# Patient Record
Sex: FEMALE | Race: ASIAN | NOT HISPANIC OR LATINO
[De-identification: names, ages, dates, MRNs, and addresses within clinical notes are randomized per-mention and may not be internally consistent; named-entity substitution may affect disease eponyms.]

---

## 2017-01-03 ENCOUNTER — APPOINTMENT (OUTPATIENT)
Dept: OBGYN | Facility: CLINIC | Age: 37
End: 2017-01-03

## 2017-01-03 VITALS
WEIGHT: 150 LBS | SYSTOLIC BLOOD PRESSURE: 120 MMHG | HEIGHT: 61 IN | DIASTOLIC BLOOD PRESSURE: 70 MMHG | BODY MASS INDEX: 28.32 KG/M2

## 2017-01-03 LAB
BILIRUB UR QL STRIP: NORMAL
GLUCOSE UR-MCNC: NORMAL
HCG UR QL: 0.2 EU/DL
HGB UR QL STRIP.AUTO: NORMAL
KETONES UR-MCNC: NORMAL
LEUKOCYTE ESTERASE UR QL STRIP: NORMAL
NITRITE UR QL STRIP: NORMAL
PH UR STRIP: 6.5
PROT UR STRIP-MCNC: NORMAL
SP GR UR STRIP: 1.01

## 2017-01-04 LAB
ALBUMIN SERPL ELPH-MCNC: 3.8 G/DL
ALP BLD-CCNC: 55 U/L
ALT SERPL-CCNC: 10 U/L
ANION GAP SERPL CALC-SCNC: 15 MMOL/L
AST SERPL-CCNC: 13 U/L
BASOPHILS # BLD AUTO: 0.01 K/UL
BASOPHILS NFR BLD AUTO: 0.1 %
BILIRUB SERPL-MCNC: <0.2 MG/DL
BUN SERPL-MCNC: 6 MG/DL
CALCIUM SERPL-MCNC: 9.2 MG/DL
CHLORIDE SERPL-SCNC: 101 MMOL/L
CO2 SERPL-SCNC: 21 MMOL/L
CREAT SERPL-MCNC: 0.52 MG/DL
EOSINOPHIL # BLD AUTO: 0.08 K/UL
EOSINOPHIL NFR BLD AUTO: 0.8 %
GLUCOSE 1H P 50 G GLC PO SERPL-MCNC: 140 MG/DL
GLUCOSE SERPL-MCNC: 143 MG/DL
HCT VFR BLD CALC: 39.4 %
HGB BLD-MCNC: 12.7 G/DL
IMM GRANULOCYTES NFR BLD AUTO: 0.3 %
LYMPHOCYTES # BLD AUTO: 2.45 K/UL
LYMPHOCYTES NFR BLD AUTO: 24.5 %
MAN DIFF?: NORMAL
MCHC RBC-ENTMCNC: 29.6 PG
MCHC RBC-ENTMCNC: 32.2 GM/DL
MCV RBC AUTO: 91.8 FL
MONOCYTES # BLD AUTO: 0.58 K/UL
MONOCYTES NFR BLD AUTO: 5.8 %
NEUTROPHILS # BLD AUTO: 6.83 K/UL
NEUTROPHILS NFR BLD AUTO: 68.5 %
PLATELET # BLD AUTO: 297 K/UL
POTASSIUM SERPL-SCNC: 4.5 MMOL/L
PROT SERPL-MCNC: 6.9 G/DL
RBC # BLD: 4.29 M/UL
RBC # FLD: 14.1 %
SODIUM SERPL-SCNC: 137 MMOL/L
WBC # FLD AUTO: 9.98 K/UL

## 2017-01-06 LAB
CREAT 24H UR-MCNC: 1.1 G/24 H
CREAT ?TM UR-MCNC: 42 MG/DL
PROT 24H UR-MRATE: <4 MG/DL
PROT ?TM UR-MCNC: 24 HR
PROT UR-MCNC: <105 MG/24 H
SPECIMEN VOL 24H UR: 2625 ML

## 2017-01-19 ENCOUNTER — APPOINTMENT (OUTPATIENT)
Dept: CARDIOLOGY | Facility: CLINIC | Age: 37
End: 2017-01-19

## 2017-01-27 ENCOUNTER — APPOINTMENT (OUTPATIENT)
Dept: ANTEPARTUM | Facility: CLINIC | Age: 37
End: 2017-01-27

## 2017-01-27 ENCOUNTER — ASOB RESULT (OUTPATIENT)
Age: 37
End: 2017-01-27

## 2017-01-30 ENCOUNTER — OUTPATIENT (OUTPATIENT)
Dept: OUTPATIENT SERVICES | Facility: HOSPITAL | Age: 37
LOS: 1 days | End: 2017-01-30

## 2017-01-30 DIAGNOSIS — O26.899 OTHER SPECIFIED PREGNANCY RELATED CONDITIONS, UNSPECIFIED TRIMESTER: ICD-10-CM

## 2017-01-30 DIAGNOSIS — Z3A.00 WEEKS OF GESTATION OF PREGNANCY NOT SPECIFIED: ICD-10-CM

## 2017-01-30 DIAGNOSIS — O03.9 COMPLETE OR UNSPECIFIED SPONTANEOUS ABORTION WITHOUT COMPLICATION: Chronic | ICD-10-CM

## 2017-02-03 LAB
GLUCOSE 1H P 100 G GLC PO SERPL-MCNC: 177 MG/DL
GLUCOSE 2H P CHAL SERPL-MCNC: 137 MG/DL
GLUCOSE 3H P CHAL SERPL-MCNC: 153 MG/DL
GLUCOSE BS SERPL-MCNC: 84 MG/DL

## 2017-02-04 ENCOUNTER — APPOINTMENT (OUTPATIENT)
Dept: OBGYN | Facility: CLINIC | Age: 37
End: 2017-02-04

## 2017-02-04 VITALS
BODY MASS INDEX: 30.21 KG/M2 | SYSTOLIC BLOOD PRESSURE: 102 MMHG | DIASTOLIC BLOOD PRESSURE: 60 MMHG | WEIGHT: 160 LBS | HEIGHT: 61 IN

## 2017-02-04 LAB
BILIRUB UR QL STRIP: NORMAL
GLUCOSE UR-MCNC: NORMAL
HCG UR QL: 0.2 EU/DL
HGB UR QL STRIP.AUTO: NORMAL
KETONES UR-MCNC: NORMAL
LEUKOCYTE ESTERASE UR QL STRIP: NORMAL
NITRITE UR QL STRIP: NORMAL
PH UR STRIP: 7
PROT UR STRIP-MCNC: NORMAL
SP GR UR STRIP: 1.01

## 2017-02-09 ENCOUNTER — APPOINTMENT (OUTPATIENT)
Dept: CARDIOLOGY | Facility: CLINIC | Age: 37
End: 2017-02-09

## 2017-02-16 ENCOUNTER — NON-APPOINTMENT (OUTPATIENT)
Age: 37
End: 2017-02-16

## 2017-02-16 ENCOUNTER — APPOINTMENT (OUTPATIENT)
Dept: CARDIOLOGY | Facility: CLINIC | Age: 37
End: 2017-02-16

## 2017-02-16 VITALS
HEART RATE: 99 BPM | RESPIRATION RATE: 15 BRPM | OXYGEN SATURATION: 97 % | SYSTOLIC BLOOD PRESSURE: 123 MMHG | DIASTOLIC BLOOD PRESSURE: 79 MMHG | HEIGHT: 61 IN | WEIGHT: 160 LBS | BODY MASS INDEX: 30.21 KG/M2

## 2017-02-17 ENCOUNTER — APPOINTMENT (OUTPATIENT)
Dept: OBGYN | Facility: CLINIC | Age: 37
End: 2017-02-17

## 2017-02-17 ENCOUNTER — MESSAGE (OUTPATIENT)
Age: 37
End: 2017-02-17

## 2017-02-17 VITALS
HEIGHT: 61 IN | SYSTOLIC BLOOD PRESSURE: 110 MMHG | WEIGHT: 162 LBS | BODY MASS INDEX: 30.58 KG/M2 | DIASTOLIC BLOOD PRESSURE: 62 MMHG

## 2017-02-17 DIAGNOSIS — O44.40 LOW LYING PLACENTA NOS OR WITHOUT HEMORRHAGE, UNSPECIFIED TRIMESTER: ICD-10-CM

## 2017-02-17 DIAGNOSIS — Z87.898 PERSONAL HISTORY OF OTHER SPECIFIED CONDITIONS: ICD-10-CM

## 2017-02-17 DIAGNOSIS — O09.92 SUPERVISION OF HIGH RISK PREGNANCY, UNSPECIFIED, SECOND TRIMESTER: ICD-10-CM

## 2017-02-17 DIAGNOSIS — Z23 ENCOUNTER FOR IMMUNIZATION: ICD-10-CM

## 2017-02-17 LAB
BILIRUB UR QL STRIP: NORMAL
CLARITY UR: CLEAR
COLLECTION METHOD: NORMAL
GLUCOSE UR-MCNC: NORMAL
HCG UR QL: 0.2 EU/DL
HGB UR QL STRIP.AUTO: NORMAL
KETONES UR-MCNC: NORMAL
LEUKOCYTE ESTERASE UR QL STRIP: NORMAL
NITRITE UR QL STRIP: NORMAL
PH UR STRIP: 7
PROT UR STRIP-MCNC: NORMAL
SP GR UR STRIP: 1.01

## 2017-02-19 PROBLEM — Z23 FLU VACCINE NEED: Status: RESOLVED | Noted: 2017-01-03 | Resolved: 2017-02-19

## 2017-02-19 PROBLEM — O09.92 HIGH-RISK PREGNANCY SUPERVISION, SECOND TRIMESTER: Status: RESOLVED | Noted: 2017-02-06 | Resolved: 2017-02-19

## 2017-02-19 PROBLEM — O44.40 LOW-LYING PLACENTA: Status: RESOLVED | Noted: 2017-01-12 | Resolved: 2017-02-19

## 2017-02-22 ENCOUNTER — APPOINTMENT (OUTPATIENT)
Dept: CV DIAGNOSITCS | Facility: HOSPITAL | Age: 37
End: 2017-02-22

## 2017-02-22 ENCOUNTER — APPOINTMENT (OUTPATIENT)
Dept: CARDIOLOGY | Facility: CLINIC | Age: 37
End: 2017-02-22

## 2017-02-22 ENCOUNTER — OUTPATIENT (OUTPATIENT)
Dept: OUTPATIENT SERVICES | Facility: HOSPITAL | Age: 37
LOS: 1 days | End: 2017-02-22

## 2017-02-22 VITALS
WEIGHT: 159 LBS | BODY MASS INDEX: 30.02 KG/M2 | OXYGEN SATURATION: 97 % | HEART RATE: 93 BPM | HEIGHT: 61 IN | DIASTOLIC BLOOD PRESSURE: 72 MMHG | SYSTOLIC BLOOD PRESSURE: 109 MMHG

## 2017-02-22 DIAGNOSIS — J45.909 UNSPECIFIED ASTHMA, UNCOMPLICATED: ICD-10-CM

## 2017-02-22 DIAGNOSIS — O24.419 GESTATIONAL DIABETES MELLITUS IN PREGNANCY, UNSPECIFIED CONTROL: ICD-10-CM

## 2017-02-22 DIAGNOSIS — R07.9 CHEST PAIN, UNSPECIFIED: ICD-10-CM

## 2017-02-22 DIAGNOSIS — O03.9 COMPLETE OR UNSPECIFIED SPONTANEOUS ABORTION WITHOUT COMPLICATION: Chronic | ICD-10-CM

## 2017-02-22 DIAGNOSIS — I50.9 HEART FAILURE, UNSPECIFIED: ICD-10-CM

## 2017-02-28 RX ORDER — LANCETS
EACH MISCELLANEOUS
Qty: 1 | Refills: 2 | Status: DISCONTINUED | COMMUNITY
Start: 2017-02-03 | End: 2017-02-28

## 2017-03-07 ENCOUNTER — APPOINTMENT (OUTPATIENT)
Dept: OBGYN | Facility: CLINIC | Age: 37
End: 2017-03-07

## 2017-03-07 VITALS
HEIGHT: 61 IN | WEIGHT: 163 LBS | DIASTOLIC BLOOD PRESSURE: 52 MMHG | SYSTOLIC BLOOD PRESSURE: 112 MMHG | BODY MASS INDEX: 30.78 KG/M2

## 2017-03-21 ENCOUNTER — APPOINTMENT (OUTPATIENT)
Dept: CV DIAGNOSITCS | Facility: HOSPITAL | Age: 37
End: 2017-03-21

## 2017-03-24 ENCOUNTER — APPOINTMENT (OUTPATIENT)
Dept: OBGYN | Facility: CLINIC | Age: 37
End: 2017-03-24

## 2017-03-24 VITALS
DIASTOLIC BLOOD PRESSURE: 70 MMHG | BODY MASS INDEX: 30.96 KG/M2 | WEIGHT: 164 LBS | HEIGHT: 61 IN | SYSTOLIC BLOOD PRESSURE: 100 MMHG

## 2017-03-24 LAB
BILIRUB UR QL STRIP: NORMAL
GLUCOSE UR-MCNC: NORMAL
HCG UR QL: 0.2 EU/DL
HGB UR QL STRIP.AUTO: NORMAL
KETONES UR-MCNC: NORMAL
LEUKOCYTE ESTERASE UR QL STRIP: NORMAL
NITRITE UR QL STRIP: NORMAL
PH UR STRIP: 6.5
PROT UR STRIP-MCNC: NORMAL
SP GR UR STRIP: 1.02

## 2017-04-07 ENCOUNTER — APPOINTMENT (OUTPATIENT)
Dept: ANTEPARTUM | Facility: CLINIC | Age: 37
End: 2017-04-07

## 2017-04-07 ENCOUNTER — ASOB RESULT (OUTPATIENT)
Age: 37
End: 2017-04-07

## 2017-04-10 RX ORDER — TERCONAZOLE 80 MG/1
80 SUPPOSITORY VAGINAL
Qty: 3 | Refills: 0 | Status: DISCONTINUED | COMMUNITY
Start: 2017-04-10 | End: 2017-04-10

## 2017-04-18 ENCOUNTER — APPOINTMENT (OUTPATIENT)
Dept: OBGYN | Facility: CLINIC | Age: 37
End: 2017-04-18

## 2017-04-18 VITALS
WEIGHT: 170 LBS | DIASTOLIC BLOOD PRESSURE: 72 MMHG | BODY MASS INDEX: 32.1 KG/M2 | HEIGHT: 61 IN | SYSTOLIC BLOOD PRESSURE: 106 MMHG

## 2017-04-18 DIAGNOSIS — O24.419 GESTATIONAL DIABETES MELLITUS IN PREGNANCY, UNSPECIFIED CONTROL: ICD-10-CM

## 2017-04-19 PROBLEM — O24.419 GESTATIONAL DIABETES MELLITUS (GDM) IN SECOND TRIMESTER, GESTATIONAL DIABETES METHOD OF CONTROL UNSPECIFIED: Status: RESOLVED | Noted: 2017-02-03 | Resolved: 2017-04-19

## 2017-04-20 LAB
BASOPHILS # BLD AUTO: 0.01 K/UL
BASOPHILS NFR BLD AUTO: 0.1 %
EOSINOPHIL # BLD AUTO: 0.05 K/UL
EOSINOPHIL NFR BLD AUTO: 0.6 %
HCT VFR BLD CALC: 41.3 %
HGB BLD-MCNC: 13.8 G/DL
HIV1+2 AB SPEC QL IA.RAPID: NONREACTIVE
IMM GRANULOCYTES NFR BLD AUTO: 0.6 %
LYMPHOCYTES # BLD AUTO: 2.12 K/UL
LYMPHOCYTES NFR BLD AUTO: 26 %
MAN DIFF?: NORMAL
MCHC RBC-ENTMCNC: 30.5 PG
MCHC RBC-ENTMCNC: 33.4 GM/DL
MCV RBC AUTO: 91.2 FL
MONOCYTES # BLD AUTO: 0.81 K/UL
MONOCYTES NFR BLD AUTO: 9.9 %
NEUTROPHILS # BLD AUTO: 5.12 K/UL
NEUTROPHILS NFR BLD AUTO: 62.8 %
PLATELET # BLD AUTO: 217 K/UL
RBC # BLD: 4.53 M/UL
RBC # FLD: 13.8 %
WBC # FLD AUTO: 8.16 K/UL

## 2017-04-21 ENCOUNTER — APPOINTMENT (OUTPATIENT)
Dept: ANTEPARTUM | Facility: CLINIC | Age: 37
End: 2017-04-21

## 2017-04-21 LAB
GP B STREP DNA SPEC QL NAA+PROBE: DETECTED
GP B STREP DNA SPEC QL NAA+PROBE: NORMAL
SOURCE GBS: NORMAL

## 2017-04-24 ENCOUNTER — APPOINTMENT (OUTPATIENT)
Dept: CARDIOLOGY | Facility: CLINIC | Age: 37
End: 2017-04-24

## 2017-04-24 ENCOUNTER — NON-APPOINTMENT (OUTPATIENT)
Age: 37
End: 2017-04-24

## 2017-04-24 VITALS
BODY MASS INDEX: 32.47 KG/M2 | HEART RATE: 101 BPM | HEIGHT: 61 IN | OXYGEN SATURATION: 97 % | WEIGHT: 172 LBS | SYSTOLIC BLOOD PRESSURE: 108 MMHG | DIASTOLIC BLOOD PRESSURE: 73 MMHG

## 2017-04-25 ENCOUNTER — APPOINTMENT (OUTPATIENT)
Dept: OBGYN | Facility: CLINIC | Age: 37
End: 2017-04-25

## 2017-04-30 ENCOUNTER — OUTPATIENT (OUTPATIENT)
Dept: OUTPATIENT SERVICES | Facility: HOSPITAL | Age: 37
LOS: 1 days | End: 2017-04-30

## 2017-04-30 DIAGNOSIS — O26.899 OTHER SPECIFIED PREGNANCY RELATED CONDITIONS, UNSPECIFIED TRIMESTER: ICD-10-CM

## 2017-04-30 DIAGNOSIS — Z3A.00 WEEKS OF GESTATION OF PREGNANCY NOT SPECIFIED: ICD-10-CM

## 2017-04-30 DIAGNOSIS — O03.9 COMPLETE OR UNSPECIFIED SPONTANEOUS ABORTION WITHOUT COMPLICATION: Chronic | ICD-10-CM

## 2017-04-30 RX ORDER — SODIUM CHLORIDE 9 MG/ML
1000 INJECTION, SOLUTION INTRAVENOUS ONCE
Qty: 0 | Refills: 0 | Status: COMPLETED | OUTPATIENT
Start: 2017-04-30 | End: 2017-04-30

## 2017-04-30 RX ADMIN — SODIUM CHLORIDE 2000 MILLILITER(S): 9 INJECTION, SOLUTION INTRAVENOUS at 19:30

## 2017-05-01 ENCOUNTER — INPATIENT (INPATIENT)
Facility: HOSPITAL | Age: 37
LOS: 4 days | Discharge: HOME | End: 2017-05-06
Attending: OBSTETRICS & GYNECOLOGY | Admitting: OBSTETRICS & GYNECOLOGY
Payer: MEDICAID

## 2017-05-01 ENCOUNTER — TRANSCRIPTION ENCOUNTER (OUTPATIENT)
Age: 37
End: 2017-05-01

## 2017-05-01 ENCOUNTER — RESULT REVIEW (OUTPATIENT)
Age: 37
End: 2017-05-01

## 2017-05-01 VITALS — WEIGHT: 171.96 LBS | HEIGHT: 61 IN

## 2017-05-01 DIAGNOSIS — Z3A.00 WEEKS OF GESTATION OF PREGNANCY NOT SPECIFIED: ICD-10-CM

## 2017-05-01 DIAGNOSIS — O26.899 OTHER SPECIFIED PREGNANCY RELATED CONDITIONS, UNSPECIFIED TRIMESTER: ICD-10-CM

## 2017-05-01 DIAGNOSIS — O03.9 COMPLETE OR UNSPECIFIED SPONTANEOUS ABORTION WITHOUT COMPLICATION: Chronic | ICD-10-CM

## 2017-05-01 LAB
HCT VFR BLD CALC: 40.4 % — SIGNIFICANT CHANGE UP (ref 34.5–45)
HGB BLD-MCNC: 13.4 G/DL — SIGNIFICANT CHANGE UP (ref 11.5–15.5)
MCHC RBC-ENTMCNC: 30.4 PG — SIGNIFICANT CHANGE UP (ref 27–34)
MCHC RBC-ENTMCNC: 33.2 % — SIGNIFICANT CHANGE UP (ref 32–36)
MCV RBC AUTO: 91.6 FL — SIGNIFICANT CHANGE UP (ref 80–100)
PLATELET # BLD AUTO: 211 K/UL — SIGNIFICANT CHANGE UP (ref 150–400)
PMV BLD: 11.1 FL — SIGNIFICANT CHANGE UP (ref 7–13)
RBC # BLD: 4.41 M/UL — SIGNIFICANT CHANGE UP (ref 3.8–5.2)
RBC # FLD: 13.4 % — SIGNIFICANT CHANGE UP (ref 10.3–14.5)
WBC # BLD: 9.69 K/UL — SIGNIFICANT CHANGE UP (ref 3.8–10.5)
WBC # FLD AUTO: 9.69 K/UL — SIGNIFICANT CHANGE UP (ref 3.8–10.5)

## 2017-05-01 RX ORDER — OXYCODONE HYDROCHLORIDE 5 MG/1
5 TABLET ORAL EVERY 4 HOURS
Qty: 0 | Refills: 0 | Status: DISCONTINUED | OUTPATIENT
Start: 2017-05-02 | End: 2017-05-06

## 2017-05-01 RX ORDER — ONDANSETRON 8 MG/1
4 TABLET, FILM COATED ORAL EVERY 6 HOURS
Qty: 0 | Refills: 0 | Status: DISCONTINUED | OUTPATIENT
Start: 2017-05-02 | End: 2017-05-03

## 2017-05-01 RX ORDER — HYDROMORPHONE HYDROCHLORIDE 2 MG/ML
0.5 INJECTION INTRAMUSCULAR; INTRAVENOUS; SUBCUTANEOUS
Qty: 0 | Refills: 0 | Status: DISCONTINUED | OUTPATIENT
Start: 2017-05-02 | End: 2017-05-03

## 2017-05-01 RX ORDER — OXYCODONE HYDROCHLORIDE 5 MG/1
10 TABLET ORAL
Qty: 0 | Refills: 0 | Status: DISCONTINUED | OUTPATIENT
Start: 2017-05-02 | End: 2017-05-03

## 2017-05-01 RX ORDER — OXYCODONE HYDROCHLORIDE 5 MG/1
5 TABLET ORAL
Qty: 0 | Refills: 0 | Status: DISCONTINUED | OUTPATIENT
Start: 2017-05-02 | End: 2017-05-03

## 2017-05-01 RX ORDER — DIPHENHYDRAMINE HCL 50 MG
25 CAPSULE ORAL EVERY 6 HOURS
Qty: 0 | Refills: 0 | Status: DISCONTINUED | OUTPATIENT
Start: 2017-05-02 | End: 2017-05-06

## 2017-05-01 RX ORDER — KETOROLAC TROMETHAMINE 30 MG/ML
30 SYRINGE (ML) INJECTION EVERY 6 HOURS
Qty: 0 | Refills: 0 | Status: DISCONTINUED | OUTPATIENT
Start: 2017-05-02 | End: 2017-05-03

## 2017-05-01 RX ORDER — HEPARIN SODIUM 5000 [USP'U]/ML
5000 INJECTION INTRAVENOUS; SUBCUTANEOUS EVERY 12 HOURS
Qty: 0 | Refills: 0 | Status: DISCONTINUED | OUTPATIENT
Start: 2017-05-02 | End: 2017-05-06

## 2017-05-01 RX ORDER — LANOLIN
1 OINTMENT (GRAM) TOPICAL
Qty: 0 | Refills: 0 | Status: DISCONTINUED | OUTPATIENT
Start: 2017-05-02 | End: 2017-05-06

## 2017-05-01 RX ORDER — FERROUS SULFATE 325(65) MG
325 TABLET ORAL DAILY
Qty: 0 | Refills: 0 | Status: DISCONTINUED | OUTPATIENT
Start: 2017-05-02 | End: 2017-05-06

## 2017-05-01 RX ORDER — OXYTOCIN 10 UNIT/ML
333.33 VIAL (ML) INJECTION
Qty: 20 | Refills: 0 | Status: COMPLETED | OUTPATIENT
Start: 2017-05-01 | End: 2017-05-02

## 2017-05-01 RX ORDER — DIPHENHYDRAMINE HCL 50 MG
25 CAPSULE ORAL EVERY 4 HOURS
Qty: 0 | Refills: 0 | Status: DISCONTINUED | OUTPATIENT
Start: 2017-05-02 | End: 2017-05-03

## 2017-05-01 RX ORDER — TETANUS TOXOID, REDUCED DIPHTHERIA TOXOID AND ACELLULAR PERTUSSIS VACCINE, ADSORBED 5; 2.5; 8; 8; 2.5 [IU]/.5ML; [IU]/.5ML; UG/.5ML; UG/.5ML; UG/.5ML
0.5 SUSPENSION INTRAMUSCULAR ONCE
Qty: 0 | Refills: 0 | Status: DISCONTINUED | OUTPATIENT
Start: 2017-05-02 | End: 2017-05-06

## 2017-05-01 RX ORDER — OXYTOCIN 10 UNIT/ML
41.67 VIAL (ML) INJECTION
Qty: 20 | Refills: 0 | Status: DISCONTINUED | OUTPATIENT
Start: 2017-05-01 | End: 2017-05-02

## 2017-05-01 RX ORDER — DOCUSATE SODIUM 100 MG
100 CAPSULE ORAL
Qty: 0 | Refills: 0 | Status: DISCONTINUED | OUTPATIENT
Start: 2017-05-02 | End: 2017-05-06

## 2017-05-01 RX ORDER — OXYCODONE HYDROCHLORIDE 5 MG/1
5 TABLET ORAL
Qty: 0 | Refills: 0 | Status: DISCONTINUED | OUTPATIENT
Start: 2017-05-02 | End: 2017-05-06

## 2017-05-01 RX ORDER — HYDROMORPHONE HYDROCHLORIDE 2 MG/ML
1 INJECTION INTRAMUSCULAR; INTRAVENOUS; SUBCUTANEOUS
Qty: 0 | Refills: 0 | Status: DISCONTINUED | OUTPATIENT
Start: 2017-05-02 | End: 2017-05-02

## 2017-05-01 RX ORDER — ACETAMINOPHEN 500 MG
975 TABLET ORAL EVERY 6 HOURS
Qty: 0 | Refills: 0 | Status: DISCONTINUED | OUTPATIENT
Start: 2017-05-02 | End: 2017-05-06

## 2017-05-01 RX ORDER — NALOXONE HYDROCHLORIDE 4 MG/.1ML
0.1 SPRAY NASAL
Qty: 0 | Refills: 0 | Status: DISCONTINUED | OUTPATIENT
Start: 2017-05-02 | End: 2017-05-03

## 2017-05-01 RX ORDER — GLYCERIN ADULT
1 SUPPOSITORY, RECTAL RECTAL AT BEDTIME
Qty: 0 | Refills: 0 | Status: DISCONTINUED | OUTPATIENT
Start: 2017-05-02 | End: 2017-05-06

## 2017-05-01 RX ORDER — SIMETHICONE 80 MG/1
80 TABLET, CHEWABLE ORAL EVERY 4 HOURS
Qty: 0 | Refills: 0 | Status: DISCONTINUED | OUTPATIENT
Start: 2017-05-02 | End: 2017-05-06

## 2017-05-01 RX ORDER — SODIUM CHLORIDE 9 MG/ML
1000 INJECTION, SOLUTION INTRAVENOUS
Qty: 0 | Refills: 0 | Status: DISCONTINUED | OUTPATIENT
Start: 2017-05-01 | End: 2017-05-02

## 2017-05-01 RX ORDER — MORPHINE SULFATE 50 MG/1
0.2 CAPSULE, EXTENDED RELEASE ORAL ONCE
Qty: 0 | Refills: 0 | Status: DISCONTINUED | OUTPATIENT
Start: 2017-05-02 | End: 2017-05-03

## 2017-05-01 RX ORDER — IBUPROFEN 200 MG
600 TABLET ORAL EVERY 6 HOURS
Qty: 0 | Refills: 0 | Status: DISCONTINUED | OUTPATIENT
Start: 2017-05-02 | End: 2017-05-06

## 2017-05-01 RX ADMIN — FAMOTIDINE 20 MILLIGRAM(S): 10 INJECTION INTRAVENOUS at 23:35

## 2017-05-01 RX ADMIN — Medication 10 MILLIGRAM(S): at 23:35

## 2017-05-01 RX ADMIN — Medication 30 MILLILITER(S): at 23:50

## 2017-05-02 LAB
ALBUMIN SERPL ELPH-MCNC: 3 G/DL — LOW (ref 3.3–5)
ALP SERPL-CCNC: 119 U/L — SIGNIFICANT CHANGE UP (ref 40–120)
ALT FLD-CCNC: 14 U/L — SIGNIFICANT CHANGE UP (ref 4–33)
APTT BLD: 29.3 SEC — SIGNIFICANT CHANGE UP (ref 27.5–37.4)
AST SERPL-CCNC: 15 U/L — SIGNIFICANT CHANGE UP (ref 4–32)
BASOPHILS # BLD AUTO: 0.01 K/UL — SIGNIFICANT CHANGE UP (ref 0–0.2)
BASOPHILS NFR BLD AUTO: 0.1 % — SIGNIFICANT CHANGE UP (ref 0–2)
BILIRUB SERPL-MCNC: < 0.2 MG/DL — LOW (ref 0.2–1.2)
BLD GP AB SCN SERPL QL: NEGATIVE — SIGNIFICANT CHANGE UP
BUN SERPL-MCNC: 8 MG/DL — SIGNIFICANT CHANGE UP (ref 7–23)
CALCIUM SERPL-MCNC: 8.9 MG/DL — SIGNIFICANT CHANGE UP (ref 8.4–10.5)
CHLORIDE SERPL-SCNC: 106 MMOL/L — SIGNIFICANT CHANGE UP (ref 98–107)
CO2 SERPL-SCNC: 21 MMOL/L — LOW (ref 22–31)
CREAT SERPL-MCNC: 0.52 MG/DL — SIGNIFICANT CHANGE UP (ref 0.5–1.3)
EOSINOPHIL # BLD AUTO: 0.1 K/UL — SIGNIFICANT CHANGE UP (ref 0–0.5)
EOSINOPHIL NFR BLD AUTO: 0.9 % — SIGNIFICANT CHANGE UP (ref 0–6)
FIBRINOGEN PPP-MCNC: 609 MG/DL — HIGH (ref 310–510)
GLUCOSE SERPL-MCNC: 100 MG/DL — HIGH (ref 70–99)
HCT VFR BLD CALC: 34.3 % — LOW (ref 34.5–45)
HCT VFR BLD CALC: 39.7 % — SIGNIFICANT CHANGE UP (ref 34.5–45)
HGB BLD-MCNC: 11.1 G/DL — LOW (ref 11.5–15.5)
HGB BLD-MCNC: 13.2 G/DL — SIGNIFICANT CHANGE UP (ref 11.5–15.5)
IMM GRANULOCYTES NFR BLD AUTO: 0.2 % — SIGNIFICANT CHANGE UP (ref 0–1.5)
INR BLD: 0.92 — SIGNIFICANT CHANGE UP (ref 0.88–1.17)
LDH SERPL L TO P-CCNC: 186 U/L — SIGNIFICANT CHANGE UP (ref 135–225)
LYMPHOCYTES # BLD AUTO: 26.7 % — SIGNIFICANT CHANGE UP (ref 13–44)
LYMPHOCYTES # BLD AUTO: 3 K/UL — SIGNIFICANT CHANGE UP (ref 1–3.3)
MCHC RBC-ENTMCNC: 29.8 PG — SIGNIFICANT CHANGE UP (ref 27–34)
MCHC RBC-ENTMCNC: 30.6 PG — SIGNIFICANT CHANGE UP (ref 27–34)
MCHC RBC-ENTMCNC: 32.4 % — SIGNIFICANT CHANGE UP (ref 32–36)
MCHC RBC-ENTMCNC: 33.2 % — SIGNIFICANT CHANGE UP (ref 32–36)
MCV RBC AUTO: 91.9 FL — SIGNIFICANT CHANGE UP (ref 80–100)
MCV RBC AUTO: 92 FL — SIGNIFICANT CHANGE UP (ref 80–100)
MONOCYTES # BLD AUTO: 0.74 K/UL — SIGNIFICANT CHANGE UP (ref 0–0.9)
MONOCYTES NFR BLD AUTO: 6.6 % — SIGNIFICANT CHANGE UP (ref 2–14)
NEUTROPHILS # BLD AUTO: 7.38 K/UL — SIGNIFICANT CHANGE UP (ref 1.8–7.4)
NEUTROPHILS NFR BLD AUTO: 65.5 % — SIGNIFICANT CHANGE UP (ref 43–77)
NT-PROBNP SERPL-SCNC: 9.24 PG/ML — SIGNIFICANT CHANGE UP
PLATELET # BLD AUTO: 190 K/UL — SIGNIFICANT CHANGE UP (ref 150–400)
PLATELET # BLD AUTO: 199 K/UL — SIGNIFICANT CHANGE UP (ref 150–400)
PMV BLD: 10.7 FL — SIGNIFICANT CHANGE UP (ref 7–13)
PMV BLD: 10.9 FL — SIGNIFICANT CHANGE UP (ref 7–13)
POTASSIUM SERPL-MCNC: 4.4 MMOL/L — SIGNIFICANT CHANGE UP (ref 3.5–5.3)
POTASSIUM SERPL-SCNC: 4.4 MMOL/L — SIGNIFICANT CHANGE UP (ref 3.5–5.3)
PROT SERPL-MCNC: 5.6 G/DL — LOW (ref 6–8.3)
PROTHROM AB SERPL-ACNC: 10.3 SEC — SIGNIFICANT CHANGE UP (ref 9.8–13.1)
RBC # BLD: 3.73 M/UL — LOW (ref 3.8–5.2)
RBC # BLD: 4.32 M/UL — SIGNIFICANT CHANGE UP (ref 3.8–5.2)
RBC # FLD: 13.4 % — SIGNIFICANT CHANGE UP (ref 10.3–14.5)
RBC # FLD: 13.6 % — SIGNIFICANT CHANGE UP (ref 10.3–14.5)
RH IG SCN BLD-IMP: POSITIVE — SIGNIFICANT CHANGE UP
SODIUM SERPL-SCNC: 141 MMOL/L — SIGNIFICANT CHANGE UP (ref 135–145)
T PALLIDUM AB TITR SER: NEGATIVE — SIGNIFICANT CHANGE UP
URATE SERPL-MCNC: 3.3 MG/DL — SIGNIFICANT CHANGE UP (ref 2.5–7)
WBC # BLD: 10.53 K/UL — HIGH (ref 3.8–10.5)
WBC # BLD: 11.25 K/UL — HIGH (ref 3.8–10.5)
WBC # FLD AUTO: 10.53 K/UL — HIGH (ref 3.8–10.5)
WBC # FLD AUTO: 11.25 K/UL — HIGH (ref 3.8–10.5)

## 2017-05-02 PROCEDURE — 93010 ELECTROCARDIOGRAM REPORT: CPT

## 2017-05-02 PROCEDURE — 71010: CPT | Mod: 26

## 2017-05-02 PROCEDURE — 88307 TISSUE EXAM BY PATHOLOGIST: CPT | Mod: 26

## 2017-05-02 PROCEDURE — 59510 CESAREAN DELIVERY: CPT | Mod: U8,GC

## 2017-05-02 RX ORDER — ALBUTEROL 90 UG/1
2 AEROSOL, METERED ORAL EVERY 6 HOURS
Qty: 0 | Refills: 0 | Status: DISCONTINUED | OUTPATIENT
Start: 2017-05-02 | End: 2017-05-06

## 2017-05-02 RX ORDER — SODIUM CHLORIDE 9 MG/ML
250 INJECTION, SOLUTION INTRAVENOUS ONCE
Qty: 0 | Refills: 0 | Status: COMPLETED | OUTPATIENT
Start: 2017-05-02 | End: 2017-05-02

## 2017-05-02 RX ORDER — METOCLOPRAMIDE HCL 10 MG
10 TABLET ORAL ONCE
Qty: 0 | Refills: 0 | Status: COMPLETED | OUTPATIENT
Start: 2017-05-02 | End: 2017-05-01

## 2017-05-02 RX ORDER — FAMOTIDINE 10 MG/ML
20 INJECTION INTRAVENOUS ONCE
Qty: 0 | Refills: 0 | Status: COMPLETED | OUTPATIENT
Start: 2017-05-02 | End: 2017-05-01

## 2017-05-02 RX ORDER — SODIUM CHLORIDE 9 MG/ML
1000 INJECTION, SOLUTION INTRAVENOUS
Qty: 0 | Refills: 0 | Status: DISCONTINUED | OUTPATIENT
Start: 2017-05-02 | End: 2017-05-03

## 2017-05-02 RX ORDER — OXYTOCIN 10 UNIT/ML
41.67 VIAL (ML) INJECTION
Qty: 20 | Refills: 0 | Status: DISCONTINUED | OUTPATIENT
Start: 2017-05-02 | End: 2017-05-02

## 2017-05-02 RX ORDER — CITRIC ACID/SODIUM CITRATE 300-500 MG
30 SOLUTION, ORAL ORAL ONCE
Qty: 0 | Refills: 0 | Status: COMPLETED | OUTPATIENT
Start: 2017-05-02 | End: 2017-05-01

## 2017-05-02 RX ADMIN — SODIUM CHLORIDE 500 MILLILITER(S): 9 INJECTION, SOLUTION INTRAVENOUS at 04:50

## 2017-05-02 RX ADMIN — HEPARIN SODIUM 5000 UNIT(S): 5000 INJECTION INTRAVENOUS; SUBCUTANEOUS at 17:53

## 2017-05-02 RX ADMIN — ONDANSETRON 4 MILLIGRAM(S): 8 TABLET, FILM COATED ORAL at 08:07

## 2017-05-02 RX ADMIN — Medication 30 MILLIGRAM(S): at 22:00

## 2017-05-02 RX ADMIN — SODIUM CHLORIDE 125 MILLILITER(S): 9 INJECTION, SOLUTION INTRAVENOUS at 11:03

## 2017-05-02 RX ADMIN — Medication 975 MILLIGRAM(S): at 14:30

## 2017-05-02 RX ADMIN — Medication 30 MILLIGRAM(S): at 14:45

## 2017-05-02 RX ADMIN — Medication 30 MILLIGRAM(S): at 08:07

## 2017-05-02 RX ADMIN — Medication 1000 MILLIUNIT(S)/MIN: at 00:46

## 2017-05-02 RX ADMIN — Medication 125 MILLIUNIT(S)/MIN: at 01:43

## 2017-05-02 RX ADMIN — Medication 30 MILLIGRAM(S): at 14:30

## 2017-05-02 RX ADMIN — HEPARIN SODIUM 5000 UNIT(S): 5000 INJECTION INTRAVENOUS; SUBCUTANEOUS at 06:31

## 2017-05-02 RX ADMIN — Medication 975 MILLIGRAM(S): at 15:00

## 2017-05-02 RX ADMIN — SODIUM CHLORIDE 125 MILLILITER(S): 9 INJECTION, SOLUTION INTRAVENOUS at 04:52

## 2017-05-02 RX ADMIN — SODIUM CHLORIDE 125 MILLILITER(S): 9 INJECTION, SOLUTION INTRAVENOUS at 00:46

## 2017-05-02 RX ADMIN — Medication 30 MILLIGRAM(S): at 21:15

## 2017-05-02 RX ADMIN — Medication 30 MILLIGRAM(S): at 08:30

## 2017-05-02 NOTE — DISCHARGE NOTE OB - PLAN OF CARE
fully recovered dr ambrose 2 weeks wound check dr ambrose 2 weeks for a wound check.   please call the office for an appointment

## 2017-05-02 NOTE — DISCHARGE NOTE OB - HOSPITAL COURSE
decreased fetal movement at 37 weeks category 2 tracing remote from delivery male 35 y/o female presented with decreased fetal movement at 37 weeks.  Pt had a primary LTCS for category 2 tracing remote from delivery male.    Cardiology was consulted and recommended no tele or CCU care.  However strict I/Os and daily weight were monitored as per Cardiology.  Patient remained asymptomatic.

## 2017-05-02 NOTE — DISCHARGE NOTE OB - MEDICATION SUMMARY - MEDICATIONS TO TAKE
I will START or STAY ON the medications listed below when I get home from the hospital:    ibuprofen 600 mg oral tablet  -- 1 tab(s) by mouth every 6 hours  -- Indication: For Mild to moderate pain as needed    Percocet 5/325 325 mg-5 mg oral tablet  -- 1 tab(s) by mouth every 6 hours, As Needed MDD:6  -- Caution federal law prohibits the transfer of this drug to any person other  than the person for whom it was prescribed.  May cause drowsiness.  Alcohol may intensify this effect.  Use care when operating dangerous machinery.  This prescription cannot be refilled.  This product contains acetaminophen.  Do not use  with any other product containing acetaminophen to prevent possible liver damage.  Using more of this medication than prescribed may cause serious breathing problems.    -- Indication: For Severe pain as needed    Prenatal Multivitamins with Folic Acid 1 mg oral tablet  -- 1 tab(s) by mouth once a day  -- Indication: For Postpartum

## 2017-05-02 NOTE — DISCHARGE NOTE OB - CARE PROVIDERS DIRECT ADDRESSES
[email or web address]

## 2017-05-02 NOTE — PROVIDER CONTACT NOTE (OTHER) - BACKGROUND
S/p c/s at 0027, , IVF in OR 1600, urine output in . Urine output for first hour was 75ml, second hour 30ml and third hour was 17ml. CBC sent at 0200.

## 2017-05-02 NOTE — DISCHARGE NOTE OB - CARE PROVIDER_API CALL
Pedro Brizuela), Obstetrics and Gynecology  [address]  [place] [address]  Phone: [phone]  Fax: [phone]

## 2017-05-02 NOTE — DISCHARGE NOTE OB - PATIENT PORTAL LINK FT
“You can access the [facility] Patient Portal, offered by [facility], by registering with the following website: [email or web address]”

## 2017-05-02 NOTE — DISCHARGE NOTE OB - CARE PLAN
Principal Discharge DX:	 delivery delivered  Goal:	fully recovered  Instructions for follow-up, activity and diet:	dr ambrose 2 weeks wound check Principal Discharge DX:	 delivery delivered  Goal:	fully recovered  Instructions for follow-up, activity and diet:	dr ambrose 2 weeks for a wound check.   please call the office for an appointment

## 2017-05-02 NOTE — PROVIDER CONTACT NOTE (OTHER) - SITUATION
S/P Primary c/s for Cat 2 tracing, Oligo, decreased fetal movement. patient urine output should be 38ml.

## 2017-05-02 NOTE — DISCHARGE NOTE OB - MEDICATION SUMMARY - MEDICATIONS TO STOP TAKING
I will STOP taking the medications listed below when I get home from the hospital:    doxycycline monohydrate 100 mg oral capsule  -- 1 cap(s) by mouth every 12 hours    methylergonovine 0.2 mg oral tablet  -- 1 tab(s) by mouth every 4 hours

## 2017-05-03 ENCOUNTER — TRANSCRIPTION ENCOUNTER (OUTPATIENT)
Age: 37
End: 2017-05-03

## 2017-05-03 PROCEDURE — 93306 TTE W/DOPPLER COMPLETE: CPT | Mod: 26

## 2017-05-03 PROCEDURE — 99233 SBSQ HOSP IP/OBS HIGH 50: CPT

## 2017-05-03 RX ORDER — FUROSEMIDE 40 MG
20 TABLET ORAL ONCE
Qty: 0 | Refills: 0 | Status: COMPLETED | OUTPATIENT
Start: 2017-05-03 | End: 2017-05-03

## 2017-05-03 RX ADMIN — HEPARIN SODIUM 5000 UNIT(S): 5000 INJECTION INTRAVENOUS; SUBCUTANEOUS at 06:25

## 2017-05-03 RX ADMIN — Medication 600 MILLIGRAM(S): at 21:30

## 2017-05-03 RX ADMIN — Medication 600 MILLIGRAM(S): at 06:25

## 2017-05-03 RX ADMIN — OXYCODONE HYDROCHLORIDE 5 MILLIGRAM(S): 5 TABLET ORAL at 18:15

## 2017-05-03 RX ADMIN — Medication 1 TABLET(S): at 10:26

## 2017-05-03 RX ADMIN — Medication 20 MILLIGRAM(S): at 15:51

## 2017-05-03 RX ADMIN — Medication 975 MILLIGRAM(S): at 18:45

## 2017-05-03 RX ADMIN — Medication 600 MILLIGRAM(S): at 20:58

## 2017-05-03 RX ADMIN — OXYCODONE HYDROCHLORIDE 5 MILLIGRAM(S): 5 TABLET ORAL at 10:25

## 2017-05-03 RX ADMIN — HEPARIN SODIUM 5000 UNIT(S): 5000 INJECTION INTRAVENOUS; SUBCUTANEOUS at 18:15

## 2017-05-03 RX ADMIN — Medication 975 MILLIGRAM(S): at 10:25

## 2017-05-03 RX ADMIN — Medication 975 MILLIGRAM(S): at 10:55

## 2017-05-03 RX ADMIN — Medication 975 MILLIGRAM(S): at 04:30

## 2017-05-03 RX ADMIN — Medication 325 MILLIGRAM(S): at 13:27

## 2017-05-03 RX ADMIN — OXYCODONE HYDROCHLORIDE 5 MILLIGRAM(S): 5 TABLET ORAL at 18:45

## 2017-05-03 RX ADMIN — OXYCODONE HYDROCHLORIDE 5 MILLIGRAM(S): 5 TABLET ORAL at 03:48

## 2017-05-03 RX ADMIN — OXYCODONE HYDROCHLORIDE 5 MILLIGRAM(S): 5 TABLET ORAL at 10:55

## 2017-05-03 RX ADMIN — Medication 975 MILLIGRAM(S): at 18:15

## 2017-05-03 RX ADMIN — Medication 600 MILLIGRAM(S): at 04:30

## 2017-05-03 RX ADMIN — Medication 975 MILLIGRAM(S): at 03:48

## 2017-05-03 RX ADMIN — Medication 600 MILLIGRAM(S): at 13:28

## 2017-05-03 RX ADMIN — Medication 600 MILLIGRAM(S): at 13:58

## 2017-05-04 LAB
ALBUMIN SERPL ELPH-MCNC: 3 G/DL — LOW (ref 3.3–5)
ALP SERPL-CCNC: 97 U/L — SIGNIFICANT CHANGE UP (ref 40–120)
ALT FLD-CCNC: 15 U/L — SIGNIFICANT CHANGE UP (ref 4–33)
AST SERPL-CCNC: 16 U/L — SIGNIFICANT CHANGE UP (ref 4–32)
BILIRUB SERPL-MCNC: 0.2 MG/DL — SIGNIFICANT CHANGE UP (ref 0.2–1.2)
BUN SERPL-MCNC: 5 MG/DL — LOW (ref 7–23)
CALCIUM SERPL-MCNC: 8.5 MG/DL — SIGNIFICANT CHANGE UP (ref 8.4–10.5)
CHLORIDE SERPL-SCNC: 104 MMOL/L — SIGNIFICANT CHANGE UP (ref 98–107)
CO2 SERPL-SCNC: 25 MMOL/L — SIGNIFICANT CHANGE UP (ref 22–31)
CREAT SERPL-MCNC: 0.52 MG/DL — SIGNIFICANT CHANGE UP (ref 0.5–1.3)
GLUCOSE SERPL-MCNC: 158 MG/DL — HIGH (ref 70–99)
POTASSIUM SERPL-MCNC: 3.9 MMOL/L — SIGNIFICANT CHANGE UP (ref 3.5–5.3)
POTASSIUM SERPL-SCNC: 3.9 MMOL/L — SIGNIFICANT CHANGE UP (ref 3.5–5.3)
PROT SERPL-MCNC: 5.7 G/DL — LOW (ref 6–8.3)
SODIUM SERPL-SCNC: 141 MMOL/L — SIGNIFICANT CHANGE UP (ref 135–145)

## 2017-05-04 RX ADMIN — Medication 600 MILLIGRAM(S): at 07:09

## 2017-05-04 RX ADMIN — Medication 975 MILLIGRAM(S): at 13:00

## 2017-05-04 RX ADMIN — Medication 325 MILLIGRAM(S): at 12:07

## 2017-05-04 RX ADMIN — OXYCODONE HYDROCHLORIDE 5 MILLIGRAM(S): 5 TABLET ORAL at 13:00

## 2017-05-04 RX ADMIN — Medication 1 TABLET(S): at 12:07

## 2017-05-04 RX ADMIN — Medication 975 MILLIGRAM(S): at 01:25

## 2017-05-04 RX ADMIN — HEPARIN SODIUM 5000 UNIT(S): 5000 INJECTION INTRAVENOUS; SUBCUTANEOUS at 06:35

## 2017-05-04 RX ADMIN — Medication 600 MILLIGRAM(S): at 18:47

## 2017-05-04 RX ADMIN — HEPARIN SODIUM 5000 UNIT(S): 5000 INJECTION INTRAVENOUS; SUBCUTANEOUS at 18:47

## 2017-05-04 RX ADMIN — OXYCODONE HYDROCHLORIDE 5 MILLIGRAM(S): 5 TABLET ORAL at 19:33

## 2017-05-04 RX ADMIN — OXYCODONE HYDROCHLORIDE 5 MILLIGRAM(S): 5 TABLET ORAL at 07:09

## 2017-05-04 RX ADMIN — Medication 975 MILLIGRAM(S): at 19:30

## 2017-05-04 RX ADMIN — Medication 975 MILLIGRAM(S): at 02:15

## 2017-05-04 RX ADMIN — OXYCODONE HYDROCHLORIDE 5 MILLIGRAM(S): 5 TABLET ORAL at 12:07

## 2017-05-04 RX ADMIN — OXYCODONE HYDROCHLORIDE 5 MILLIGRAM(S): 5 TABLET ORAL at 02:15

## 2017-05-04 RX ADMIN — Medication 975 MILLIGRAM(S): at 12:05

## 2017-05-04 RX ADMIN — Medication 600 MILLIGRAM(S): at 06:34

## 2017-05-04 RX ADMIN — Medication 600 MILLIGRAM(S): at 19:30

## 2017-05-04 RX ADMIN — OXYCODONE HYDROCHLORIDE 5 MILLIGRAM(S): 5 TABLET ORAL at 01:24

## 2017-05-04 RX ADMIN — OXYCODONE HYDROCHLORIDE 5 MILLIGRAM(S): 5 TABLET ORAL at 06:34

## 2017-05-04 RX ADMIN — Medication 100 MILLIGRAM(S): at 01:24

## 2017-05-04 RX ADMIN — Medication 975 MILLIGRAM(S): at 18:47

## 2017-05-04 RX ADMIN — OXYCODONE HYDROCHLORIDE 5 MILLIGRAM(S): 5 TABLET ORAL at 20:28

## 2017-05-05 ENCOUNTER — APPOINTMENT (OUTPATIENT)
Dept: ANTEPARTUM | Facility: CLINIC | Age: 37
End: 2017-05-05

## 2017-05-05 PROCEDURE — 93970 EXTREMITY STUDY: CPT | Mod: 26

## 2017-05-05 RX ADMIN — OXYCODONE HYDROCHLORIDE 5 MILLIGRAM(S): 5 TABLET ORAL at 16:30

## 2017-05-05 RX ADMIN — HEPARIN SODIUM 5000 UNIT(S): 5000 INJECTION INTRAVENOUS; SUBCUTANEOUS at 06:07

## 2017-05-05 RX ADMIN — OXYCODONE HYDROCHLORIDE 5 MILLIGRAM(S): 5 TABLET ORAL at 20:59

## 2017-05-05 RX ADMIN — Medication 975 MILLIGRAM(S): at 09:56

## 2017-05-05 RX ADMIN — OXYCODONE HYDROCHLORIDE 5 MILLIGRAM(S): 5 TABLET ORAL at 16:05

## 2017-05-05 RX ADMIN — Medication 975 MILLIGRAM(S): at 08:58

## 2017-05-05 RX ADMIN — Medication 975 MILLIGRAM(S): at 16:30

## 2017-05-05 RX ADMIN — OXYCODONE HYDROCHLORIDE 5 MILLIGRAM(S): 5 TABLET ORAL at 21:30

## 2017-05-05 RX ADMIN — HEPARIN SODIUM 5000 UNIT(S): 5000 INJECTION INTRAVENOUS; SUBCUTANEOUS at 17:32

## 2017-05-05 RX ADMIN — Medication 975 MILLIGRAM(S): at 16:05

## 2017-05-05 RX ADMIN — Medication 975 MILLIGRAM(S): at 21:00

## 2017-05-05 RX ADMIN — OXYCODONE HYDROCHLORIDE 5 MILLIGRAM(S): 5 TABLET ORAL at 07:43

## 2017-05-05 RX ADMIN — Medication 100 MILLIGRAM(S): at 08:57

## 2017-05-05 RX ADMIN — Medication 975 MILLIGRAM(S): at 02:43

## 2017-05-05 RX ADMIN — Medication 100 MILLIGRAM(S): at 02:43

## 2017-05-05 RX ADMIN — OXYCODONE HYDROCHLORIDE 5 MILLIGRAM(S): 5 TABLET ORAL at 08:15

## 2017-05-05 RX ADMIN — Medication 600 MILLIGRAM(S): at 07:00

## 2017-05-05 RX ADMIN — Medication 975 MILLIGRAM(S): at 21:30

## 2017-05-05 RX ADMIN — Medication 600 MILLIGRAM(S): at 08:15

## 2017-05-05 RX ADMIN — Medication 600 MILLIGRAM(S): at 21:30

## 2017-05-05 RX ADMIN — Medication 1 TABLET(S): at 12:07

## 2017-05-05 RX ADMIN — Medication 600 MILLIGRAM(S): at 07:45

## 2017-05-05 RX ADMIN — Medication 600 MILLIGRAM(S): at 21:00

## 2017-05-05 RX ADMIN — OXYCODONE HYDROCHLORIDE 5 MILLIGRAM(S): 5 TABLET ORAL at 02:40

## 2017-05-05 RX ADMIN — Medication 325 MILLIGRAM(S): at 12:07

## 2017-05-05 RX ADMIN — OXYCODONE HYDROCHLORIDE 5 MILLIGRAM(S): 5 TABLET ORAL at 03:45

## 2017-05-05 RX ADMIN — Medication 975 MILLIGRAM(S): at 03:45

## 2017-05-06 ENCOUNTER — APPOINTMENT (OUTPATIENT)
Dept: OBGYN | Facility: CLINIC | Age: 37
End: 2017-05-06

## 2017-05-06 VITALS
SYSTOLIC BLOOD PRESSURE: 134 MMHG | HEART RATE: 71 BPM | RESPIRATION RATE: 18 BRPM | DIASTOLIC BLOOD PRESSURE: 87 MMHG | OXYGEN SATURATION: 100 % | TEMPERATURE: 98 F

## 2017-05-06 RX ORDER — IBUPROFEN 200 MG
1 TABLET ORAL
Qty: 0 | Refills: 0 | COMMUNITY
Start: 2017-05-06

## 2017-05-06 RX ADMIN — OXYCODONE HYDROCHLORIDE 5 MILLIGRAM(S): 5 TABLET ORAL at 17:26

## 2017-05-06 RX ADMIN — Medication 975 MILLIGRAM(S): at 05:42

## 2017-05-06 RX ADMIN — Medication 975 MILLIGRAM(S): at 06:37

## 2017-05-06 RX ADMIN — Medication 100 MILLIGRAM(S): at 05:42

## 2017-05-06 RX ADMIN — HEPARIN SODIUM 5000 UNIT(S): 5000 INJECTION INTRAVENOUS; SUBCUTANEOUS at 05:42

## 2017-05-06 RX ADMIN — Medication 600 MILLIGRAM(S): at 05:43

## 2017-05-06 RX ADMIN — Medication 600 MILLIGRAM(S): at 06:38

## 2017-05-08 ENCOUNTER — MEDICATION RENEWAL (OUTPATIENT)
Age: 37
End: 2017-05-08

## 2017-05-09 ENCOUNTER — APPOINTMENT (OUTPATIENT)
Dept: OBGYN | Facility: CLINIC | Age: 37
End: 2017-05-09

## 2017-05-10 ENCOUNTER — APPOINTMENT (OUTPATIENT)
Dept: CARDIOLOGY | Facility: CLINIC | Age: 37
End: 2017-05-10

## 2017-05-10 ENCOUNTER — NON-APPOINTMENT (OUTPATIENT)
Age: 37
End: 2017-05-10

## 2017-05-10 VITALS
SYSTOLIC BLOOD PRESSURE: 148 MMHG | HEART RATE: 61 BPM | WEIGHT: 154 LBS | HEIGHT: 61 IN | DIASTOLIC BLOOD PRESSURE: 97 MMHG | OXYGEN SATURATION: 100 % | BODY MASS INDEX: 29.07 KG/M2 | RESPIRATION RATE: 16 BRPM

## 2017-05-10 LAB — SURGICAL PATHOLOGY STUDY: SIGNIFICANT CHANGE UP

## 2017-05-11 ENCOUNTER — RESULT REVIEW (OUTPATIENT)
Age: 37
End: 2017-05-11

## 2017-05-11 LAB
ANION GAP SERPL CALC-SCNC: 22 MMOL/L
BUN SERPL-MCNC: 14 MG/DL
CALCIUM SERPL-MCNC: 10.3 MG/DL
CHLORIDE SERPL-SCNC: 99 MMOL/L
CO2 SERPL-SCNC: 21 MMOL/L
CREAT SERPL-MCNC: 0.7 MG/DL
GLUCOSE SERPL-MCNC: 48 MG/DL
NT-PROBNP SERPL-MCNC: 153 PG/ML
POTASSIUM SERPL-SCNC: 3.8 MMOL/L
SODIUM SERPL-SCNC: 142 MMOL/L

## 2017-06-06 ENCOUNTER — APPOINTMENT (OUTPATIENT)
Dept: OBGYN | Facility: CLINIC | Age: 37
End: 2017-06-06

## 2017-06-06 VITALS
DIASTOLIC BLOOD PRESSURE: 80 MMHG | WEIGHT: 150 LBS | BODY MASS INDEX: 28.32 KG/M2 | SYSTOLIC BLOOD PRESSURE: 120 MMHG | HEIGHT: 61 IN

## 2017-06-06 DIAGNOSIS — M54.31 SCIATICA, RIGHT SIDE: ICD-10-CM

## 2017-06-06 DIAGNOSIS — L29.8 OTHER PRURITUS: ICD-10-CM

## 2017-06-06 DIAGNOSIS — M54.32 SCIATICA, RIGHT SIDE: ICD-10-CM

## 2017-06-06 DIAGNOSIS — O09.90 SUPERVISION OF HIGH RISK PREGNANCY, UNSPECIFIED, UNSPECIFIED TRIMESTER: ICD-10-CM

## 2017-06-06 DIAGNOSIS — Z23 ENCOUNTER FOR IMMUNIZATION: ICD-10-CM

## 2017-06-06 DIAGNOSIS — O24.419 GESTATIONAL DIABETES MELLITUS IN PREGNANCY, UNSPECIFIED CONTROL: ICD-10-CM

## 2017-06-06 DIAGNOSIS — Z87.898 PERSONAL HISTORY OF OTHER SPECIFIED CONDITIONS: ICD-10-CM

## 2017-06-08 PROBLEM — M54.31 BILATERAL SCIATICA: Status: RESOLVED | Noted: 2017-03-10 | Resolved: 2017-06-08

## 2017-06-08 PROBLEM — Z23 NEED FOR TDAP VACCINATION: Status: RESOLVED | Noted: 2017-04-18 | Resolved: 2017-06-08

## 2017-06-08 PROBLEM — O24.419 GESTATIONAL DIABETES MELLITUS (GDM) IN THIRD TRIMESTER: Status: RESOLVED | Noted: 2017-04-19 | Resolved: 2017-06-08

## 2017-06-08 PROBLEM — L29.8 VAGINAL ITCHING: Status: RESOLVED | Noted: 2017-04-10 | Resolved: 2017-06-08

## 2017-06-08 RX ORDER — BLOOD-GLUCOSE METER
W/DEVICE EACH MISCELLANEOUS
Qty: 1 | Refills: 0 | Status: DISCONTINUED | COMMUNITY
Start: 2017-02-03 | End: 2017-06-08

## 2017-06-08 RX ORDER — GLYBURIDE 2.5 MG/1
2.5 TABLET ORAL DAILY
Qty: 30 | Refills: 0 | Status: COMPLETED | COMMUNITY
Start: 2017-03-24 | End: 2017-06-08

## 2017-06-08 RX ORDER — TERCONAZOLE 8 MG/G
0.8 CREAM VAGINAL
Qty: 1 | Refills: 0 | Status: COMPLETED | COMMUNITY
Start: 2017-04-10 | End: 2017-06-08

## 2017-06-08 RX ORDER — LANCETS
EACH MISCELLANEOUS
Qty: 1 | Refills: 3 | Status: DISCONTINUED | COMMUNITY
Start: 2017-02-04 | End: 2017-06-08

## 2017-06-08 RX ORDER — BLOOD SUGAR DIAGNOSTIC
STRIP MISCELLANEOUS 4 TIMES DAILY
Qty: 1 | Refills: 2 | Status: DISCONTINUED | COMMUNITY
Start: 2017-02-03 | End: 2017-06-08

## 2017-06-08 RX ORDER — HYDRALAZINE HYDROCHLORIDE 10 MG/1
10 TABLET ORAL EVERY 8 HOURS
Qty: 90 | Refills: 3 | Status: COMPLETED | COMMUNITY
Start: 2017-05-10 | End: 2017-06-08

## 2017-06-08 RX ORDER — LANCETS 33 GAUGE
EACH MISCELLANEOUS
Qty: 1 | Refills: 4 | Status: DISCONTINUED | COMMUNITY
Start: 2017-02-28 | End: 2017-06-08

## 2017-06-23 ENCOUNTER — APPOINTMENT (OUTPATIENT)
Dept: OBGYN | Facility: CLINIC | Age: 37
End: 2017-06-23

## 2017-08-18 ENCOUNTER — APPOINTMENT (OUTPATIENT)
Dept: OBGYN | Facility: CLINIC | Age: 37
End: 2017-08-18

## 2017-09-26 ENCOUNTER — APPOINTMENT (OUTPATIENT)
Dept: FAMILY MEDICINE | Facility: CLINIC | Age: 37
End: 2017-09-26
Payer: MEDICAID

## 2017-09-26 VITALS
DIASTOLIC BLOOD PRESSURE: 86 MMHG | WEIGHT: 158 LBS | HEART RATE: 70 BPM | BODY MASS INDEX: 29.83 KG/M2 | SYSTOLIC BLOOD PRESSURE: 123 MMHG | HEIGHT: 61 IN | TEMPERATURE: 98.4 F

## 2017-09-26 PROCEDURE — 93000 ELECTROCARDIOGRAM COMPLETE: CPT | Mod: 59

## 2017-09-26 PROCEDURE — 99214 OFFICE O/P EST MOD 30 MIN: CPT | Mod: 25

## 2017-09-26 RX ORDER — FUROSEMIDE 20 MG/1
20 TABLET ORAL DAILY
Qty: 30 | Refills: 6 | Status: COMPLETED | COMMUNITY
Start: 2017-05-08 | End: 2017-09-26

## 2017-09-26 RX ORDER — TERCONAZOLE 8 MG/G
0.8 CREAM VAGINAL
Qty: 1 | Refills: 0 | Status: COMPLETED | COMMUNITY
Start: 2017-07-18 | End: 2017-09-26

## 2017-09-28 ENCOUNTER — APPOINTMENT (OUTPATIENT)
Dept: FAMILY MEDICINE | Facility: CLINIC | Age: 37
End: 2017-09-28

## 2017-10-09 ENCOUNTER — FORM ENCOUNTER (OUTPATIENT)
Age: 37
End: 2017-10-09

## 2017-10-10 ENCOUNTER — APPOINTMENT (OUTPATIENT)
Dept: RADIOLOGY | Facility: HOSPITAL | Age: 37
End: 2017-10-10
Payer: MEDICAID

## 2017-10-10 ENCOUNTER — OUTPATIENT (OUTPATIENT)
Dept: OUTPATIENT SERVICES | Facility: HOSPITAL | Age: 37
LOS: 1 days | End: 2017-10-10
Payer: MEDICAID

## 2017-10-10 DIAGNOSIS — O03.9 COMPLETE OR UNSPECIFIED SPONTANEOUS ABORTION WITHOUT COMPLICATION: Chronic | ICD-10-CM

## 2017-10-10 PROCEDURE — 71046 X-RAY EXAM CHEST 2 VIEWS: CPT

## 2017-10-10 PROCEDURE — 71020: CPT | Mod: 26

## 2017-10-22 ENCOUNTER — FORM ENCOUNTER (OUTPATIENT)
Age: 37
End: 2017-10-22

## 2017-10-23 ENCOUNTER — OUTPATIENT (OUTPATIENT)
Dept: OUTPATIENT SERVICES | Facility: HOSPITAL | Age: 37
LOS: 1 days | End: 2017-10-23
Payer: MEDICAID

## 2017-10-23 ENCOUNTER — APPOINTMENT (OUTPATIENT)
Dept: CT IMAGING | Facility: HOSPITAL | Age: 37
End: 2017-10-23
Payer: MEDICAID

## 2017-10-23 DIAGNOSIS — O03.9 COMPLETE OR UNSPECIFIED SPONTANEOUS ABORTION WITHOUT COMPLICATION: Chronic | ICD-10-CM

## 2017-10-23 PROCEDURE — 71250 CT THORAX DX C-: CPT

## 2017-10-23 PROCEDURE — 71250 CT THORAX DX C-: CPT | Mod: 26

## 2017-11-09 ENCOUNTER — LABORATORY RESULT (OUTPATIENT)
Age: 37
End: 2017-11-09

## 2017-11-10 ENCOUNTER — APPOINTMENT (OUTPATIENT)
Dept: OBGYN | Facility: CLINIC | Age: 37
End: 2017-11-10
Payer: MEDICAID

## 2017-11-10 VITALS
DIASTOLIC BLOOD PRESSURE: 70 MMHG | HEIGHT: 61 IN | WEIGHT: 158 LBS | BODY MASS INDEX: 29.83 KG/M2 | SYSTOLIC BLOOD PRESSURE: 122 MMHG

## 2017-11-10 DIAGNOSIS — R07.89 OTHER CHEST PAIN: ICD-10-CM

## 2017-11-10 DIAGNOSIS — Z86.79 PERSONAL HISTORY OF OTHER DISEASES OF THE CIRCULATORY SYSTEM: ICD-10-CM

## 2017-11-10 DIAGNOSIS — B37.9 CANDIDIASIS, UNSPECIFIED: ICD-10-CM

## 2017-11-10 LAB
BILIRUB UR QL STRIP: NORMAL
GLUCOSE UR-MCNC: 100
HCG UR QL: 0.2 EU/DL
HCG UR QL: POSITIVE
HGB UR QL STRIP.AUTO: NORMAL
KETONES UR-MCNC: NORMAL
LEUKOCYTE ESTERASE UR QL STRIP: NORMAL
NITRITE UR QL STRIP: NORMAL
PH UR STRIP: 7
PROT UR STRIP-MCNC: NORMAL
QUALITY CONTROL: YES
SP GR UR STRIP: 1.01

## 2017-11-10 PROCEDURE — 81025 URINE PREGNANCY TEST: CPT

## 2017-11-10 PROCEDURE — 99213 OFFICE O/P EST LOW 20 MIN: CPT | Mod: 25

## 2017-11-10 PROCEDURE — 76817 TRANSVAGINAL US OBSTETRIC: CPT

## 2017-11-10 PROCEDURE — 81003 URINALYSIS AUTO W/O SCOPE: CPT | Mod: QW

## 2017-11-10 PROCEDURE — 36415 COLL VENOUS BLD VENIPUNCTURE: CPT

## 2017-11-11 LAB
HCG SERPL-MCNC: NORMAL MIU/ML
PROGEST SERPL-MCNC: 14.1 NG/ML

## 2017-11-12 PROBLEM — R07.89 CHEST PAIN, ATYPICAL: Status: RESOLVED | Noted: 2017-09-26 | Resolved: 2017-11-12

## 2017-11-12 PROBLEM — Z86.79 HISTORY OF HYPERTENSION: Status: RESOLVED | Noted: 2017-05-10 | Resolved: 2017-11-12

## 2017-11-12 PROBLEM — B37.9 YEAST INFECTION: Status: RESOLVED | Noted: 2017-07-18 | Resolved: 2017-11-12

## 2017-11-12 LAB
C TRACH RRNA SPEC QL NAA+PROBE: NOT DETECTED
N GONORRHOEA RRNA SPEC QL NAA+PROBE: NOT DETECTED
SOURCE AMPLIFICATION: NORMAL

## 2017-11-13 LAB
ABO + RH PNL BLD: NORMAL
BLD GP AB SCN SERPL QL: NORMAL

## 2017-11-14 LAB
SOURCE AMPLIFICATION: NORMAL
T VAGINALIS RRNA SPEC QL NAA+PROBE: NOT DETECTED

## 2017-11-15 LAB
BASOPHILS # BLD AUTO: 0.02 K/UL
BASOPHILS NFR BLD AUTO: 0.2 %
EOSINOPHIL # BLD AUTO: 0.08 K/UL
EOSINOPHIL NFR BLD AUTO: 0.9 %
HCT VFR BLD CALC: 39.3 %
HGB BLD-MCNC: 13.2 G/DL
IMM GRANULOCYTES NFR BLD AUTO: 0.2 %
LYMPHOCYTES # BLD AUTO: 2.31 K/UL
LYMPHOCYTES NFR BLD AUTO: 25.5 %
MAN DIFF?: NORMAL
MCHC RBC-ENTMCNC: 30.1 PG
MCHC RBC-ENTMCNC: 33.6 GM/DL
MCV RBC AUTO: 89.7 FL
MONOCYTES # BLD AUTO: 0.64 K/UL
MONOCYTES NFR BLD AUTO: 7.1 %
NEUTROPHILS # BLD AUTO: 6 K/UL
NEUTROPHILS NFR BLD AUTO: 66.1 %
PLATELET # BLD AUTO: 338 K/UL
RBC # BLD: 4.38 M/UL
RBC # FLD: 13 %
WBC # FLD AUTO: 9.07 K/UL

## 2017-11-16 ENCOUNTER — APPOINTMENT (OUTPATIENT)
Dept: CARDIOLOGY | Facility: CLINIC | Age: 37
End: 2017-11-16

## 2017-11-22 ENCOUNTER — APPOINTMENT (OUTPATIENT)
Dept: PULMONOLOGY | Facility: CLINIC | Age: 37
End: 2017-11-22
Payer: MEDICAID

## 2017-11-22 VITALS
SYSTOLIC BLOOD PRESSURE: 120 MMHG | OXYGEN SATURATION: 98 % | HEIGHT: 61 IN | DIASTOLIC BLOOD PRESSURE: 70 MMHG | BODY MASS INDEX: 29.64 KG/M2 | HEART RATE: 80 BPM | WEIGHT: 157 LBS

## 2017-11-22 DIAGNOSIS — R93.8 ABNORMAL FINDINGS ON DIAGNOSTIC IMAGING OF OTHER SPECIFIED BODY STRUCTURES: ICD-10-CM

## 2017-11-22 PROCEDURE — 99205 OFFICE O/P NEW HI 60 MIN: CPT

## 2017-11-27 ENCOUNTER — OUTPATIENT (OUTPATIENT)
Dept: OUTPATIENT SERVICES | Facility: HOSPITAL | Age: 37
LOS: 1 days | End: 2017-11-27
Payer: MEDICAID

## 2017-11-27 ENCOUNTER — LABORATORY RESULT (OUTPATIENT)
Age: 37
End: 2017-11-27

## 2017-11-27 ENCOUNTER — APPOINTMENT (OUTPATIENT)
Dept: OBGYN | Facility: CLINIC | Age: 37
End: 2017-11-27
Payer: MEDICAID

## 2017-11-27 VITALS
DIASTOLIC BLOOD PRESSURE: 75 MMHG | HEART RATE: 97 BPM | SYSTOLIC BLOOD PRESSURE: 123 MMHG | TEMPERATURE: 99 F | OXYGEN SATURATION: 98 % | WEIGHT: 156.09 LBS | HEIGHT: 61 IN | RESPIRATION RATE: 16 BRPM

## 2017-11-27 VITALS — SYSTOLIC BLOOD PRESSURE: 130 MMHG | WEIGHT: 158 LBS | BODY MASS INDEX: 29.85 KG/M2 | DIASTOLIC BLOOD PRESSURE: 80 MMHG

## 2017-11-27 DIAGNOSIS — Z01.818 ENCOUNTER FOR OTHER PREPROCEDURAL EXAMINATION: ICD-10-CM

## 2017-11-27 DIAGNOSIS — Z90.89 ACQUIRED ABSENCE OF OTHER ORGANS: Chronic | ICD-10-CM

## 2017-11-27 DIAGNOSIS — N76.0 ACUTE VAGINITIS: ICD-10-CM

## 2017-11-27 DIAGNOSIS — O03.9 COMPLETE OR UNSPECIFIED SPONTANEOUS ABORTION WITHOUT COMPLICATION: Chronic | ICD-10-CM

## 2017-11-27 DIAGNOSIS — Z33.2 ENCOUNTER FOR ELECTIVE TERMINATION OF PREGNANCY: ICD-10-CM

## 2017-11-27 DIAGNOSIS — Z98.891 HISTORY OF UTERINE SCAR FROM PREVIOUS SURGERY: Chronic | ICD-10-CM

## 2017-11-27 PROCEDURE — 99215 OFFICE O/P EST HI 40 MIN: CPT | Mod: GC

## 2017-11-27 PROCEDURE — 86850 RBC ANTIBODY SCREEN: CPT

## 2017-11-27 PROCEDURE — G0463: CPT

## 2017-11-27 PROCEDURE — 86900 BLOOD TYPING SEROLOGIC ABO: CPT

## 2017-11-27 PROCEDURE — 86901 BLOOD TYPING SEROLOGIC RH(D): CPT

## 2017-11-27 PROCEDURE — 85027 COMPLETE CBC AUTOMATED: CPT

## 2017-11-27 NOTE — H&P PST ADULT - PMH
Asthma    Encounter for elective termination of pregnancy    Non-rheumatic mitral regurgitation Acute congestive heart failure, unspecified congestive heart failure type  during pregnancy   Asthma    Encounter for elective termination of pregnancy    Gestational diabetes mellitus (GDM), antepartum, gestational diabetes method of control unspecified    Missed   2015  Non-rheumatic mitral regurgitation    Preeclampsia in postpartum period

## 2017-11-28 ENCOUNTER — APPOINTMENT (OUTPATIENT)
Dept: OBGYN | Facility: CLINIC | Age: 37
End: 2017-11-28

## 2017-12-04 ENCOUNTER — NON-APPOINTMENT (OUTPATIENT)
Age: 37
End: 2017-12-04

## 2017-12-04 ENCOUNTER — APPOINTMENT (OUTPATIENT)
Dept: CARDIOLOGY | Facility: CLINIC | Age: 37
End: 2017-12-04
Payer: MEDICAID

## 2017-12-04 VITALS
WEIGHT: 158 LBS | OXYGEN SATURATION: 99 % | DIASTOLIC BLOOD PRESSURE: 68 MMHG | BODY MASS INDEX: 29.83 KG/M2 | HEIGHT: 61 IN | SYSTOLIC BLOOD PRESSURE: 106 MMHG | RESPIRATION RATE: 16 BRPM | HEART RATE: 87 BPM

## 2017-12-04 PROCEDURE — 93000 ELECTROCARDIOGRAM COMPLETE: CPT

## 2017-12-04 PROCEDURE — 99214 OFFICE O/P EST MOD 30 MIN: CPT

## 2017-12-05 ENCOUNTER — APPOINTMENT (OUTPATIENT)
Dept: OBGYN | Facility: CLINIC | Age: 37
End: 2017-12-05

## 2017-12-05 ENCOUNTER — OUTPATIENT (OUTPATIENT)
Dept: OUTPATIENT SERVICES | Facility: HOSPITAL | Age: 37
LOS: 1 days | End: 2017-12-05
Payer: MEDICAID

## 2017-12-05 VITALS
TEMPERATURE: 97 F | HEIGHT: 61 IN | WEIGHT: 156.09 LBS | SYSTOLIC BLOOD PRESSURE: 106 MMHG | OXYGEN SATURATION: 100 % | RESPIRATION RATE: 16 BRPM | DIASTOLIC BLOOD PRESSURE: 60 MMHG | HEART RATE: 82 BPM

## 2017-12-05 VITALS
HEART RATE: 70 BPM | DIASTOLIC BLOOD PRESSURE: 63 MMHG | TEMPERATURE: 98 F | OXYGEN SATURATION: 100 % | RESPIRATION RATE: 16 BRPM | SYSTOLIC BLOOD PRESSURE: 112 MMHG

## 2017-12-05 DIAGNOSIS — O03.9 COMPLETE OR UNSPECIFIED SPONTANEOUS ABORTION WITHOUT COMPLICATION: Chronic | ICD-10-CM

## 2017-12-05 DIAGNOSIS — Z98.891 HISTORY OF UTERINE SCAR FROM PREVIOUS SURGERY: Chronic | ICD-10-CM

## 2017-12-05 DIAGNOSIS — Z90.89 ACQUIRED ABSENCE OF OTHER ORGANS: Chronic | ICD-10-CM

## 2017-12-05 DIAGNOSIS — Z33.2 ENCOUNTER FOR ELECTIVE TERMINATION OF PREGNANCY: ICD-10-CM

## 2017-12-05 PROCEDURE — 59840 INDUCED ABORTION D&C: CPT

## 2017-12-05 PROCEDURE — 58300 INSERT INTRAUTERINE DEVICE: CPT

## 2017-12-05 RX ORDER — SODIUM CHLORIDE 9 MG/ML
3 INJECTION INTRAMUSCULAR; INTRAVENOUS; SUBCUTANEOUS EVERY 8 HOURS
Qty: 0 | Refills: 0 | Status: DISCONTINUED | OUTPATIENT
Start: 2017-12-05 | End: 2017-12-20

## 2017-12-05 RX ORDER — SODIUM CHLORIDE 9 MG/ML
1000 INJECTION, SOLUTION INTRAVENOUS
Qty: 0 | Refills: 0 | Status: DISCONTINUED | OUTPATIENT
Start: 2017-12-05 | End: 2017-12-20

## 2017-12-05 RX ORDER — CELECOXIB 200 MG/1
200 CAPSULE ORAL ONCE
Qty: 0 | Refills: 0 | Status: COMPLETED | OUTPATIENT
Start: 2017-12-05 | End: 2017-12-05

## 2017-12-05 RX ORDER — FAMOTIDINE 10 MG/ML
1 INJECTION INTRAVENOUS
Qty: 0 | Refills: 0 | COMMUNITY

## 2017-12-05 RX ORDER — OXYCODONE HYDROCHLORIDE 5 MG/1
5 TABLET ORAL ONCE
Qty: 0 | Refills: 0 | Status: DISCONTINUED | OUTPATIENT
Start: 2017-12-05 | End: 2017-12-05

## 2017-12-05 RX ORDER — ACETAMINOPHEN 500 MG
975 TABLET ORAL ONCE
Qty: 0 | Refills: 0 | Status: COMPLETED | OUTPATIENT
Start: 2017-12-05 | End: 2017-12-05

## 2017-12-05 RX ORDER — LIDOCAINE HCL 20 MG/ML
0.2 VIAL (ML) INJECTION ONCE
Qty: 0 | Refills: 0 | Status: DISCONTINUED | OUTPATIENT
Start: 2017-12-05 | End: 2017-12-20

## 2017-12-05 RX ORDER — ONDANSETRON 8 MG/1
4 TABLET, FILM COATED ORAL ONCE
Qty: 0 | Refills: 0 | Status: DISCONTINUED | OUTPATIENT
Start: 2017-12-05 | End: 2017-12-20

## 2017-12-05 RX ADMIN — Medication 975 MILLIGRAM(S): at 11:48

## 2017-12-05 RX ADMIN — CELECOXIB 200 MILLIGRAM(S): 200 CAPSULE ORAL at 13:31

## 2017-12-05 RX ADMIN — CELECOXIB 200 MILLIGRAM(S): 200 CAPSULE ORAL at 11:48

## 2017-12-05 NOTE — BRIEF OPERATIVE NOTE - PROCEDURE
<<-----Click on this checkbox to enter Procedure IUD insertion  12/05/2017  LNG-IUS: lot number: [id number], exp: 06/2020  Active  ESCARACELI  Dilation and curettage for pregnancy termination  12/05/2017    Active  JOHN

## 2017-12-05 NOTE — ASU DISCHARGE PLAN (ADULT/PEDIATRIC). - NOTIFY
Persistent Nausea and Vomiting/Unable to Urinate/Excessive Diarrhea/Inability to Tolerate Liquids or Foods/Bleeding that does not stop/Pain not relieved by Medications/GYN Fever>100.4 GYN Fever>100.4/Inability to Tolerate Liquids or Foods/Bleeding that does not stop/Excessive Diarrhea/Unable to Urinate/Pain not relieved by Medications/Swelling that continues/Persistent Nausea and Vomiting

## 2017-12-05 NOTE — ASU PATIENT PROFILE, ADULT - PMH
Acute congestive heart failure, unspecified congestive heart failure type  during pregnancy   Asthma    Encounter for elective termination of pregnancy    Gestational diabetes mellitus (GDM), antepartum, gestational diabetes method of control unspecified    Missed   2015  Non-rheumatic mitral regurgitation    Preeclampsia in postpartum period

## 2017-12-05 NOTE — ASU DISCHARGE PLAN (ADULT/PEDIATRIC). - MEDICATION SUMMARY - MEDICATIONS TO TAKE
I will START or STAY ON the medications listed below when I get home from the hospital:    Prenatal Multivitamins with Folic Acid 1 mg oral tablet  -- 1 tab(s) by mouth once a day  -- Indication: For vitamin

## 2017-12-05 NOTE — CHART NOTE - NSCHARTNOTEFT_GEN_A_CORE
called pt's torrie, reviewed uncomplicated D+C and IUD insertion.  pt to f/u in 2 weeks.  all questions/concerns addressed.

## 2017-12-06 ENCOUNTER — RESULT REVIEW (OUTPATIENT)
Age: 37
End: 2017-12-06

## 2017-12-06 ENCOUNTER — TRANSCRIPTION ENCOUNTER (OUTPATIENT)
Age: 37
End: 2017-12-06

## 2017-12-06 PROCEDURE — 88305 TISSUE EXAM BY PATHOLOGIST: CPT | Mod: 26

## 2017-12-06 PROCEDURE — 58300 INSERT INTRAUTERINE DEVICE: CPT

## 2017-12-06 PROCEDURE — 88305 TISSUE EXAM BY PATHOLOGIST: CPT

## 2017-12-06 PROCEDURE — 59840 INDUCED ABORTION D&C: CPT

## 2017-12-07 DIAGNOSIS — Z64.0 PROBLEMS RELATED TO UNWANTED PREGNANCY: ICD-10-CM

## 2018-01-10 ENCOUNTER — APPOINTMENT (OUTPATIENT)
Dept: PULMONOLOGY | Facility: CLINIC | Age: 38
End: 2018-01-10

## 2018-01-31 ENCOUNTER — APPOINTMENT (OUTPATIENT)
Dept: FAMILY MEDICINE | Facility: CLINIC | Age: 38
End: 2018-01-31

## 2018-02-02 ENCOUNTER — APPOINTMENT (OUTPATIENT)
Dept: FAMILY MEDICINE | Facility: CLINIC | Age: 38
End: 2018-02-02

## 2018-02-11 ENCOUNTER — TRANSCRIPTION ENCOUNTER (OUTPATIENT)
Age: 38
End: 2018-02-11

## 2018-04-13 ENCOUNTER — APPOINTMENT (OUTPATIENT)
Dept: OBGYN | Facility: CLINIC | Age: 38
End: 2018-04-13
Payer: MEDICAID

## 2018-04-13 VITALS
HEIGHT: 61 IN | BODY MASS INDEX: 27.94 KG/M2 | WEIGHT: 148 LBS | DIASTOLIC BLOOD PRESSURE: 80 MMHG | SYSTOLIC BLOOD PRESSURE: 120 MMHG

## 2018-04-13 PROCEDURE — 99395 PREV VISIT EST AGE 18-39: CPT

## 2018-04-15 LAB
CANDIDA VAG CYTO: NOT DETECTED
G VAGINALIS+PREV SP MTYP VAG QL MICRO: NOT DETECTED
HPV HIGH+LOW RISK DNA PNL CVX: NOT DETECTED
T VAGINALIS VAG QL WET PREP: NOT DETECTED

## 2018-04-18 ENCOUNTER — APPOINTMENT (OUTPATIENT)
Dept: FAMILY MEDICINE | Facility: CLINIC | Age: 38
End: 2018-04-18
Payer: MEDICAID

## 2018-04-18 VITALS
BODY MASS INDEX: 28.7 KG/M2 | HEIGHT: 61 IN | SYSTOLIC BLOOD PRESSURE: 110 MMHG | DIASTOLIC BLOOD PRESSURE: 70 MMHG | OXYGEN SATURATION: 98 % | WEIGHT: 152 LBS | TEMPERATURE: 98.6 F | HEART RATE: 73 BPM

## 2018-04-18 DIAGNOSIS — Z32.01 ENCOUNTER FOR PREGNANCY TEST, RESULT POSITIVE: ICD-10-CM

## 2018-04-18 DIAGNOSIS — Z78.9 OTHER SPECIFIED HEALTH STATUS: ICD-10-CM

## 2018-04-18 DIAGNOSIS — Z64.0 PROBLEMS RELATED TO UNWANTED PREGNANCY: ICD-10-CM

## 2018-04-18 PROCEDURE — 93000 ELECTROCARDIOGRAM COMPLETE: CPT | Mod: 59

## 2018-04-18 PROCEDURE — 99214 OFFICE O/P EST MOD 30 MIN: CPT | Mod: 25

## 2018-04-19 LAB — CYTOLOGY CVX/VAG DOC THIN PREP: NORMAL

## 2018-05-24 ENCOUNTER — RX RENEWAL (OUTPATIENT)
Age: 38
End: 2018-05-24

## 2018-07-16 PROBLEM — I34.0 NONRHEUMATIC MITRAL (VALVE) INSUFFICIENCY: Chronic | Status: ACTIVE | Noted: 2017-11-27

## 2018-07-16 PROBLEM — Z33.2 ENCOUNTER FOR ELECTIVE TERMINATION OF PREGNANCY: Chronic | Status: ACTIVE | Noted: 2017-11-27

## 2018-07-16 PROBLEM — I50.9 HEART FAILURE, UNSPECIFIED: Chronic | Status: ACTIVE | Noted: 2017-11-27

## 2018-07-16 PROBLEM — O02.1 MISSED ABORTION: Chronic | Status: ACTIVE | Noted: 2017-11-27

## 2018-07-16 PROBLEM — O24.419 GESTATIONAL DIABETES MELLITUS IN PREGNANCY, UNSPECIFIED CONTROL: Chronic | Status: ACTIVE | Noted: 2017-11-27

## 2018-07-17 ENCOUNTER — FORM ENCOUNTER (OUTPATIENT)
Age: 38
End: 2018-07-17

## 2018-07-17 ENCOUNTER — APPOINTMENT (OUTPATIENT)
Dept: FAMILY MEDICINE | Facility: CLINIC | Age: 38
End: 2018-07-17
Payer: MEDICAID

## 2018-07-17 VITALS
BODY MASS INDEX: 27.56 KG/M2 | WEIGHT: 146 LBS | DIASTOLIC BLOOD PRESSURE: 68 MMHG | RESPIRATION RATE: 15 BRPM | OXYGEN SATURATION: 97 % | HEIGHT: 61 IN | SYSTOLIC BLOOD PRESSURE: 112 MMHG | HEART RATE: 82 BPM

## 2018-07-17 DIAGNOSIS — E04.9 NONTOXIC GOITER, UNSPECIFIED: ICD-10-CM

## 2018-07-17 DIAGNOSIS — Z87.09 PERSONAL HISTORY OF OTHER DISEASES OF THE RESPIRATORY SYSTEM: ICD-10-CM

## 2018-07-17 DIAGNOSIS — R42 DIZZINESS AND GIDDINESS: ICD-10-CM

## 2018-07-17 PROCEDURE — 99214 OFFICE O/P EST MOD 30 MIN: CPT | Mod: 25

## 2018-07-17 PROCEDURE — 93000 ELECTROCARDIOGRAM COMPLETE: CPT | Mod: 59

## 2018-07-17 RX ORDER — AMOXICILLIN 500 MG/1
500 CAPSULE ORAL TWICE DAILY
Qty: 40 | Refills: 0 | Status: COMPLETED | COMMUNITY
Start: 2018-04-18 | End: 2018-07-17

## 2018-07-17 RX ORDER — MONTELUKAST 10 MG/1
10 TABLET, FILM COATED ORAL
Qty: 30 | Refills: 0 | Status: COMPLETED | COMMUNITY
Start: 2017-11-22 | End: 2018-07-17

## 2018-07-18 ENCOUNTER — APPOINTMENT (OUTPATIENT)
Dept: RADIOLOGY | Facility: HOSPITAL | Age: 38
End: 2018-07-18
Payer: MEDICAID

## 2018-07-18 ENCOUNTER — OUTPATIENT (OUTPATIENT)
Dept: OUTPATIENT SERVICES | Facility: HOSPITAL | Age: 38
LOS: 1 days | End: 2018-07-18
Payer: MEDICAID

## 2018-07-18 DIAGNOSIS — E04.9 NONTOXIC GOITER, UNSPECIFIED: ICD-10-CM

## 2018-07-18 DIAGNOSIS — O03.9 COMPLETE OR UNSPECIFIED SPONTANEOUS ABORTION WITHOUT COMPLICATION: Chronic | ICD-10-CM

## 2018-07-18 DIAGNOSIS — Z98.891 HISTORY OF UTERINE SCAR FROM PREVIOUS SURGERY: Chronic | ICD-10-CM

## 2018-07-18 DIAGNOSIS — Z90.89 ACQUIRED ABSENCE OF OTHER ORGANS: Chronic | ICD-10-CM

## 2018-07-18 DIAGNOSIS — R51 HEADACHE: ICD-10-CM

## 2018-07-18 PROCEDURE — 72100 X-RAY EXAM L-S SPINE 2/3 VWS: CPT | Mod: 26

## 2018-07-18 PROCEDURE — 71046 X-RAY EXAM CHEST 2 VIEWS: CPT | Mod: 26

## 2018-07-18 PROCEDURE — 72100 X-RAY EXAM L-S SPINE 2/3 VWS: CPT

## 2018-07-18 PROCEDURE — 73521 X-RAY EXAM HIPS BI 2 VIEWS: CPT

## 2018-07-18 PROCEDURE — 73521 X-RAY EXAM HIPS BI 2 VIEWS: CPT | Mod: 26

## 2018-07-18 PROCEDURE — 71046 X-RAY EXAM CHEST 2 VIEWS: CPT

## 2018-07-19 ENCOUNTER — FORM ENCOUNTER (OUTPATIENT)
Age: 38
End: 2018-07-19

## 2018-07-19 LAB
25(OH)D3 SERPL-MCNC: 29.2 NG/ML
ALBUMIN SERPL ELPH-MCNC: 4.7 G/DL
ALP BLD-CCNC: 52 U/L
ALT SERPL-CCNC: 8 U/L
ANION GAP SERPL CALC-SCNC: 13 MMOL/L
APPEARANCE: CLEAR
AST SERPL-CCNC: 17 U/L
BASOPHILS # BLD AUTO: 0.01 K/UL
BASOPHILS NFR BLD AUTO: 0.2 %
BILIRUB DIRECT SERPL-MCNC: 0.1 MG/DL
BILIRUB INDIRECT SERPL-MCNC: 0.5 MG/DL
BILIRUB SERPL-MCNC: 0.6 MG/DL
BILIRUBIN URINE: NEGATIVE
BLOOD URINE: ABNORMAL
BUN SERPL-MCNC: 10 MG/DL
CALCIUM SERPL-MCNC: 9.6 MG/DL
CHLORIDE SERPL-SCNC: 103 MMOL/L
CHOLEST SERPL-MCNC: 176 MG/DL
CHOLEST/HDLC SERPL: 3.8 RATIO
CO2 SERPL-SCNC: 25 MMOL/L
COLOR: YELLOW
CREAT SERPL-MCNC: 0.72 MG/DL
CRP SERPL-MCNC: 0.26 MG/DL
DEPRECATED D DIMER PPP IA-ACNC: <150 NG/ML DDU
EOSINOPHIL # BLD AUTO: 0.12 K/UL
EOSINOPHIL NFR BLD AUTO: 2.1 %
FOLATE SERPL-MCNC: >20 NG/ML
GLUCOSE QUALITATIVE U: NEGATIVE MG/DL
GLUCOSE SERPL-MCNC: 98 MG/DL
HCG SERPL-MCNC: <1 MIU/ML
HCT VFR BLD CALC: 41.3 %
HDLC SERPL-MCNC: 46 MG/DL
HGB BLD-MCNC: 14.1 G/DL
IMM GRANULOCYTES NFR BLD AUTO: 0 %
KETONES URINE: NEGATIVE
LDLC SERPL CALC-MCNC: 119 MG/DL
LEUKOCYTE ESTERASE URINE: ABNORMAL
LYMPHOCYTES # BLD AUTO: 2.48 K/UL
LYMPHOCYTES NFR BLD AUTO: 42.5 %
MAN DIFF?: NORMAL
MCHC RBC-ENTMCNC: 29.5 PG
MCHC RBC-ENTMCNC: 34.1 GM/DL
MCV RBC AUTO: 86.4 FL
MONOCYTES # BLD AUTO: 0.32 K/UL
MONOCYTES NFR BLD AUTO: 5.5 %
NEUTROPHILS # BLD AUTO: 2.91 K/UL
NEUTROPHILS NFR BLD AUTO: 49.7 %
NITRITE URINE: NEGATIVE
PH URINE: 7
PLATELET # BLD AUTO: 318 K/UL
POTASSIUM SERPL-SCNC: 4.7 MMOL/L
PROT SERPL-MCNC: 7.6 G/DL
PROTEIN URINE: NEGATIVE MG/DL
RBC # BLD: 4.78 M/UL
RBC # FLD: 12.4 %
SODIUM SERPL-SCNC: 141 MMOL/L
SPECIFIC GRAVITY URINE: 1.01
TRIGL SERPL-MCNC: 56 MG/DL
TSH SERPL-ACNC: 1.48 UIU/ML
URATE SERPL-MCNC: 3.9 MG/DL
UROBILINOGEN URINE: NEGATIVE MG/DL
VIT B12 SERPL-MCNC: 682 PG/ML
WBC # FLD AUTO: 5.84 K/UL

## 2018-07-20 ENCOUNTER — OUTPATIENT (OUTPATIENT)
Dept: OUTPATIENT SERVICES | Facility: HOSPITAL | Age: 38
LOS: 1 days | End: 2018-07-20
Payer: MEDICAID

## 2018-07-20 ENCOUNTER — APPOINTMENT (OUTPATIENT)
Dept: MRI IMAGING | Facility: HOSPITAL | Age: 38
End: 2018-07-20
Payer: MEDICAID

## 2018-07-20 DIAGNOSIS — Z98.891 HISTORY OF UTERINE SCAR FROM PREVIOUS SURGERY: Chronic | ICD-10-CM

## 2018-07-20 DIAGNOSIS — R51 HEADACHE: ICD-10-CM

## 2018-07-20 DIAGNOSIS — Z90.89 ACQUIRED ABSENCE OF OTHER ORGANS: Chronic | ICD-10-CM

## 2018-07-20 DIAGNOSIS — O03.9 COMPLETE OR UNSPECIFIED SPONTANEOUS ABORTION WITHOUT COMPLICATION: Chronic | ICD-10-CM

## 2018-07-20 PROCEDURE — 70551 MRI BRAIN STEM W/O DYE: CPT | Mod: 26

## 2018-07-20 PROCEDURE — 70551 MRI BRAIN STEM W/O DYE: CPT

## 2018-07-23 ENCOUNTER — APPOINTMENT (OUTPATIENT)
Dept: ULTRASOUND IMAGING | Facility: HOSPITAL | Age: 38
End: 2018-07-23

## 2018-07-23 PROBLEM — R42 DIZZINESS: Status: ACTIVE | Noted: 2018-04-18

## 2018-07-23 PROBLEM — E04.9 THYROID ENLARGED: Status: ACTIVE | Noted: 2018-07-17

## 2018-07-23 NOTE — HISTORY OF PRESENT ILLNESS
[FreeTextEntry1] : cc: headache , back pain , sleep apnea  [de-identified] : Patient  came to f/u on her HA , Dizziness, no N,V<C,D. Back pain on and off, 10/10, not improving - pt did not go for blood work, CXR . Pain is worse with moment , no injury. Patient c/o HA ,on and off, generalized, recently worse. Denies CP,SOB,Abd pain .

## 2018-07-23 NOTE — COUNSELING
[Activity counseling provided] : activity [Keep Food Diary] : Keep food diary [Low Fat Diet] : Low fat diet [Decrease Portions] : Decrease food portions [Low Salt Diet] : Low salt diet [Walking] : Walking

## 2018-07-23 NOTE — PHYSICAL EXAM
[No Acute Distress] : no acute distress [Normal Outer Ear/Nose] : the outer ears and nose were normal in appearance [Normal Oropharynx] : the oropharynx was normal [Normal TMs] : both tympanic membranes were normal [No JVD] : no jugular venous distention [Supple] : supple [Thyroid Normal, No Nodules] : the thyroid was normal and there were no nodules present [No Respiratory Distress] : no respiratory distress  [Clear to Auscultation] : lungs were clear to auscultation bilaterally [No Accessory Muscle Use] : no accessory muscle use [Normal Rate] : normal rate  [Regular Rhythm] : with a regular rhythm [Normal S1, S2] : normal S1 and S2 [No Murmur] : no murmur heard [No Varicosities] : no varicosities [No Edema] : there was no peripheral edema [Soft] : abdomen soft [Non Tender] : non-tender [Non-distended] : non-distended [No Masses] : no abdominal mass palpated [No HSM] : no HSM [Normal Bowel Sounds] : normal bowel sounds [Normal Supraclavicular Nodes] : no supraclavicular lymphadenopathy [Normal Posterior Cervical Nodes] : no posterior cervical lymphadenopathy [Normal Anterior Cervical Nodes] : no anterior cervical lymphadenopathy [No CVA Tenderness] : no CVA  tenderness [No Joint Swelling] : no joint swelling [Grossly Normal Strength/Tone] : grossly normal strength/tone [No Rash] : no rash [Normal Gait] : normal gait [Coordination Grossly Intact] : coordination grossly intact [No Focal Deficits] : no focal deficits [Normal Affect] : the affect was normal [Alert and Oriented x3] : oriented to person, place, and time [Normal Insight/Judgement] : insight and judgment were intact [de-identified] : +

## 2018-10-10 ENCOUNTER — TRANSCRIPTION ENCOUNTER (OUTPATIENT)
Age: 38
End: 2018-10-10

## 2018-11-10 ENCOUNTER — TRANSCRIPTION ENCOUNTER (OUTPATIENT)
Age: 38
End: 2018-11-10

## 2018-11-12 ENCOUNTER — FORM ENCOUNTER (OUTPATIENT)
Age: 38
End: 2018-11-12

## 2018-11-13 ENCOUNTER — APPOINTMENT (OUTPATIENT)
Dept: RADIOLOGY | Facility: HOSPITAL | Age: 38
End: 2018-11-13
Payer: MEDICAID

## 2018-11-13 ENCOUNTER — OUTPATIENT (OUTPATIENT)
Dept: OUTPATIENT SERVICES | Facility: HOSPITAL | Age: 38
LOS: 1 days | End: 2018-11-13
Payer: MEDICAID

## 2018-11-13 ENCOUNTER — APPOINTMENT (OUTPATIENT)
Dept: FAMILY MEDICINE | Facility: CLINIC | Age: 38
End: 2018-11-13
Payer: MEDICAID

## 2018-11-13 VITALS
HEART RATE: 92 BPM | TEMPERATURE: 99.5 F | WEIGHT: 143 LBS | DIASTOLIC BLOOD PRESSURE: 76 MMHG | SYSTOLIC BLOOD PRESSURE: 120 MMHG | BODY MASS INDEX: 27 KG/M2 | HEIGHT: 61 IN | OXYGEN SATURATION: 99 %

## 2018-11-13 DIAGNOSIS — J45.909 UNSPECIFIED ASTHMA, UNCOMPLICATED: ICD-10-CM

## 2018-11-13 DIAGNOSIS — O03.9 COMPLETE OR UNSPECIFIED SPONTANEOUS ABORTION WITHOUT COMPLICATION: Chronic | ICD-10-CM

## 2018-11-13 DIAGNOSIS — Z98.891 HISTORY OF UTERINE SCAR FROM PREVIOUS SURGERY: Chronic | ICD-10-CM

## 2018-11-13 DIAGNOSIS — Z90.89 ACQUIRED ABSENCE OF OTHER ORGANS: Chronic | ICD-10-CM

## 2018-11-13 PROCEDURE — 96372 THER/PROPH/DIAG INJ SC/IM: CPT

## 2018-11-13 PROCEDURE — 71046 X-RAY EXAM CHEST 2 VIEWS: CPT | Mod: 26

## 2018-11-13 PROCEDURE — 71046 X-RAY EXAM CHEST 2 VIEWS: CPT

## 2018-11-13 PROCEDURE — 93000 ELECTROCARDIOGRAM COMPLETE: CPT | Mod: 59

## 2018-11-13 PROCEDURE — 99214 OFFICE O/P EST MOD 30 MIN: CPT | Mod: 25

## 2018-11-13 RX ORDER — METHYLPRED ACET/NACL,ISO-OS/PF 40 MG/ML
40 VIAL (ML) INJECTION
Qty: 1 | Refills: 0 | Status: COMPLETED | OUTPATIENT
Start: 2018-11-13

## 2018-11-13 RX ORDER — PROMETHAZINE HYDROCHLORIDE AND DEXTROMETHORPHAN HYDROBROMIDE ORAL SOLUTION 15; 6.25 MG/5ML; MG/5ML
6.25-15 SOLUTION ORAL
Qty: 100 | Refills: 0 | Status: COMPLETED | COMMUNITY
Start: 2018-10-10

## 2018-11-13 RX ORDER — METHYLPREDNISOLONE 4 MG/1
4 TABLET ORAL
Qty: 21 | Refills: 0 | Status: COMPLETED | COMMUNITY
Start: 2018-10-10

## 2018-11-13 RX ADMIN — METHYLPREDNISOLONE ACETATE 0 MG/ML: 40 INJECTION, SUSPENSION INTRA-ARTICULAR; INTRALESIONAL; INTRAMUSCULAR; SOFT TISSUE at 00:00

## 2018-11-13 NOTE — REVIEW OF SYSTEMS
[Palpitations] : no palpitations [Leg Claudication] : no leg claudication [Lower Ext Edema] : no lower extremity edema [Orthopnea] : no orthopnea [Paroysmal Nocturnal Dyspnea] : no paroysmal nocturnal dyspnea [Negative] : Integumentary [FreeTextEntry5] : chest wall pain

## 2018-11-13 NOTE — PHYSICAL EXAM
[No Acute Distress] : no acute distress [No JVD] : no jugular venous distention [Supple] : supple [Thyroid Normal, No Nodules] : the thyroid was normal and there were no nodules present [No Respiratory Distress] : no respiratory distress  [Clear to Auscultation] : lungs were clear to auscultation bilaterally [No Accessory Muscle Use] : no accessory muscle use [Normal Rate] : normal rate  [Regular Rhythm] : with a regular rhythm [Normal S1, S2] : normal S1 and S2 [No Murmur] : no murmur heard [No Varicosities] : no varicosities [No Edema] : there was no peripheral edema [Soft] : abdomen soft [Non Tender] : non-tender [Non-distended] : non-distended [No Masses] : no abdominal mass palpated [No HSM] : no HSM [Normal Bowel Sounds] : normal bowel sounds [Normal Anterior Cervical Nodes] : no anterior cervical lymphadenopathy [Normal Gait] : normal gait [Alert and Oriented x3] : oriented to person, place, and time [de-identified] : extended vesicular , erythematosus rash on chest wall front and back

## 2018-11-13 NOTE — HISTORY OF PRESENT ILLNESS
[FreeTextEntry8] : cc: f/u EMMETT pichardoal - 11/10/18\par Patient came to f/u after  evaluation on 11/10/18 - Dgn, Acute bronchitis, Shingles - treated with Medrol dose pack, Ibuprofen, Valtrex. patient came c/o cough, worse at  night , pt. was not using her nebul recently, she c/o chest discomfort - + shingle on her right chest wall, no SOB, no heart palpitation, Pt takes Ibuprofen for pain, no improvement " I need something stronger", pain worse  with movement.

## 2018-11-14 DIAGNOSIS — M54.9 DORSALGIA, UNSPECIFIED: ICD-10-CM

## 2018-11-28 ENCOUNTER — APPOINTMENT (OUTPATIENT)
Dept: FAMILY MEDICINE | Facility: CLINIC | Age: 38
End: 2018-11-28
Payer: MEDICAID

## 2018-11-28 VITALS
TEMPERATURE: 98.3 F | WEIGHT: 142 LBS | BODY MASS INDEX: 26.81 KG/M2 | SYSTOLIC BLOOD PRESSURE: 110 MMHG | OXYGEN SATURATION: 98 % | HEIGHT: 61 IN | DIASTOLIC BLOOD PRESSURE: 60 MMHG | HEART RATE: 62 BPM

## 2018-11-28 PROBLEM — M54.9 ACUTE BACK PAIN: Status: ACTIVE | Noted: 2017-09-26

## 2018-11-28 PROCEDURE — 99214 OFFICE O/P EST MOD 30 MIN: CPT

## 2018-11-28 NOTE — PHYSICAL EXAM
[No Acute Distress] : no acute distress [No Respiratory Distress] : no respiratory distress  [Clear to Auscultation] : lungs were clear to auscultation bilaterally [No Accessory Muscle Use] : no accessory muscle use [Normal Rate] : normal rate  [Regular Rhythm] : with a regular rhythm [Normal S1, S2] : normal S1 and S2 [No Murmur] : no murmur heard [No Varicosities] : no varicosities [No Edema] : there was no peripheral edema [Soft] : abdomen soft [Non Tender] : non-tender [Non-distended] : non-distended [No Masses] : no abdominal mass palpated [No HSM] : no HSM [Normal Bowel Sounds] : normal bowel sounds [Normal Anterior Cervical Nodes] : no anterior cervical lymphadenopathy [Normal Gait] : normal gait [Alert and Oriented x3] : oriented to person, place, and time [de-identified] : no vesicular rash, + crusted  rash on chest wall front and back , + erythema

## 2018-11-28 NOTE — HISTORY OF PRESENT ILLNESS
[FreeTextEntry1] : cc: f/u on her Shingles, pain management  [de-identified] : Patient  came to f/u on her  rash - which is improving, crusting. Her pain is getting worse 10/10.  Tramadol is not helping - sharp, tingling, itchy . Patient denies CP,SOB,Abd pain,

## 2018-11-28 NOTE — REVIEW OF SYSTEMS
[Skin Rash] : skin rash [Negative] : Neurological [FreeTextEntry9] : back and chest wall pain  [de-identified] : improving - crusting

## 2019-02-24 ENCOUNTER — RX RENEWAL (OUTPATIENT)
Age: 39
End: 2019-02-24

## 2019-03-13 ENCOUNTER — APPOINTMENT (OUTPATIENT)
Dept: FAMILY MEDICINE | Facility: CLINIC | Age: 39
End: 2019-03-13
Payer: MEDICAID

## 2019-03-13 VITALS
HEIGHT: 61 IN | OXYGEN SATURATION: 98 % | RESPIRATION RATE: 14 BRPM | TEMPERATURE: 99.1 F | BODY MASS INDEX: 27.56 KG/M2 | WEIGHT: 146 LBS | HEART RATE: 61 BPM

## 2019-03-13 VITALS — DIASTOLIC BLOOD PRESSURE: 70 MMHG | SYSTOLIC BLOOD PRESSURE: 108 MMHG

## 2019-03-13 DIAGNOSIS — H10.10 ACUTE ATOPIC CONJUNCTIVITIS, UNSPECIFIED EYE: ICD-10-CM

## 2019-03-13 PROCEDURE — 99212 OFFICE O/P EST SF 10 MIN: CPT

## 2019-03-13 RX ORDER — TRAMADOL HYDROCHLORIDE 50 MG/1
50 TABLET, COATED ORAL 3 TIMES DAILY
Qty: 21 | Refills: 0 | Status: COMPLETED | COMMUNITY
Start: 2018-11-13 | End: 2019-03-13

## 2019-03-13 RX ORDER — GABAPENTIN 300 MG/1
300 CAPSULE ORAL AT BEDTIME
Qty: 90 | Refills: 0 | Status: COMPLETED | COMMUNITY
Start: 2018-11-28 | End: 2019-03-13

## 2019-03-13 RX ORDER — IBUPROFEN 800 MG/1
800 TABLET, FILM COATED ORAL
Qty: 21 | Refills: 0 | Status: COMPLETED | COMMUNITY
Start: 2018-11-10 | End: 2019-03-13

## 2019-03-13 RX ORDER — OXYCODONE 5 MG/1
5 TABLET ORAL EVERY 8 HOURS
Qty: 21 | Refills: 0 | Status: COMPLETED | COMMUNITY
Start: 2018-11-30 | End: 2019-03-13

## 2019-03-13 RX ORDER — MELOXICAM 15 MG/1
15 TABLET ORAL DAILY
Qty: 30 | Refills: 0 | Status: COMPLETED | COMMUNITY
Start: 2018-07-19 | End: 2019-03-13

## 2019-03-13 RX ORDER — BENZONATATE 200 MG/1
200 CAPSULE ORAL 3 TIMES DAILY
Qty: 21 | Refills: 1 | Status: COMPLETED | COMMUNITY
Start: 2018-11-13 | End: 2019-03-13

## 2019-03-13 RX ORDER — LIDOCAINE 5% 700 MG/1
5 PATCH TOPICAL
Qty: 60 | Refills: 3 | Status: COMPLETED | COMMUNITY
Start: 2018-11-28 | End: 2019-03-13

## 2019-03-13 RX ORDER — AZITHROMYCIN 250 MG/1
250 TABLET, FILM COATED ORAL
Qty: 6 | Refills: 0 | Status: COMPLETED | COMMUNITY
Start: 2018-11-10 | End: 2019-03-13

## 2019-03-13 RX ORDER — VALACYCLOVIR 1 G/1
1 TABLET, FILM COATED ORAL
Qty: 21 | Refills: 0 | Status: COMPLETED | COMMUNITY
Start: 2018-11-10 | End: 2019-03-13

## 2019-03-13 NOTE — HISTORY OF PRESENT ILLNESS
[FreeTextEntry8] : Noted bilateral reddened eyes since Friday evening. Eyes are very itchy and are tearing. Notes to be sneezing more than usual. Minimal discharge noted. Eyes are not crusty. \par \par \par Did see opthalmology on Friday during the day. Patient has been having chronic vision blurriness, but patient reports that her vision was normal at check up. Denies acute change in vision or eye pain. \par \par Eye redness and and itchiness improved when she took a Zyrtec.

## 2019-03-13 NOTE — PLAN
[FreeTextEntry1] : Conjunctivitis most likely allergic. Encouraged to continue taking Zyrtec. \par Patient aware to follow up with opthalmology if symptoms persist more then 2 days or if she notices vision changes or eye pain. \par \par Patient encouraged not to rub eyes and wash her hands frequently

## 2019-03-13 NOTE — PHYSICAL EXAM
[No Acute Distress] : no acute distress [Well Nourished] : well nourished [Well Developed] : well developed [Normal Outer Ear/Nose] : the outer ears and nose were normal in appearance [Normal Oropharynx] : the oropharynx was normal [Normal TMs] : both tympanic membranes were normal [No JVD] : no jugular venous distention [Supple] : supple [No Lymphadenopathy] : no lymphadenopathy [No Respiratory Distress] : no respiratory distress  [Clear to Auscultation] : lungs were clear to auscultation bilaterally [No Accessory Muscle Use] : no accessory muscle use [Normal Rate] : normal rate  [Regular Rhythm] : with a regular rhythm [Normal S1, S2] : normal S1 and S2 [No Murmur] : no murmur heard [de-identified] : Injected conjunctivae. No discharge or crusting noted  [de-identified] : Boggy nasal turbinates.

## 2019-03-20 ENCOUNTER — APPOINTMENT (OUTPATIENT)
Dept: FAMILY MEDICINE | Facility: CLINIC | Age: 39
End: 2019-03-20
Payer: MEDICAID

## 2019-03-20 VITALS
SYSTOLIC BLOOD PRESSURE: 108 MMHG | OXYGEN SATURATION: 99 % | HEIGHT: 60 IN | DIASTOLIC BLOOD PRESSURE: 66 MMHG | WEIGHT: 148 LBS | BODY MASS INDEX: 29.06 KG/M2 | RESPIRATION RATE: 17 BRPM | HEART RATE: 68 BPM | TEMPERATURE: 98.3 F

## 2019-03-20 PROCEDURE — 99214 OFFICE O/P EST MOD 30 MIN: CPT

## 2019-03-20 NOTE — HEALTH RISK ASSESSMENT
[No falls in past year] : Patient reported no falls in the past year [0] : 2) Feeling down, depressed, or hopeless: Not at all (0) [[id number]] : 0

## 2019-03-20 NOTE — PHYSICAL EXAM
[No Acute Distress] : no acute distress [No JVD] : no jugular venous distention [Normal Outer Ear/Nose] : the outer ears and nose were normal in appearance [Clear to Auscultation] : lungs were clear to auscultation bilaterally [No Respiratory Distress] : no respiratory distress  [Normal Rate] : normal rate  [No Accessory Muscle Use] : no accessory muscle use [Regular Rhythm] : with a regular rhythm [Normal S1, S2] : normal S1 and S2 [No Varicosities] : no varicosities [No Edema] : there was no peripheral edema [Soft] : abdomen soft [Non Tender] : non-tender [Non-distended] : non-distended [No Masses] : no abdominal mass palpated [No HSM] : no HSM [Normal Bowel Sounds] : normal bowel sounds [Normal Supraclavicular Nodes] : no supraclavicular lymphadenopathy [Normal Axillary Nodes] : no axillary lymphadenopathy [Normal Anterior Cervical Nodes] : no anterior cervical lymphadenopathy [No Rash] : no rash [No Joint Swelling] : no joint swelling [Acne] : acne [de-identified] : + b/l conjunctival erythema  [Normal Gait] : normal gait [de-identified] : + enlarde lymp nodes behind left ear  [de-identified] : face  [de-identified] : + PHARYNX ERYTHEMA, + enlarged lymph nodes - behind the left ear  , left ear canal + erythema

## 2019-03-20 NOTE — HISTORY OF PRESENT ILLNESS
[FreeTextEntry8] : Patient came to f/u on her eye problem - seen by NP her meds recently were changed to Abx and today feels better, her acne is getting worse , she c/o left ear discomfort, lump behind her left ear, no fever, no chills, no cough - no CP,no And pain, SOB. patient has Ct chest done in the past + nodule , needs f/u CT

## 2019-03-20 NOTE — REVIEW OF SYSTEMS
[Discharge] : discharge [Vision Problems] : no vision problems [Itching] : itching [Negative] : Psychiatric [FreeTextEntry4] : left ear pain, lump behind the ear  [de-identified] : + acne

## 2019-05-01 ENCOUNTER — OUTPATIENT (OUTPATIENT)
Dept: OUTPATIENT SERVICES | Facility: HOSPITAL | Age: 39
LOS: 1 days | End: 2019-05-01
Payer: MEDICAID

## 2019-05-01 DIAGNOSIS — O03.9 COMPLETE OR UNSPECIFIED SPONTANEOUS ABORTION WITHOUT COMPLICATION: Chronic | ICD-10-CM

## 2019-05-01 DIAGNOSIS — Z98.891 HISTORY OF UTERINE SCAR FROM PREVIOUS SURGERY: Chronic | ICD-10-CM

## 2019-05-01 DIAGNOSIS — Z90.89 ACQUIRED ABSENCE OF OTHER ORGANS: Chronic | ICD-10-CM

## 2019-05-01 PROCEDURE — G9001: CPT

## 2019-05-07 ENCOUNTER — EMERGENCY (EMERGENCY)
Facility: HOSPITAL | Age: 39
LOS: 1 days | Discharge: HOME | End: 2019-05-07
Attending: EMERGENCY MEDICINE | Admitting: EMERGENCY MEDICINE
Payer: MEDICAID

## 2019-05-07 VITALS
DIASTOLIC BLOOD PRESSURE: 67 MMHG | RESPIRATION RATE: 18 BRPM | HEART RATE: 89 BPM | HEIGHT: 66 IN | SYSTOLIC BLOOD PRESSURE: 132 MMHG | OXYGEN SATURATION: 98 % | WEIGHT: 145.06 LBS | TEMPERATURE: 98 F

## 2019-05-07 VITALS
HEART RATE: 70 BPM | SYSTOLIC BLOOD PRESSURE: 112 MMHG | OXYGEN SATURATION: 99 % | RESPIRATION RATE: 18 BRPM | DIASTOLIC BLOOD PRESSURE: 70 MMHG

## 2019-05-07 DIAGNOSIS — O03.9 COMPLETE OR UNSPECIFIED SPONTANEOUS ABORTION WITHOUT COMPLICATION: Chronic | ICD-10-CM

## 2019-05-07 DIAGNOSIS — Z90.89 ACQUIRED ABSENCE OF OTHER ORGANS: Chronic | ICD-10-CM

## 2019-05-07 DIAGNOSIS — R07.9 CHEST PAIN, UNSPECIFIED: ICD-10-CM

## 2019-05-07 DIAGNOSIS — Z98.891 HISTORY OF UTERINE SCAR FROM PREVIOUS SURGERY: Chronic | ICD-10-CM

## 2019-05-07 LAB
ALBUMIN SERPL ELPH-MCNC: 4 G/DL — SIGNIFICANT CHANGE UP (ref 3.3–5)
ALP SERPL-CCNC: 54 U/L — SIGNIFICANT CHANGE UP (ref 40–120)
ALT FLD-CCNC: 20 U/L DA — SIGNIFICANT CHANGE UP (ref 10–45)
ANION GAP SERPL CALC-SCNC: 10 MMOL/L — SIGNIFICANT CHANGE UP (ref 5–17)
AST SERPL-CCNC: 15 U/L — SIGNIFICANT CHANGE UP (ref 10–40)
BILIRUB SERPL-MCNC: 0.4 MG/DL — SIGNIFICANT CHANGE UP (ref 0.2–1.2)
BUN SERPL-MCNC: 9 MG/DL — SIGNIFICANT CHANGE UP (ref 7–23)
CALCIUM SERPL-MCNC: 9.8 MG/DL — SIGNIFICANT CHANGE UP (ref 8.4–10.5)
CHLORIDE SERPL-SCNC: 106 MMOL/L — SIGNIFICANT CHANGE UP (ref 96–108)
CO2 SERPL-SCNC: 26 MMOL/L — SIGNIFICANT CHANGE UP (ref 22–31)
CREAT SERPL-MCNC: 0.66 MG/DL — SIGNIFICANT CHANGE UP (ref 0.5–1.3)
GLUCOSE SERPL-MCNC: 91 MG/DL — SIGNIFICANT CHANGE UP (ref 70–99)
HCT VFR BLD CALC: 41.8 % — SIGNIFICANT CHANGE UP (ref 34.5–45)
HGB BLD-MCNC: 14 G/DL — SIGNIFICANT CHANGE UP (ref 11.5–15.5)
MCHC RBC-ENTMCNC: 30 PG — SIGNIFICANT CHANGE UP (ref 27–34)
MCHC RBC-ENTMCNC: 33.5 GM/DL — SIGNIFICANT CHANGE UP (ref 32–36)
MCV RBC AUTO: 89.5 FL — SIGNIFICANT CHANGE UP (ref 80–100)
PLATELET # BLD AUTO: 304 K/UL — SIGNIFICANT CHANGE UP (ref 150–400)
POTASSIUM SERPL-MCNC: 4.8 MMOL/L — SIGNIFICANT CHANGE UP (ref 3.5–5.3)
POTASSIUM SERPL-SCNC: 4.8 MMOL/L — SIGNIFICANT CHANGE UP (ref 3.5–5.3)
PROT SERPL-MCNC: 7.8 G/DL — SIGNIFICANT CHANGE UP (ref 6–8.3)
RBC # BLD: 4.67 M/UL — SIGNIFICANT CHANGE UP (ref 3.8–5.2)
RBC # FLD: 11.3 % — SIGNIFICANT CHANGE UP (ref 10.3–14.5)
SODIUM SERPL-SCNC: 142 MMOL/L — SIGNIFICANT CHANGE UP (ref 135–145)
TROPONIN I SERPL-MCNC: <.017 NG/ML — LOW (ref 0.02–0.06)
TROPONIN I SERPL-MCNC: <.017 NG/ML — LOW (ref 0.02–0.06)
WBC # BLD: 8.1 K/UL — SIGNIFICANT CHANGE UP (ref 3.8–10.5)
WBC # FLD AUTO: 8.1 K/UL — SIGNIFICANT CHANGE UP (ref 3.8–10.5)

## 2019-05-07 PROCEDURE — 84484 ASSAY OF TROPONIN QUANT: CPT

## 2019-05-07 PROCEDURE — 99284 EMERGENCY DEPT VISIT MOD MDM: CPT | Mod: 25

## 2019-05-07 PROCEDURE — 99285 EMERGENCY DEPT VISIT HI MDM: CPT

## 2019-05-07 PROCEDURE — 93010 ELECTROCARDIOGRAM REPORT: CPT

## 2019-05-07 PROCEDURE — 93005 ELECTROCARDIOGRAM TRACING: CPT

## 2019-05-07 PROCEDURE — 70450 CT HEAD/BRAIN W/O DYE: CPT

## 2019-05-07 PROCEDURE — 70450 CT HEAD/BRAIN W/O DYE: CPT | Mod: 26

## 2019-05-07 PROCEDURE — 81025 URINE PREGNANCY TEST: CPT

## 2019-05-07 PROCEDURE — 71046 X-RAY EXAM CHEST 2 VIEWS: CPT

## 2019-05-07 PROCEDURE — 85027 COMPLETE CBC AUTOMATED: CPT

## 2019-05-07 PROCEDURE — 96374 THER/PROPH/DIAG INJ IV PUSH: CPT

## 2019-05-07 PROCEDURE — 71046 X-RAY EXAM CHEST 2 VIEWS: CPT | Mod: 26

## 2019-05-07 PROCEDURE — 36415 COLL VENOUS BLD VENIPUNCTURE: CPT

## 2019-05-07 PROCEDURE — 80053 COMPREHEN METABOLIC PANEL: CPT

## 2019-05-07 RX ORDER — METOCLOPRAMIDE HCL 10 MG
10 TABLET ORAL ONCE
Qty: 0 | Refills: 0 | Status: COMPLETED | OUTPATIENT
Start: 2019-05-07 | End: 2019-05-07

## 2019-05-07 RX ORDER — SODIUM CHLORIDE 9 MG/ML
1000 INJECTION INTRAMUSCULAR; INTRAVENOUS; SUBCUTANEOUS ONCE
Qty: 0 | Refills: 0 | Status: COMPLETED | OUTPATIENT
Start: 2019-05-07 | End: 2019-05-07

## 2019-05-07 RX ORDER — ACETAMINOPHEN 500 MG
650 TABLET ORAL ONCE
Qty: 0 | Refills: 0 | Status: COMPLETED | OUTPATIENT
Start: 2019-05-07 | End: 2019-05-07

## 2019-05-07 RX ADMIN — Medication 650 MILLIGRAM(S): at 16:17

## 2019-05-07 RX ADMIN — Medication 10 MILLIGRAM(S): at 16:17

## 2019-05-07 RX ADMIN — SODIUM CHLORIDE 1000 MILLILITER(S): 9 INJECTION INTRAMUSCULAR; INTRAVENOUS; SUBCUTANEOUS at 15:24
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
## 2019-05-07 NOTE — ED PROVIDER NOTE - CARE PLAN
Principal Discharge DX:	Syncope Principal Discharge DX:	Pre-syncope  Secondary Diagnosis:	Headache syndrome Principal Discharge DX:	Pre-syncope  Assessment and plan of treatment:	Follow up with your primary physician for a post hospital visit adrian 48 hours, taking all results from the ER to be reviewed.   You can follow up with Dr Ovalle for your migraines, call to make an appointment [phone].  If any recurrent, concerning or new signs or symptoms return to the ER.  Secondary Diagnosis:	Headache syndrome

## 2019-05-07 NOTE — ED PROVIDER NOTE - PROGRESS NOTE DETAILS
Labs, ct head, cxr, ekg ok.   Still co headache. hx migraines in past, Will give reglan, and rpt trop at this time, re-eval nino thomas, headache resolved with meds.  Feeling ok. Will dc home with pcp fu as john Griffin. Pt requesting neuro name for hx of migraines, will put in dc.
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
## 2019-05-07 NOTE — ED PROVIDER NOTE - PLAN OF CARE
Follow up with your primary physician for a post hospital visit adrian 48 hours, taking all results from the ER to be reviewed.   You can follow up with Dr Ovalle for your migraines, call to make an appointment [phone].  If any recurrent, concerning or new signs or symptoms return to the ER.
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
## 2019-05-08 ENCOUNTER — APPOINTMENT (OUTPATIENT)
Dept: FAMILY MEDICINE | Facility: CLINIC | Age: 39
End: 2019-05-08
Payer: MEDICAID

## 2019-05-08 VITALS
OXYGEN SATURATION: 99 % | BODY MASS INDEX: 27.94 KG/M2 | SYSTOLIC BLOOD PRESSURE: 112 MMHG | TEMPERATURE: 98 F | DIASTOLIC BLOOD PRESSURE: 81 MMHG | WEIGHT: 148 LBS | RESPIRATION RATE: 15 BRPM | HEIGHT: 61 IN | HEART RATE: 71 BPM

## 2019-05-08 DIAGNOSIS — L70.0 ACNE VULGARIS: ICD-10-CM

## 2019-05-08 PROCEDURE — 99214 OFFICE O/P EST MOD 30 MIN: CPT

## 2019-05-08 RX ORDER — FLUTICASONE PROPIONATE 50 UG/1
50 SPRAY, METERED NASAL
Qty: 1 | Refills: 3 | Status: DISCONTINUED | COMMUNITY
Start: 2018-04-18 | End: 2019-05-08

## 2019-05-08 RX ORDER — OFLOXACIN 3 MG/ML
0.3 SOLUTION/ DROPS OPHTHALMIC
Qty: 2 | Refills: 0 | Status: DISCONTINUED | COMMUNITY
Start: 2019-03-18 | End: 2019-05-08

## 2019-05-08 RX ORDER — AMOXICILLIN AND CLAVULANATE POTASSIUM 875; 125 MG/1; MG/1
875-125 TABLET, COATED ORAL
Qty: 14 | Refills: 0 | Status: DISCONTINUED | COMMUNITY
Start: 2019-03-20 | End: 2019-05-08

## 2019-05-08 RX ORDER — KETOTIFEN FUMARATE 0.25 MG/ML
0.03 SOLUTION OPHTHALMIC
Qty: 1 | Refills: 0 | Status: DISCONTINUED | COMMUNITY
Start: 2019-03-13 | End: 2019-05-08

## 2019-05-08 NOTE — HEALTH RISK ASSESSMENT
[Intercurrent hospitalizations] : was admitted to the hospital  [No falls in past year] : Patient reported no falls in the past year [0] : 2) Feeling down, depressed, or hopeless: Not at all (0) [None] : None [With Family] : lives with family [# of Members in Household ___] :  household currently consist of [unfilled] member(s) [Employed] : employed [] : No [de-identified] : [employer] [de-identified] : social wine [[id number]] : 0 [Language] : denies difficulty with language [Change in mental status noted] : No change in mental status noted [Behavior] : denies difficulty with behavior [Handling Complex Tasks] : denies difficulty handling complex tasks [Learning/Retaining New Information] : denies difficulty learning/retaining new information [Spatial Ability and Orientation] : denies difficulty with spatial ability and orientation [Reasoning] : denies difficulty with reasoning [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

## 2019-05-08 NOTE — REVIEW OF SYSTEMS
[Cough] : cough [Nausea] : nausea [Dizziness] : dizziness [Headache] : headache [Memory Loss] : memory loss [Confusion] : confusion [Fever] : no fever [Vision Problems] : no vision problems [Chills] : no chills [Nasal Discharge] : no nasal discharge [Paroysmal Nocturnal Dyspnea] : no paroysmal nocturnal dyspnea [Palpitations] : no palpitations [Chest Pain] : no chest pain [Shortness Of Breath] : no shortness of breath [Wheezing] : no wheezing [Diarrhea] : diarrhea [Abdominal Pain] : no abdominal pain [Constipation] : no constipation [Incontinence] : no incontinence [Dysuria] : no dysuria [Vomiting] : no vomiting [Fainting] : no fainting [Skin Rash] : no skin rash [Frequency] : no frequency
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
## 2019-05-09 ENCOUNTER — INPATIENT (INPATIENT)
Facility: HOSPITAL | Age: 39
LOS: 0 days | Discharge: HOME | DRG: 312 | End: 2019-05-10
Attending: STUDENT IN AN ORGANIZED HEALTH CARE EDUCATION/TRAINING PROGRAM | Admitting: INTERNAL MEDICINE
Payer: MEDICAID

## 2019-05-09 VITALS
HEIGHT: 65 IN | TEMPERATURE: 98 F | DIASTOLIC BLOOD PRESSURE: 94 MMHG | WEIGHT: 160.06 LBS | RESPIRATION RATE: 16 BRPM | OXYGEN SATURATION: 99 % | SYSTOLIC BLOOD PRESSURE: 146 MMHG | HEART RATE: 102 BPM

## 2019-05-09 DIAGNOSIS — Z90.89 ACQUIRED ABSENCE OF OTHER ORGANS: Chronic | ICD-10-CM

## 2019-05-09 DIAGNOSIS — Z98.891 HISTORY OF UTERINE SCAR FROM PREVIOUS SURGERY: Chronic | ICD-10-CM

## 2019-05-09 DIAGNOSIS — O03.9 COMPLETE OR UNSPECIFIED SPONTANEOUS ABORTION WITHOUT COMPLICATION: Chronic | ICD-10-CM

## 2019-05-09 PROCEDURE — 99285 EMERGENCY DEPT VISIT HI MDM: CPT | Mod: 25

## 2019-05-09 NOTE — ED ADULT TRIAGE NOTE - CHIEF COMPLAINT QUOTE
Pt s/p seizure like activity presents with expressive aphasia, unable to move bilat le, headache.  Per EMS family reports pt "staring off into into space".  Pt seen in ED 2 days prior for new onset seizure.

## 2019-05-10 ENCOUNTER — APPOINTMENT (OUTPATIENT)
Dept: MRI IMAGING | Facility: HOSPITAL | Age: 39
End: 2019-05-10

## 2019-05-10 ENCOUNTER — TRANSCRIPTION ENCOUNTER (OUTPATIENT)
Age: 39
End: 2019-05-10

## 2019-05-10 VITALS
SYSTOLIC BLOOD PRESSURE: 127 MMHG | OXYGEN SATURATION: 100 % | RESPIRATION RATE: 16 BRPM | HEART RATE: 69 BPM | TEMPERATURE: 99 F | DIASTOLIC BLOOD PRESSURE: 78 MMHG

## 2019-05-10 DIAGNOSIS — R55 SYNCOPE AND COLLAPSE: ICD-10-CM

## 2019-05-10 LAB
ALBUMIN SERPL ELPH-MCNC: 3.7 G/DL — SIGNIFICANT CHANGE UP (ref 3.3–5)
ALP SERPL-CCNC: 53 U/L — SIGNIFICANT CHANGE UP (ref 40–120)
ALT FLD-CCNC: 19 U/L DA — SIGNIFICANT CHANGE UP (ref 10–45)
ANION GAP SERPL CALC-SCNC: 8 MMOL/L — SIGNIFICANT CHANGE UP (ref 5–17)
AST SERPL-CCNC: 13 U/L — SIGNIFICANT CHANGE UP (ref 10–40)
BASOPHILS # BLD AUTO: 0.1 K/UL — SIGNIFICANT CHANGE UP (ref 0–0.2)
BASOPHILS NFR BLD AUTO: 1.1 % — SIGNIFICANT CHANGE UP (ref 0–2)
BILIRUB SERPL-MCNC: 0.1 MG/DL — LOW (ref 0.2–1.2)
BUN SERPL-MCNC: 14 MG/DL — SIGNIFICANT CHANGE UP (ref 7–23)
CALCIUM SERPL-MCNC: 9 MG/DL — SIGNIFICANT CHANGE UP (ref 8.4–10.5)
CHLORIDE SERPL-SCNC: 106 MMOL/L — SIGNIFICANT CHANGE UP (ref 96–108)
CO2 SERPL-SCNC: 26 MMOL/L — SIGNIFICANT CHANGE UP (ref 22–31)
CREAT SERPL-MCNC: 0.91 MG/DL — SIGNIFICANT CHANGE UP (ref 0.5–1.3)
EOSINOPHIL # BLD AUTO: 0.2 K/UL — SIGNIFICANT CHANGE UP (ref 0–0.5)
EOSINOPHIL NFR BLD AUTO: 1.8 % — SIGNIFICANT CHANGE UP (ref 0–6)
GLUCOSE BLDC GLUCOMTR-MCNC: 150 MG/DL — HIGH (ref 70–99)
GLUCOSE SERPL-MCNC: 136 MG/DL — HIGH (ref 70–99)
HCT VFR BLD CALC: 38.8 % — SIGNIFICANT CHANGE UP (ref 34.5–45)
HGB BLD-MCNC: 13.4 G/DL — SIGNIFICANT CHANGE UP (ref 11.5–15.5)
LYMPHOCYTES # BLD AUTO: 3.2 K/UL — SIGNIFICANT CHANGE UP (ref 1–3.3)
LYMPHOCYTES # BLD AUTO: 33.9 % — SIGNIFICANT CHANGE UP (ref 13–44)
MCHC RBC-ENTMCNC: 30.5 PG — SIGNIFICANT CHANGE UP (ref 27–34)
MCHC RBC-ENTMCNC: 34.6 GM/DL — SIGNIFICANT CHANGE UP (ref 32–36)
MCV RBC AUTO: 88.3 FL — SIGNIFICANT CHANGE UP (ref 80–100)
MONOCYTES # BLD AUTO: 0.7 K/UL — SIGNIFICANT CHANGE UP (ref 0–0.9)
MONOCYTES NFR BLD AUTO: 7.3 % — SIGNIFICANT CHANGE UP (ref 2–14)
NEUTROPHILS # BLD AUTO: 5.3 K/UL — SIGNIFICANT CHANGE UP (ref 1.8–7.4)
NEUTROPHILS NFR BLD AUTO: 55.9 % — SIGNIFICANT CHANGE UP (ref 43–77)
PLATELET # BLD AUTO: 305 K/UL — SIGNIFICANT CHANGE UP (ref 150–400)
POTASSIUM SERPL-MCNC: 3.5 MMOL/L — SIGNIFICANT CHANGE UP (ref 3.5–5.3)
POTASSIUM SERPL-SCNC: 3.5 MMOL/L — SIGNIFICANT CHANGE UP (ref 3.5–5.3)
PROT SERPL-MCNC: 7.2 G/DL — SIGNIFICANT CHANGE UP (ref 6–8.3)
RBC # BLD: 4.39 M/UL — SIGNIFICANT CHANGE UP (ref 3.8–5.2)
RBC # FLD: 11.3 % — SIGNIFICANT CHANGE UP (ref 10.3–14.5)
SODIUM SERPL-SCNC: 140 MMOL/L — SIGNIFICANT CHANGE UP (ref 135–145)
WBC # BLD: 9.4 K/UL — SIGNIFICANT CHANGE UP (ref 3.8–10.5)
WBC # FLD AUTO: 9.4 K/UL — SIGNIFICANT CHANGE UP (ref 3.8–10.5)

## 2019-05-10 PROCEDURE — 70498 CT ANGIOGRAPHY NECK: CPT | Mod: 26

## 2019-05-10 PROCEDURE — 93010 ELECTROCARDIOGRAM REPORT: CPT

## 2019-05-10 PROCEDURE — 93306 TTE W/DOPPLER COMPLETE: CPT | Mod: 26

## 2019-05-10 PROCEDURE — 70496 CT ANGIOGRAPHY HEAD: CPT | Mod: 26

## 2019-05-10 PROCEDURE — 70551 MRI BRAIN STEM W/O DYE: CPT | Mod: 26

## 2019-05-10 PROCEDURE — 99223 1ST HOSP IP/OBS HIGH 75: CPT

## 2019-05-10 PROCEDURE — 99239 HOSP IP/OBS DSCHRG MGMT >30: CPT

## 2019-05-10 PROCEDURE — 70450 CT HEAD/BRAIN W/O DYE: CPT | Mod: 26,59

## 2019-05-10 RX ORDER — DIAZEPAM 5 MG
2 TABLET ORAL ONCE
Refills: 0 | Status: DISCONTINUED | OUTPATIENT
Start: 2019-05-10 | End: 2019-05-10

## 2019-05-10 RX ORDER — ASPIRIN/CALCIUM CARB/MAGNESIUM 324 MG
81 TABLET ORAL DAILY
Refills: 0 | Status: DISCONTINUED | OUTPATIENT
Start: 2019-05-10 | End: 2019-05-10

## 2019-05-10 RX ORDER — SODIUM CHLORIDE 9 MG/ML
1000 INJECTION INTRAMUSCULAR; INTRAVENOUS; SUBCUTANEOUS ONCE
Refills: 0 | Status: COMPLETED | OUTPATIENT
Start: 2019-05-10 | End: 2019-05-10

## 2019-05-10 RX ORDER — IBUPROFEN 200 MG
400 TABLET ORAL THREE TIMES A DAY
Refills: 0 | Status: DISCONTINUED | OUTPATIENT
Start: 2019-05-10 | End: 2019-05-10

## 2019-05-10 RX ORDER — ACETAMINOPHEN 500 MG
650 TABLET ORAL EVERY 6 HOURS
Refills: 0 | Status: DISCONTINUED | OUTPATIENT
Start: 2019-05-10 | End: 2019-05-10

## 2019-05-10 RX ORDER — IBUPROFEN 200 MG
1 TABLET ORAL
Qty: 0 | Refills: 0 | DISCHARGE
Start: 2019-05-10

## 2019-05-10 RX ADMIN — Medication 400 MILLIGRAM(S): at 17:31

## 2019-05-10 RX ADMIN — SODIUM CHLORIDE 1000 MILLILITER(S): 9 INJECTION INTRAMUSCULAR; INTRAVENOUS; SUBCUTANEOUS at 02:29

## 2019-05-10 RX ADMIN — SODIUM CHLORIDE 2000 MILLILITER(S): 9 INJECTION INTRAMUSCULAR; INTRAVENOUS; SUBCUTANEOUS at 01:29

## 2019-05-10 RX ADMIN — Medication 2 MILLIGRAM(S): at 13:03

## 2019-05-10 RX ADMIN — Medication 81 MILLIGRAM(S): at 11:41

## 2019-05-10 RX ADMIN — Medication 400 MILLIGRAM(S): at 11:41

## 2019-05-10 RX ADMIN — Medication 400 MILLIGRAM(S): at 12:00

## 2019-05-10 NOTE — CONSULT NOTE ADULT - SUBJECTIVE AND OBJECTIVE BOX
Neurology consult    NEDRA [id number]     Patient is a 38y old  Female who presents with a chief complaint of syncope (10 May 2019 06:49)      HPI:  39 y/o F with h/o asthma and migraine, presents to the ED with sudden onset dizziness unsteadiness and fell to the floor, was out for few seconds. When she was woken up by fiance, and EMS, pt was unable to speak, until she got to the ED.  Pt apparently in the ED also a day ago for almost same complaints, but pt was seated in the car.  Head CT at that time neg. at that time.  Pt still with mild headache and states she feels fatigued, lightheaded.  There was no report of witnessed seizure activity.  denies chest pain, vomiting, blurred vision.  CT Angio brain/neck neg, +10 mm right upper lobe peripheral nodule. (10 May 2019 06:49)    MEDICATIONS    acetaminophen   Tablet .. 650 milliGRAM(s) Oral every 6 hours PRN  aspirin enteric coated 81 milliGRAM(s) Oral daily  ibuprofen  Tablet. 400 milliGRAM(s) Oral three times a day PRN      PMH: Gestational diabetes mellitus (GDM), antepartum, gestational diabetes method of control unspecified  Missed   Acute congestive heart failure, unspecified congestive heart failure type  Preeclampsia in postpartum period  Non-rheumatic mitral regurgitation  Encounter for elective termination of pregnancy  Asthma  No pertinent past medical history       PSH: S/P  section  S/P tonsillectomy    No significant past surgical history      Family history:   FAMILY HISTORY:  No pertinent family history in first degree relatives      SOCIAL HISTORY:  No history of tobacco or alcohol use     Allergies    calamari and fish eggs (Unknown)  No Known Drug Allergies  shellfish (Swelling)    Intolerances        Height (cm): 165.1 ( @ 23:56)  Weight (kg): 72.8 (05-10 @ 05:31)  BMI (kg/m2): 26.7 (05-10 @ 05:31)    Vital Signs Last 24 Hrs  T(C): 36.8 (10 May 2019 05:31), Max: 36.8 (09 May 2019 23:56)  T(F): 98.3 (10 May 2019 05:31), Max: 98.3 (09 May 2019 23:56)  HR: 83 (10 May 2019 05:31) (83 - 102)  BP: 111/67 (10 May 2019 05:31) (98/59 - 146/94)  BP(mean): --  RR: 16 (10 May 2019 05:31) (15 - 16)  SpO2: 98% (10 May 2019 05:31) (98% - 100%)      On Neurological Examination:    Head: normocephalic Neck: supple no carotid bruits    Mental Status - Patient is alert oriented speech is normal    Cranial Nerves - PERRL, EOMI, VFF, normal V through XII no nystagmus    Motor Exam :  No drift normal strength tone and coordination mildly tremulous bilaterally no focality    Sensory    Intact to light touch and pinprick bilaterally normal DSS to touch    Reflexes:  symmetric @ 2+ plantars downgoing    Gait -  normal                                                                        RADIOLOGY  CT:       < from: CT Angio Head w/ IV Cont (05.10.19 @ 02:18) >    EXAM:  CT ANGIO NECK (W)AW IC    EXAM:  CT ANGIO BRAIN (W)AW IC      PROCEDURE DATE:  05/10/2019        INTERPRETATION:  INDICATION: Headache. Syncope    TECHNIQUE:  A thin section CT study of the head and  neck was conducted   during rapid infusion of intravenous contrast (power injected).  In   addition to the axial data, reformatted images were generated using a 3D   workstation. 100 cc Omnipaque 350 was administered.    FINDINGS:      AORTIC ARCH  unremarkable. Major margins are patent  COMMON CAROTID ARTERIES:     Bilaterally patent  RIGHT BIFURCATION:  Widely patent  LEFT BIFURCATION:   Widely patent  INTERNAL CAROTID ARTERIES:  Bilaterally patent with tortuosity  VERTEBRALSpatent with right-sided dominance  MISCELLANEOUS:  A pleural-based lesion is noted in the posterolateral   right upper chest, measuring 8.9 mm. Further assessment and follow-up   recommended. This is incompletely imaged.      BRAIN       The anterior middle, and posterior cerebral arteries are bilaterally   widely patent. The cavernous and supraclinoid carotid arteries are   unremarkable. The vertebrobasilar system is patent.    Venous sinuses are patent.      IMPRESSION    Widely patent head and neck vasculature. Incidental pleural-based lesion   right posterolateral upper chest. This is incompletely imaged, and   appears larger when compared with 10/23/2017. Dedicated chest imaging   advised.                    LAURIE GONZALES M.D., ATTENDING RADIOLOGIST  This document has been electronically signed. May 10 2019  9:00AM              < end of copied text >                < from: CT Head No Cont (05.10.19 @ 00:39) >    EXAM:  CT BRAIN      PROCEDURE DATE:  05/10/2019        INTERPRETATION:  NONCONTRAST CT OF THE BRAIN.    CLINICAL HISTORY:  Syncope.    TECHNIQUE: Axial CT images are obtained from the cranial vertex to the   skull base without the administration of IV contrast.    Comparison is made to the prior CT head 2019.    FINDINGS: Beam hardening artifact slightly obscures evaluation of the   posterior fossa and brainstem.    There is no acute intra-axial or extra-axial hemorrhage. There is no mass   effect or shift of the midline. The basal cisterns are not effaced.   Ventricles and sulci are appropriate for patient's stated age. Gray-white   matter differentiation is grossly preserved. There is no CT evidence of a   large vascular territorial infarct.    The regional soft tissues and bony calvarium are unremarkable. The   visualized paranasal sinuses and mastoid air cells are well-aerated.    IMPRESSION:    No acute intracranial hemorrhage, mass effect, or CT evidence of a large   vascularterritory infarct. If symptoms persist, consider short-term   follow-up or MRI may be obtained for further evaluation provided no MR   contraindications.            < end of copied text >

## 2019-05-10 NOTE — DISCHARGE NOTE NURSING/CASE MANAGEMENT/SOCIAL WORK - NSDCDPATPORTLINK_GEN_ALL_CORE
You can access the "[employer] Patient Portal, offered by [facility], by registering with the following website: [email or web address]

## 2019-05-10 NOTE — H&P ADULT - NSHPPHYSICALEXAM_GEN_ALL_CORE
Vital Signs (24 Hrs):  T(C): 36.8 (05-10-19 @ 05:31), Max: 36.8 (05-09-19 @ 23:56)  HR: 83 (05-10-19 @ 05:31) (83 - 102)  BP: 111/67 (05-10-19 @ 05:31) (98/59 - 146/94)  RR: 16 (05-10-19 @ 05:31) (15 - 16)  SpO2: 98% (05-10-19 @ 05:31) (98% - 100%)  Daily Height in cm: 165.1 (09 May 2019 23:56)

## 2019-05-10 NOTE — DISCHARGE NOTE PROVIDER - NSDCFUADDINST_GEN_ALL_CORE_FT
Pt. to make own appts for f/u with Dr. Walters, PMD and Dr. Ramos, Neurology.  REcommend f/u in 3-5 days.

## 2019-05-10 NOTE — H&P ADULT - NSICDXPASTMEDICALHX_GEN_ALL_CORE_FT
Acute worsening of chronic renal failure  Indication for emergent dialysis is hyperkalemia with cardiac membrane instability  Plan for 2 hours of HD tonight for hyperkalemia, request MICU evaluation for first cycle of dialysis
PAST MEDICAL HISTORY:  Acute congestive heart failure, unspecified congestive heart failure type during pregnancy     Asthma     Encounter for elective termination of pregnancy     Gestational diabetes mellitus (GDM), antepartum, gestational diabetes method of control unspecified     Missed  2015    Non-rheumatic mitral regurgitation     Preeclampsia in postpartum period

## 2019-05-10 NOTE — ED ADULT NURSE NOTE - OBJECTIVE STATEMENT
Pt to hospital via EMS s/p seizure like activity.  Pt presents with expressive aphasia, unable to move bilat le.  Pt does understand and shakes/nods head appropriately.  VSS, pt indicate headache.

## 2019-05-10 NOTE — ED PROVIDER NOTE - OBJECTIVE STATEMENT
Pertinent PMH/PSH/FHx/SHx and Review of Systems contained within:  39 y/o F with h/o asthma and migraine [facility] EMS c/o sudden onset dizziness unsteadiness and fell backwards, after which she was not able to speak for a while, but she had no tongue bite or incontinence .Pt had almost similar symptoms 2 days ago and was seen in er and discharged home with advise to follow up with neuro.

## 2019-05-10 NOTE — H&P ADULT - ASSESSMENT
37 y/o female with hx of asthma, migraine presents with syncope, second episode apparently this week. CT head neg.  ?arrhythmia, ?vasovagal ?migraine aura, ?anxiety  Lung Nodule    Admit-telemetry  will initiate ASA 81 mg/day  Echo to assess LV fn, r/o valvular dse  Neuro Eval  Out pt ffup of Lung Nodule- ffup CT in 6-12 months  Low risk for DVT - does not require prophylaxis

## 2019-05-10 NOTE — CONSULT NOTE ADULT - ASSESSMENT
39 yo female with h/o migraine presents with 2 episodes of altered consciousness and headache as well as difficulty with speech and jaw tightness.  No incontinence or tongue biting or other definite seizure stigmata.  Neuro exam is unremarkable and CT head and CTA head and neck all negative.  DDx includes syncope, seizure and complicated migraine.    REC:  EEG, MRI brain, routine studies.

## 2019-05-10 NOTE — ED PROVIDER NOTE - CLINICAL SUMMARY MEDICAL DECISION MAKING FREE TEXT BOX
pt has atypical syncope of unknown cause, this being second episode in few days, will admit for further work up

## 2019-05-10 NOTE — H&P ADULT - HISTORY OF PRESENT ILLNESS
39 y/o F with h/o asthma and migraine, presents to the ED with sudden onset dizziness unsteadiness and fell to the floor, was out for few seconds. When she was woken up by fiance, and EMS, pt was unable to speak, until she got to the ED.  Pt apparently in the ED also a day ago for almost same complaints, but pt was seated in the car.  Head CT at that time neg. at that time.  Pt still with mild headache and states she feels fatigued, lightheaded.  There was no report of witnessed seizure activity.  denies chest pain, vomiting, blurred vision.  CT Angio brain/neck neg, +10 mm right upper lobe peripheral nodule.

## 2019-05-10 NOTE — ED ADULT NURSE NOTE - CHPI ED NUR SYMPTOMS NEG
no tingling/no dizziness/no chills/no decreased eating/drinking/no nausea/no vomiting/no pain/no fever

## 2019-05-10 NOTE — H&P ADULT - NEUROLOGICAL DETAILS
alert and oriented x 3/responds to pain/responds to verbal commands/cranial nerves intact/sensation intact/deep reflexes intact

## 2019-05-10 NOTE — DISCHARGE NOTE PROVIDER - HOSPITAL COURSE
39 y/o female with hx of asthma, migraine presents with syncope, second episode apparently this week. CT head neg.    She was admitted to telemetry started on ASA 81 mg/day.  Pt. seen by Neurology, ordered MRI of brain and EEG.      Pt. cleared for D/C by Neurology.        < from: TTE Echo Complete w/Doppler (05.10.19 @ 08:05) >                Summary:     1. Left ventricular ejection fraction, by visual estimation, is 70%.     2. Normalglobal left ventricular systolic function.     3. Spectral Doppler shows pseudonormal pattern of left ventricular     myocardial filling (Grade II diastolic dysfunction).     4. LA volume Index is 23.8 ml/m² ml/m2.        < end of copied text >        < from: MR Head No Cont (05.10.19 @ 14:14) >            IMPRESSION:    Unremarkable noncontrast brain MRI. No acute infarct or hemorrhage.        < end of copied text >

## 2019-05-10 NOTE — ED ADULT NURSE NOTE - NSIMPLEMENTINTERV_GEN_ALL_ED
Implemented All Fall Risk Interventions:  [place] to call system. Call bell, personal items and telephone within reach. Instruct patient to call for assistance. Room bathroom lighting operational. Non-slip footwear when patient is off stretcher. Physically safe environment: no spills, clutter or unnecessary equipment. Stretcher in lowest position, wheels locked, appropriate side rails in place. Provide visual cue, wrist band, yellow gown, etc. Monitor gait and stability. Monitor for mental status changes and reorient to person, place, and time. Review medications for side effects contributing to fall risk. Reinforce activity limits and safety measures with patient and family.

## 2019-05-10 NOTE — DISCHARGE NOTE PROVIDER - NSDCCPCAREPLAN_GEN_ALL_CORE_FT
PRINCIPAL DISCHARGE DIAGNOSIS  Diagnosis: Atypical syncope  Assessment and Plan of Treatment:       SECONDARY DISCHARGE DIAGNOSES  Diagnosis: Migraine  Assessment and Plan of Treatment:

## 2019-05-11 ENCOUNTER — INPATIENT (INPATIENT)
Facility: HOSPITAL | Age: 39
LOS: 2 days | Discharge: HOME | End: 2019-05-14
Attending: HOSPITALIST | Admitting: HOSPITALIST
Payer: MEDICAID

## 2019-05-11 VITALS
RESPIRATION RATE: 18 BRPM | SYSTOLIC BLOOD PRESSURE: 142 MMHG | HEART RATE: 91 BPM | OXYGEN SATURATION: 99 % | DIASTOLIC BLOOD PRESSURE: 85 MMHG | TEMPERATURE: 99 F

## 2019-05-11 DIAGNOSIS — Z98.891 HISTORY OF UTERINE SCAR FROM PREVIOUS SURGERY: Chronic | ICD-10-CM

## 2019-05-11 DIAGNOSIS — R56.9 UNSPECIFIED CONVULSIONS: ICD-10-CM

## 2019-05-11 DIAGNOSIS — Z29.9 ENCOUNTER FOR PROPHYLACTIC MEASURES, UNSPECIFIED: ICD-10-CM

## 2019-05-11 DIAGNOSIS — G43.909 MIGRAINE, UNSPECIFIED, NOT INTRACTABLE, WITHOUT STATUS MIGRAINOSUS: ICD-10-CM

## 2019-05-11 DIAGNOSIS — Z90.89 ACQUIRED ABSENCE OF OTHER ORGANS: Chronic | ICD-10-CM

## 2019-05-11 DIAGNOSIS — J45.20 MILD INTERMITTENT ASTHMA, UNCOMPLICATED: ICD-10-CM

## 2019-05-11 DIAGNOSIS — O03.9 COMPLETE OR UNSPECIFIED SPONTANEOUS ABORTION WITHOUT COMPLICATION: Chronic | ICD-10-CM

## 2019-05-11 LAB
ALBUMIN SERPL ELPH-MCNC: 4.4 G/DL — SIGNIFICANT CHANGE UP (ref 3.3–5)
ALP SERPL-CCNC: 48 U/L — SIGNIFICANT CHANGE UP (ref 40–120)
ALT FLD-CCNC: 14 U/L — SIGNIFICANT CHANGE UP (ref 4–33)
ANION GAP SERPL CALC-SCNC: 14 MMO/L — SIGNIFICANT CHANGE UP (ref 7–14)
APAP SERPL-MCNC: < 15 UG/ML — LOW (ref 15–25)
AST SERPL-CCNC: 28 U/L — SIGNIFICANT CHANGE UP (ref 4–32)
BASE EXCESS BLDV CALC-SCNC: 2.8 MMOL/L — SIGNIFICANT CHANGE UP
BASOPHILS # BLD AUTO: 0.05 K/UL — SIGNIFICANT CHANGE UP (ref 0–0.2)
BASOPHILS NFR BLD AUTO: 0.7 % — SIGNIFICANT CHANGE UP (ref 0–2)
BILIRUB SERPL-MCNC: 0.4 MG/DL — SIGNIFICANT CHANGE UP (ref 0.2–1.2)
BLOOD GAS VENOUS - CREATININE: 0.65 MG/DL — SIGNIFICANT CHANGE UP (ref 0.5–1.3)
BLOOD GAS VENOUS - FIO2: 21 — SIGNIFICANT CHANGE UP
BUN SERPL-MCNC: 10 MG/DL — SIGNIFICANT CHANGE UP (ref 7–23)
CALCIUM SERPL-MCNC: 9.6 MG/DL — SIGNIFICANT CHANGE UP (ref 8.4–10.5)
CHLORIDE BLDV-SCNC: 107 MMOL/L — SIGNIFICANT CHANGE UP (ref 96–108)
CHLORIDE SERPL-SCNC: 111 MMOL/L — HIGH (ref 98–107)
CO2 SERPL-SCNC: 20 MMOL/L — LOW (ref 22–31)
CREAT SERPL-MCNC: 0.61 MG/DL — SIGNIFICANT CHANGE UP (ref 0.5–1.3)
EOSINOPHIL # BLD AUTO: 0.17 K/UL — SIGNIFICANT CHANGE UP (ref 0–0.5)
EOSINOPHIL NFR BLD AUTO: 2.4 % — SIGNIFICANT CHANGE UP (ref 0–6)
ETHANOL BLD-MCNC: < 10 MG/DL — SIGNIFICANT CHANGE UP
GAS PNL BLDV: 140 MMOL/L — SIGNIFICANT CHANGE UP (ref 136–146)
GLUCOSE BLDV-MCNC: 85 MG/DL — SIGNIFICANT CHANGE UP (ref 70–99)
GLUCOSE SERPL-MCNC: 85 MG/DL — SIGNIFICANT CHANGE UP (ref 70–99)
HCG SERPL-ACNC: SIGNIFICANT CHANGE UP MIU/ML
HCO3 BLDV-SCNC: 25 MMOL/L — SIGNIFICANT CHANGE UP (ref 20–27)
HCT VFR BLD CALC: 42.7 % — SIGNIFICANT CHANGE UP (ref 34.5–45)
HCT VFR BLDV CALC: 43.3 % — SIGNIFICANT CHANGE UP (ref 34.5–45)
HGB BLD-MCNC: 13.9 G/DL — SIGNIFICANT CHANGE UP (ref 11.5–15.5)
HGB BLDV-MCNC: 14.1 G/DL — SIGNIFICANT CHANGE UP (ref 11.5–15.5)
IMM GRANULOCYTES NFR BLD AUTO: 0.3 % — SIGNIFICANT CHANGE UP (ref 0–1.5)
LACTATE BLDV-MCNC: 2.1 MMOL/L — HIGH (ref 0.5–2)
LYMPHOCYTES # BLD AUTO: 2.68 K/UL — SIGNIFICANT CHANGE UP (ref 1–3.3)
LYMPHOCYTES # BLD AUTO: 38.3 % — SIGNIFICANT CHANGE UP (ref 13–44)
MCHC RBC-ENTMCNC: 28.8 PG — SIGNIFICANT CHANGE UP (ref 27–34)
MCHC RBC-ENTMCNC: 32.6 % — SIGNIFICANT CHANGE UP (ref 32–36)
MCV RBC AUTO: 88.6 FL — SIGNIFICANT CHANGE UP (ref 80–100)
MONOCYTES # BLD AUTO: 0.44 K/UL — SIGNIFICANT CHANGE UP (ref 0–0.9)
MONOCYTES NFR BLD AUTO: 6.3 % — SIGNIFICANT CHANGE UP (ref 2–14)
NEUTROPHILS # BLD AUTO: 3.63 K/UL — SIGNIFICANT CHANGE UP (ref 1.8–7.4)
NEUTROPHILS NFR BLD AUTO: 52 % — SIGNIFICANT CHANGE UP (ref 43–77)
NRBC # FLD: 0 K/UL — SIGNIFICANT CHANGE UP (ref 0–0)
PCO2 BLDV: 51 MMHG — SIGNIFICANT CHANGE UP (ref 41–51)
PH BLDV: 7.36 PH — SIGNIFICANT CHANGE UP (ref 7.32–7.43)
PLATELET # BLD AUTO: 339 K/UL — SIGNIFICANT CHANGE UP (ref 150–400)
PMV BLD: 10.6 FL — SIGNIFICANT CHANGE UP (ref 7–13)
PO2 BLDV: 25 MMHG — LOW (ref 35–40)
POTASSIUM BLDV-SCNC: 4.6 MMOL/L — HIGH (ref 3.4–4.5)
POTASSIUM SERPL-MCNC: 5.9 MMOL/L — HIGH (ref 3.5–5.3)
POTASSIUM SERPL-SCNC: 5.9 MMOL/L — HIGH (ref 3.5–5.3)
PROT SERPL-MCNC: 7.5 G/DL — SIGNIFICANT CHANGE UP (ref 6–8.3)
RBC # BLD: 4.82 M/UL — SIGNIFICANT CHANGE UP (ref 3.8–5.2)
RBC # FLD: 12.1 % — SIGNIFICANT CHANGE UP (ref 10.3–14.5)
SALICYLATES SERPL-MCNC: < 5 MG/DL — LOW (ref 15–30)
SAO2 % BLDV: 38.6 % — LOW (ref 60–85)
SODIUM SERPL-SCNC: 145 MMOL/L — SIGNIFICANT CHANGE UP (ref 135–145)
TSH SERPL-MCNC: 1.71 UIU/ML — SIGNIFICANT CHANGE UP (ref 0.27–4.2)
WBC # BLD: 6.99 K/UL — SIGNIFICANT CHANGE UP (ref 3.8–10.5)
WBC # FLD AUTO: 6.99 K/UL — SIGNIFICANT CHANGE UP (ref 3.8–10.5)

## 2019-05-11 PROCEDURE — 99223 1ST HOSP IP/OBS HIGH 75: CPT

## 2019-05-11 RX ORDER — SODIUM CHLORIDE 9 MG/ML
1000 INJECTION INTRAMUSCULAR; INTRAVENOUS; SUBCUTANEOUS ONCE
Refills: 0 | Status: COMPLETED | OUTPATIENT
Start: 2019-05-11 | End: 2019-05-11

## 2019-05-11 RX ORDER — LEVETIRACETAM 250 MG/1
500 TABLET, FILM COATED ORAL ONCE
Refills: 0 | Status: COMPLETED | OUTPATIENT
Start: 2019-05-11 | End: 2019-05-11

## 2019-05-11 RX ORDER — LEVETIRACETAM 250 MG/1
500 TABLET, FILM COATED ORAL
Refills: 0 | Status: DISCONTINUED | OUTPATIENT
Start: 2019-05-11 | End: 2019-05-14

## 2019-05-11 RX ORDER — METOCLOPRAMIDE HCL 10 MG
10 TABLET ORAL ONCE
Refills: 0 | Status: COMPLETED | OUTPATIENT
Start: 2019-05-11 | End: 2019-05-11

## 2019-05-11 RX ORDER — ALBUTEROL 90 UG/1
2 AEROSOL, METERED ORAL EVERY 6 HOURS
Refills: 0 | Status: DISCONTINUED | OUTPATIENT
Start: 2019-05-11 | End: 2019-05-14

## 2019-05-11 RX ADMIN — SODIUM CHLORIDE 1000 MILLILITER(S): 9 INJECTION INTRAMUSCULAR; INTRAVENOUS; SUBCUTANEOUS at 15:23

## 2019-05-11 RX ADMIN — LEVETIRACETAM 500 MILLIGRAM(S): 250 TABLET, FILM COATED ORAL at 16:15

## 2019-05-11 RX ADMIN — Medication 10 MILLIGRAM(S): at 15:23

## 2019-05-11 NOTE — CONSULT NOTE ADULT - SUBJECTIVE AND OBJECTIVE BOX
HPI:  Kvng Duff MRN: [id number] (imaging, ED notes from  under this MRN)    Patient is a 38 year old female with PMHx of Asthma, migraines who presents to the ER with multiple episodes of weakness, aphasia, "staring off" and blacking out over the past 5 days. She went to the  ED twice, was admitted the second time due to these sx. The first time on , she was driving and suddenly had tunneling of vision, began to feel weak, and fiance had to grab the steering wheel. She recalls the events prior episode, no aura, but felt jaw pain after. She was taken to  ED, CT head, blood work was done and was negative, she went home. Two days after she went to  again for episodes of staring off, and aphasia (while alert) which lasted about an hour. During the staring spells she's not aware that they are occurring and they stop after a few minutes. CT head, CTA head and neck, MRI brain and TTE were done, all were unremarkable. EEG was done per patient (20 min) but was not read by neurologist (was read by tech, said to be unremarkable?) and she was DC home on prenatal vitamins and told to take ibuprofen for headache. She had several more episodes witnessed on day of presentation to [facility]  by family (at least two) which include staring off for several minutes. Patient feels tired now, has tingling in her hands and legs, memory problems and the L side of her face felt "heavy" earlier but no facial droop noted. In terms of her headache, she has had migraines before, but never associated with blacking out, and the migraines improved after she gave birth, but recently they have been worse. She is not on any medications, supplements, etc.     PAST MEDICAL & SURGICAL HISTORY:  Migraine  Asthma  S/P     Allergies  No Known Drug Allergies  Seafood (Hives)    Review of Systems:  CONSTITUTIONAL:  No weight loss, fever, chills, weakness or fatigue.  HEENT:  Eyes:  No visual loss, blurred vision, double vision or yellow sclerae. Ears, Nose, Throat:  No hearing loss, sneezing, congestion, runny nose or sore throat.  CARDIOVASCULAR:  No chest pain, chest pressure or chest discomfort. No palpitations or edema.  GASTROINTESTINAL:  No anorexia, nausea, vomiting or diarrhea. No abdominal pain or blood.  NEUROLOGICAL: See HPI  MUSCULOSKELETAL:  No muscle, back pain, joint pain or stiffness.  PSYCHIATRIC:  No history of depression or anxiety.    Vital Signs Last 24 Hrs  T(C): 36.7 (11 May 2019 13:43), Max: 37.1 (11 May 2019 13:17)  T(F): 98 (11 May 2019 13:43), Max: 98.8 (11 May 2019 13:17)  HR: 89 (11 May 2019 13:43) (89 - 91)  BP: 141/77 (11 May 2019 13:43) (141/77 - 142/85)  BP(mean): --  RR: 17 (11 May 2019 13:43) (17 - 18)  SpO2: 99% (11 May 2019 13:43) (99% - 99%)    General Exam:   General appearance: No acute distress                 Neurological Exam:  Mental Status: Orientated to self, date and place.  No dysarthria, aphasia or neglect.      Cranial Nerves: PERRL, EOMI, VFF, no nystagmus.  No facial asymmetry.  Hearing intact to finger rub bilaterally.     Motor:   Tone: normal.                  Strength:     [] Upper extremity                      Delt       Bicep    Tricep                                                  R         5/5        5/5        5/5       5/5                                               L          5/5        5/5        5/5       5/5  [] Lower extremity                       HF          KE          KF        DF         PF                                               R        5/5        5/5        5/5       5/5       5/5                                               L         5/5        5/5       5/5       5/5        5/5  Pronator drift: none                 Dysmetria: BL NL FTN  No truncal ataxia.    Tremor: No resting, postural or action tremor.  No myoclonus.    Sensation: intact to light touch    Deep Tendon Reflexes:   Right 2+ : BC, TC, BRD   Left 2+ : BC, TC, BRD  Right 2+ Knee, 1+ ankle  Left 2+ Knee, 1+ ankle    Toes flexor bilaterally  Gait: feels weak, unsteady but able to ambulates without assistance.

## 2019-05-11 NOTE — H&P ADULT - ASSESSMENT
39 yo F with hx of asthma (well controlled) and migraines, who presents with several episodes (weakness, staring spells, LOC, memory issues, aphasia), concerning for possible seizures.

## 2019-05-11 NOTE — H&P ADULT - NSHPLABSRESULTS_GEN_ALL_CORE
Labs reviewed personally. No leukocytosis, with elevated potassium but specimen hemolyzed, TSH 1.71 wnl, lactate 2.1.                           13.9   6.99  )-----------( 339      ( 11 May 2019 14:11 )             42.7     Hgb Trend: 13.9<--  05-11    145  |  111<H>  |  10  ----------------------------<  85  5.9<H>   |  20<L>  |  0.61    Ca    9.6      11 May 2019 14:11    TPro  7.5  /  Alb  4.4  /  TBili  0.4  /  DBili  x   /  AST  28  /  ALT  14  /  AlkPhos  48  05-11    Creatinine Trend: 0.61<--    No images here to review.     Records from [facility] personally reviewed: CTA head and neck, MRI brain, echo unremarkable    EKG personally reviewed: NSR 71 bpm

## 2019-05-11 NOTE — H&P ADULT - NSHPSOCIALHISTORY_GEN_ALL_CORE
bria brownleegus, also child who is doing well, denies smoking, reports social etoh use (~1/month), no illicit drug use

## 2019-05-11 NOTE — ED ADULT TRIAGE NOTE - CHIEF COMPLAINT QUOTE
mother c/o of pt having a shaking episode with weakness and aphasia. pt communicates with full and complete sentences in triage, reports having similar episodes on the [past, was supposed to follow up with neuro. PERRLA extremities are strong and equal, pt able to follow commands and responds to questions appropriately. mother c/o of pt having a shaking episode with weakness and aphasia. pt communicates with full and complete sentences in triage, reports having similar episodes in the past, was supposed to follow up with neuro. PERRLA extremities are strong and equal, pt able to follow commands and responds to questions appropriately.

## 2019-05-11 NOTE — H&P ADULT - NSHPPHYSICALEXAM_GEN_ALL_CORE
Vital Signs Last 24 Hrs  T(C): 36.7 (11 May 2019 17:55), Max: 37.1 (11 May 2019 13:17)  T(F): 98 (11 May 2019 17:55), Max: 98.8 (11 May 2019 13:17)  HR: 68 (11 May 2019 17:55) (68 - 91)  BP: 110/70 (11 May 2019 17:55) (110/70 - 142/85)  BP(mean): --  RR: 18 (11 May 2019 17:55) (17 - 18)  SpO2: 100% (11 May 2019 17:55) (99% - 100%)    PHYSICAL EXAM:  Constitutional: NAD, sitting in bed, family at bedside  Eyes: EOMI, clear sclera/conjunctiva  ENMT: MMM  Neck: supple, intact ROM  Back: non-tender  Respiratory: CTAB, comfortable on RA. speaking in full sentences  Cardiovascular: S1S2 RRR  Gastrointestinal: soft, non-tender +BS  Genitourinary: no suprapubic tenderness  Extremities: no c/c/e  Vascular: wwp  Neurological: moving all extremities, strength 5/5 b/l dorsiflexion/plantarflexion/knee extension/hip flexion, arm flexion/extension/finger , SILT grossly, no tremors noted, AOx3  Skin: no rash  Musculoskeletal: no calf tenderness  Psychiatric: calm, pleasant

## 2019-05-11 NOTE — ED PROVIDER NOTE - PROGRESS NOTE DETAILS
JUAN Mccracken: pt seen by neurology advised to start Keppra and to admit for video EEG.  Pt aware and agreeable with plan.  MAR notified.

## 2019-05-11 NOTE — H&P ADULT - HISTORY OF PRESENT ILLNESS
37 yo F w/ hx asthma (well controlled on albuterol prn, no recent exacerbations), migraines (no recent attacks, not on any medications, did not have any similar experiences in the past), who presents due to complaints of multiple episodes of "blacking out", staring, aphasia and weakness. States she was in her usual state of health before the first episode on Tuesday. She noted her body felt limp, she couldn't move, she was gasping for breath, with numbness and tingling in her fingers. She felt like her jaw was sore. She had a second episode on Thursday, when she was in the kitchen and fell to the floor. She has been experiencing some facial heaviness. She denies any episodes of bowel or bladder incontinence, or personal or family history of seizures or neurological disease. She reports short term memory issues since these episodes occur. Unclear triggers, does not know when they are coming on. She was seen in [facility] where she had an unremarkable TTE, CT head, CTA head and neck and discharged home. She apparently had several more episodes in the emergency department witnessed by family but none since arriving in the [facility].     Family reports when she is having these episodes, she does not respond. No new medications or ingestions.     Patient seen by neurology, recommended to be started on Keppra and have 24 hr vEEG monitoring for further evaluation.     Of note patient has a separate MRN # [id number] under which their [place] hospitalization is under

## 2019-05-11 NOTE — CONSULT NOTE ADULT - ATTENDING COMMENTS
Patient seen and examined.  History per resident.  She endorses episodes that seem atypical for seizure.  She is aware of the events but can't speak which is unusual for epilepsy.  However, she has had several of these episodes and some include starring spells so will monitor with vEEG, cont keppra 500mg BID

## 2019-05-11 NOTE — ED PROVIDER NOTE - CLINICAL SUMMARY MEDICAL DECISION MAKING FREE TEXT BOX
37 y/o female with a hx of Asthma, Migraine presents to the ER c/o multiple episodes of weakness and aphasia over the past 5 days.  Pt is well appearing, NAD, A&O x 4, pt had an extensive work up at [facility] multiple CT scans and MRI,  will check labs, neuro consult.

## 2019-05-11 NOTE — H&P ADULT - PROBLEM SELECTOR PLAN 4
DVT ppx: low risk  Regular diet (has seafood allergy, patient aware not to order calamari/fish eggs)

## 2019-05-11 NOTE — ED ADULT NURSE NOTE - OBJECTIVE STATEMENT
patient arrives a*ox3 ambulatory to room 21 c/o "an episode of shaking with whole body weakness and difficulty speaking," patient states this began @ 1230, aphasia has resolved and patient speaking in full sentences with out any difficulty, md medina made aware and no code stroke called. patient states the episode was witnessed by family, and she did not syncopize, lose consciousness or hit her head. patient states this has been happening over the past several days and was discharged yesterday after and [facility] where she had an EEG done. no neuo defecits on exam, patient states she now feels "out of it," has an occipital headache with jaw pain and "a cold feeling in my feet. appears comfortable and in no distress, respirations are even and unlabored, spo2 100% on room air , nsr per monitor. denies any visual issues, no chest pain or shortness of breath, appears comfortable and in no distress.  pending ed md godinez at this time, bed in lowest position with side rails up and call bell in reach, family @ bedside. will ctm closely.

## 2019-05-11 NOTE — H&P ADULT - PROBLEM SELECTOR PLAN 1
- neuro input appreciated, they are suspecting possible new onset seizures (complex partial, simple partial)  - c/w keppra bid as per neuro, if having prolonged episode of seizure >3 min can do ativan 2 mg prn as per neuro  - await 24h vEEG  - fall, aspiration, seizure precautions  - checking utox, cpk, HIV, heavy metal screen as per neuro recs

## 2019-05-11 NOTE — H&P ADULT - NSHPREVIEWOFSYSTEMS_GEN_ALL_CORE
REVIEW OF SYSTEMS  General:	no fever/chills, +fatigue  Skin/Breast: no rash  Ophthalmologic: no eye pain	  ENMT: jaw pain after episode	  Respiratory and Thorax: no SOB  Cardiovascular: no CP	  Gastrointestinal: episodes of nausea after episodes, no abdominal pain	  Genitourinary: no dysuria, no incontinence	  Musculoskeletal: no joint pain	  Neurological: history of migraines, feels like she might have a headache coming on, episodes concerning for possible seizures as noted in HPI, denies weakness	  Psychiatric: no issues	  Hematology/Lymphatics: no easy bleeding/bruising	  Endocrine: no diabetes or thyroid issues	  Allergic/Immunologic: Allergy to seafood (calamari/fish eggs)

## 2019-05-11 NOTE — ED PROVIDER NOTE - ATTENDING CONTRIBUTION TO CARE
I, Pedro Aragon MD, personally saw the patient with the resident, and completed the key components of the history and physical exam. I then discussed the management plan with the resident.    pt w/ intermittent episodes of AMS.  Possible new onset seizure vs complex migraine.  s/p recent imaging w/o findings, no need for rpt @ this time.  d/w neuro regarding possible initiation of AED. I, Pedro Aragon MD, personally saw the patient with ACP.  I have personally performed a face to face diagnostic evaluation on this patient.  I have reviewed the ACP note and agree with the history, exam, and plan of care, except as noted.      pt w/ intermittent episodes of AMS.  Possible new onset seizure vs complex migraine.  s/p recent imaging w/o findings, no need for rpt @ this time.  d/w neuro regarding possible initiation of AED.

## 2019-05-11 NOTE — ED ADULT NURSE NOTE - NSIMPLEMENTINTERV_GEN_ALL_ED
Implemented All Fall Risk Interventions:  [place] to call system. Call bell, personal items and telephone within reach. Instruct patient to call for assistance. Room bathroom lighting operational. Non-slip footwear when patient is off stretcher. Physically safe environment: no spills, clutter or unnecessary equipment. Stretcher in lowest position, wheels locked, appropriate side rails in place. Provide visual cue, wrist band, yellow gown, etc. Monitor gait and stability. Monitor for mental status changes and reorient to person, place, and time. Review medications for side effects contributing to fall risk. Reinforce activity limits and safety measures with patient and family.

## 2019-05-11 NOTE — H&P ADULT - PROBLEM SELECTOR PLAN 2
- respiratory status stable, no wheezing appreciated, not in acute exacerbation  - c/w albuterol prn

## 2019-05-11 NOTE — ED PROVIDER NOTE - OBJECTIVE STATEMENT
39 y/o female with a hx of Asthma, Migraine presents to the ER c/o episodes of weakness and aphasia 37 y/o female with a hx of Asthma, Migraine presents to the ER c/o multiple episodes of weakness and aphasia over the past 5 days.  Pt states on Tuesday when she was driving she started not to feel well she became weak, could not speak and everything became dark; pt was taken to  ER had blood work/CT and was discharged home.  Pt states she went back to the ER at  2 days later for same symptoms pt was admitted to the hospital and had a CT head, CTA head/neck, MRI and EEG and was discharge home.  Pt states today she had another episode witnessed by the family.  Pt denies fevers, chills, hx of similar episodes in the past, chest pain, shortness of breath, bowel/bladder incontinence. 37 y/o female with a hx of Asthma, Migraine presents to the ER c/o multiple episodes of weakness and aphasia over the past 5 days.  Pt states on Tuesday when she was driving she started not to feel well she became weak, could not speak and everything became dark; pt was taken to  ER had blood work/CT and was discharged home.  Pt states she went back to the ER at  2 days later for same symptoms pt was admitted to the hospital and had a CT head, CTA head/neck, MRI and EEG and was discharge home.  Pt states today she had another episode witnessed by the family.  Pt denies fevers, chills, hx of similar episodes in the past, chest pain, shortness of breath, bowel/bladder incontinence.    Mahesh: 37 y/o F w/ pHx as noted pw multiple episodes of AMS x 5 days.  Described as being unable to speak.  Seen at  initial CT performed WNL, dced, returned and admitted s/p extensive w/u w/o findings, awaiting EEG rpt.  No fever, CP, AP, n/v.

## 2019-05-11 NOTE — CONSULT NOTE ADULT - ASSESSMENT
Kvng Duff MRN: [id number] (imaging, ED notes from  under this MRN)  38 year old female with PMHx of Asthma, migraines who presents to the ER with multiple episodes of weakness, aphasia, "staring off" and blacking out over the past 5 days. She went to the  ED twice, was admitted the second time due to these sx. The first time on 5/7, she was driving and suddenly had tunneling of vision, began to feel weak, and fiance had to grab the steering wheel. She recalls the events prior episode, no aura, but felt jaw pain after. She was taken to  ED, CT head, blood work was done and was negative, she went home. Two days after she went to  again for episodes of staring off, and aphasia (while alert) which lasted about an hour. During the staring spells she's not aware that they are occurring and they stop after a few minutes. CT head, CTA head and neck, MRI brain and TTE were done, all were unremarkable. EEG was done per patient (20 min) but was not read by neurologist (was read by tech, said to be unremarkable?) and she was DC home on prenatal vitamins and told to take ibuprofen for headache. She had several more episodes witnessed on day of presentation to [facility]  by family (at least two) which include staring off for several minutes. Patient feels tired now, has tingling in her hands and legs, memory problems and the L side of her face felt "heavy" earlier but no facial droop noted. In terms of her headache, she has had migraines before, but never associated with blacking out, and the migraines improved after she gave birth, but recently they have been worse. She is not on any medications, supplements, etc.   Impression: likely new onset seizures (complex partial, simple partial), given negative imaging and unremarkable cardiac workup, no toxic habits.    Plan  [] vEEG  [] start on 500 keppra BID  [] labs: CPK, TSH, utox, heavy metal screen  [] seizure, fall, aspiration precautions  [] ativan 2 mg PRN for seizures > 3 min Kvng Duff MRN: [id number] (imaging, ED notes from  under this MRN)  38 year old female with PMHx of Asthma, migraines who presents to the ER with multiple episodes of weakness, aphasia, "staring off" and blacking out over the past 5 days. She went to the  ED twice, was admitted the second time due to these sx. The first time on 5/7, she was driving and suddenly had tunneling of vision, began to feel weak, and fiance had to grab the steering wheel. She recalls the events prior episode, no aura, but felt jaw pain after. She was taken to  ED, CT head, blood work was done and was negative, she went home. Two days after she went to  again for episodes of staring off, and aphasia (while alert) which lasted about an hour. During the staring spells she's not aware that they are occurring and they stop after a few minutes. CT head, CTA head and neck, MRI brain and TTE were done, all were unremarkable. EEG was done per patient (20 min) but was not read by neurologist (was read by tech, said to be unremarkable?) and she was DC home on prenatal vitamins and told to take ibuprofen for headache. She had several more episodes witnessed on day of presentation to [facility]  by family (at least two) which include staring off for several minutes. Patient feels tired now, has tingling in her hands and legs, memory problems and the L side of her face felt "heavy" earlier but no facial droop noted. In terms of her headache, she has had migraines before, but never associated with blacking out, and the migraines improved after she gave birth, but recently they have been worse. She is not on any medications, supplements, etc.   Impression: likely new onset seizures (complex partial, simple partial), given negative imaging and unremarkable cardiac workup, no toxic habits.    Plan  [] vEEG  [] start on 500 keppra BID  [] labs: CPK, TSH, utox, heavy metal screen, HSV, HIV  [] seizure, fall, aspiration precautions  [] ativan 2 mg PRN for seizures > 3 min

## 2019-05-11 NOTE — H&P ADULT - NSICDXPASTMEDICALHX_GEN_ALL_CORE_FT
PAST MEDICAL HISTORY:  Asthma mild, intermittent    Migraine     Mitral valve regurgitation noted during pregnancy, reports resolved, states no further issues

## 2019-05-11 NOTE — ED ADULT NURSE NOTE - CHIEF COMPLAINT QUOTE
mother c/o of pt having a shaking episode with weakness and aphasia. pt communicates with full and complete sentences in triage, reports having similar episodes in the past, was supposed to follow up with neuro. PERRLA extremities are strong and equal, pt able to follow commands and responds to questions appropriately.

## 2019-05-11 NOTE — ED ADULT NURSE REASSESSMENT NOTE - NS ED NURSE REASSESS COMMENT FT1
called to bedside by patients fiance who states "she is having another episode!" patient laying in stretcher with eyes closed, not answering questions and not speaking, normal sinus per monitor, respirations are even and unlabored,  spo2 100% on room air, no shaking or tremor observed. md medina made aware immediately. per md no further intervention is necessary @ this time, will ctm closely and reassess.

## 2019-05-12 ENCOUNTER — TRANSCRIPTION ENCOUNTER (OUTPATIENT)
Age: 39
End: 2019-05-12

## 2019-05-12 LAB
AMPHET UR-MCNC: NEGATIVE — SIGNIFICANT CHANGE UP
ANION GAP SERPL CALC-SCNC: 10 MMO/L — SIGNIFICANT CHANGE UP (ref 7–14)
BARBITURATES UR SCN-MCNC: NEGATIVE — SIGNIFICANT CHANGE UP
BENZODIAZ UR-MCNC: NEGATIVE — SIGNIFICANT CHANGE UP
BUN SERPL-MCNC: 11 MG/DL — SIGNIFICANT CHANGE UP (ref 7–23)
CALCIUM SERPL-MCNC: 9.6 MG/DL — SIGNIFICANT CHANGE UP (ref 8.4–10.5)
CANNABINOIDS UR-MCNC: NEGATIVE — SIGNIFICANT CHANGE UP
CHLORIDE SERPL-SCNC: 104 MMOL/L — SIGNIFICANT CHANGE UP (ref 98–107)
CK SERPL-CCNC: 74 U/L — SIGNIFICANT CHANGE UP (ref 25–170)
CO2 SERPL-SCNC: 26 MMOL/L — SIGNIFICANT CHANGE UP (ref 22–31)
COCAINE METAB.OTHER UR-MCNC: NEGATIVE — SIGNIFICANT CHANGE UP
CREAT SERPL-MCNC: 0.72 MG/DL — SIGNIFICANT CHANGE UP (ref 0.5–1.3)
GLUCOSE SERPL-MCNC: 94 MG/DL — SIGNIFICANT CHANGE UP (ref 70–99)
HCG UR-SCNC: NEGATIVE — SIGNIFICANT CHANGE UP
HIV 1+2 AB+HIV1 P24 AG SERPL QL IA: SIGNIFICANT CHANGE UP
METHADONE UR-MCNC: NEGATIVE — SIGNIFICANT CHANGE UP
OPIATES UR-MCNC: NEGATIVE — SIGNIFICANT CHANGE UP
OXYCODONE UR-MCNC: NEGATIVE — SIGNIFICANT CHANGE UP
PCP UR-MCNC: NEGATIVE — SIGNIFICANT CHANGE UP
POTASSIUM SERPL-MCNC: 4.1 MMOL/L — SIGNIFICANT CHANGE UP (ref 3.5–5.3)
POTASSIUM SERPL-SCNC: 4.1 MMOL/L — SIGNIFICANT CHANGE UP (ref 3.5–5.3)
SODIUM SERPL-SCNC: 140 MMOL/L — SIGNIFICANT CHANGE UP (ref 135–145)
SP GR UR: 1 — SIGNIFICANT CHANGE UP (ref 1–1.03)

## 2019-05-12 PROCEDURE — 99232 SBSQ HOSP IP/OBS MODERATE 35: CPT

## 2019-05-12 PROCEDURE — 99222 1ST HOSP IP/OBS MODERATE 55: CPT

## 2019-05-12 RX ORDER — KETOROLAC TROMETHAMINE 30 MG/ML
15 SYRINGE (ML) INJECTION ONCE
Refills: 0 | Status: DISCONTINUED | OUTPATIENT
Start: 2019-05-12 | End: 2019-05-12

## 2019-05-12 RX ORDER — ACETAMINOPHEN 500 MG
650 TABLET ORAL ONCE
Refills: 0 | Status: COMPLETED | OUTPATIENT
Start: 2019-05-12 | End: 2019-05-12

## 2019-05-12 RX ORDER — ONDANSETRON 8 MG/1
4 TABLET, FILM COATED ORAL ONCE
Refills: 0 | Status: COMPLETED | OUTPATIENT
Start: 2019-05-12 | End: 2019-05-12

## 2019-05-12 RX ADMIN — Medication 15 MILLIGRAM(S): at 13:00

## 2019-05-12 RX ADMIN — LEVETIRACETAM 500 MILLIGRAM(S): 250 TABLET, FILM COATED ORAL at 18:49

## 2019-05-12 RX ADMIN — Medication 650 MILLIGRAM(S): at 20:48

## 2019-05-12 RX ADMIN — LEVETIRACETAM 500 MILLIGRAM(S): 250 TABLET, FILM COATED ORAL at 06:40

## 2019-05-12 RX ADMIN — Medication 1 TABLET(S): at 11:06

## 2019-05-12 RX ADMIN — Medication 1 MILLIGRAM(S): at 15:20

## 2019-05-12 RX ADMIN — Medication 650 MILLIGRAM(S): at 21:32

## 2019-05-12 RX ADMIN — ONDANSETRON 4 MILLIGRAM(S): 8 TABLET, FILM COATED ORAL at 20:50

## 2019-05-12 RX ADMIN — Medication 15 MILLIGRAM(S): at 12:15

## 2019-05-12 NOTE — PROGRESS NOTE ADULT - PROBLEM SELECTOR PLAN 1
- neuro input appreciated, they are suspecting possible new onset seizures (complex partial, simple partial)  - c/w keppra bid as per neuro, if having prolonged episode of seizure >3 min can do ativan 2 mg prn as per neuro  - await 24h vEEG  - fall, aspiration, seizure precautions  - f/u utox,  heavy metal screen as per neuro recs

## 2019-05-12 NOTE — CHART NOTE - NSCHARTNOTEFT_GEN_A_CORE
Called by RN that patient was "slumped over" and unresponsive.    Nurse states patient making slight head movements. Seizure lasting 3 minutes.   Patient seen and examined at bedside.   Ativan 1 mg IV given.   A/P patient currently being worked up for new onset seizures.  Keppra 500 mg BID, EEG pending.  Patient alert and oriented, able to answer questions appropriately.    Neurology called.

## 2019-05-12 NOTE — DISCHARGE NOTE PROVIDER - CARE PROVIDER_API CALL
Freedom [facility])  Neurology  [address]  [place] [address]  Phone: [phone]  Fax: [phone]  Follow Up Time:

## 2019-05-12 NOTE — PROGRESS NOTE ADULT - ASSESSMENT
37 yo F with hx of asthma (well controlled) and migraines, who presents with several episodes (weakness, staring spells, LOC, memory issues, aphasia), concerning for possible seizures.

## 2019-05-12 NOTE — PROGRESS NOTE ADULT - SUBJECTIVE AND OBJECTIVE BOX
Patient is a 38y old  Female who presents with a chief complaint of Concern for Seizure (11 May 2019 19:54)      SUBJECTIVE / OVERNIGHT EVENTS: Pt endorses headache ( typical of her migraine). No weakness    MEDICATIONS  (STANDING):  levETIRAcetam 500 milliGRAM(s) Oral two times a day  multivitamin 1 Tablet(s) Oral daily    MEDICATIONS  (PRN):  ALBUTerol    90 MICROgram(s) HFA Inhaler 2 Puff(s) Inhalation every 6 hours PRN Shortness of Breath and/or Wheezing      T(C): 36.9 (19 @ 10:16), Max: 37.1 (19 @ 13:17)  HR: 80 (19 @ 10:16) (68 - 91)  BP: 110/70 (19 @ 10:16) (99/60 - 142/85)  RR: 18 (19 @ 10:16) (17 - 18)  SpO2: 100% (19 @ 10:16) (99% - 100%)  CAPILLARY BLOOD GLUCOSE      POCT Blood Glucose.: 87 mg/dL (11 May 2019 13:16)    I&O's Summary      PHYSICAL EXAM:  GENERAL: NAD,   HEAD:  Normocephalic  EYES: EOMI,  conjunctiva and sclera clear  NECK: Supple,   CHEST/LUNG: Clear to auscultation bilaterally; No wheeze  HEART:  s1 s2 + Regular rate and rhythm;   ABDOMEN: Soft, Nontender, Nondistended; Bowel sounds present  EXTREMITIES:   No clubbing, cyanosis  NEURO: AAOx3      LABS:                        13.9   6.99  )-----------( 339      ( 11 May 2019 14:11 )             42.7     05-12    140  |  104  |  11  ----------------------------<  94  4.1   |  26  |  0.72    Ca    9.6      12 May 2019 09:10    TPro  7.5  /  Alb  4.4  /  TBili  0.4  /  DBili  x   /  AST  28  /  ALT  14  /  AlkPhos  48  05-11      CARDIAC MARKERS ( 12 May 2019 07:00 )  x     / x     / 74 u/L / x     / x                Consultant(s) Notes Reviewed:  Neurology    Srinath Aviles MD  Hospitalist  P/ [phone]

## 2019-05-12 NOTE — CHART NOTE - NSCHARTNOTEFT_GEN_A_CORE
Late note entry     Notified by RN that pt had seizure lasting approx 3 mins then resolved. Pt had headache and nausea in post-ictal phase, tx with tylenol and zofran. Pt seen now at bedside, stable, A&Ox4, states that she feels much better. PRN ativan 2mg IV for seizure lasting >3 mins ordered as per neuro recs.    HUMA Juarez PA-C  [id number]

## 2019-05-12 NOTE — PROVIDER CONTACT NOTE (OTHER) - ASSESSMENT
Patient unresponsive, nodding forward with eyes closed lasting 3 minutes. Aphasic when seizure ended. Reported to have nausea and HA once resolved.

## 2019-05-12 NOTE — DISCHARGE NOTE PROVIDER - NSDCCPCAREPLAN_GEN_ALL_CORE_FT
PRINCIPAL DISCHARGE DIAGNOSIS  Diagnosis: Seizure  Assessment and Plan of Treatment: You had an EEG performed while hopsitalzied.  Results have shown --------------.      SECONDARY DISCHARGE DIAGNOSES  Diagnosis: Migraine without status migrainosus, not intractable, unspecified migraine type  Assessment and Plan of Treatment: Migraine without status migrainosus, not intractable, unspecified migraine type PRINCIPAL DISCHARGE DIAGNOSIS  Diagnosis: Seizure  Assessment and Plan of Treatment: - Neuro consulted recommended Keppra, Ativan PRN inpatient  - CT head, CTA head / neck, MRI brain and TTE all were unremarkable   - 24h vEEG- Normal EEG study. No epileptic pattern or seizure seen  - you can follow up with neurology clinic, call [phone] to schedule an appoint        SECONDARY DISCHARGE DIAGNOSES  Diagnosis: Migraine without status migrainosus, not intractable, unspecified migraine type  Assessment and Plan of Treatment: - you have strong history of Migraine headache likely with Aura   - per Neuro discontinued Keppra and started Amitriptyline 10mg HS for migraine prophylaxis and you can continue taking Magnesium Oxide supplement (400mg BID) and Riboflavin (200mg BID)  - you can follow up with neurology clinic, call [phone] to schedule an appoint    Diagnosis: Mild intermittent asthma without complication  Assessment and Plan of Treatment: - continue inhalers as prescribed

## 2019-05-12 NOTE — DISCHARGE NOTE PROVIDER - HOSPITAL COURSE
39 yo F with hx of asthma (well controlled) and migraines, who presents with several episodes (weakness, staring spells, LOC, memory issues, aphasia), concerning for possible seizures.         Hospital Course: 37 yo F with hx of asthma (well controlled) and migraines, who presents with several episodes (weakness, staring spells, LOC, memory issues, aphasia), concerning for possible seizures.         Hospital Course        Seizure r/o    - Neuro consulted recommended Keppra, Ativan PRN while in the hospital. CT head, CTA head / neck, MRI brain and TTE all were unremarkable. 24h vEEG- Normal EEG study. No epileptic pattern or seizure seen    - Pt does complain of Migraine type HA with these events, and has a strong hx of Migraine. Likely these events are migraine aura. Discontinued Keppra and started Amitriptyline 10mg HS for migraine ppx. She can follow up with neurology clinic, can call [phone]. Pt also showed interest in over the counter Magnesium Oxide supplement (400mg BID) and Riboflavin (200mg BID). Seizure precautions. Urine tox- negative, HIV negative         Mild intermittent asthma without complication- respiratory status stable, no wheezing appreciated, not in acute exacerbation, Albuterol PRN         Migraine without status migrainosus, not intractable- denies recent attacks, not on any medications. Per Neuro recs started Elavil 10mg 1 tab oral daily at bedtime

## 2019-05-12 NOTE — PROVIDER CONTACT NOTE (OTHER) - ASSESSMENT
Pt appears slumped over in bed and unresponsive, lasting approximately 3 minutes. Pt aphasic after seizure ended

## 2019-05-13 LAB
ANION GAP SERPL CALC-SCNC: 12 MMO/L — SIGNIFICANT CHANGE UP (ref 7–14)
BUN SERPL-MCNC: 13 MG/DL — SIGNIFICANT CHANGE UP (ref 7–23)
CALCIUM SERPL-MCNC: 9.2 MG/DL — SIGNIFICANT CHANGE UP (ref 8.4–10.5)
CHLORIDE SERPL-SCNC: 111 MMOL/L — HIGH (ref 98–107)
CO2 SERPL-SCNC: 24 MMOL/L — SIGNIFICANT CHANGE UP (ref 22–31)
CREAT SERPL-MCNC: 0.87 MG/DL — SIGNIFICANT CHANGE UP (ref 0.5–1.3)
GLUCOSE SERPL-MCNC: 91 MG/DL — SIGNIFICANT CHANGE UP (ref 70–99)
POTASSIUM SERPL-MCNC: 4.2 MMOL/L — SIGNIFICANT CHANGE UP (ref 3.5–5.3)
POTASSIUM SERPL-SCNC: 4.2 MMOL/L — SIGNIFICANT CHANGE UP (ref 3.5–5.3)
SODIUM SERPL-SCNC: 147 MMOL/L — HIGH (ref 135–145)

## 2019-05-13 PROCEDURE — 99232 SBSQ HOSP IP/OBS MODERATE 35: CPT

## 2019-05-13 PROCEDURE — 95819 EEG AWAKE AND ASLEEP: CPT | Mod: 26

## 2019-05-13 RX ORDER — ONDANSETRON 8 MG/1
4 TABLET, FILM COATED ORAL EVERY 6 HOURS
Refills: 0 | Status: DISCONTINUED | OUTPATIENT
Start: 2019-05-13 | End: 2019-05-14

## 2019-05-13 RX ORDER — ACETAMINOPHEN 500 MG
650 TABLET ORAL EVERY 6 HOURS
Refills: 0 | Status: DISCONTINUED | OUTPATIENT
Start: 2019-05-13 | End: 2019-05-14

## 2019-05-13 RX ADMIN — LEVETIRACETAM 500 MILLIGRAM(S): 250 TABLET, FILM COATED ORAL at 17:52

## 2019-05-13 RX ADMIN — Medication 650 MILLIGRAM(S): at 18:36

## 2019-05-13 RX ADMIN — LEVETIRACETAM 500 MILLIGRAM(S): 250 TABLET, FILM COATED ORAL at 05:29

## 2019-05-13 RX ADMIN — Medication 1 TABLET(S): at 11:56

## 2019-05-13 RX ADMIN — ONDANSETRON 4 MILLIGRAM(S): 8 TABLET, FILM COATED ORAL at 14:00

## 2019-05-13 RX ADMIN — Medication 650 MILLIGRAM(S): at 17:54

## 2019-05-13 RX ADMIN — Medication 650 MILLIGRAM(S): at 10:15

## 2019-05-13 RX ADMIN — Medication 650 MILLIGRAM(S): at 09:48

## 2019-05-13 NOTE — PROVIDER CONTACT NOTE (OTHER) - SITUATION
Patient had 3 episodes where she was unable to speak, but did follow commands, lasting 2-3 mins, VS wnl, no distress noted, NP made aware, EEG monitor in progress,
Patient noted to have seizure.
pt having a seizure

## 2019-05-13 NOTE — PROVIDER CONTACT NOTE (OTHER) - BACKGROUND
resting comfortable, safety maintained, will continue to monitor.
Patient admitted d/t new onset of seizures at home
Pt admitted for seizures

## 2019-05-13 NOTE — PROGRESS NOTE ADULT - SUBJECTIVE AND OBJECTIVE BOX
Patient is a 38y old  Female who presents with a chief complaint of Concern for Seizure (11 May 2019 19:54)    SUBJECTIVE / OVERNIGHT EVENTS: Episode witnessed today when patient became aphasic. Did not respond for few minutes but returned to baseline without intervention. No headache, no tonic clonic movement, no tongue biting, or urinary incontinence     MEDICATIONS  (STANDING):  levETIRAcetam 500 milliGRAM(s) Oral two times a day  multivitamin 1 Tablet(s) Oral daily    MEDICATIONS  (PRN):  ALBUTerol    90 MICROgram(s) HFA Inhaler 2 Puff(s) Inhalation every 6 hours PRN Shortness of Breath and/or Wheezing      Vital Signs Last 24 Hrs  T(C): 36.9 (13 May 2019 13:29), Max: 37.2 (13 May 2019 09:35)  T(F): 98.4 (13 May 2019 13:29), Max: 98.9 (13 May 2019 09:35)  HR: 74 (13 May 2019 13:29) (74 - 91)  BP: 113/78 (13 May 2019 13:29) (113/78 - 124/88)  BP(mean): --  RR: 17 (13 May 2019 13:29) (16 - 18)  SpO2: 100% (13 May 2019 13:29) (99% - 100%)    PHYSICAL EXAM:  GENERAL: NAD,   HEAD:  Normocephalic  EYES: EOMI,  conjunctiva and sclera clear  NECK: Supple,   CHEST/LUNG: Clear to auscultation bilaterally; No wheeze  HEART:  s1 s2 + Regular rate and rhythm;   ABDOMEN: Soft, Nontender, Nondistended; Bowel sounds present  EXTREMITIES:   No clubbing, cyanosis  NEURO: AAOx3      LABS:                                   13.9   6.99  )-----------( 339      ( 11 May 2019 14:11 )             42.7   05-13    147<H>  |  111<H>  |  13  ----------------------------<  91  4.2   |  24  |  0.87    Ca    9.2      13 May 2019 05:47    TPro  7.5  /  Alb  4.4  /  TBili  0.4  /  DBili  x   /  AST  28  /  ALT  14  /  AlkPhos  48  05-11      Consultant(s) Notes Reviewed:  Neurology    Srinath Aviles MD  Hospitalist  P/ [phone]

## 2019-05-13 NOTE — PROGRESS NOTE ADULT - PROBLEM SELECTOR PLAN 1
- neuro input appreciated, they are suspecting possible pseudoseizures?  -Awaiting v EEG  - c/w keppra bid as per neuro, if having prolonged episode of seizure >3 min can do ativan 2 mg prn as per neuro  - fall, aspiration, seizure precautions  - f/u heavy metal screen as per neuro recs

## 2019-05-13 NOTE — EEG REPORT - NS EEG TEXT BOX
[facility]   COMPREHENSIVE EPILEPSY CENTER   REPORT OF ROUTINE VIDEO EEG     [facility]: [address], [place] [address], Ph#: [phone]  [facility]: [address], [place] [address], Ph#: [phone]  Office: [address] [place] [address], [place] [address], [place] [address] Ph#: [phone]    Patient Name: NEDRA WELSH  Age and : 38y (82)  MRN #: [id number]  Location: [place] [address]  Referring Physician: Agueda Herndon    Study Date: 19    _____________________________________________________________  TECHNICAL INFORMATION    Placement and Labeling of Electrodes:  The EEG was performed utilizing 20 channels referential EEG connections (coronal over temporal over parasagittal montage) using all standard 10-20 electrode placements with EKG.  Recording was at a sampling rate of 256 samples per second per channel.  Time synchronized digital video recording was done simultaneously with EEG recording.  A low light infrared camera was used for low light recording.  Chris and seizure detection algorithms were utilized.    _____________________________________________________________  HISTORY    Patient is a 38y old  Female who presents with a chief complaint of Concern for Seizure (13 May 2019 13:37)    PERTINENT MEDICATION:  levETIRAcetam 500 milliGRAM(s) Oral two times a day  _____________________________________________________________  STUDY INTERPRETATION    Findings: The background was continuous, spontaneously variable and reactive. During wakefulness, the posterior dominant rhythm consisted of symmetric, well-modulated 10 Hz activity, with amplitude to 30 uV, that attenuated to eye opening.  Low amplitude frontal beta was noted in wakefulness.    Background Slowing:  No generalized background slowing was present.    Focal Slowing:   None was present.    Sleep Background:  Drowsiness was characterized by fragmentation, attenuation, and slowing of the background activity.    Stage II sleep transients were not recorded.    Other Non-Epileptiform Findings:  None were present.    Interictal Epileptiform Activity:   None were present.    Events:  Clinical events: None recorded.  Seizures: None recorded.    Activation Procedures:   Hyperventilation was not performed.    Photic stimulation was not performed.     Artifacts:  Intermittent myogenic and movement artifacts were noted.    ECG:  The heart rate on single channel ECG was predominantly between 75-85 BPM.    _____________________________________________________________  EEG SUMMARY/CLASSIFICATION    Normal EEG in the awake, drowsy  states.  _____________________________________________________________  EEG IMPRESSION/CLINICAL CORRELATE    Normal EEG study.  No epileptic pattern or seizure seen.      Preliminary Fellow Read:      Madeline Gonzalez MD  Adult EEG Fellow, [facility]ilepsy [facility]   COMPREHENSIVE EPILEPSY CENTER   REPORT OF ROUTINE VIDEO EEG     [facility]: [address], [place] [address], Ph#: [phone]  [facility]: [address], [place] [address], Ph#: [phone]  Office: [address] [place] [address], [place] [address], [place] [address] Ph#: [phone]    Patient Name: NEDRA WELSH  Age and : 38y (82)  MRN #: [id number]  Location: [place] [address]  Referring Physician: Agueda Herndon    Study Date: 19    _____________________________________________________________  TECHNICAL INFORMATION    Placement and Labeling of Electrodes:  The EEG was performed utilizing 20 channels referential EEG connections (coronal over temporal over parasagittal montage) using all standard 10-20 electrode placements with EKG.  Recording was at a sampling rate of 256 samples per second per channel.  Time synchronized digital video recording was done simultaneously with EEG recording.  A low light infrared camera was used for low light recording.  Chris and seizure detection algorithms were utilized.    _____________________________________________________________  HISTORY    Patient is a 38y old  Female who presents with a chief complaint of Concern for Seizure (13 May 2019 13:37)    PERTINENT MEDICATION:  levETIRAcetam 500 milliGRAM(s) Oral two times a day  _____________________________________________________________  STUDY INTERPRETATION    Findings: The background was continuous, spontaneously variable and reactive. During wakefulness, the posterior dominant rhythm consisted of symmetric, well-modulated 10 Hz activity, with amplitude to 30 uV, that attenuated to eye opening.  Low amplitude frontal beta was noted in wakefulness.    Background Slowing:  No generalized background slowing was present.    Focal Slowing:   None was present.    Sleep Background:  Drowsiness was characterized by fragmentation, attenuation, and slowing of the background activity.    Stage II sleep transients were not recorded.    Other Non-Epileptiform Findings:  None were present.    Interictal Epileptiform Activity:   None were present.    Events:  Clinical events: None recorded.  Seizures: None recorded.    Activation Procedures:   Hyperventilation was not performed.    Photic stimulation was not performed.     Artifacts:  Intermittent myogenic and movement artifacts were noted.    ECG:  The heart rate on single channel ECG was predominantly between 75-85 BPM.    _____________________________________________________________  EEG SUMMARY/CLASSIFICATION    Normal EEG in the awake, drowsy  states.  _____________________________________________________________  EEG IMPRESSION/CLINICAL CORRELATE    Normal EEG study.  No epileptic pattern or seizure seen.        Madeline Gonzalez MD  Adult EEG Fellow, [facility] Epilepsy [place]

## 2019-05-14 ENCOUNTER — TRANSCRIPTION ENCOUNTER (OUTPATIENT)
Age: 39
End: 2019-05-14

## 2019-05-14 VITALS
TEMPERATURE: 99 F | HEART RATE: 81 BPM | OXYGEN SATURATION: 100 % | SYSTOLIC BLOOD PRESSURE: 95 MMHG | RESPIRATION RATE: 17 BRPM | DIASTOLIC BLOOD PRESSURE: 55 MMHG

## 2019-05-14 DIAGNOSIS — Z71.89 OTHER SPECIFIED COUNSELING: ICD-10-CM

## 2019-05-14 PROCEDURE — 99239 HOSP IP/OBS DSCHRG MGMT >30: CPT

## 2019-05-14 PROCEDURE — 95951: CPT | Mod: 26

## 2019-05-14 RX ORDER — ACETAMINOPHEN 500 MG
2 TABLET ORAL
Qty: 0 | Refills: 0 | DISCHARGE
Start: 2019-05-14

## 2019-05-14 RX ORDER — AMITRIPTYLINE HCL 25 MG
10 TABLET ORAL AT BEDTIME
Refills: 0 | Status: DISCONTINUED | OUTPATIENT
Start: 2019-05-14 | End: 2019-05-14

## 2019-05-14 RX ORDER — AMITRIPTYLINE HCL 25 MG
1 TABLET ORAL
Qty: 15 | Refills: 0
Start: 2019-05-14 | End: 2019-05-28

## 2019-05-14 RX ORDER — ALBUTEROL 90 UG/1
2 AEROSOL, METERED ORAL
Qty: 1 | Refills: 0
Start: 2019-05-14 | End: 2019-06-12

## 2019-05-14 RX ADMIN — Medication 650 MILLIGRAM(S): at 12:53

## 2019-05-14 RX ADMIN — Medication 1 TABLET(S): at 11:58

## 2019-05-14 RX ADMIN — ONDANSETRON 4 MILLIGRAM(S): 8 TABLET, FILM COATED ORAL at 12:54

## 2019-05-14 RX ADMIN — LEVETIRACETAM 500 MILLIGRAM(S): 250 TABLET, FILM COATED ORAL at 06:17

## 2019-05-14 NOTE — PROGRESS NOTE ADULT - PROBLEM SELECTOR PLAN 1
- neuro input appreciated, they are suspecting possible pseudoseizures?  -Patient with multiple episodes while under EEG but no correlating seizure like findings on EEG. Suspicion is high for pseudoseizures   - c/w keppra bid as per neuro, if having prolonged episode of seizure >3 min can do ativan 2 mg prn as per neuro  - fall, aspiration, seizure precautions  - f/u heavy metal screen as per neuro recs - neuro input appreciated, they are suspecting possible pseudoseizures vs migraine related?  -Patient with multiple episodes while under EEG but no correlating seizure like findings on EEG. Suspicion is high for pseudoseizures vs migraine  - DC keppra bid as per neuro, and start trial of nortriptyline

## 2019-05-14 NOTE — CHART NOTE - NSCHARTNOTEFT_GEN_A_CORE
EEG reviewed, pt had multiple episodes during the EEG recording, none of which were associated with seizure or an epileptic pattern. Her EEG was normal. Pt does complain of Migraine type HA with these events, and has a strong hx of Migraine. Likely these events are migraine aura. Would d/c Keppra and start Amitriptyline 10mg HS for migraine ppx. She can follow up with neurology clinic, can call [phone]. Pt also showed interest in over the counter Magnesium Oxide supplement (400mg BID) and Riboflavin (200mg BID).

## 2019-05-14 NOTE — DISCHARGE NOTE NURSING/CASE MANAGEMENT/SOCIAL WORK - NSDCDPATPORTLINK_GEN_ALL_CORE
You can access the [facility] Patient Portal, offered by [facility], by registering with the following website: [email or web address]

## 2019-05-14 NOTE — PROGRESS NOTE ADULT - SUBJECTIVE AND OBJECTIVE BOX
Patient is a 38y old  Female who presents with a chief complaint of Concern for Seizure (11 May 2019 19:54)    SUBJECTIVE / OVERNIGHT EVENTS: Patient reports having multiple episodes yesterday while getting 24hr EEG. States that episodes are different now that she dosent get pain. She occasionally does get headaches after the episodes. Denies any major stress at home.     MEDICATIONS  (STANDING):  levETIRAcetam 500 milliGRAM(s) Oral two times a day  multivitamin 1 Tablet(s) Oral daily    MEDICATIONS  (PRN):  ALBUTerol    90 MICROgram(s) HFA Inhaler 2 Puff(s) Inhalation every 6 hours PRN Shortness of Breath and/or Wheezing      Vital Signs Last 24 Hrs  T(C): 37 (14 May 2019 06:16), Max: 37 (14 May 2019 06:16)  T(F): 98.6 (14 May 2019 06:16), Max: 98.6 (14 May 2019 06:16)  HR: 81 (14 May 2019 06:16) (73 - 91)  BP: 95/55 (14 May 2019 06:16) (95/55 - 120/67)  BP(mean): --  RR: 17 (14 May 2019 06:16) (16 - 17)  SpO2: 100% (14 May 2019 06:16) (96% - 100%)    PHYSICAL EXAM:  GENERAL: NAD,   HEAD:  Normocephalic  EYES: EOMI,  conjunctiva and sclera clear  NECK: Supple,   CHEST/LUNG: Clear to auscultation bilaterally; No wheeze  HEART:  s1 s2 + Regular rate and rhythm;   ABDOMEN: Soft, Nontender, Nondistended; Bowel sounds present  EXTREMITIES:   No clubbing, cyanosis  NEURO: AAOx3      LABS:                                   13.9   6.99  )-----------( 339      ( 11 May 2019 14:11 )             42.7   05-13    147<H>  |  111<H>  |  13  ----------------------------<  91  4.2   |  24  |  0.87    Ca    9.2      13 May 2019 05:47    TPro  7.5  /  Alb  4.4  /  TBili  0.4  /  DBili  x   /  AST  28  /  ALT  14  /  AlkPhos  48  05-11      Consultant(s) Notes Reviewed:  Neurology    Srinath Aviles MD  Hospitalist  P/ [phone]

## 2019-05-14 NOTE — EEG REPORT - NS EEG TEXT BOX
[facility]   COMPREHENSIVE EPILEPSY CENTER   REPORT OF CONTINUOUS VIDEO EEG     [facility]: [address], [place] [address], Ph#: [phone]  [facility]:  [address], [place] [address], Ph#: [phone]  Office: [address] [place] [address], [place] [address], [place] [address] Ph#: [phone]    Patient Name: NEDRA WELSH  Age and : 38y ([birth year])  MRN #: [id number]  Location: [place] [address]  Referring Physician: Agueda Herndon    Study Date: 19    _____________________________________________________________  STUDY INFORMATION    EEG Recording Technique:  The patient underwent continuous Video-EEG monitoring, using Telemetry System hardware on the XLTek Digital System. EEG and video data were stored on a computer hard drive with important events saved in digital archive files. The material was reviewed by a physician (electroencephalographer / epileptologist) on a daily basis. Chris and seizure detection algorithms were utilized and reviewed. An EEG Technician attended to the patient, and was available throughout daytime work hours.  The epilepsy center neurologist was available in person or on call 24-hours per day.    EEG Placement and Labeling of Electrodes:  The EEG was performed utilizing 20 channel referential EEG connections (coronal over temporal over parasagittal montage) using all standard 10-20 electrode placements with EKG, with additional electrodes placed in the inferior temporal region using the modified 10-10 montage electrode placements for elective admissions, or if deemed necessary. Recording was at a sampling rate of 256 samples per second per channel. Time synchronized digital video recording was done simultaneously with EEG recording. A low light infrared camera was used for low light recording.     _____________________________________________________________  HISTORY    Patient is a 38y old  Female who presents with a chief complaint of Concern for Seizure (13 May 2019 13:37)      PERTINENT MEDICATION:  levETIRAcetam 500 milliGRAM(s) Oral two times a day  ____________________________________________________________  STUDY INTERPRETATION    Findings: The background was continuous, spontaneously variable and reactive. During wakefulness, the posterior dominant rhythm consisted of symmetric, well-modulated 10 Hz activity, with amplitude to 30 uV, that attenuated to eye opening.  Low amplitude frontal beta was noted in wakefulness.    Background Slowing:  No generalized background slowing was present.    Focal Slowing:   None was present.    Sleep Background:  Drowsiness was characterized by fragmentation, attenuation, and slowing of the background activity.    Stage II sleep transients characterized by symmetric vertex waves, spindles and K complexes.     Other Non-Epileptiform Findings:  None were present.    Interictal Epileptiform Activity:   None were present.    Events:  Clinical events: None recorded.  Seizures: None recorded.    Activation Procedures:   Hyperventilation was not performed.    Photic stimulation was not performed.     Artifacts:  Intermittent myogenic and movement artifacts were noted.    ECG:  The heart rate on single channel ECG was predominantly between 75-85 BPM.    _____________________________________________________________  EEG SUMMARY/CLASSIFICATION    Normal EEG in the awake, drowsy and asleep states.  _____________________________________________________________  EEG IMPRESSION/CLINICAL CORRELATE    Normal EEG study.  No epileptic pattern or seizure seen.        Madeline Gonzalez MD  Adult EEG Fellow, [facility] Epilepsy [facility]   COMPREHENSIVE EPILEPSY CENTER   REPORT OF CONTINUOUS VIDEO EEG     [facility]: [address], [place] [address], Ph#: [phone]  [facility]:  [address], [place] [address], Ph#: [phone]  Office: [address] [place] [address], [place] [address], [place] [address] Ph#: [phone]    Patient Name: NEDRA WELSH  Age and : 38y ([birth year])  MRN #: [id number]  Location: [place] [address]  Referring Physician: Agueda Herndon    Study Date:  to 19    _____________________________________________________________  STUDY INFORMATION    EEG Recording Technique:  The patient underwent continuous Video-EEG monitoring, using Telemetry System hardware on the XLTek Digital System. EEG and video data were stored on a computer hard drive with important events saved in digital archive files. The material was reviewed by a physician (electroencephalographer / epileptologist) on a daily basis. Chris and seizure detection algorithms were utilized and reviewed. An EEG Technician attended to the patient, and was available throughout daytime work hours.  The epilepsy center neurologist was available in person or on call 24-hours per day.    EEG Placement and Labeling of Electrodes:  The EEG was performed utilizing 20 channel referential EEG connections (coronal over temporal over parasagittal montage) using all standard 10-20 electrode placements with EKG, with additional electrodes placed in the inferior temporal region using the modified 10-10 montage electrode placements for elective admissions, or if deemed necessary. Recording was at a sampling rate of 256 samples per second per channel. Time synchronized digital video recording was done simultaneously with EEG recording. A low light infrared camera was used for low light recording.     _____________________________________________________________  HISTORY    Patient is a 38y old  Female who presents with a chief complaint of Concern for Seizure (13 May 2019 13:37)      PERTINENT MEDICATION:  levETIRAcetam 500 milliGRAM(s) Oral two times a day  ____________________________________________________________  STUDY INTERPRETATION    Findings: The background was continuous, spontaneously variable and reactive. During wakefulness, the posterior dominant rhythm consisted of symmetric, well-modulated 10 Hz activity, with amplitude to 30 uV, that attenuated to eye opening.  Low amplitude frontal beta was noted in wakefulness.    Background Slowing:  No generalized background slowing was present.    Focal Slowing:   None was present.    Sleep Background:  Drowsiness was characterized by fragmentation, attenuation, and slowing of the background activity.    Stage II sleep transients characterized by symmetric vertex waves, spindles and K complexes.     Other Non-Epileptiform Findings:  None were present.    Interictal Epileptiform Activity:   None were present.    Events:  Clinical events: None recorded.  Seizures: None recorded.    Activation Procedures:   Hyperventilation was not performed.    Photic stimulation was not performed.     Artifacts:  Intermittent myogenic and movement artifacts were noted.    ECG:  The heart rate on single channel ECG was predominantly between 75-85 BPM.    _____________________________________________________________  EEG SUMMARY/CLASSIFICATION    Normal EEG in the awake, drowsy and asleep states.  _____________________________________________________________  EEG IMPRESSION/CLINICAL CORRELATE    Normal EEG study.  No epileptic pattern or seizure seen.        Madeline Gonzalez MD  Adult EEG Fellow, [facility] Epilepsy [place]    Gurdeep Martínez MD

## 2019-05-14 NOTE — PROGRESS NOTE ADULT - ATTENDING COMMENTS
DC home on kera with outpt follow up with neuro. Patient can call epilepsy clinic at [phone] to make appointment. Advised to avoid driving or operating heavy machines until symptoms resolve. DC home. Time spent 40 mins Case discussed with neuro team. DC home with outpt follow up with neuro.  Advised to avoid driving or operating heavy machines until symptoms resolve. DC home. Time spent 40 mins Case discussed with neuro team.

## 2019-05-15 ENCOUNTER — APPOINTMENT (OUTPATIENT)
Dept: FAMILY MEDICINE | Facility: CLINIC | Age: 39
End: 2019-05-15

## 2019-05-15 ENCOUNTER — APPOINTMENT (OUTPATIENT)
Dept: FAMILY MEDICINE | Facility: CLINIC | Age: 39
End: 2019-05-15
Payer: MEDICAID

## 2019-05-15 VITALS
DIASTOLIC BLOOD PRESSURE: 70 MMHG | SYSTOLIC BLOOD PRESSURE: 114 MMHG | TEMPERATURE: 98.3 F | BODY MASS INDEX: 27.38 KG/M2 | HEIGHT: 61 IN | WEIGHT: 145 LBS | OXYGEN SATURATION: 98 % | HEART RATE: 70 BPM

## 2019-05-15 DIAGNOSIS — R47.01 APHASIA: ICD-10-CM

## 2019-05-15 PROCEDURE — 99496 TRANSJ CARE MGMT HIGH F2F 7D: CPT

## 2019-05-15 RX ORDER — ALBUTEROL 90 UG/1
2 AEROSOL, METERED ORAL
Qty: 0 | Refills: 0 | DISCHARGE

## 2019-05-15 NOTE — EEG REPORT - NS EEG TEXT BOX
[facility]   COMPREHENSIVE EPILEPSY CENTER   REPORT OF CONTINUOUS VIDEO EEG     [facility]: [address], [place] [address], Ph#: [phone]  [facility]:  [address], [place] [address], Ph#: [phone]  Office: [address] [place] [address], [place] [address], [place] [address] Ph#: [phone]    Patient Name: NEDRA WELSH  Age and : 38y ([birth year])  MRN #: [id number]  Location: [place] [address]  Referring Physician: Agueda Hrendon    Study Date: 19 ([id number])    _____________________________________________________________  STUDY INFORMATION    EEG Recording Technique:  The patient underwent continuous Video-EEG monitoring, using Telemetry System hardware on the XLTek Digital System. EEG and video data were stored on a computer hard drive with important events saved in digital archive files. The material was reviewed by a physician (electroencephalographer / epileptologist) on a daily basis. Chris and seizure detection algorithms were utilized and reviewed. An EEG Technician attended to the patient, and was available throughout daytime work hours.  The epilepsy center neurologist was available in person or on call 24-hours per day.    EEG Placement and Labeling of Electrodes:  The EEG was performed utilizing 20 channel referential EEG connections (coronal over temporal over parasagittal montage) using all standard 10-20 electrode placements with EKG, with additional electrodes placed in the inferior temporal region using the modified 10-10 montage electrode placements for elective admissions, or if deemed necessary. Recording was at a sampling rate of 256 samples per second per channel. Time synchronized digital video recording was done simultaneously with EEG recording. A low light infrared camera was used for low light recording.     _____________________________________________________________  HISTORY    Patient is a 38y old  Female who presents with a chief complaint of Concern for Seizure (13 May 2019 13:37)      PERTINENT MEDICATION:  levETIRAcetam 500 milliGRAM(s) Oral two times a day  ____________________________________________________________  STUDY INTERPRETATION    Findings: The background was continuous, spontaneously variable and reactive. During wakefulness, the posterior dominant rhythm consisted of symmetric, well-modulated 10 Hz activity, with amplitude to 30 uV, that attenuated to eye opening.  Low amplitude frontal beta was noted in wakefulness.    Background Slowing:  No generalized background slowing was present.    Focal Slowing:   None was present.    Sleep Background:  Drowsiness was characterized by fragmentation, attenuation, and slowing of the background activity.    Stage II sleep transients characterized by symmetric vertex waves, spindles and K complexes.     Other Non-Epileptiform Findings:  None were present.    Interictal Epileptiform Activity:   None were present.    Events:  Clinical events: None recorded.  Seizures: None recorded.    Activation Procedures:   Hyperventilation was not performed.    Photic stimulation was not performed.     Artifacts:  Intermittent myogenic and movement artifacts were noted.    ECG:  The heart rate on single channel ECG was predominantly between 75-85 BPM.    _____________________________________________________________  EEG SUMMARY/CLASSIFICATION    Normal EEG in the awake, drowsy and asleep states.  _____________________________________________________________  EEG IMPRESSION/CLINICAL CORRELATE    Normal EEG study.  No epileptic pattern or seizure seen.        Madeline Gonzalez MD  Adult EEG Fellow, [facility] Epilepsy [place]   COMPREHENSIVE EPILEPSY CENTER   REPORT OF CONTINUOUS VIDEO EEG     [facility]: [address], [place] [address], Ph#: [phone]  [facility]:  [address], [place] [address], Ph#: [phone]  Office: [address] [place] [address], [place] [address], [place] [address] Ph#: [phone]    Patient Name: NEDRA WELSH  Age and : 38y ([birth year])  MRN #: [id number]  Location: [place] [address]  Referring Physician: Agueda Herndon    Study Date: 19 ([id number])    _____________________________________________________________  STUDY INFORMATION    EEG Recording Technique:  The patient underwent continuous Video-EEG monitoring, using Telemetry System hardware on the XLTek Digital System. EEG and video data were stored on a computer hard drive with important events saved in digital archive files. The material was reviewed by a physician (electroencephalographer / epileptologist) on a daily basis. Chris and seizure detection algorithms were utilized and reviewed. An EEG Technician attended to the patient, and was available throughout daytime work hours.  The epilepsy center neurologist was available in person or on call 24-hours per day.    EEG Placement and Labeling of Electrodes:  The EEG was performed utilizing 20 channel referential EEG connections (coronal over temporal over parasagittal montage) using all standard 10-20 electrode placements with EKG, with additional electrodes placed in the inferior temporal region using the modified 10-10 montage electrode placements for elective admissions, or if deemed necessary. Recording was at a sampling rate of 256 samples per second per channel. Time synchronized digital video recording was done simultaneously with EEG recording. A low light infrared camera was used for low light recording.     _____________________________________________________________  HISTORY    Patient is a 38y old  Female who presents with a chief complaint of Concern for Seizure (13 May 2019 13:37)      PERTINENT MEDICATION:  levETIRAcetam 500 milliGRAM(s) Oral two times a day  ____________________________________________________________  STUDY INTERPRETATION    Findings: The background was continuous, spontaneously variable and reactive. During wakefulness, the posterior dominant rhythm consisted of symmetric, well-modulated 10 Hz activity, with amplitude to 30 uV, that attenuated to eye opening.  Low amplitude frontal beta was noted in wakefulness.    Background Slowing:  No generalized background slowing was present.    Focal Slowing:   None was present.    Sleep Background:  Drowsiness was characterized by fragmentation, attenuation, and slowing of the background activity.    Stage II sleep transients characterized by symmetric vertex waves, spindles and K complexes.     Other Non-Epileptiform Findings:  None were present.    Interictal Epileptiform Activity:   None were present.    Events:  Clinical events: None recorded.  Seizures: None recorded.    Activation Procedures:   Hyperventilation was not performed.    Photic stimulation was not performed.     Artifacts:  Intermittent myogenic and movement artifacts were noted.    ECG:  The heart rate on single channel ECG was predominantly between 75-85 BPM.    _____________________________________________________________  EEG SUMMARY/CLASSIFICATION    Normal EEG in the awake, drowsy and asleep states.  _____________________________________________________________  EEG IMPRESSION/CLINICAL CORRELATE    Normal EEG study.  No epileptic pattern or seizure seen.        Madeline Gonzalez MD  Adult EEG Fellow, [facility] Epilepsy [place]      Gurdeep Martínez MD

## 2019-05-16 PROBLEM — R47.01 APHASIA OF UNKNOWN ORIGIN: Status: ACTIVE | Noted: 2019-05-15

## 2019-05-16 LAB — HEAVY MET PNL SERPL: SIGNIFICANT CHANGE UP

## 2019-05-16 NOTE — PHYSICAL EXAM
[High Complexity requires an extensive number of possible diagnoses and/or the management options, extensive complexity of the medical data (tests, etc.) to be reviewed, and a high risk of significant complications, morbidity, and/or mortality as well as c] : High Complexity  [No Acute Distress] : no acute distress [PERRL] : pupils equal round and reactive to light [Normal Sclera/Conjunctiva] : normal sclera/conjunctiva [Normal Outer Ear/Nose] : the outer ears and nose were normal in appearance [Normal Oropharynx] : the oropharynx was normal [Normal TMs] : both tympanic membranes were normal [No JVD] : no jugular venous distention [Clear to Auscultation] : lungs were clear to auscultation bilaterally [No Respiratory Distress] : no respiratory distress  [No Accessory Muscle Use] : no accessory muscle use [Normal Rate] : normal rate  [Regular Rhythm] : with a regular rhythm [Normal S1, S2] : normal S1 and S2 [No Varicosities] : no varicosities [Soft] : abdomen soft [No Edema] : there was no peripheral edema [Non Tender] : non-tender [Non-distended] : non-distended [No Masses] : no abdominal mass palpated [No HSM] : no HSM [Normal Bowel Sounds] : normal bowel sounds [Normal Supraclavicular Nodes] : no supraclavicular lymphadenopathy [Normal Axillary Nodes] : no axillary lymphadenopathy [Normal Posterior Cervical Nodes] : no posterior cervical lymphadenopathy [Normal Anterior Cervical Nodes] : no anterior cervical lymphadenopathy [No Joint Swelling] : no joint swelling [Acne] : acne [Normal Gait] : normal gait [No Focal Deficits] : no focal deficits [Deep Tendon Reflexes (DTR)] : deep tendon reflexes were 2+ and symmetric [Alert and Oriented x3] : oriented to person, place, and time [de-identified] : face

## 2019-05-16 NOTE — HISTORY OF PRESENT ILLNESS
[Post-hospitalization from ___ Hospital] : Post-hospitalization from [unfilled] Hospital [Admitted on: ___] : The patient was admitted on [unfilled] [Discharged on ___] : discharged on [unfilled] [Discharge Summary] : discharge summary [Pertinent Labs] : pertinent labs [Discharge Med List] : discharge medication list [Med Reconciliation] : medication reconciliation has been completed [Patient Contacted By: ____] : and contacted by [unfilled] [FreeTextEntry2] : Patient  presented to f/u Hospitalization after several episodes (weakness, staring spells, LOC, memory issues, aphasia), concerning for possible seizures.  Neuro consulted recommended Keppra, Ativan PRN while in the hospital. CT head, CTA head / neck, MRI brain and TTE all were unremarkable. 24h vEEG- \par Normal EEG study. No epileptic pattern or seizure seen \par - Pt does complain of Migraine type HA with these events, and has a strong hx \par of Migraine. Likely these events are migraine aura. Discontinued Keppra and \par started Amitriptyline 10mg HS for migraine ppx.Magnesium Oxide supplement (400mg BID) and Riboflavin (200mg BID). \par Seizure precautions. Urine tox- negative, HIV negative Today pt denies HA, + N on and off, no CP, no Abd pain - after d/c from Hospital no further episodes so far.

## 2019-05-16 NOTE — REVIEW OF SYSTEMS
[Headache] : headache [Dizziness] : no dizziness [Fainting] : fainting [Confusion] : no confusion [Unsteady Walking] : no ataxia [Memory Loss] : memory loss [Negative] : Psychiatric

## 2019-05-20 ENCOUNTER — APPOINTMENT (OUTPATIENT)
Dept: CARDIOLOGY | Facility: CLINIC | Age: 39
End: 2019-05-20
Payer: MEDICAID

## 2019-05-20 ENCOUNTER — NON-APPOINTMENT (OUTPATIENT)
Age: 39
End: 2019-05-20

## 2019-05-20 VITALS
SYSTOLIC BLOOD PRESSURE: 118 MMHG | WEIGHT: 145 LBS | HEIGHT: 61 IN | RESPIRATION RATE: 16 BRPM | BODY MASS INDEX: 27.38 KG/M2 | HEART RATE: 69 BPM | OXYGEN SATURATION: 99 % | TEMPERATURE: 99.2 F | DIASTOLIC BLOOD PRESSURE: 76 MMHG

## 2019-05-20 PROBLEM — J45.909 UNSPECIFIED ASTHMA, UNCOMPLICATED: Chronic | Status: ACTIVE | Noted: 2019-05-11

## 2019-05-20 PROBLEM — I34.0 NONRHEUMATIC MITRAL (VALVE) INSUFFICIENCY: Chronic | Status: ACTIVE | Noted: 2019-05-11

## 2019-05-20 PROBLEM — G43.909 MIGRAINE, UNSPECIFIED, NOT INTRACTABLE, WITHOUT STATUS MIGRAINOSUS: Chronic | Status: ACTIVE | Noted: 2019-05-11

## 2019-05-20 PROCEDURE — 99214 OFFICE O/P EST MOD 30 MIN: CPT

## 2019-05-20 PROCEDURE — 93000 ELECTROCARDIOGRAM COMPLETE: CPT

## 2019-05-20 PROCEDURE — 36415 COLL VENOUS BLD VENIPUNCTURE: CPT

## 2019-05-22 ENCOUNTER — APPOINTMENT (OUTPATIENT)
Dept: NEUROLOGY | Facility: CLINIC | Age: 39
End: 2019-05-22
Payer: MEDICAID

## 2019-05-22 VITALS
SYSTOLIC BLOOD PRESSURE: 110 MMHG | OXYGEN SATURATION: 99 % | BODY MASS INDEX: 27.19 KG/M2 | HEIGHT: 61 IN | WEIGHT: 144 LBS | HEART RATE: 74 BPM | TEMPERATURE: 98.5 F | DIASTOLIC BLOOD PRESSURE: 70 MMHG | RESPIRATION RATE: 17 BRPM

## 2019-05-22 LAB
ALBUMIN SERPL ELPH-MCNC: 4.8 G/DL
ALP BLD-CCNC: 54 U/L
ALT SERPL-CCNC: 14 U/L
ANION GAP SERPL CALC-SCNC: 15 MMOL/L
AST SERPL-CCNC: 13 U/L
BILIRUB SERPL-MCNC: 0.4 MG/DL
BUN SERPL-MCNC: 8 MG/DL
CALCIUM SERPL-MCNC: 10.3 MG/DL
CHLORIDE SERPL-SCNC: 101 MMOL/L
CO2 SERPL-SCNC: 25 MMOL/L
CREAT SERPL-MCNC: 0.63 MG/DL
GLUCOSE SERPL-MCNC: 59 MG/DL
NT-PROBNP SERPL-MCNC: 27 PG/ML
POTASSIUM SERPL-SCNC: 3.8 MMOL/L
PROT SERPL-MCNC: 8.1 G/DL
SODIUM SERPL-SCNC: 141 MMOL/L

## 2019-05-22 PROCEDURE — 99244 OFF/OP CNSLTJ NEW/EST MOD 40: CPT
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
## 2019-05-22 NOTE — PHYSICAL EXAM
[General Appearance - Well Developed] : well developed [Normal Appearance] : normal appearance [Well Groomed] : well groomed [General Appearance - Well Nourished] : well nourished [No Deformities] : no deformities [General Appearance - In No Acute Distress] : no acute distress [Normal Conjunctiva] : the conjunctiva exhibited no abnormalities [Eyelids - No Xanthelasma] : the eyelids demonstrated no xanthelasmas [Normal Oral Mucosa] : normal oral mucosa [No Oral Pallor] : no oral pallor [No Oral Cyanosis] : no oral cyanosis [Normal Jugular Venous A Waves Present] : normal jugular venous A waves present [Normal Jugular Venous V Waves Present] : normal jugular venous V waves present [No Jugular Venous Barrow A Waves] : no jugular venous barrow A waves [Respiration, Rhythm And Depth] : normal respiratory rhythm and effort [Exaggerated Use Of Accessory Muscles For Inspiration] : no accessory muscle use [Auscultation Breath Sounds / Voice Sounds] : lungs were clear to auscultation bilaterally [Heart Rate And Rhythm] : heart rate and rhythm were normal [Heart Sounds] : normal S1 and S2 [Murmurs] : no murmurs present [Arterial Pulses Normal] : the arterial pulses were normal [Edema] : no peripheral edema present [Bowel Sounds] : normal bowel sounds [Abdomen Tenderness] : non-tender [Abdomen Mass (___ Cm)] : no abdominal mass palpated [Abnormal Walk] : normal gait [Nail Clubbing] : no clubbing of the fingernails [Cyanosis, Localized] : no localized cyanosis [Petechial Hemorrhages (___cm)] : no petechial hemorrhages [Skin Color & Pigmentation] : normal skin color and pigmentation [] : no rash [No Venous Stasis] : no venous stasis [Skin Lesions] : no skin lesions [No Skin Ulcers] : no skin ulcer [No Xanthoma] : no  xanthoma was observed [Oriented To Time, Place, And Person] : oriented to person, place, and time [Affect] : the affect was normal [Mood] : the mood was normal [No Anxiety] : not feeling anxious [Abdomen Soft] : soft [FreeTextEntry1] : no JVD

## 2019-05-22 NOTE — REASON FOR VISIT
[Follow-Up - Clinic] : a clinic follow-up of [Mitral Regurgitation] : mitral regurgitation [FreeTextEntry2] : S/P admission at [facility] 5/11-5/14 secondary to several episodes of generalized weakness with LOC concerning for seizures

## 2019-05-22 NOTE — DISCUSSION/SUMMARY
[Patient] : the patient [___ Week(s)] : [unfilled] week(s) [FreeTextEntry1] : 1.Seizure vs Migraine\par  - Neuro consulted recommended Keppra, Ativan PRN inpatient but changed to amitriptyline to treat migraine\par - CT head, CTA head / neck, MRI brain and TTE all were unremarkable \par - 24h vEEG- Normal EEG study. No epileptic pattern or seizure seen\par - For follow up with neurology Dr. Ovalle  this week \par \par 2.  Mild-Mod MR. Hypertensive with LVEF 75% on TTE 2/2017 and repeat TTE 5/10/19 with LVEF 67% and mild mod MR\par \par 3. Palpitations/ fluttering preceding seizure like episodes\par - order a 14 day Cardio NET monitor to R/O arrhythmia\par \par 4. Mild intermittent asthma without complication- Albuterol PRN \par \par Follow up in office in 3 weeks after monitoring, check labs today pro BNP and CMP

## 2019-05-22 NOTE — ASSESSMENT
[FreeTextEntry1] : 39 y/o female with a PMHX of preeclampsia, postpartum-associated moderate-severe mitral regurgitation, asthma, gestational diabetes,  s/p  18.  During the pregnancy TTEs revealed mild MR.\par TTE done 5/10/19 with normal LV systolic function and mild mod MR during hospitalization for several episodes (weakness, staring spells, LOC, memory issues, aphasia), concerning for possible seizures. 5/10/19 MRI with no acute infarct or hemorrhage.\par Appears euvolemic and normotensive

## 2019-05-22 NOTE — REVIEW OF SYSTEMS
[Negative] : Heme/Lymph [see HPI] : see HPI [Palpitations] : palpitations [Cough] : cough [Shortness Of Breath] : no shortness of breath [FreeTextEntry1] : seizure like episodes

## 2019-05-23 RX ORDER — MAGNESIUM OXIDE 241.3 MG/1000MG
400 TABLET ORAL TWICE DAILY
Refills: 0 | Status: DISCONTINUED | COMMUNITY
Start: 2019-05-15 | End: 2019-05-23

## 2019-05-24 NOTE — PHYSICAL EXAM

## 2019-05-24 NOTE — CONSULT LETTER
[Dear  ___] : Dear  [unfilled], [Consult Letter:] : I had the pleasure of evaluating your patient, [unfilled]. [Please see my note below.] : Please see my note below. [Consult Closing:] : Thank you very much for allowing me to participate in the care of this patient.  If you have any questions, please do not hesitate to contact me. [Sincerely,] : Sincerely, [FreeTextEntry2] : Alex Rossi MD
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
## 2019-05-24 NOTE — ASSESSMENT
[FreeTextEntry1] : Start zonisamide 100 mg daily for migraine prophylaxis. Potential adverse effects reviewed. She can continue amitriptyline for 10 days after starting  zonisamide. Start coenzyme Q 10 200 mg daily and stop riboflavin and magnesium. Schedule cardiac monitor with Dr. Ledbetter.\par \par Return for followup in one month.

## 2019-05-29 ENCOUNTER — APPOINTMENT (OUTPATIENT)
Dept: FAMILY MEDICINE | Facility: CLINIC | Age: 39
End: 2019-05-29

## 2019-06-20 ENCOUNTER — OTHER (OUTPATIENT)
Age: 39
End: 2019-06-20

## 2019-06-26 ENCOUNTER — APPOINTMENT (OUTPATIENT)
Dept: NEUROLOGY | Facility: CLINIC | Age: 39
End: 2019-06-26
Payer: MEDICAID

## 2019-06-26 VITALS
RESPIRATION RATE: 16 BRPM | HEART RATE: 64 BPM | SYSTOLIC BLOOD PRESSURE: 120 MMHG | BODY MASS INDEX: 27.19 KG/M2 | HEIGHT: 61 IN | DIASTOLIC BLOOD PRESSURE: 60 MMHG | WEIGHT: 144 LBS | OXYGEN SATURATION: 99 %

## 2019-06-26 PROCEDURE — 99214 OFFICE O/P EST MOD 30 MIN: CPT

## 2019-06-26 RX ORDER — AMITRIPTYLINE HYDROCHLORIDE 10 MG/1
10 TABLET, FILM COATED ORAL AT BEDTIME
Refills: 0 | Status: DISCONTINUED | COMMUNITY
Start: 2019-05-15 | End: 2019-06-26
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
## 2019-06-26 NOTE — PHYSICAL EXAM
[FreeTextEntry1] : Alert and oriented. No cognitive or communication deficits. Visual fields are full to confrontation. Optic disc margins are sharp. Pupils equal and constrict to light. Extraocular movements intact. No facial asymmetry. Hearing intact to finger rub. Palate rises symmetrically and tongue protrudes in midline. Neck is supple. No bruits heard. No weakness or sensory deficits. Tendon reflexes are all active and symmetric. Plantars are flexor. Gait and coordination intact. Heart sounds are normal. No murmurs heard.\par

## 2019-06-26 NOTE — ASSESSMENT
[FreeTextEntry1] : Increase zonisamide dosage to 200 mg daily. Take 2 Aleve tablets at onset of headache and return for followup in one month.\par

## 2019-07-08 ENCOUNTER — APPOINTMENT (OUTPATIENT)
Dept: CARDIOLOGY | Facility: CLINIC | Age: 39
End: 2019-07-08

## 2019-07-09 ENCOUNTER — RX CHANGE (OUTPATIENT)
Age: 39
End: 2019-07-09

## 2019-07-10 PROCEDURE — 80053 COMPREHEN METABOLIC PANEL: CPT

## 2019-07-10 PROCEDURE — 93005 ELECTROCARDIOGRAM TRACING: CPT

## 2019-07-10 PROCEDURE — 70498 CT ANGIOGRAPHY NECK: CPT

## 2019-07-10 PROCEDURE — 96360 HYDRATION IV INFUSION INIT: CPT

## 2019-07-10 PROCEDURE — 36415 COLL VENOUS BLD VENIPUNCTURE: CPT

## 2019-07-10 PROCEDURE — 85027 COMPLETE CBC AUTOMATED: CPT

## 2019-07-10 PROCEDURE — 95812 EEG 41-60 MINUTES: CPT

## 2019-07-10 PROCEDURE — 82962 GLUCOSE BLOOD TEST: CPT

## 2019-07-10 PROCEDURE — 70496 CT ANGIOGRAPHY HEAD: CPT

## 2019-07-10 PROCEDURE — 70450 CT HEAD/BRAIN W/O DYE: CPT

## 2019-07-10 PROCEDURE — 99285 EMERGENCY DEPT VISIT HI MDM: CPT | Mod: 25

## 2019-07-10 PROCEDURE — 70551 MRI BRAIN STEM W/O DYE: CPT

## 2019-07-10 PROCEDURE — 93306 TTE W/DOPPLER COMPLETE: CPT

## 2019-07-15 ENCOUNTER — APPOINTMENT (OUTPATIENT)
Dept: FAMILY MEDICINE | Facility: CLINIC | Age: 39
End: 2019-07-15
Payer: MEDICAID

## 2019-07-15 VITALS
DIASTOLIC BLOOD PRESSURE: 78 MMHG | RESPIRATION RATE: 17 BRPM | WEIGHT: 142 LBS | OXYGEN SATURATION: 98 % | HEIGHT: 61 IN | BODY MASS INDEX: 26.81 KG/M2 | SYSTOLIC BLOOD PRESSURE: 128 MMHG | HEART RATE: 68 BPM

## 2019-07-15 DIAGNOSIS — R59.0 LOCALIZED ENLARGED LYMPH NODES: ICD-10-CM

## 2019-07-15 PROCEDURE — 36415 COLL VENOUS BLD VENIPUNCTURE: CPT

## 2019-07-15 PROCEDURE — 99395 PREV VISIT EST AGE 18-39: CPT | Mod: 25

## 2019-07-15 NOTE — HISTORY OF PRESENT ILLNESS
[FreeTextEntry1] : cc: physical , Migrans, Anxiety  [de-identified] : Patient  presented for physical, She is under Neurol care due to Complex Migraines . She denies LOC,  HA  decreasing - + N on and off. No fever, no chills, + lower back  pain, + Abd suprapubic discomfort on and off, not seen by GYN recently.   Patient denies CP,SOB,Abd pain. patient feels Anxious, Ligia, depresses, NS,NH for the past few months. patient c/o painful lumps on her neck on and off.

## 2019-07-15 NOTE — REVIEW OF SYSTEMS
[Back Pain] : back pain [Headache] : headache [Fainting] : fainting [Anxiety] : anxiety [Depression] : depression [Negative] : Integumentary [Dizziness] : no dizziness [Confusion] : no confusion [Memory Loss] : no memory loss [Unsteady Walking] : no ataxia [Suicidal] : not suicidal [Insomnia] : no insomnia

## 2019-07-15 NOTE — COUNSELING
[Activity counseling provided] : activity [Behavioral health counseling provided] : behavioral health  [Good understanding] : Patient has a good understanding of disease, goals and obesity follow-up plan [Walking] : Walking [Engage in a relaxing activity] : Engage in a relaxing activity [None] : None

## 2019-07-15 NOTE — HEALTH RISK ASSESSMENT
[Fair] :  ~his/her~ mood as fair [No] : No [No falls in past year] : Patient reported no falls in the past year [2] : 2) Feeling down, depressed, or hopeless for more than half of the days (2) [Patient reported bone density results were normal] : Patient reported bone density results were normal [None] : None [With Family] : lives with family [Employed] : employed [] :  [# Of Children ___] : has [unfilled] children [Feels Safe at Home] : Feels safe at home [Independent] : managing finances [Smoke Detector] : smoke detector [Carbon Monoxide Detector] : carbon monoxide detector [With Patient/Caregiver] : With Patient/Caregiver [Aggressive treatment] : aggressive treatment [FreeTextEntry1] : migraines, depression  [] : No [de-identified] : Yes 05/19 [de-identified] : Cardiol, Neurolo [de-identified] : no [de-identified] : regular  [FKO0Jjekc] : 4 [FQQ4Rdfgz] : 11 [Change in mental status noted] : No change in mental status noted [Language] : denies difficulty with language [Reports changes in hearing] : Reports no changes in hearing [Reports changes in vision] : Reports no changes in vision [Reports changes in dental health] : Reports no changes in dental health [Seat Belt] : does not use seat belt [Sunscreen] : does not use sunscreen [MammogramDate] : never [PapSmearDate] : 2018 [BoneDensityDate] : never [ColonoscopyDate] : never [HIVDate] : 08/16 [HepatitisCDate] : 04/17 [AdvancecareDate] : 07/15/19

## 2019-07-18 LAB
25(OH)D3 SERPL-MCNC: 35.1 NG/ML
ALBUMIN SERPL ELPH-MCNC: 4.6 G/DL
ALP BLD-CCNC: 56 U/L
ALT SERPL-CCNC: 10 U/L
ANION GAP SERPL CALC-SCNC: 13 MMOL/L
APPEARANCE: CLEAR
AST SERPL-CCNC: 12 U/L
BASOPHILS # BLD AUTO: 0.04 K/UL
BASOPHILS NFR BLD AUTO: 0.7 %
BILIRUB SERPL-MCNC: 0.3 MG/DL
BILIRUBIN URINE: NEGATIVE
BLOOD URINE: NEGATIVE
BUN SERPL-MCNC: 13 MG/DL
CALCIUM SERPL-MCNC: 9.2 MG/DL
CHLORIDE SERPL-SCNC: 109 MMOL/L
CHOLEST SERPL-MCNC: 173 MG/DL
CHOLEST/HDLC SERPL: 3.7 RATIO
CO2 SERPL-SCNC: 19 MMOL/L
COLOR: YELLOW
CREAT SERPL-MCNC: 0.7 MG/DL
EOSINOPHIL # BLD AUTO: 0.11 K/UL
EOSINOPHIL NFR BLD AUTO: 1.9 %
ESTIMATED AVERAGE GLUCOSE: 108 MG/DL
FOLATE SERPL-MCNC: >20 NG/ML
GLUCOSE QUALITATIVE U: NEGATIVE
GLUCOSE SERPL-MCNC: 91 MG/DL
HBA1C MFR BLD HPLC: 5.4 %
HCG SERPL-MCNC: <1 MIU/ML
HCT VFR BLD CALC: 40.8 %
HDLC SERPL-MCNC: 47 MG/DL
HGB BLD-MCNC: 13.5 G/DL
IMM GRANULOCYTES NFR BLD AUTO: 0.3 %
KETONES URINE: NEGATIVE
LDLC SERPL CALC-MCNC: 117 MG/DL
LEUKOCYTE ESTERASE URINE: NEGATIVE
LYMPHOCYTES # BLD AUTO: 2.46 K/UL
LYMPHOCYTES NFR BLD AUTO: 41.5 %
MAN DIFF?: NORMAL
MCHC RBC-ENTMCNC: 29.7 PG
MCHC RBC-ENTMCNC: 33.1 GM/DL
MCV RBC AUTO: 89.9 FL
MONOCYTES # BLD AUTO: 0.37 K/UL
MONOCYTES NFR BLD AUTO: 6.2 %
NEUTROPHILS # BLD AUTO: 2.93 K/UL
NEUTROPHILS NFR BLD AUTO: 49.4 %
NITRITE URINE: NEGATIVE
PH URINE: 7
PLATELET # BLD AUTO: 296 K/UL
POTASSIUM SERPL-SCNC: 4 MMOL/L
PROT SERPL-MCNC: 7 G/DL
PROTEIN URINE: NEGATIVE
RBC # BLD: 4.54 M/UL
RBC # FLD: 12.4 %
SODIUM SERPL-SCNC: 141 MMOL/L
SPECIFIC GRAVITY URINE: 1.02
TRIGL SERPL-MCNC: 45 MG/DL
TSH SERPL-ACNC: 1.3 UIU/ML
URATE SERPL-MCNC: 3.9 MG/DL
UROBILINOGEN URINE: NORMAL
VIT B12 SERPL-MCNC: 478 PG/ML
WBC # FLD AUTO: 5.93 K/UL

## 2019-07-22 ENCOUNTER — APPOINTMENT (OUTPATIENT)
Dept: FAMILY MEDICINE | Facility: CLINIC | Age: 39
End: 2019-07-22

## 2019-07-23 ENCOUNTER — APPOINTMENT (OUTPATIENT)
Dept: OBGYN | Facility: CLINIC | Age: 39
End: 2019-07-23

## 2019-07-29 ENCOUNTER — APPOINTMENT (OUTPATIENT)
Dept: NEUROLOGY | Facility: CLINIC | Age: 39
End: 2019-07-29
Payer: MEDICAID

## 2019-07-29 VITALS
HEART RATE: 62 BPM | RESPIRATION RATE: 16 BRPM | HEIGHT: 61 IN | DIASTOLIC BLOOD PRESSURE: 60 MMHG | BODY MASS INDEX: 25.68 KG/M2 | SYSTOLIC BLOOD PRESSURE: 100 MMHG | OXYGEN SATURATION: 98 % | TEMPERATURE: 99 F | WEIGHT: 136 LBS

## 2019-07-29 PROCEDURE — 99214 OFFICE O/P EST MOD 30 MIN: CPT

## 2019-07-29 NOTE — ASSESSMENT
[FreeTextEntry1] : Advised restarting amitriptyline for migraine prophylaxis and insomnia. Start a low-dose of 10 mg and if no benefit increase it to 20 mg h.s.\par \par Return for followup in one month.

## 2019-07-29 NOTE — HISTORY OF PRESENT ILLNESS
[FreeTextEntry1] : 38-year-old woman seen last month with migraine and chronic daily headache. The severity of the headaches has lessened and increasing zonisamide to 200 mg daily. Headaches are often preceded by back and abdominal pain.  She claims that the headaches often start around 3 PM and progress. They may be associated with nausea. She reports a chronic history of insomnia and admits to significant stress with her fiancé.

## 2019-09-09 ENCOUNTER — APPOINTMENT (OUTPATIENT)
Dept: NEUROLOGY | Facility: CLINIC | Age: 39
End: 2019-09-09

## 2019-09-30 ENCOUNTER — RX RENEWAL (OUTPATIENT)
Age: 39
End: 2019-09-30

## 2019-10-15 ENCOUNTER — RX RENEWAL (OUTPATIENT)
Age: 39
End: 2019-10-15

## 2019-10-17 ENCOUNTER — APPOINTMENT (OUTPATIENT)
Dept: NEUROLOGY | Facility: CLINIC | Age: 39
End: 2019-10-17
Payer: MEDICAID

## 2019-10-17 VITALS
BODY MASS INDEX: 25.86 KG/M2 | DIASTOLIC BLOOD PRESSURE: 60 MMHG | WEIGHT: 137 LBS | SYSTOLIC BLOOD PRESSURE: 110 MMHG | TEMPERATURE: 98.3 F | OXYGEN SATURATION: 98 % | HEART RATE: 77 BPM | RESPIRATION RATE: 16 BRPM | HEIGHT: 61 IN

## 2019-10-17 PROCEDURE — 99214 OFFICE O/P EST MOD 30 MIN: CPT

## 2019-10-17 RX ORDER — AMITRIPTYLINE HYDROCHLORIDE 10 MG/1
10 TABLET, FILM COATED ORAL
Qty: 60 | Refills: 1 | Status: DISCONTINUED | COMMUNITY
Start: 2019-07-29 | End: 2019-10-17

## 2019-10-17 NOTE — ASSESSMENT
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 Will increase zonisamide to 300 mg at bed time.\par \par Return for followup in one month.

## 2019-10-17 NOTE — HISTORY OF PRESENT ILLNESS
[FreeTextEntry1] : 38-year-old woman last seen 3 months ago with frequent headaches. Headaches have become daily and she claims that they were associated with memory impairment. She has trouble concentrating and has difficulty understanding some conversations and courses at school. Previous workup including prolonged EEG and brain imaging was normal. She could not tolerate amitriptyline and was stopped. She continues on zonisamide 200 mg daily.

## 2019-11-07 ENCOUNTER — APPOINTMENT (OUTPATIENT)
Dept: INTERNAL MEDICINE | Facility: CLINIC | Age: 39
End: 2019-11-07
Payer: MEDICAID

## 2019-11-07 VITALS
HEART RATE: 86 BPM | WEIGHT: 134 LBS | SYSTOLIC BLOOD PRESSURE: 110 MMHG | DIASTOLIC BLOOD PRESSURE: 80 MMHG | TEMPERATURE: 98.7 F | OXYGEN SATURATION: 98 % | BODY MASS INDEX: 25.3 KG/M2 | HEIGHT: 61 IN

## 2019-11-07 DIAGNOSIS — Z87.09 PERSONAL HISTORY OF OTHER DISEASES OF THE RESPIRATORY SYSTEM: ICD-10-CM

## 2019-11-07 DIAGNOSIS — H10.33 UNSPECIFIED ACUTE CONJUNCTIVITIS, BILATERAL: ICD-10-CM

## 2019-11-07 DIAGNOSIS — Z00.00 ENCOUNTER FOR GENERAL ADULT MEDICAL EXAMINATION W/OUT ABNORMAL FINDINGS: ICD-10-CM

## 2019-11-07 PROCEDURE — 99203 OFFICE O/P NEW LOW 30 MIN: CPT

## 2019-11-07 PROCEDURE — 99214 OFFICE O/P EST MOD 30 MIN: CPT

## 2019-11-07 RX ORDER — FLUTICASONE PROPIONATE 220 UG/1
220 AEROSOL, METERED RESPIRATORY (INHALATION) TWICE DAILY
Qty: 1 | Refills: 3 | Status: DISCONTINUED | COMMUNITY
Start: 2019-11-07 | End: 2019-11-07
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
## 2019-11-07 NOTE — HEALTH RISK ASSESSMENT
[No] : In the past 12 months have you used drugs other than those required for medical reasons? No [No falls in past year] : Patient reported no falls in the past year [0] : 2) Feeling down, depressed, or hopeless: Not at all (0) [] : No [[id number]] : 0

## 2019-11-13 ENCOUNTER — APPOINTMENT (OUTPATIENT)
Dept: NEUROLOGY | Facility: CLINIC | Age: 39
End: 2019-11-13

## 2019-11-14 ENCOUNTER — FORM ENCOUNTER (OUTPATIENT)
Age: 39
End: 2019-11-14

## 2019-11-15 ENCOUNTER — OUTPATIENT (OUTPATIENT)
Dept: OUTPATIENT SERVICES | Facility: HOSPITAL | Age: 39
LOS: 1 days | End: 2019-11-15
Payer: MEDICAID

## 2019-11-15 ENCOUNTER — APPOINTMENT (OUTPATIENT)
Dept: RADIOLOGY | Facility: HOSPITAL | Age: 39
End: 2019-11-15
Payer: MEDICAID

## 2019-11-15 ENCOUNTER — APPOINTMENT (OUTPATIENT)
Dept: FAMILY MEDICINE | Facility: CLINIC | Age: 39
End: 2019-11-15
Payer: MEDICAID

## 2019-11-15 VITALS
BODY MASS INDEX: 25.51 KG/M2 | OXYGEN SATURATION: 99 % | WEIGHT: 135 LBS | TEMPERATURE: 99.21 F | SYSTOLIC BLOOD PRESSURE: 110 MMHG | DIASTOLIC BLOOD PRESSURE: 70 MMHG | HEART RATE: 71 BPM | RESPIRATION RATE: 14 BRPM

## 2019-11-15 DIAGNOSIS — J20.8 ACUTE BRONCHITIS DUE TO OTHER SPECIFIED ORGANISMS: ICD-10-CM

## 2019-11-15 DIAGNOSIS — Z90.89 ACQUIRED ABSENCE OF OTHER ORGANS: Chronic | ICD-10-CM

## 2019-11-15 DIAGNOSIS — Z98.891 HISTORY OF UTERINE SCAR FROM PREVIOUS SURGERY: Chronic | ICD-10-CM

## 2019-11-15 DIAGNOSIS — O03.9 COMPLETE OR UNSPECIFIED SPONTANEOUS ABORTION WITHOUT COMPLICATION: Chronic | ICD-10-CM

## 2019-11-15 DIAGNOSIS — J45.909 UNSPECIFIED ASTHMA, UNCOMPLICATED: ICD-10-CM

## 2019-11-15 PROCEDURE — 99213 OFFICE O/P EST LOW 20 MIN: CPT

## 2019-11-15 PROCEDURE — 71046 X-RAY EXAM CHEST 2 VIEWS: CPT | Mod: 26

## 2019-11-15 PROCEDURE — 71046 X-RAY EXAM CHEST 2 VIEWS: CPT

## 2019-11-15 NOTE — PHYSICAL EXAM
[No Acute Distress] : no acute distress [Well Nourished] : well nourished [Well Developed] : well developed [Well-Appearing] : well-appearing [Normal Sclera/Conjunctiva] : normal sclera/conjunctiva [PERRL] : pupils equal round and reactive to light [Normal Outer Ear/Nose] : the outer ears and nose were normal in appearance [EOMI] : extraocular movements intact [No JVD] : no jugular venous distention [Normal Oropharynx] : the oropharynx was normal [No Lymphadenopathy] : no lymphadenopathy [Supple] : supple [Thyroid Normal, No Nodules] : the thyroid was normal and there were no nodules present [No Respiratory Distress] : no respiratory distress  [No Accessory Muscle Use] : no accessory muscle use [Clear to Auscultation] : lungs were clear to auscultation bilaterally [Decreased Breath Sounds Bilaterally Bases] : breath sounds were diminished over both bases [Wheezing Bilaterally Bases] : wheezing was heard over both bases [Normal S1, S2] : normal S1 and S2 [Normal Rate] : normal rate  [Regular Rhythm] : with a regular rhythm [No Carotid Bruits] : no carotid bruits [No Murmur] : no murmur heard [No Varicosities] : no varicosities [Pedal Pulses Present] : the pedal pulses are present [No Abdominal Bruit] : a ~M bruit was not heard ~T in the abdomen [No Extremity Clubbing/Cyanosis] : no extremity clubbing/cyanosis [No Edema] : there was no peripheral edema [No Palpable Aorta] : no palpable aorta [Non Tender] : non-tender [Soft] : abdomen soft [No Masses] : no abdominal mass palpated [Non-distended] : non-distended [No HSM] : no HSM [Normal Bowel Sounds] : normal bowel sounds [Normal Anterior Cervical Nodes] : no anterior cervical lymphadenopathy [Normal Posterior Cervical Nodes] : no posterior cervical lymphadenopathy [No Spinal Tenderness] : no spinal tenderness [No CVA Tenderness] : no CVA  tenderness [Grossly Normal Strength/Tone] : grossly normal strength/tone [No Joint Swelling] : no joint swelling [No Rash] : no rash [Coordination Grossly Intact] : coordination grossly intact [Normal Gait] : normal gait [No Focal Deficits] : no focal deficits [Deep Tendon Reflexes (DTR)] : deep tendon reflexes were 2+ and symmetric [Normal Affect] : the affect was normal [Normal Insight/Judgement] : insight and judgment were intact

## 2019-11-15 NOTE — HISTORY OF PRESENT ILLNESS
[FreeTextEntry8] : Persistent worsening productive cough that has persisted for one month with associated chest tightness, and questionable wheezing.\par Symptoms worse at night and mildly relieved with albuterol nebs. She started qvar inhaler this past Monday. \par \par Questionable fever,\par Denies sick contacts.\par \par \par \par

## 2019-11-15 NOTE — ASSESSMENT
[FreeTextEntry1] : Will cover for CAP.\par Asthma exacerbation- add medrol dose pack\par Xray\par Symptoms worsen report to ed\par FU in one week

## 2019-11-15 NOTE — REVIEW OF SYSTEMS
[Negative] : Heme/Lymph [Fever] : fever [Chills] : chills [Hoarseness] : hoarseness [Earache] : earache [Nasal Discharge] : nasal discharge [Sore Throat] : sore throat [Wheezing] : wheezing [Cough] : cough [Shortness Of Breath] : no shortness of breath

## 2020-01-12 ENCOUNTER — RX RENEWAL (OUTPATIENT)
Age: 40
End: 2020-01-12

## 2020-02-06 ENCOUNTER — APPOINTMENT (OUTPATIENT)
Dept: NEUROLOGY | Facility: CLINIC | Age: 40
End: 2020-02-06
Payer: MEDICAID

## 2020-02-06 VITALS
DIASTOLIC BLOOD PRESSURE: 60 MMHG | RESPIRATION RATE: 17 BRPM | TEMPERATURE: 98.2 F | HEART RATE: 74 BPM | BODY MASS INDEX: 24.17 KG/M2 | OXYGEN SATURATION: 99 % | SYSTOLIC BLOOD PRESSURE: 106 MMHG | WEIGHT: 128 LBS | HEIGHT: 61 IN

## 2020-02-06 PROCEDURE — 99213 OFFICE O/P EST LOW 20 MIN: CPT

## 2020-02-06 NOTE — HISTORY OF PRESENT ILLNESS
[FreeTextEntry1] : 39-year-old woman last seen 4 months ago with frequent headaches. Zonisamide was increased to 300 mg hs with good response. Her insomnia has improved with melatonin

## 2020-03-28 ENCOUNTER — RX RENEWAL (OUTPATIENT)
Age: 40
End: 2020-03-28

## 2020-04-19 ENCOUNTER — RX RENEWAL (OUTPATIENT)
Age: 40
End: 2020-04-19

## 2020-04-22 ENCOUNTER — APPOINTMENT (OUTPATIENT)
Dept: NEUROLOGY | Facility: CLINIC | Age: 40
End: 2020-04-22
Payer: MEDICAID

## 2020-04-22 PROCEDURE — 99441: CPT

## 2020-11-12 NOTE — ED ADULT TRIAGE NOTE - BP NONINVASIVE SYSTOLIC (MM HG)
"MOVEMENT DISORDERS CLINIC NEW CONSULT NOTE    PCP/Referring Provider: Thee Maza MD  [address] [place] [address]  Date of Service: 2020    Chief Complaint: gait decline    HPI: Yana Orellana is a R HANDED 74 y.o. female with a medical issues significant for RA, neuropathy, fibromyalgia, Lunng Ca s/p chemo, who presents for gait eval per Dr. Maza. She reports gait issues since . She notes a very gradual decline in gait and balance. She reports stumbling and walking into walls necessitating a cane after a fall in . After her rehab she was unable to graduate from a cane. Started traveling again (to [place] - ) and was able to walk well. In  her son . In  she had to help her  who had a brain tumor. Went to [place]  and started having trouble with cobblestones. She is starting to struggle to get up from sitting in soft chairs at this point. <20 falls total.  She has no pain when she walks. No shuffling when she walks.  She reports her legs "feel dead." When she sits in the car she cannot raise her knees to get in. She pulls them up with her arms and swivels her hips to get into the car.    Finished PT  Started to have reported bowel incontinence in the last 2 months    Reports polio in 1963 - painful and stiff neck and back    MRI brain and Cspine NL  EMG shows mild sensory neuropathy    Lspine  NL    Paternal grandmother had PD      Review of Systems:   Review of Systems   Neurological: Positive for weakness.         Current Medications:  Outpatient Encounter Medications as of 2020   Medication Sig Dispense Refill    alendronate (FOSAMAX) 70 MG tablet Take 1 tablet (70 mg total) by mouth every 7 days. 12 tablet 3    chlorhexidine (PERIDEX) 0.12 % solution RINSE WITH 15 ML PO BID FOR 7 DAYS  4    diazePAM (VALIUM) 5 MG tablet Take 1 tablet (5 mg total) by mouth nightly as needed (fibromyalgia). 30 tablet 0    mupirocin (BACTROBAN) 2 % " ointment Apply topically 3 (three) times daily. 15 g 1    predniSONE (DELTASONE) 5 MG tablet Take as needed for flare of rheumatoid arthritis 20 tablet 1    folic acid (FOLVITE) 1 MG tablet Take 2 tablets (2 mg total) by mouth once daily. 180 tablet 3    oxyCODONE-acetaminophen (PERCOCET) 5-325 mg per tablet Take 1 tablet by mouth every 4 (four) hours as needed for Pain. 20 tablet 0     No facility-administered encounter medications on file as of 11/12/2020.        Past Medical History:  Patient Active Problem List   Diagnosis    Hx Lung cancer, lower lobe    Depression    Class 3 severe obesity due to excess calories with serious comorbidity and body mass index (BMI) of 40.0 to 44.9 in adult    Fibromyalgia    Neuropathic pain of foot    Hypothyroidism    PVC (premature ventricular contraction)    Postmenopausal estrogen deficiency    Xerostomia    Post-polio syndrome    Diarrhea    Insomnia    Rheumatoid arthritis involving multiple sites with positive rheumatoid factor    Motion sickness    Erythema ab igne    Centrilobular emphysema    Vitamin D insufficiency    Pre-diabetes    Thrombocytopenia    Lightheadedness    Transaminitis    Hyperbilirubinemia    Colon polyp    Murmur    Elevated blood pressure reading without diagnosis of hypertension    Aortic atherosclerosis    Osteopenia    Gait disorder    Polyneuropathy       Past Surgical History:  Past Surgical History:   Procedure Laterality Date    BLADDER SURGERY  1959    CHOLECYSTECTOMY      HYSTERECTOMY  1988    LUNG REMOVAL, PARTIAL  2009    left lower lobectomy    OVARIAN CYST REMOVAL  1970    TONSILLECTOMY  1952       Social:  Social History     Socioeconomic History    Marital status:      Spouse name: Not on file    Number of children: Not on file    Years of education: Not on file    Highest education level: Not on file   Occupational History    Occupation: Writer and    Social Needs  "   Financial resource strain: Not on file    Food insecurity     Worry: Not on file     Inability: Not on file    Transportation needs     Medical: Not on file     Non-medical: Not on file   Tobacco Use    Smoking status: Former Smoker     Packs/day: 1.50     Years: 40.00     Pack years: 60.00     Types: Cigarettes     Quit date: 2009     Years since quittin.3    Smokeless tobacco: Never Used   Substance and Sexual Activity    Alcohol use: Not Currently    Drug use: No    Sexual activity: Not Currently   Lifestyle    Physical activity     Days per week: Not on file     Minutes per session: Not on file    Stress: Not on file   Relationships    Social connections     Talks on phone: Not on file     Gets together: Not on file     Attends [employer] service: Not on file     Active member of club or organization: Not on file     Attends meetings of clubs or organizations: Not on file     Relationship status: Not on file   Other Topics Concern    Not on file   Social History Narrative    Not on file       Family History:  Family History   Problem Relation Age of Onset    Lung cancer Father     Lung cancer Maternal Aunt     Diabetes Maternal Grandmother     Colon cancer Maternal Grandfather     Parkinsonism Paternal Grandmother     Arrhythmia Mother     Lung cancer Maternal Uncle     Heart attack Son 45    Hypertension Neg Hx        PHYSICAL:  BP (!) 169/76   Pulse 98   Ht 5' 10" (1.778 m)   Wt 123.4 kg (272 lb 0.8 oz)   LMP  (LMP Unknown)   BMI 39.03 kg/m²     General Medical Examination:  General: Good hygiene, appropriate appearance.  HEENT: Normocephalic, atraumatic.   Neck: Supple.   Chest: Unlabored breathing.   CV: Symmetric pulses.   Ext: No clubbing, cyanosis, or edema.     Mental Status:  Mood/Affect: Appropriate/congruent.  Level of consciousness: Awake, alert.  Orientation: Oriented to person, place, time and situation.  Language: No Dysarthria    Cranial nerves:  I: Not " tested  II: PERRL, VFF to counting  III, IV, VI: EOMI with conjugate gaze and no nystagmus on end gaze  V: Facial sensation intact and symmetric over the bilateral V1-V3  VII: Facial muscle activation intact and symmetric over the bilateral upper and lower face  VIII: Hearing intact in the b/l ears and symmetrical to finger rub  IX, X, XII: TUP midline - no atrophy or fasiculations  X: SCMs and shoulder shrug full strength b/l and symmetric    Motor:   -UE: 4+/5 deltoids; 5/5 biceps, triceps; 5/5 wrist flexors, extensors; 5/5 interosseous; 5/5   -LEs: 4+/5 hip flexion, extension; 5/5 knee flexion, extension; 5/5 ankle flexion, extension    No tremor    DTRs:  ? Biceps Triceps Brachioradialis Knee Ankle   Left 2+ 2+ 2+ 0 0   Right 2+ 2+ 2+ 0 0       ? Finger taps Finger flicks SRINIVAS Heel taps   Left 0 0 0 0   Right 0 0 0 0     Neck tone: nl  ? Arm Leg   Left 0 0   Right 0 0     Sensation:   -Light touch: Intact and symmetric in the bilateral upper and lower extremities.  -Temp: Intact and symmetric in the bilateral upper and lower extremities.  -Vibration: decreased to knees bilat    Coordination:   -Finger to nose: nl    Gait:  -Arises from chair with use of hands.  -Stoop is neg  -Casual gait is: wide based, foot slapping  -Stride length: nl  -Arm Swing: nl  -Turning: 3 steps  -Tandem gait: cannot  -FOG: neg    Romberg: neg      Laboratory Data:  B12, folate NL    Imaging:    MRI Brain  Impression:     No acute intracranial abnormality.    Lab   Impression   This is an abnormal study. It was technically challenging secondary to patient habitus. There is electrophysiologic evidence of:   1. Mild bilateral carpal tunnel syndrome, comparatively worse on the right;   2. A sensory neuropathy in the lower extremities that cannot be better characterised since there were absent responses. Of note, absent sensory responses in the lower extremities is not uncommon in this patients age group and is not necessarily to be  considered pathological;   3. Chronic denervation in the right C6 and C7 myotomes suggestive of radiculopathy at those levels.     There were no myopathic motor units noted in any of the muscles examined. There were no abnormalities on nerve conduction or needle examination to account for the patients diffuse weakness.     Assessment//Plan:   Problem List Items Addressed This Visit        Neuro    Polyneuropathy    Current Assessment & Plan     Wide non ataxic neuropathic gait with loss of reflexes suggestive of sensory neuropathy. As she has a hx cisplatin use this may the the causative agent. Will f/u reversible neuropathy labs.  Suggested  PT             Other    Gait disorder    Current Assessment & Plan     Proximal weakness b/l Legs, subtle. Appears out of proportion to gait abilities. Suggested CK, neuromuscular consult. Lspine MRI due to reported bowl incontinence.           Other Visit Diagnoses     Gait abnormality    -  Primary    Relevant Orders    HEAVY METALS SCREEN, BLOOD (QUANTITATIVE)    Vitamin B1    Ambulatory referral/consult to Home Health    RPR    CK    Ambulatory consult to Neurology    PROTEIN ELECTROPHORESIS, SERUM    MRI Lumbar Spine Without Contrast            Maite Block MD, MS [facility]  Department of Neurology  Movement Disorders     132

## 2021-03-18 ENCOUNTER — APPOINTMENT (OUTPATIENT)
Dept: FAMILY MEDICINE | Facility: CLINIC | Age: 41
End: 2021-03-18
Payer: MEDICAID

## 2021-03-18 ENCOUNTER — NON-APPOINTMENT (OUTPATIENT)
Age: 41
End: 2021-03-18

## 2021-03-18 VITALS
HEIGHT: 61 IN | WEIGHT: 148 LBS | TEMPERATURE: 97.2 F | OXYGEN SATURATION: 99 % | SYSTOLIC BLOOD PRESSURE: 110 MMHG | HEART RATE: 90 BPM | BODY MASS INDEX: 27.94 KG/M2 | DIASTOLIC BLOOD PRESSURE: 70 MMHG | RESPIRATION RATE: 15 BRPM

## 2021-03-18 DIAGNOSIS — G47.30 SLEEP APNEA, UNSPECIFIED: ICD-10-CM

## 2021-03-18 DIAGNOSIS — F51.01 PRIMARY INSOMNIA: ICD-10-CM

## 2021-03-18 DIAGNOSIS — Z97.5 PRESENCE OF (INTRAUTERINE) CONTRACEPTIVE DEVICE: ICD-10-CM

## 2021-03-18 DIAGNOSIS — R10.32 LEFT LOWER QUADRANT PAIN: ICD-10-CM

## 2021-03-18 PROCEDURE — 93000 ELECTROCARDIOGRAM COMPLETE: CPT

## 2021-03-18 PROCEDURE — 99072 ADDL SUPL MATRL&STAF TM PHE: CPT

## 2021-03-18 PROCEDURE — 99395 PREV VISIT EST AGE 18-39: CPT | Mod: 25

## 2021-03-18 RX ORDER — AZITHROMYCIN 250 MG/1
250 TABLET, FILM COATED ORAL
Qty: 1 | Refills: 0 | Status: COMPLETED | COMMUNITY
Start: 2019-11-15 | End: 2021-03-18

## 2021-03-18 RX ORDER — METHYLPREDNISOLONE 4 MG/1
4 TABLET ORAL
Qty: 1 | Refills: 0 | Status: COMPLETED | COMMUNITY
Start: 2019-11-15 | End: 2021-03-18

## 2021-03-18 NOTE — DISCHARGE NOTE PROVIDER - CARE PROVIDER_API CALL
Bakari Gonzalez)  Orthopaedic Surgery  [facility] [id number]  Phone: [phone]  Fax: [phone]  Follow Up Time:    Enid Johnson)  Family Medicine  [address] [place] [address], [address]  [place] [address]  Phone: [phone]  Fax: [phone]  Follow Up Time:     Jimbo Ramos)  Neurology  [address] [facility], [address]  [place], [place] [id number]  Phone: [phone]  Fax: [phone]  Follow Up Time:

## 2021-03-19 PROBLEM — F51.01 INSOMNIA, IDIOPATHIC: Status: ACTIVE | Noted: 2021-03-18

## 2021-03-19 PROBLEM — G47.30 SLEEP APNEA: Status: ACTIVE | Noted: 2018-07-17

## 2021-03-19 PROBLEM — Z97.5 IUD CONTRACEPTION: Status: ACTIVE | Noted: 2019-11-07

## 2021-03-19 PROBLEM — R10.32 LEFT LOWER QUADRANT PAIN: Status: ACTIVE | Noted: 2021-03-18

## 2021-03-19 NOTE — HISTORY OF PRESENT ILLNESS
[FreeTextEntry1] : Presents for physical exam  [de-identified] : Chronic migraines- notes episodic episodes of zoning out and when she is unable to more her arms or even legs at time. . She has been worked up thoroughly by neurology including - MRI, CT, and MRA. She has been treated for Complex migraines and was taking Zonisamide but reports that she stopped taking due to side effects a while ago. She reports being in a post ictal phase post event for an extended period of time. Cardiac work up also unremarkable other then mitral regurgitation. \par \par NOtes intermittent bilateral pelvic pain. Mostly constant pain, but worse just prior to menstruation. Reports having IUD for 4 years. Patient is overdue to see OBGYN. She doesn’t remember last visit. \par \par Reports persistent insomnia - unable to fall and stay asleep. She attempts to fall asleep at 9 30 and actually falls asleep at around 3 am and wakes up at 5 am. Unclear history of sleep apnea. Does wake up in the middle of the night with her heart racing. She was told that she snores.\par \par Hx of lung nodule - pending repeat ct. \par \par Asthma - takes qvar infrequently, mostly allergy induced. Reports that she has a new puppy and symptoms are exacerbated with close contact of dog. \par \par

## 2021-03-19 NOTE — HEALTH RISK ASSESSMENT
[Fair] : ~his/her~ current health as fair  [Good] : ~his/her~  mood as  good [2 - 4 times a month (2 pts)] : 2-4 times a month (2 points) [Never (0 pts)] : Never (0 points) [No] : In the past 12 months have you used drugs other than those required for medical reasons? No [No falls in past year] : Patient reported no falls in the past year [0] : 2) Feeling down, depressed, or hopeless: Not at all (0) [HIV Test offered] : HIV Test offered [Hepatitis C test offered] : Hepatitis C test offered [None] : None [With Significant Other] : lives with significant other [With Family] : lives with family [Single] : single [# Of Children ___] : has [unfilled] children [Sexually Active] : sexually active [Fully functional (bathing, dressing, toileting, transferring, walking, feeding)] : Fully functional (bathing, dressing, toileting, transferring, walking, feeding) [Fully functional (using the telephone, shopping, preparing meals, housekeeping, doing laundry, using] : Fully functional and needs no help or supervision to perform IADLs (using the telephone, shopping, preparing meals, housekeeping, doing laundry, using transportation, managing medications and managing finances) [Smoke Detector] : smoke detector [Carbon Monoxide Detector] : carbon monoxide detector [Safety elements used in home] : safety elements used in home [Seat Belt] :  uses seat belt [Sunscreen] : uses sunscreen [Name: ___] : Health Care Proxy's Name: [unfilled]  [Relationship: ___] : Relationship: [unfilled] [FreeTextEntry1] : Episodes of fuzzy feeling in head - head drops, arms get weak - last about a minute - on the 16th  [] : No [de-identified] : Hx of neurology  [de-identified] : 3 times a week - squats, sit ups  [de-identified] : good- keto diet- back on keto over the past month  [EDX1Hzdgf] : 0 [Change in mental status noted] : No change in mental status noted [High Risk Behavior] : no high risk behavior [Reports changes in hearing] : Reports no changes in hearing [Reports changes in vision] : Reports no changes in vision [Guns at Home] : no guns at home [Travel to Developing Areas] : does not  travel to developing areas [TB Exposure] : is not being exposed to tuberculosis [Caregiver Concerns] : does not have caregiver concerns [PapSmearComments] : Unsure

## 2021-03-19 NOTE — PHYSICAL EXAM
[No Acute Distress] : no acute distress [Well Nourished] : well nourished [Well Developed] : well developed [Well-Appearing] : well-appearing [Normal Sclera/Conjunctiva] : normal sclera/conjunctiva [PERRL] : pupils equal round and reactive to light [EOMI] : extraocular movements intact [Normal Outer Ear/Nose] : the outer ears and nose were normal in appearance [Normal Oropharynx] : the oropharynx was normal [No JVD] : no jugular venous distention [No Lymphadenopathy] : no lymphadenopathy [Supple] : supple [Thyroid Normal, No Nodules] : the thyroid was normal and there were no nodules present [No Respiratory Distress] : no respiratory distress  [No Accessory Muscle Use] : no accessory muscle use [Clear to Auscultation] : lungs were clear to auscultation bilaterally [Normal Rate] : normal rate  [Regular Rhythm] : with a regular rhythm [Normal S1, S2] : normal S1 and S2 [No Murmur] : no murmur heard [No Carotid Bruits] : no carotid bruits [No Abdominal Bruit] : a ~M bruit was not heard ~T in the abdomen [No Varicosities] : no varicosities [Pedal Pulses Present] : the pedal pulses are present [No Edema] : there was no peripheral edema [No Palpable Aorta] : no palpable aorta [No Extremity Clubbing/Cyanosis] : no extremity clubbing/cyanosis [Soft] : abdomen soft [Non Tender] : non-tender [Non-distended] : non-distended [No Masses] : no abdominal mass palpated [No HSM] : no HSM [Normal Bowel Sounds] : normal bowel sounds [Normal Posterior Cervical Nodes] : no posterior cervical lymphadenopathy [Normal Anterior Cervical Nodes] : no anterior cervical lymphadenopathy [No CVA Tenderness] : no CVA  tenderness [No Spinal Tenderness] : no spinal tenderness [No Joint Swelling] : no joint swelling [Grossly Normal Strength/Tone] : grossly normal strength/tone [No Rash] : no rash [Coordination Grossly Intact] : coordination grossly intact [No Focal Deficits] : no focal deficits [Normal Gait] : normal gait [Deep Tendon Reflexes (DTR)] : deep tendon reflexes were 2+ and symmetric [Normal Affect] : the affect was normal [Normal Insight/Judgement] : insight and judgment were intact [de-identified] : declines breast exam

## 2021-03-19 NOTE — REVIEW OF SYSTEMS
[Fever] : no fever [Chills] : no chills [Fatigue] : fatigue [Earache] : no earache [Hoarseness] : no hoarseness [Nasal Discharge] : no nasal discharge [Sore Throat] : no sore throat [Shortness Of Breath] : no shortness of breath [Wheezing] : wheezing [Cough] : cough [Headache] : headache [Dizziness] : dizziness [Fainting] : fainting [Confusion] : confusion [Memory Loss] : memory loss [Negative] : Heme/Lymph

## 2021-03-30 NOTE — H&P PST ADULT - WEIGHT IN LBS
If tests were ordered for you at today’s visit you can expect to receive results for lab work  in 2-3 days , and imaging studies  in 3-4 days.    If a procedure was ordered, those results will be communicated to you within 14 days of that procedure.  If you have questions regarding any testing, please call our office at [phone].    
156

## 2021-04-02 ENCOUNTER — APPOINTMENT (OUTPATIENT)
Dept: FAMILY MEDICINE | Facility: CLINIC | Age: 41
End: 2021-04-02
Payer: MEDICAID

## 2021-04-02 ENCOUNTER — NON-APPOINTMENT (OUTPATIENT)
Age: 41
End: 2021-04-02

## 2021-04-02 VITALS
DIASTOLIC BLOOD PRESSURE: 70 MMHG | TEMPERATURE: 97.3 F | HEIGHT: 61 IN | BODY MASS INDEX: 28.51 KG/M2 | RESPIRATION RATE: 15 BRPM | WEIGHT: 151 LBS | SYSTOLIC BLOOD PRESSURE: 110 MMHG | HEART RATE: 89 BPM | OXYGEN SATURATION: 99 %

## 2021-04-02 DIAGNOSIS — R55 SYNCOPE AND COLLAPSE: ICD-10-CM

## 2021-04-02 PROCEDURE — 99072 ADDL SUPL MATRL&STAF TM PHE: CPT

## 2021-04-02 PROCEDURE — 99213 OFFICE O/P EST LOW 20 MIN: CPT

## 2021-04-02 NOTE — PHYSICAL EXAM
[Normal] : normal gait, coordination grossly intact, no focal deficits and deep tendon reflexes were 2+ and symmetric [de-identified] : Moderate lower back tenderness. negative slouch test. Noted grimacing with movement. Full strength noted in lower extremities

## 2021-04-02 NOTE — HISTORY OF PRESENT ILLNESS
[FreeTextEntry8] : Acute lower back pain after lifting child yesterday afternoon. \par Was reaching and bending down to  30+ pound child.\par Noted as persistent lower back pain that radiates down to lower legs. \par Constant pain - worse with sitting for an extended period of time or when she turns in bed \par + numbness - tingling in toes\par + weakness of lower extremities\par No incontinence noted. \par \par History of sciatica about ten years ago. \par Does suffer from mild exacerbations at times \par In the past she did respond to physical therapy \par Never had imaging \par \par Also numbness in hands after an episode of near syncope. Has yet to follow up with neurologist

## 2021-04-02 NOTE — REVIEW OF SYSTEMS
[Joint Pain] : joint pain [Muscle Weakness] : muscle weakness [Muscle Pain] : muscle pain [Negative] : Heme/Lymph

## 2021-05-17 ENCOUNTER — TRANSCRIPTION ENCOUNTER (OUTPATIENT)
Age: 41
End: 2021-05-17

## 2021-06-08 LAB
25(OH)D3 SERPL-MCNC: 35.7 NG/ML
ALBUMIN SERPL ELPH-MCNC: 5 G/DL
ALP BLD-CCNC: 47 U/L
ALT SERPL-CCNC: 16 U/L
ANION GAP SERPL CALC-SCNC: 15 MMOL/L
AST SERPL-CCNC: 14 U/L
B BURGDOR AB SER-IMP: NEGATIVE
B BURGDOR IGG+IGM SER QL: 0.16 INDEX
BASOPHILS # BLD AUTO: 0.03 K/UL
BASOPHILS NFR BLD AUTO: 0.5 %
BILIRUB SERPL-MCNC: 0.4 MG/DL
BUN SERPL-MCNC: 14 MG/DL
CALCIUM SERPL-MCNC: 10.2 MG/DL
CHLORIDE SERPL-SCNC: 102 MMOL/L
CHOLEST SERPL-MCNC: 197 MG/DL
CO2 SERPL-SCNC: 22 MMOL/L
CREAT SERPL-MCNC: 0.71 MG/DL
EOSINOPHIL # BLD AUTO: 0.06 K/UL
EOSINOPHIL NFR BLD AUTO: 0.9 %
ESTIMATED AVERAGE GLUCOSE: 114 MG/DL
FOLATE SERPL-MCNC: >20 NG/ML
GLUCOSE SERPL-MCNC: 95 MG/DL
HBA1C MFR BLD HPLC: 5.6 %
HCT VFR BLD CALC: 45.3 %
HDLC SERPL-MCNC: 63 MG/DL
HGB BLD-MCNC: 15.1 G/DL
HIV1+2 AB SPEC QL IA.RAPID: NONREACTIVE
IMM GRANULOCYTES NFR BLD AUTO: 0.2 %
LDLC SERPL CALC-MCNC: 121 MG/DL
LYMPHOCYTES # BLD AUTO: 2.74 K/UL
LYMPHOCYTES NFR BLD AUTO: 41.6 %
MAN DIFF?: NORMAL
MCHC RBC-ENTMCNC: 30.2 PG
MCHC RBC-ENTMCNC: 33.3 GM/DL
MCV RBC AUTO: 90.6 FL
MONOCYTES # BLD AUTO: 0.4 K/UL
MONOCYTES NFR BLD AUTO: 6.1 %
NEUTROPHILS # BLD AUTO: 3.35 K/UL
NEUTROPHILS NFR BLD AUTO: 50.7 %
NONHDLC SERPL-MCNC: 134 MG/DL
PLATELET # BLD AUTO: 311 K/UL
POTASSIUM SERPL-SCNC: 4.6 MMOL/L
PROT SERPL-MCNC: 7.9 G/DL
RBC # BLD: 5 M/UL
RBC # FLD: 12.1 %
SODIUM SERPL-SCNC: 138 MMOL/L
T PALLIDUM AB SER QL IA: NEGATIVE
TRIGL SERPL-MCNC: 66 MG/DL
TSH SERPL-ACNC: 1.09 UIU/ML
VIT B12 SERPL-MCNC: 503 PG/ML
WBC # FLD AUTO: 6.59 K/UL

## 2021-06-10 LAB
C TRACH RRNA SPEC QL NAA+PROBE: NOT DETECTED
HCV RNA SERPL NAA DL=5-ACNC: NOT DETECTED
HCV RNA SERPL NAA+PROBE-LOG IU: NOT DETECTED LOG10IU/ML
N GONORRHOEA RRNA SPEC QL NAA+PROBE: NOT DETECTED
SOURCE AMPLIFICATION: NORMAL

## 2021-06-15 ENCOUNTER — RESULT REVIEW (OUTPATIENT)
Age: 41
End: 2021-06-15

## 2021-06-15 ENCOUNTER — APPOINTMENT (OUTPATIENT)
Dept: ULTRASOUND IMAGING | Facility: HOSPITAL | Age: 41
End: 2021-06-15
Payer: MEDICAID

## 2021-06-15 ENCOUNTER — OUTPATIENT (OUTPATIENT)
Dept: OUTPATIENT SERVICES | Facility: HOSPITAL | Age: 41
LOS: 1 days | End: 2021-06-15
Payer: MEDICAID

## 2021-06-15 ENCOUNTER — APPOINTMENT (OUTPATIENT)
Dept: CT IMAGING | Facility: HOSPITAL | Age: 41
End: 2021-06-15
Payer: MEDICAID

## 2021-06-15 ENCOUNTER — RX RENEWAL (OUTPATIENT)
Age: 41
End: 2021-06-15

## 2021-06-15 ENCOUNTER — APPOINTMENT (OUTPATIENT)
Dept: RADIOLOGY | Facility: HOSPITAL | Age: 41
End: 2021-06-15
Payer: MEDICAID

## 2021-06-15 DIAGNOSIS — Z98.891 HISTORY OF UTERINE SCAR FROM PREVIOUS SURGERY: Chronic | ICD-10-CM

## 2021-06-15 DIAGNOSIS — R91.1 SOLITARY PULMONARY NODULE: ICD-10-CM

## 2021-06-15 DIAGNOSIS — Z90.89 ACQUIRED ABSENCE OF OTHER ORGANS: Chronic | ICD-10-CM

## 2021-06-15 DIAGNOSIS — O03.9 COMPLETE OR UNSPECIFIED SPONTANEOUS ABORTION WITHOUT COMPLICATION: Chronic | ICD-10-CM

## 2021-06-15 PROCEDURE — 71250 CT THORAX DX C-: CPT | Mod: 26

## 2021-06-15 PROCEDURE — 71250 CT THORAX DX C-: CPT

## 2021-06-15 PROCEDURE — 76830 TRANSVAGINAL US NON-OB: CPT

## 2021-06-15 PROCEDURE — 76830 TRANSVAGINAL US NON-OB: CPT | Mod: 26

## 2021-06-15 PROCEDURE — 76856 US EXAM PELVIC COMPLETE: CPT

## 2021-06-15 PROCEDURE — 72100 X-RAY EXAM L-S SPINE 2/3 VWS: CPT | Mod: 26

## 2021-06-15 PROCEDURE — 76775 US EXAM ABDO BACK WALL LIM: CPT | Mod: 26

## 2021-06-15 PROCEDURE — 72100 X-RAY EXAM L-S SPINE 2/3 VWS: CPT

## 2021-06-15 PROCEDURE — 76856 US EXAM PELVIC COMPLETE: CPT | Mod: 26

## 2021-06-15 PROCEDURE — 76775 US EXAM ABDO BACK WALL LIM: CPT

## 2021-06-15 RX ORDER — ALBUTEROL SULFATE 90 UG/1
108 (90 BASE) INHALANT RESPIRATORY (INHALATION)
Qty: 1 | Refills: 5 | Status: ACTIVE | COMMUNITY
Start: 2021-06-15 | End: 1900-01-01

## 2021-06-16 ENCOUNTER — RX RENEWAL (OUTPATIENT)
Age: 41
End: 2021-06-16

## 2021-06-18 ENCOUNTER — APPOINTMENT (OUTPATIENT)
Dept: FAMILY MEDICINE | Facility: CLINIC | Age: 41
End: 2021-06-18
Payer: MEDICAID

## 2021-06-18 VITALS
HEIGHT: 61 IN | SYSTOLIC BLOOD PRESSURE: 120 MMHG | OXYGEN SATURATION: 99 % | TEMPERATURE: 97.5 F | WEIGHT: 150 LBS | DIASTOLIC BLOOD PRESSURE: 70 MMHG | BODY MASS INDEX: 28.32 KG/M2 | RESPIRATION RATE: 14 BRPM | HEART RATE: 91 BPM

## 2021-06-18 DIAGNOSIS — R07.89 OTHER CHEST PAIN: ICD-10-CM

## 2021-06-18 DIAGNOSIS — F32.9 ANXIETY DISORDER, UNSPECIFIED: ICD-10-CM

## 2021-06-18 DIAGNOSIS — F41.9 ANXIETY DISORDER, UNSPECIFIED: ICD-10-CM

## 2021-06-18 PROCEDURE — 36415 COLL VENOUS BLD VENIPUNCTURE: CPT

## 2021-06-18 PROCEDURE — 99214 OFFICE O/P EST MOD 30 MIN: CPT | Mod: 25

## 2021-06-18 PROCEDURE — 99072 ADDL SUPL MATRL&STAF TM PHE: CPT

## 2021-06-18 NOTE — HISTORY OF PRESENT ILLNESS
[FreeTextEntry8] : Patient presents with a number of health ailments.\par \par She is having worsening sob, wheezing and a dry cough. Symptoms have been persistent for about one month and is worse at night. Reports that she has not been getting relief from her qvar. Has not followed up with pulmonology. \par Has history of subcentimeter pleural nodule in 2018. Will repeat imaging.\par \par Since she received her second covid vaccine in April she has continued to have worsening muscle and joint pains. She reports pain in bilateral wrists, ankles and elbows. Reports at times she also has neck pain that radiates both down arms, and chest pain that at times is tender to the touch. NO history of trauma noted. 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 \par \par Transvaginal us - positive for myoma. Patient will follow up with gyn. \par \par TED/depression gad7 - 18 overall persistent. Has been unable to fu with sw but is receptive to cbt and medical treatment.\par

## 2021-06-18 NOTE — PHYSICAL EXAM
[Normal] : normal gait, coordination grossly intact, no focal deficits and deep tendon reflexes were 2+ and symmetric [de-identified] : Moderate lower back tenderness. negative slouch test. Noted grimacing with movement. Full strength noted in lower extremities

## 2021-06-18 NOTE — REVIEW OF SYSTEMS
[Shortness Of Breath] : shortness of breath [Wheezing] : wheezing [Cough] : cough [Joint Pain] : joint pain [Muscle Weakness] : muscle weakness [Muscle Pain] : muscle pain [Negative] : Heme/Lymph

## 2021-06-21 ENCOUNTER — TRANSCRIPTION ENCOUNTER (OUTPATIENT)
Age: 41
End: 2021-06-21

## 2021-06-22 LAB
ALBUMIN SERPL ELPH-MCNC: 4.6 G/DL
ALP BLD-CCNC: 45 U/L
ALT SERPL-CCNC: 17 U/L
ANA SER IF-ACNC: NEGATIVE
ANION GAP SERPL CALC-SCNC: 15 MMOL/L
APPEARANCE: CLEAR
AST SERPL-CCNC: 16 U/L
BACTERIA: NEGATIVE
BASOPHILS # BLD AUTO: 0.04 K/UL
BASOPHILS NFR BLD AUTO: 0.6 %
BILIRUB SERPL-MCNC: 0.2 MG/DL
BILIRUBIN URINE: NEGATIVE
BLOOD URINE: NEGATIVE
BUN SERPL-MCNC: 13 MG/DL
CALCIUM SERPL-MCNC: 9.8 MG/DL
CHLORIDE SERPL-SCNC: 104 MMOL/L
CO2 SERPL-SCNC: 21 MMOL/L
COLOR: NORMAL
CREAT SERPL-MCNC: 0.61 MG/DL
CRP SERPL-MCNC: <3 MG/L
EOSINOPHIL # BLD AUTO: 0.12 K/UL
EOSINOPHIL NFR BLD AUTO: 1.9 %
ERYTHROCYTE [SEDIMENTATION RATE] IN BLOOD BY WESTERGREN METHOD: 9 MM/HR
GLUCOSE QUALITATIVE U: NEGATIVE
GLUCOSE SERPL-MCNC: 105 MG/DL
HCT VFR BLD CALC: 42.2 %
HGB BLD-MCNC: 13.9 G/DL
HYALINE CASTS: 0 /LPF
IMM GRANULOCYTES NFR BLD AUTO: 0.2 %
KETONES URINE: NEGATIVE
LEUKOCYTE ESTERASE URINE: NEGATIVE
LYMPHOCYTES # BLD AUTO: 2.84 K/UL
LYMPHOCYTES NFR BLD AUTO: 45.7 %
MAN DIFF?: NORMAL
MCHC RBC-ENTMCNC: 30.1 PG
MCHC RBC-ENTMCNC: 32.9 GM/DL
MCV RBC AUTO: 91.3 FL
MICROSCOPIC-UA: NORMAL
MONOCYTES # BLD AUTO: 0.42 K/UL
MONOCYTES NFR BLD AUTO: 6.8 %
NEUTROPHILS # BLD AUTO: 2.78 K/UL
NEUTROPHILS NFR BLD AUTO: 44.8 %
NITRITE URINE: NEGATIVE
PH URINE: 7
PLATELET # BLD AUTO: 345 K/UL
POTASSIUM SERPL-SCNC: 4.6 MMOL/L
PROT SERPL-MCNC: 7.1 G/DL
PROTEIN URINE: NEGATIVE
RBC # BLD: 4.62 M/UL
RBC # FLD: 12.3 %
RED BLOOD CELLS URINE: 1 /HPF
RHEUMATOID FACT SER QL: <10 IU/ML
SODIUM SERPL-SCNC: 140 MMOL/L
SPECIFIC GRAVITY URINE: 1.02
SQUAMOUS EPITHELIAL CELLS: 0 /HPF
TSH SERPL-ACNC: 0.96 UIU/ML
UROBILINOGEN URINE: NORMAL
WBC # FLD AUTO: 6.21 K/UL
WHITE BLOOD CELLS URINE: 0 /HPF

## 2021-06-28 ENCOUNTER — APPOINTMENT (OUTPATIENT)
Dept: CARDIOLOGY | Facility: CLINIC | Age: 41
End: 2021-06-28
Payer: MEDICAID

## 2021-06-28 ENCOUNTER — NON-APPOINTMENT (OUTPATIENT)
Age: 41
End: 2021-06-28

## 2021-06-28 VITALS
SYSTOLIC BLOOD PRESSURE: 125 MMHG | OXYGEN SATURATION: 98 % | HEART RATE: 77 BPM | HEIGHT: 61 IN | DIASTOLIC BLOOD PRESSURE: 80 MMHG | BODY MASS INDEX: 28.15 KG/M2

## 2021-06-28 VITALS — WEIGHT: 149 LBS | BODY MASS INDEX: 28.15 KG/M2

## 2021-06-28 DIAGNOSIS — R07.89 OTHER CHEST PAIN: ICD-10-CM

## 2021-06-28 LAB — HLA-B27 RELATED AG QL: NEGATIVE

## 2021-06-28 PROCEDURE — 99214 OFFICE O/P EST MOD 30 MIN: CPT

## 2021-06-28 PROCEDURE — 99072 ADDL SUPL MATRL&STAF TM PHE: CPT

## 2021-06-28 PROCEDURE — 93000 ELECTROCARDIOGRAM COMPLETE: CPT

## 2021-06-28 RX ORDER — UBIDECARENONE 200 MG
200 CAPSULE ORAL
Qty: 100 | Refills: 2 | Status: DISCONTINUED | COMMUNITY
Start: 2019-05-23 | End: 2021-06-28

## 2021-06-28 NOTE — REVIEW OF SYSTEMS
[Palpitations] : palpitations [Cough] : cough [Negative] : Integumentary [de-identified] : + migraines

## 2021-06-28 NOTE — DISCUSSION/SUMMARY
[Stable] : stable [Patient] : the patient [___ Month(s)] : in [unfilled] month(s) [___ Week(s)] : in [unfilled] week(s) [FreeTextEntry1] : 1.Palpitations\par - states it wakes her up at night.\par - Will order CardioNET  2 week monitor to R/O arrhythmia\par -decrease intake of caffeine\par \par 2. Migraine\par  - Neuro consulted recommended Keppra, Ativan PRN inpatient but changed to amitriptyline to treat migraine\par - CT head, CTA head / neck, MRI brain and TTE all were unremarkable \par - 24h vEEG- Normal EEG study. No epileptic pattern or seizure seen\par - Follow up with neurology Dr. Vasquez bell\par \par 3.  Mild-Mod MR. Hypertensive with LVEF 75% on TTE 2/2017 and repeat TTE 5/10/19 with LVEF 67% and mild mod MR\par - order TTE to eval LVEF\par \par 4. Mild intermittent asthma without complication- Albuterol PRN and Qzar\par - follow up with pulm for lung nodule and asthma. Pt with dry cough\par \par Follow up in office in 6 weeks after monitoring and echo.

## 2021-06-28 NOTE — REASON FOR VISIT
[Follow-Up - Clinic] : a clinic follow-up of [Mitral Regurgitation] : mitral regurgitation [FreeTextEntry2] : Admission at [facility] 5/11-5/14 secondary to several episodes of generalized weakness with LOC concerning for seizures

## 2021-06-28 NOTE — ASSESSMENT
[FreeTextEntry1] : 39 y/o female with a PMHX of preeclampsia, postpartum-associated moderate-severe mitral regurgitation, asthma, gestational diabetes,  s/p  17.  During the pregnancy TTEs revealed mild MR.\par TTE done 5/10/19 with normal LV systolic function and mild mod MR during hospitalization for several episodes (weakness, staring spells, LOC, memory issues, aphasia), concerning for possible seizures. 5/10/19 MRI with no acute infarct or hemorrhage. Pt is here for a follow up appt secondary to symptoms of palpitations at night.\par Appears euvolemic and normotensive

## 2021-06-28 NOTE — CARDIOLOGY SUMMARY
[___] : [unfilled] [LVEF ___%] : LVEF [unfilled]% [None] : no pulmonary hypertension [Normal] : normal LA size [Mild] : mild mitral regurgitation [de-identified] : 6/28/21 NSR 82, NSST\par

## 2021-06-28 NOTE — REASON FOR VISIT
[Follow-Up - Clinic] : a clinic follow-up of [Mitral Regurgitation] : mitral regurgitation [FreeTextEntry2] : Admission at [facility] 5/11-5/14 secondary to several episodes of generalized weakness with LOC concerning for seizures

## 2021-06-28 NOTE — REVIEW OF SYSTEMS
[Palpitations] : palpitations [Cough] : cough [Negative] : Integumentary [de-identified] : + migraines

## 2021-06-28 NOTE — PHYSICAL EXAM
[Arterial Pulses Normal] : the arterial pulses were normal [Edema] : no peripheral edema present [Bowel Sounds] : normal bowel sounds [General Appearance - Well Developed] : well developed [Normal Appearance] : normal appearance [Well Groomed] : well groomed [General Appearance - Well Nourished] : well nourished [No Deformities] : no deformities [General Appearance - In No Acute Distress] : no acute distress [Normal Conjunctiva] : the conjunctiva exhibited no abnormalities [Eyelids - No Xanthelasma] : the eyelids demonstrated no xanthelasmas [Normal Oral Mucosa] : normal oral mucosa [No Oral Pallor] : no oral pallor [No Oral Cyanosis] : no oral cyanosis [Normal Jugular Venous A Waves Present] : normal jugular venous A waves present [Normal Jugular Venous V Waves Present] : normal jugular venous V waves present [No Jugular Venous Barrow A Waves] : no jugular venous barrow A waves [Respiration, Rhythm And Depth] : normal respiratory rhythm and effort [Exaggerated Use Of Accessory Muscles For Inspiration] : no accessory muscle use [Auscultation Breath Sounds / Voice Sounds] : lungs were clear to auscultation bilaterally [Heart Rate And Rhythm] : heart rate and rhythm were normal [Heart Sounds] : normal S1 and S2 [Murmurs] : no murmurs present [Abdomen Soft] : soft [Abdomen Tenderness] : non-tender [Abdomen Mass (___ Cm)] : no abdominal mass palpated [Abnormal Walk] : normal gait [Gait - Sufficient For Exercise Testing] : the gait was sufficient for exercise testing [Nail Clubbing] : no clubbing of the fingernails [Cyanosis, Localized] : no localized cyanosis [Petechial Hemorrhages (___cm)] : no petechial hemorrhages [Skin Color & Pigmentation] : normal skin color and pigmentation [] : no rash [No Venous Stasis] : no venous stasis [Skin Lesions] : no skin lesions [No Skin Ulcers] : no skin ulcer [No Xanthoma] : no  xanthoma was observed [Oriented To Time, Place, And Person] : oriented to person, place, and time [Affect] : the affect was normal [No Anxiety] : not feeling anxious [Mood] : the mood was normal [FreeTextEntry1] : no edema or calf pain
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
## 2021-06-28 NOTE — CARDIOLOGY SUMMARY
[___] : [unfilled] [LVEF ___%] : LVEF [unfilled]% [None] : no pulmonary hypertension [Normal] : normal LA size [Mild] : mild mitral regurgitation [de-identified] : 6/28/21 NSR 82, NSST\par

## 2021-06-28 NOTE — PHYSICAL EXAM
[Arterial Pulses Normal] : the arterial pulses were normal [Edema] : no peripheral edema present [Bowel Sounds] : normal bowel sounds [General Appearance - Well Developed] : well developed [Normal Appearance] : normal appearance [Well Groomed] : well groomed [General Appearance - Well Nourished] : well nourished [No Deformities] : no deformities [General Appearance - In No Acute Distress] : no acute distress [Normal Conjunctiva] : the conjunctiva exhibited no abnormalities [Eyelids - No Xanthelasma] : the eyelids demonstrated no xanthelasmas [Normal Oral Mucosa] : normal oral mucosa [No Oral Pallor] : no oral pallor [No Oral Cyanosis] : no oral cyanosis [Normal Jugular Venous A Waves Present] : normal jugular venous A waves present [Normal Jugular Venous V Waves Present] : normal jugular venous V waves present [No Jugular Venous Barrow A Waves] : no jugular venous barrow A waves [Respiration, Rhythm And Depth] : normal respiratory rhythm and effort [Exaggerated Use Of Accessory Muscles For Inspiration] : no accessory muscle use [Auscultation Breath Sounds / Voice Sounds] : lungs were clear to auscultation bilaterally [Heart Rate And Rhythm] : heart rate and rhythm were normal [Heart Sounds] : normal S1 and S2 [Murmurs] : no murmurs present [Abdomen Soft] : soft [Abdomen Tenderness] : non-tender [Abdomen Mass (___ Cm)] : no abdominal mass palpated [Abnormal Walk] : normal gait [Gait - Sufficient For Exercise Testing] : the gait was sufficient for exercise testing [Nail Clubbing] : no clubbing of the fingernails [Cyanosis, Localized] : no localized cyanosis [Petechial Hemorrhages (___cm)] : no petechial hemorrhages [Skin Color & Pigmentation] : normal skin color and pigmentation [] : no rash [No Venous Stasis] : no venous stasis [Skin Lesions] : no skin lesions [No Skin Ulcers] : no skin ulcer [No Xanthoma] : no  xanthoma was observed [Oriented To Time, Place, And Person] : oriented to person, place, and time [Affect] : the affect was normal [Mood] : the mood was normal [No Anxiety] : not feeling anxious [FreeTextEntry1] : no edema or calf pain
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
## 2021-06-28 NOTE — ASSESSMENT
[FreeTextEntry1] : 41 y/o female with a PMHX of preeclampsia, postpartum-associated moderate-severe mitral regurgitation, asthma, gestational diabetes,  s/p  18.  During the pregnancy TTEs revealed mild MR.\par TTE done 5/10/19 with normal LV systolic function and mild mod MR during hospitalization for several episodes (weakness, staring spells, LOC, memory issues, aphasia), concerning for possible seizures. 5/10/19 MRI with no acute infarct or hemorrhage. Pt is here for a follow up appt secondary to symptoms of palpitations at night.\par Appears euvolemic and normotensive

## 2021-06-30 ENCOUNTER — APPOINTMENT (OUTPATIENT)
Dept: PULMONOLOGY | Facility: CLINIC | Age: 41
End: 2021-06-30
Payer: MEDICAID

## 2021-06-30 VITALS
HEIGHT: 61 IN | HEART RATE: 81 BPM | WEIGHT: 149 LBS | SYSTOLIC BLOOD PRESSURE: 120 MMHG | TEMPERATURE: 98.5 F | OXYGEN SATURATION: 98 % | DIASTOLIC BLOOD PRESSURE: 80 MMHG | BODY MASS INDEX: 28.13 KG/M2

## 2021-06-30 DIAGNOSIS — R93.89 ABNORMAL FINDINGS ON DIAGNOSTIC IMAGING OF OTHER SPECIFIED BODY STRUCTURES: ICD-10-CM

## 2021-06-30 DIAGNOSIS — Z87.891 PERSONAL HISTORY OF NICOTINE DEPENDENCE: ICD-10-CM

## 2021-06-30 PROCEDURE — 99072 ADDL SUPL MATRL&STAF TM PHE: CPT

## 2021-06-30 PROCEDURE — 99205 OFFICE O/P NEW HI 60 MIN: CPT

## 2021-06-30 RX ORDER — ALBUTEROL SULFATE 2.5 MG/3ML
(2.5 MG/3ML) SOLUTION RESPIRATORY (INHALATION)
Qty: 1 | Refills: 4 | Status: ACTIVE | COMMUNITY
Start: 2018-10-10

## 2021-06-30 NOTE — REASON FOR VISIT
[Initial] : an initial visit [Abnormal CXR/ Chest CT] : an abnormal CXR/ chest CT [Asthma] : asthma [Shortness of Breath] : shortness of breath

## 2021-07-01 LAB — DEPRECATED D DIMER PPP IA-ACNC: <150 NG/ML DDU

## 2021-07-08 ENCOUNTER — TRANSCRIPTION ENCOUNTER (OUTPATIENT)
Age: 41
End: 2021-07-08

## 2021-07-15 ENCOUNTER — APPOINTMENT (OUTPATIENT)
Dept: RHEUMATOLOGY | Facility: CLINIC | Age: 41
End: 2021-07-15
Payer: MEDICAID

## 2021-07-15 VITALS
DIASTOLIC BLOOD PRESSURE: 62 MMHG | TEMPERATURE: 97.7 F | BODY MASS INDEX: 28.51 KG/M2 | SYSTOLIC BLOOD PRESSURE: 114 MMHG | RESPIRATION RATE: 16 BRPM | WEIGHT: 151 LBS | HEART RATE: 99 BPM | OXYGEN SATURATION: 98 % | HEIGHT: 61 IN

## 2021-07-15 DIAGNOSIS — R53.82 CHRONIC FATIGUE, UNSPECIFIED: ICD-10-CM

## 2021-07-15 DIAGNOSIS — R23.8 OTHER SKIN CHANGES: ICD-10-CM

## 2021-07-15 PROCEDURE — 99204 OFFICE O/P NEW MOD 45 MIN: CPT

## 2021-07-28 ENCOUNTER — APPOINTMENT (OUTPATIENT)
Dept: PULMONOLOGY | Facility: CLINIC | Age: 41
End: 2021-07-28

## 2021-08-06 ENCOUNTER — RX RENEWAL (OUTPATIENT)
Age: 41
End: 2021-08-06

## 2021-08-16 ENCOUNTER — TRANSCRIPTION ENCOUNTER (OUTPATIENT)
Age: 41
End: 2021-08-16

## 2021-08-18 ENCOUNTER — RX RENEWAL (OUTPATIENT)
Age: 41
End: 2021-08-18

## 2021-08-19 NOTE — REVIEW OF SYSTEMS
[Feeling Poorly] : feeling poorly [Feeling Tired] : feeling tired [Arthralgias] : arthralgias [Joint Pain] : joint pain [As Noted in HPI] : as noted in HPI [Sleep Disturbances] : sleep disturbances [Negative] : Heme/Lymph [Joint Swelling] : no joint swelling [Joint Stiffness] : no joint stiffness [Suicidal] : not suicidal

## 2021-08-19 NOTE — HISTORY OF PRESENT ILLNESS
[FreeTextEntry1] : NEDRA WELSH is a 40 year old woman who presents with progressive arthralgias and myalgias diffusely, no localizing areas but does have plantar and palmar fascial involvement, since Covid vaccine in April 2021. No improvement with Tylenol, exercising does partially help. Some occasional cramping. Chronic low back pain, intermittent but no fixed loss of ROM. Occasional neck pain, no radicular sx. + fatigue, skin sensitivity, mood disorder but feels this is stable. \par \par Inflammatory arthritis ROS negative for symmetrical peripheral joint synovitis, prolonged AM stiffness, enthesitis, dactylitis, psoriasis/ rashes, eye inflammation, IBD. \par \par SLE ROS negative for alopecia, sicca, salivary gland swelling, oral ulcers, malar rash, photosensitivity, SOB, chest pain, serositis, abd pain, dysuria, hematuria, rash, joint AM stiffness/synovitis, hematologic abnormalities, Raynauds. APLS ROS - 1 pregnancy with preeclampsia/CHF, 1 2nd trimester miscarriage, no thrombotic events. \par \par + sacroiliitis on LS XRay, but unchanged from prior in 2018. \par Labs - Negative - HLA B27, NICOLAS, RF, ESR/CRP, STD screen, Lyme\par FH - no autoimmune d/o

## 2021-08-19 NOTE — PHYSICAL EXAM
[General Appearance - Alert] : alert [General Appearance - In No Acute Distress] : in no acute distress [General Appearance - Well Nourished] : well nourished [Sclera] : the sclera and conjunctiva were normal [PERRL With Normal Accommodation] : pupils were equal in size, round, and reactive to light [Extraocular Movements] : extraocular movements were intact [Outer Ear] : the ears and nose were normal in appearance [Oropharynx] : the oropharynx was normal [Neck Appearance] : the appearance of the neck was normal [Thyroid Diffuse Enlargement] : the thyroid was not enlarged [Auscultation Breath Sounds / Voice Sounds] : lungs were clear to auscultation bilaterally [Heart Rate And Rhythm] : heart rate was normal and rhythm regular [Heart Sounds] : normal S1 and S2 [Murmurs] : no murmurs [Edema] : there was no peripheral edema [Bowel Sounds] : normal bowel sounds [Abdomen Soft] : soft [Abdomen Tenderness] : non-tender [No CVA Tenderness] : no ~M costovertebral angle tenderness [No Spinal Tenderness] : no spinal tenderness [Abnormal Walk] : normal gait [Nail Clubbing] : no clubbing  or cyanosis of the fingernails [Musculoskeletal - Swelling] : no joint swelling seen [FreeTextEntry1] : No synovitis, ROM intact  [Motor Tone] : muscle strength and tone were normal [Skin Color & Pigmentation] : normal skin color and pigmentation [] : no rash [Motor Exam] : the motor exam was normal [No Focal Deficits] : no focal deficits [Oriented To Time, Place, And Person] : oriented to person, place, and time [Impaired Insight] : insight and judgment were intact [Affect] : the affect was normal

## 2021-08-19 NOTE — ASSESSMENT
[FreeTextEntry1] : NEDRA WELSH is a 40 year old woman who presents with diffuse arthralgias, myalgias, fatigue, poor sleep, soft tissue tender points, skin hypersensitivity which has worsened s/p covid vaccine but does appear to be slowly self improving. Also with some nonspecific spine pain, imaging showing "sacroiliitis but stable" and paucity of other inflammatory sx on exam and serological w/u negative.\par \par - I suspect current constellation of sx may be vaccine induced or FMS related and she was recently been started on Cymbalta by PMD which could help with this. She will continue. We did discuss if partially beneficial we can increase dose. \par - Reviewed stretching, light aerobic exercises, massage, heat as adjuncts for FMS pain. \par - tylenol up to BID prn pain \par - will get MRI pelvis to better evaluate sacroiliitis read as atypical not to progress and does not have other inflammatory sx\par - reviewed serologies with her, no further serological testing at present \par - RTC in 2 months

## 2021-09-07 ENCOUNTER — NON-APPOINTMENT (OUTPATIENT)
Age: 41
End: 2021-09-07

## 2021-09-09 ENCOUNTER — APPOINTMENT (OUTPATIENT)
Dept: RHEUMATOLOGY | Facility: CLINIC | Age: 41
End: 2021-09-09

## 2021-09-28 NOTE — H&P PST ADULT - LIVES WITH, PROFILE
"      Subjective      Patient ID: Karey Kaur is a 52 y.o. female.  1969      Pt reports epigastric pain that woke her up last night.  Pt reports she took Gaviscon with some relief today.  Pt is unaware of any GERD history.  Pt reports she ate a chick-jayde-a chicken salad last night and is unsure if this caused the pain.  Pt does not have pain today.  Pt denies any constipation, diarrhea, nausea or vomiting. Pt also c/o mild pain when having intercourse and had some mild burning after afterwards.  Pt is concerned she has a UTI that was previously treated.   Pt agreeable to urinalysis today.       The following have been reviewed and updated as appropriate in this visit:  Allergies  Meds  Problems       Review of Systems   Constitutional: Negative for chills, diaphoresis, fatigue and fever.   HENT: Negative for congestion, ear pain, sinus pressure, sinus pain, sneezing and sore throat.    Eyes: Negative.    Respiratory: Negative for cough, chest tightness and shortness of breath.    Cardiovascular: Negative for chest pain and palpitations.   Gastrointestinal: Positive for abdominal pain. Negative for diarrhea and nausea.   Endocrine: Negative.    Genitourinary: Positive for dysuria.   Musculoskeletal: Negative for myalgias.   Skin: Negative for rash and wound.   Allergic/Immunologic: Negative.    Neurological: Negative.    Hematological: Negative.    Psychiatric/Behavioral: Negative for agitation, behavioral problems, confusion, self-injury and suicidal ideas.       Objective     Vitals:    09/28/21 1350   BP: 122/80   BP Location: Left upper arm   Patient Position: Sitting   Pulse: 70   Resp: 16   Temp: 36.7 °C (98 °F)   TempSrc: Temporal   SpO2: 96%   Weight: 82.8 kg (182 lb 9.6 oz)   Height: 1.6 m (5' 3\")     Body mass index is 32.35 kg/m².    Physical Exam  Vitals reviewed.   Constitutional:       General: She is not in acute distress.     Appearance: Normal appearance. She is not ill-appearing or " toxic-appearing.   HENT:      Head: Normocephalic.      Jaw: There is normal jaw occlusion.      Right Ear: Hearing, tympanic membrane, ear canal and external ear normal.      Left Ear: Hearing, tympanic membrane, ear canal and external ear normal.      Nose: Nose normal.      Mouth/Throat:      Lips: Pink.      Mouth: Mucous membranes are moist.      Pharynx: Oropharynx is clear. Uvula midline. No posterior oropharyngeal erythema or uvula swelling.   Eyes:      General: Lids are normal.      Extraocular Movements: Extraocular movements intact.      Conjunctiva/sclera: Conjunctivae normal.      Pupils: Pupils are equal, round, and reactive to light.   Neck:      Trachea: Trachea normal.   Cardiovascular:      Rate and Rhythm: Normal rate and regular rhythm.      Pulses: Normal pulses.      Heart sounds: S1 normal and S2 normal.   Pulmonary:      Effort: Pulmonary effort is normal.      Breath sounds: Normal breath sounds.   Abdominal:      General: Bowel sounds are normal.      Palpations: Abdomen is soft.      Tenderness: There is no abdominal tenderness.   Musculoskeletal:      Cervical back: Normal range of motion.      Right lower leg: No edema.      Left lower leg: No edema.   Lymphadenopathy:      Cervical: No cervical adenopathy.   Skin:     General: Skin is warm and dry.   Neurological:      Mental Status: She is alert and oriented to person, place, and time.      GCS: GCS eye subscore is 4. GCS verbal subscore is 5. GCS motor subscore is 6.      Cranial Nerves: Cranial nerves are intact.      Sensory: Sensation is intact.      Motor: Motor function is intact.      Coordination: Coordination is intact.      Gait: Gait is intact.   Psychiatric:         Speech: Speech normal.         Behavior: Behavior normal. Behavior is cooperative.         Thought Content: Thought content normal.         Judgment: Judgment normal.         Assessment/Plan   Diagnoses and all orders for this visit:    Epigastric pain  (Primary)  -     famotidine (PEPCID) 20 mg tablet; Take 1 tablet (20 mg total) by mouth 2 (two) times a day for 7 days.    Dysuria  -     Urinalysis with microscopic; Future  -     Urine culture         children/significant other

## 2021-10-08 ENCOUNTER — RX RENEWAL (OUTPATIENT)
Age: 41
End: 2021-10-08

## 2021-10-12 ENCOUNTER — NON-APPOINTMENT (OUTPATIENT)
Age: 41
End: 2021-10-12

## 2021-10-12 ENCOUNTER — APPOINTMENT (OUTPATIENT)
Dept: OBGYN | Facility: CLINIC | Age: 41
End: 2021-10-12
Payer: MEDICAID

## 2021-10-12 VITALS
SYSTOLIC BLOOD PRESSURE: 110 MMHG | TEMPERATURE: 97.8 F | BODY MASS INDEX: 27.75 KG/M2 | HEIGHT: 61 IN | DIASTOLIC BLOOD PRESSURE: 70 MMHG | WEIGHT: 147 LBS | HEART RATE: 77 BPM | OXYGEN SATURATION: 99 %

## 2021-10-12 PROCEDURE — 58301 REMOVE INTRAUTERINE DEVICE: CPT

## 2021-10-12 PROCEDURE — 99214 OFFICE O/P EST MOD 30 MIN: CPT | Mod: 25

## 2021-10-12 RX ORDER — MELATONIN 5 MG
5 CAPSULE ORAL
Refills: 0 | Status: DISCONTINUED | COMMUNITY
End: 2021-10-12

## 2021-10-12 NOTE — PROCEDURE
[IUD Removal] : intrauterine device (IUD) removal [Time out performed] : Pre-procedure time out performed.  Patient's name, date of birth and procedure confirmed. [Consent Obtained] : Consent obtained [DUB] : DUB [Dysmenorrhea] : dysmenorrhea [Risks] : risks [Benefits] : benefits [Alternatives] : alternatives [Patient] : patient [Speculum Placed] : speculum placed [IUD Removed - Forceps] : IUD removed - forceps [Sent to Pathology] : specimen was placed in buffered formalin and sent for pathology [Cultured] : specimen sent for cultures [No Complications] : no complications [Tolerated Well] : Patient tolerated the procedure well [PRN] : as needed

## 2021-10-12 NOTE — PLAN
[FreeTextEntry1] : - Post procedure counseling given--patient to practice pelvic rest for 1 week, this includes nothing in the vagina, no submerging the vagina in water, no douching, no tampons\par \par \par I spent the time noted on the day of this patient encounter preparing for, providing and documenting the above E/M service and counseling and educate patient on differential, workup, disease course, and treatment/management. Education was provided to the patient during this encounter. All questions and concerns were answered and addressed in detail.\par \par Estela Boyle MD\par

## 2021-10-12 NOTE — HISTORY OF PRESENT ILLNESS
[FreeTextEntry1] : 39 yo P2 with LMP 10/8/21 presents today for removal of paragard IUD--has had it since 2017 and now feeling bloated, and pelvic discomfort since having it. Also co irreg menses since starting the IUD. prev regular. Recent sono in June showed IUD insitu--1cm posterior myoma noted--normal sono otherwise.\par \par Patient to obtain records from Dr. Moreno \par \par POB: VDx1, CSx1, SABx1\par PGYN: irreg, denies hx of pelvic infection, nl paps (most recently on 2021)\par PMH: migraines--told by neuro that cant have JAYCEE\par PSH: CS\par Med: albuterol, monteleukast, paragard\par All: Shellfish\par SH: denies\par \par

## 2021-10-12 NOTE — COUNSELING
[Contraception/ Emergency Contraception/ Safe Sexual Practices] : contraception, emergency contraception, safe sexual practices [Confidentiality] : confidentiality [STD (testing, results, tx)] : STD (testing, results, tx) [Medication Management] : medication management [Pre/Post Op Instructions] : pre/post op instructions

## 2021-10-22 LAB
A VAGINAE DNA VAG QL NAA+PROBE: NORMAL
ACTINOMYCES CULTURE FOR IUD: NORMAL
BVAB2 DNA VAG QL NAA+PROBE: NORMAL
C KRUSEI DNA VAG QL NAA+PROBE: NEGATIVE
C TRACH RRNA SPEC QL NAA+PROBE: NEGATIVE
MEGA1 DNA VAG QL NAA+PROBE: NORMAL
N GONORRHOEA RRNA SPEC QL NAA+PROBE: NEGATIVE
T VAGINALIS RRNA SPEC QL NAA+PROBE: NEGATIVE

## 2021-12-16 ENCOUNTER — APPOINTMENT (OUTPATIENT)
Dept: FAMILY MEDICINE | Facility: CLINIC | Age: 41
End: 2021-12-16
Payer: MEDICAID

## 2021-12-16 VITALS
BODY MASS INDEX: 29.45 KG/M2 | TEMPERATURE: 98.4 F | WEIGHT: 156 LBS | HEART RATE: 80 BPM | SYSTOLIC BLOOD PRESSURE: 124 MMHG | HEIGHT: 61 IN | RESPIRATION RATE: 16 BRPM | DIASTOLIC BLOOD PRESSURE: 81 MMHG | OXYGEN SATURATION: 98 %

## 2021-12-16 DIAGNOSIS — M79.10 MYALGIA, UNSPECIFIED SITE: ICD-10-CM

## 2021-12-16 DIAGNOSIS — M53.3 SACROCOCCYGEAL DISORDERS, NOT ELSEWHERE CLASSIFIED: ICD-10-CM

## 2021-12-16 DIAGNOSIS — J02.9 ACUTE PHARYNGITIS, UNSPECIFIED: ICD-10-CM

## 2021-12-16 PROCEDURE — 99215 OFFICE O/P EST HI 40 MIN: CPT

## 2021-12-16 RX ORDER — DULOXETINE HYDROCHLORIDE 20 MG/1
20 CAPSULE, DELAYED RELEASE PELLETS ORAL
Qty: 30 | Refills: 2 | Status: COMPLETED | COMMUNITY
Start: 2021-06-18 | End: 2021-12-16

## 2021-12-16 RX ORDER — TIZANIDINE HYDROCHLORIDE 2 MG/1
2 CAPSULE ORAL
Qty: 30 | Refills: 0 | Status: COMPLETED | COMMUNITY
Start: 2021-04-02 | End: 2021-12-16

## 2021-12-16 RX ORDER — COPPER 313.4 MG/1
INTRAUTERINE DEVICE INTRAUTERINE
Refills: 0 | Status: COMPLETED | COMMUNITY
Start: 2018-04-13 | End: 2021-12-16

## 2021-12-16 RX ORDER — ZONISAMIDE 100 MG/1
100 CAPSULE ORAL
Qty: 90 | Refills: 2 | Status: COMPLETED | COMMUNITY
Start: 2019-05-22 | End: 2021-12-16

## 2021-12-16 NOTE — HISTORY OF PRESENT ILLNESS
[FreeTextEntry8] : Notes dry throat and numbness starting this morning. Has been having headaches, chills, body aches and  nausea since Monday after she received the Pfizer vaccine. Also noted left axillary pain and left breast pain which she had after previous vaccines as well.\par \par Sick contact is children with URIS.\par \par Patient was unable to fu with both pulmonology and rheumatology. She never had PET/CT FDG performed for lung nodule. She also didn't have MRI performed of pelvis for chronic sacroiliitis. \par \par Continue to suffer from significant myalgias and polyarthralgia. Was prescribed Cymbalta for possible fibromyalgia, but patient stopped medication. She is unsure why.

## 2021-12-16 NOTE — PHYSICAL EXAM
[No Acute Distress] : no acute distress [Well Nourished] : well nourished [Well Developed] : well developed [Well-Appearing] : well-appearing [Normal Sclera/Conjunctiva] : normal sclera/conjunctiva [PERRL] : pupils equal round and reactive to light [EOMI] : extraocular movements intact [Normal Outer Ear/Nose] : the outer ears and nose were normal in appearance [Normal Oropharynx] : the oropharynx was normal [No JVD] : no jugular venous distention [No Lymphadenopathy] : no lymphadenopathy [Supple] : supple [Thyroid Normal, No Nodules] : the thyroid was normal and there were no nodules present [No Respiratory Distress] : no respiratory distress  [No Accessory Muscle Use] : no accessory muscle use [Clear to Auscultation] : lungs were clear to auscultation bilaterally [Normal Rate] : normal rate  [Regular Rhythm] : with a regular rhythm [Normal S1, S2] : normal S1 and S2 [No Murmur] : no murmur heard [No Carotid Bruits] : no carotid bruits [No Abdominal Bruit] : a ~M bruit was not heard ~T in the abdomen [No Varicosities] : no varicosities [Pedal Pulses Present] : the pedal pulses are present [No Edema] : there was no peripheral edema [No Palpable Aorta] : no palpable aorta [No Extremity Clubbing/Cyanosis] : no extremity clubbing/cyanosis [Soft] : abdomen soft [Non Tender] : non-tender [Non-distended] : non-distended [No Masses] : no abdominal mass palpated [No HSM] : no HSM [Normal Bowel Sounds] : normal bowel sounds [Normal Posterior Cervical Nodes] : no posterior cervical lymphadenopathy [Normal Anterior Cervical Nodes] : no anterior cervical lymphadenopathy [No CVA Tenderness] : no CVA  tenderness [No Spinal Tenderness] : no spinal tenderness [No Joint Swelling] : no joint swelling [Grossly Normal Strength/Tone] : grossly normal strength/tone [No Rash] : no rash [Coordination Grossly Intact] : coordination grossly intact [No Focal Deficits] : no focal deficits [Normal Gait] : normal gait [Deep Tendon Reflexes (DTR)] : deep tendon reflexes were 2+ and symmetric [Normal Affect] : the affect was normal [Normal Insight/Judgement] : insight and judgment were intact [de-identified] : injected posterior pharynx. NOted tiny vesicles noted on uvula  [de-identified] : left axillary lymphadenopathy and tenderness

## 2021-12-16 NOTE — PHYSICAL EXAM
[No Acute Distress] : no acute distress [Well Nourished] : well nourished [Well Developed] : well developed [Well-Appearing] : well-appearing [Normal Sclera/Conjunctiva] : normal sclera/conjunctiva [PERRL] : pupils equal round and reactive to light [EOMI] : extraocular movements intact [Normal Outer Ear/Nose] : the outer ears and nose were normal in appearance [Normal Oropharynx] : the oropharynx was normal [No JVD] : no jugular venous distention [No Lymphadenopathy] : no lymphadenopathy [Supple] : supple [Thyroid Normal, No Nodules] : the thyroid was normal and there were no nodules present [No Respiratory Distress] : no respiratory distress  [No Accessory Muscle Use] : no accessory muscle use [Clear to Auscultation] : lungs were clear to auscultation bilaterally [Normal Rate] : normal rate  [Regular Rhythm] : with a regular rhythm [Normal S1, S2] : normal S1 and S2 [No Murmur] : no murmur heard [No Carotid Bruits] : no carotid bruits [No Abdominal Bruit] : a ~M bruit was not heard ~T in the abdomen [No Varicosities] : no varicosities [Pedal Pulses Present] : the pedal pulses are present [No Edema] : there was no peripheral edema [No Palpable Aorta] : no palpable aorta [No Extremity Clubbing/Cyanosis] : no extremity clubbing/cyanosis [Soft] : abdomen soft [Non Tender] : non-tender [Non-distended] : non-distended [No Masses] : no abdominal mass palpated [No HSM] : no HSM [Normal Bowel Sounds] : normal bowel sounds [Normal Posterior Cervical Nodes] : no posterior cervical lymphadenopathy [Normal Anterior Cervical Nodes] : no anterior cervical lymphadenopathy [No CVA Tenderness] : no CVA  tenderness [No Spinal Tenderness] : no spinal tenderness [No Joint Swelling] : no joint swelling [Grossly Normal Strength/Tone] : grossly normal strength/tone [No Rash] : no rash [Coordination Grossly Intact] : coordination grossly intact [No Focal Deficits] : no focal deficits [Normal Gait] : normal gait [Deep Tendon Reflexes (DTR)] : deep tendon reflexes were 2+ and symmetric [Normal Affect] : the affect was normal [Normal Insight/Judgement] : insight and judgment were intact [de-identified] : injected posterior pharynx. NOted tiny vesicles noted on uvula  [de-identified] : left axillary lymphadenopathy and tenderness

## 2022-01-07 NOTE — H&P ADULT - NSTOBACCOSCREENHP_GEN_A_NCS
UPDATED HISTORY AND PHYSICAL        2022  Subjective:       Sailaja Edward is a 26 y.o.  female with IUP at 38w1d weeks gestation who c/o contractions.  Patient seen @ [facility] , never seen here before   Per patient , Hx of genital Herpes , has been on Valtrex on/off thus pregnancy ,  No lesions  Since early pregnancy ./ Also GBBS positive   Rh negative status , per patient Rhogam was given     Otherwise  Normal prenatal care, normal ultrasounds    Contractions have been occuring Q5 min and have increased in intensity.      Patient reports contractions, denies vaginal bleeding, denies LOF.   Fetal Movement: normal.     PMHx:   Past Medical History:   Diagnosis Date    Bacterial vaginosis 2021    GBS (group B Streptococcus carrier), +RV culture, currently pregnant 2021    Herpes simplex virus (HSV) infection     Vaginal yeast infection 2021       PSHx:   Past Surgical History:   Procedure Laterality Date    NO PAST SURGERIES         All:   Review of patient's allergies indicates:   Allergen Reactions    Amoxicillin        Meds:   Medications Prior to Admission   Medication Sig Dispense Refill Last Dose    prenatal vit no.124/iron/folic (PRENATAL VITAMIN ORAL) Take by mouth.   2022 at Unknown time    famotidine (PEPCID) 20 MG tablet Take 1 tablet (20 mg total) by mouth 2 (two) times daily. 40 tablet 0     naproxen sodium (ANAPROX) 550 MG tablet Take 1 tablet (550 mg total) by mouth 2 (two) times daily with meals. 20 tablet 0     valACYclovir (VALTREX) 1000 MG tablet Take 1 tablet (1,000 mg total) by mouth 3 (three) times daily. for 7 days 21 tablet 0        SH:   Social History     Socioeconomic History    Marital status: Single   Tobacco Use    Smoking status: Never Smoker    Smokeless tobacco: Never Used   Substance and Sexual Activity    Alcohol use: No    Drug use: Yes    Sexual activity: Yes     Partners: Male     Birth control/protection: None, Condom       FH:  "No family history on file.    OBHx:   OB History    Para Term  AB Living   2 1 0 1 0 1   SAB IAB Ectopic Multiple Live Births   0 0 0 0 1      # Outcome Date GA Lbr Gibson/2nd Weight Sex Delivery Anes PTL Lv   2 Current            1  14 35w1d 06:33 / 01:44 3.148 kg (6 lb 15 oz) F Vag-Spont EPI, Spinal Y FEI      Apgar1: 9  Apgar5: 9       Review of Systems: Non contributory      Objective:       /72   Pulse 100   Temp 99.2 °F (37.3 °C) (Oral)   Resp 18   Ht 5' 7" (1.702 m)   Wt 105.2 kg (232 lb)   SpO2 98%   Breastfeeding No   BMI 36.34 kg/m²     Vitals:    22 0700 22 0720 22 0735 22 0750   BP: 114/67 119/74 115/70 119/72   Pulse: 97 106 102 100   Resp:       Temp:       TempSrc:       SpO2: 97% 98% 98% 98%   Weight:       Height:           General:   alert, appears stated age and cooperative   Lungs:   clear to auscultation bilaterally   Heart:   regular rate and rhythm, S1, S2 normal, no murmur, click, rub or gallop   Abdomen:  soft, non-tender; bowel sounds normal; no masses,  no organomegaly   Extremities negative edema, negative erythema   FHT: 155 Cat 1 (reassuring)                 TOCO: Q 5 minutes   Presentations: cephalic by ultrasound   Cervix:     Dilation: 4    Effacement: 50%    Station:  -3    Consistency: medium    Position: middle         Lab Review  Blood Type B NEG  Antibody + Rhogam        Assessment:       38w1d weeks gestation.  Labor   GBS positive   Hx Genital Herpes no lesions   Rh negative status       Patient Active Problem List   Diagnosis    Supervision of normal first pregnancy          Plan:      Risks, benefits, alternatives and possible complications have been discussed in detail with the patient.   - Consents signed and to chart  - Admit to Labor and Delivery unit  - Allergic to PCN will start CLinda for GBS prophylaxis   - Epidural per Anesthesia  - Draw CBC, T&S  - Recheck in 2 hrs or PRN            Oscar Oconnor" M.D.   OB/GYN    1/7/2022     No

## 2022-01-11 ENCOUNTER — APPOINTMENT (OUTPATIENT)
Dept: OBGYN | Facility: CLINIC | Age: 42
End: 2022-01-11

## 2022-04-02 ENCOUNTER — EMERGENCY (EMERGENCY)
Facility: HOSPITAL | Age: 42
LOS: 1 days | Discharge: HOME | End: 2022-04-02
Attending: STUDENT IN AN ORGANIZED HEALTH CARE EDUCATION/TRAINING PROGRAM | Admitting: STUDENT IN AN ORGANIZED HEALTH CARE EDUCATION/TRAINING PROGRAM
Payer: MEDICAID

## 2022-04-02 VITALS
RESPIRATION RATE: 16 BRPM | OXYGEN SATURATION: 100 % | TEMPERATURE: 97 F | HEART RATE: 89 BPM | DIASTOLIC BLOOD PRESSURE: 79 MMHG | SYSTOLIC BLOOD PRESSURE: 133 MMHG | HEIGHT: 65 IN

## 2022-04-02 DIAGNOSIS — Z98.891 HISTORY OF UTERINE SCAR FROM PREVIOUS SURGERY: Chronic | ICD-10-CM

## 2022-04-02 DIAGNOSIS — Z90.89 ACQUIRED ABSENCE OF OTHER ORGANS: Chronic | ICD-10-CM

## 2022-04-02 DIAGNOSIS — O03.9 COMPLETE OR UNSPECIFIED SPONTANEOUS ABORTION WITHOUT COMPLICATION: Chronic | ICD-10-CM

## 2022-04-02 PROCEDURE — 99283 EMERGENCY DEPT VISIT LOW MDM: CPT

## 2022-04-02 RX ORDER — CEPHALEXIN 500 MG
500 CAPSULE ORAL ONCE
Refills: 0 | Status: COMPLETED | OUTPATIENT
Start: 2022-04-02 | End: 2022-04-02

## 2022-04-02 RX ORDER — CEPHALEXIN 500 MG
1 CAPSULE ORAL
Qty: 15 | Refills: 0
Start: 2022-04-02 | End: 2022-04-06

## 2022-04-02 RX ORDER — ACETAMINOPHEN 500 MG
650 TABLET ORAL ONCE
Refills: 0 | Status: COMPLETED | OUTPATIENT
Start: 2022-04-02 | End: 2022-04-02

## 2022-04-02 RX ADMIN — Medication 650 MILLIGRAM(S): at 19:51

## 2022-04-02 RX ADMIN — Medication 500 MILLIGRAM(S): at 19:51

## 2022-04-02 NOTE — ED PROVIDER NOTE - PHYSICAL EXAMINATION
Gen: Awake, Alert, WD, WN, NAD  Head:  NC/AT  Eyes:  PERRL, EOMI, Conjunctiva pink, lids normal, no scleral icterus  ENT: OP clear, no exudates, no erythema, uvula midline, TMs clear bilaterally, moist mucus membranes no tenderness over mastoid   Neck: supple, nontender, no meningismus, no JVD, trachea midline  Cardiac/CV:  S1 S2, RRR, no M/G/R  Respiratory/Pulm:  CTAB, good air movement, normal resp effort, no wheezes/stridor/retractions/rales/rhonchi  Gastrointestinal/Abdomen:  Soft, nontender, nondistended, +BS, no rebound/guarding  Back:  no CVAT, no MLT  Ext:  warm, well perfused, moving all extremities spontaneously, no peripheral edema, distal pulses intact  Skin: small pimple over right posterior ear tender   Neuro:  AAOx3, sensation intact, motor 5/5 x 4 extremities, normal gait, speech clear

## 2022-04-02 NOTE — ED PROVIDER NOTE - CARE PLAN
Principal Discharge DX:	Skin infection  Assessment and plan of treatment:	During your ED visit you were evaluated for pain behind ear. You likely have an early skin infection vs pimple. take keflex 250mg every 8 hours for 5 days. Take tylenol as needed. Follow up with your pmd within 1 week. Return to the ED if you exhibit any new, continued or worsening symptoms.   1

## 2022-04-02 NOTE — ED PROVIDER NOTE - CLINICAL SUMMARY MEDICAL DECISION MAKING FREE TEXT BOX
40 y/o F with PMH complex migraines, lung nodule, asthma p/w pain behind the right ear. pt states she felt a little bump behind her right ear. She states she has  frequent headaches.  pt w/ tenderness over small follicle early abscess vs boil. no fluid that is drainable. will give keflex tylenol follow up with pmd

## 2022-04-02 NOTE — ED PROVIDER NOTE - PATIENT PORTAL LINK FT
(4) walks frequently
You can access the FollowMyHealth Patient Portal offered by [facility] by registering at the following website: [email or web address]. By joining [employer]’s FollowMyHealth portal, you will also be able to view your health information using other applications (apps) compatible with our system.

## 2022-04-02 NOTE — ED PROVIDER NOTE - NSFOLLOWUPINSTRUCTIONS_ED_ALL_ED_FT
During your ED visit you were evaluated for pain behind ear. You likely have an early skin infection vs pimple. take keflex 250mg every 8 hours for 5 days. Take tylenol as needed. Follow up with your pmd within 1 week. Return to the ED if you exhibit any new, continued or worsening symptoms.

## 2022-04-02 NOTE — ED PROVIDER NOTE - NS ED ROS FT
denies fever, chills, chest pain, SOB, abdominal pain, diarrhea, dysuria, syncope, bleeding, new rash,weakness, numbness, blurred vision  + pain over right ear   ROS  otherwise negative as per HPI

## 2022-04-02 NOTE — ED PROVIDER NOTE - NSICDXPASTMEDICALHX_GEN_ALL_CORE_FT
PAST MEDICAL HISTORY:  Acute congestive heart failure, unspecified congestive heart failure type during pregnancy     Asthma mild, intermittent    Asthma     Encounter for elective termination of pregnancy     Gestational diabetes mellitus (GDM), antepartum, gestational diabetes method of control unspecified     Migraine     Missed  2015    Mitral valve regurgitation noted during pregnancy, reports resolved, states no further issues    Non-rheumatic mitral regurgitation     Preeclampsia in postpartum period

## 2022-07-05 ENCOUNTER — APPOINTMENT (OUTPATIENT)
Dept: FAMILY MEDICINE | Facility: CLINIC | Age: 42
End: 2022-07-05

## 2022-07-05 ENCOUNTER — RESULT REVIEW (OUTPATIENT)
Age: 42
End: 2022-07-05

## 2022-07-05 VITALS
RESPIRATION RATE: 16 BRPM | OXYGEN SATURATION: 98 % | HEIGHT: 61 IN | DIASTOLIC BLOOD PRESSURE: 87 MMHG | BODY MASS INDEX: 29.07 KG/M2 | TEMPERATURE: 98.2 F | HEART RATE: 76 BPM | SYSTOLIC BLOOD PRESSURE: 128 MMHG | WEIGHT: 154 LBS

## 2022-07-05 DIAGNOSIS — J06.9 ACUTE UPPER RESPIRATORY INFECTION, UNSPECIFIED: ICD-10-CM

## 2022-07-05 PROCEDURE — 99214 OFFICE O/P EST MOD 30 MIN: CPT

## 2022-07-05 NOTE — ASSESSMENT
[FreeTextEntry1] : Asthma exacerbation due to URI.  No wheezing noted on physical exam.  Encourage patient to restart nebulizer treatment every 6 hours.  Respiratory viral panel.  Fluids and rest also recommended.\par Symptoms worsen, will treat with Medrol dose pack and possible antibiotic therapy if respiratory viral panel is negative\par \par Multiple joint pains, appears to be arthritic in nature.  Will further assess with x-rays.\par \par Planter fasciitis- discussed stretching techniques.  NSAIDs.  Wear supportive footwear. if symptoms persist patient will need to follow-up with podiatry and may need orthotics

## 2022-07-05 NOTE — PHYSICAL EXAM
[No Acute Distress] : no acute distress [Well Nourished] : well nourished [Well Developed] : well developed [Well-Appearing] : well-appearing [Normal Sclera/Conjunctiva] : normal sclera/conjunctiva [PERRL] : pupils equal round and reactive to light [EOMI] : extraocular movements intact [Normal Outer Ear/Nose] : the outer ears and nose were normal in appearance [Normal Oropharynx] : the oropharynx was normal [No JVD] : no jugular venous distention [No Lymphadenopathy] : no lymphadenopathy [Supple] : supple [Thyroid Normal, No Nodules] : the thyroid was normal and there were no nodules present [No Respiratory Distress] : no respiratory distress  [No Accessory Muscle Use] : no accessory muscle use [Clear to Auscultation] : lungs were clear to auscultation bilaterally [Normal Rate] : normal rate  [Regular Rhythm] : with a regular rhythm [Normal S1, S2] : normal S1 and S2 [No Murmur] : no murmur heard [No Carotid Bruits] : no carotid bruits [No Abdominal Bruit] : a ~M bruit was not heard ~T in the abdomen [No Varicosities] : no varicosities [Pedal Pulses Present] : the pedal pulses are present [No Edema] : there was no peripheral edema [No Palpable Aorta] : no palpable aorta [No Extremity Clubbing/Cyanosis] : no extremity clubbing/cyanosis [Soft] : abdomen soft [Non Tender] : non-tender [Non-distended] : non-distended [No Masses] : no abdominal mass palpated [No HSM] : no HSM [Normal Bowel Sounds] : normal bowel sounds [Normal Posterior Cervical Nodes] : no posterior cervical lymphadenopathy [Normal Anterior Cervical Nodes] : no anterior cervical lymphadenopathy [No CVA Tenderness] : no CVA  tenderness [No Spinal Tenderness] : no spinal tenderness [No Joint Swelling] : no joint swelling [Grossly Normal Strength/Tone] : grossly normal strength/tone [No Rash] : no rash [Coordination Grossly Intact] : coordination grossly intact [No Focal Deficits] : no focal deficits [Normal Gait] : normal gait [Deep Tendon Reflexes (DTR)] : deep tendon reflexes were 2+ and symmetric [Normal Affect] : the affect was normal [Normal Insight/Judgement] : insight and judgment were intact [de-identified] : Injected posterior [de-identified] : Clear breath sounds bilaterally [de-identified] : Minimal tenderness noted over cervical spine, and bilateral shoulders.  Negative empty can, painful arc , Pruitt , and Neer's exam of bilateral shoulders.  Significant crepitus noted in both shoulders.  Mild tenderness noted in proximal aspect of bilateral heels

## 2022-07-05 NOTE — REVIEW OF SYSTEMS
[Fatigue] : no fatigue [Sore Throat] : sore throat [Cough] : cough [Joint Pain] : joint pain [Muscle Pain] : muscle pain [Negative] : Heme/Lymph

## 2022-07-05 NOTE — HISTORY OF PRESENT ILLNESS
[FreeTextEntry8] : Acute throat pain, dry cough, difficulty breathing, chest tightness that has been worsening for 2 days.  Sick contact-family member with similar symptoms.\par Denies wheezing\par NO fever. \par Tested self with home COVID test this morning which was negative \par Unfortunately she ran out of albuterol solution.\par Does suffer from asthma.\par \par \par Also notes persistent neck pain, and bilateral shoulder pain that radiates down both arms.  No trauma reported.  Pain noted to be chronic, and she was told by  that her joints click.\par \par For 1 month she has been suffering from left heel pain that is worse in the morning.  Slightly improved with ambulating.  Endorses wearing flip-flops occasionally. \par \par

## 2022-07-06 ENCOUNTER — OUTPATIENT (OUTPATIENT)
Dept: OUTPATIENT SERVICES | Facility: HOSPITAL | Age: 42
LOS: 1 days | End: 2022-07-06
Payer: MEDICARE

## 2022-07-06 ENCOUNTER — LABORATORY RESULT (OUTPATIENT)
Age: 42
End: 2022-07-06

## 2022-07-06 ENCOUNTER — RESULT REVIEW (OUTPATIENT)
Age: 42
End: 2022-07-06

## 2022-07-06 ENCOUNTER — APPOINTMENT (OUTPATIENT)
Dept: RADIOLOGY | Facility: HOSPITAL | Age: 42
End: 2022-07-06

## 2022-07-06 DIAGNOSIS — O03.9 COMPLETE OR UNSPECIFIED SPONTANEOUS ABORTION WITHOUT COMPLICATION: Chronic | ICD-10-CM

## 2022-07-06 DIAGNOSIS — M25.511 PAIN IN RIGHT SHOULDER: ICD-10-CM

## 2022-07-06 DIAGNOSIS — Z90.89 ACQUIRED ABSENCE OF OTHER ORGANS: Chronic | ICD-10-CM

## 2022-07-06 DIAGNOSIS — Z98.891 HISTORY OF UTERINE SCAR FROM PREVIOUS SURGERY: Chronic | ICD-10-CM

## 2022-07-06 PROCEDURE — 73030 X-RAY EXAM OF SHOULDER: CPT

## 2022-07-06 PROCEDURE — 73030 X-RAY EXAM OF SHOULDER: CPT | Mod: 26,LT,76

## 2022-07-11 ENCOUNTER — APPOINTMENT (OUTPATIENT)
Dept: FAMILY MEDICINE | Facility: CLINIC | Age: 42
End: 2022-07-11

## 2022-07-11 VITALS
BODY MASS INDEX: 28.89 KG/M2 | DIASTOLIC BLOOD PRESSURE: 80 MMHG | WEIGHT: 153 LBS | RESPIRATION RATE: 16 BRPM | HEIGHT: 61 IN | OXYGEN SATURATION: 98 % | TEMPERATURE: 97.7 F | SYSTOLIC BLOOD PRESSURE: 122 MMHG | HEART RATE: 79 BPM

## 2022-07-11 DIAGNOSIS — J45.901 UNSPECIFIED ASTHMA WITH (ACUTE) EXACERBATION: ICD-10-CM

## 2022-07-11 LAB
A ALTERNATA IGE QN: 0.78 KUA/L
A FUMIGATUS IGE QN: <0.1 KUA/L
BERMUDA GRASS IGE QN: <0.1 KUA/L
BOXELDER IGE QN: <0.1 KUA/L
C HERBARUM IGE QN: <0.1 KUA/L
CALIF WALNUT IGE QN: <0.1 KUA/L
CAT DANDER IGE QN: <0.1 KUA/L
CMN PIGWEED IGE QN: <0.1 KUA/L
COMMON RAGWEED IGE QN: <0.1 KUA/L
COTTONWOOD IGE QN: <0.1 KUA/L
D FARINAE IGE QN: <0.1 KUA/L
D PTERONYSS IGE QN: <0.1 KUA/L
DEPRECATED A ALTERNATA IGE RAST QL: 2
DEPRECATED A FUMIGATUS IGE RAST QL: 0
DEPRECATED BERMUDA GRASS IGE RAST QL: 0
DEPRECATED BOXELDER IGE RAST QL: 0
DEPRECATED C HERBARUM IGE RAST QL: 0
DEPRECATED CAT DANDER IGE RAST QL: 0
DEPRECATED COMMON PIGWEED IGE RAST QL: 0
DEPRECATED COMMON RAGWEED IGE RAST QL: 0
DEPRECATED COTTONWOOD IGE RAST QL: 0
DEPRECATED D FARINAE IGE RAST QL: 0
DEPRECATED D PTERONYSS IGE RAST QL: 0
DEPRECATED DOG DANDER IGE RAST QL: 0
DEPRECATED GOOSEFOOT IGE RAST QL: 0
DEPRECATED LONDON PLANE IGE RAST QL: 0
DEPRECATED MOUSE URINE PROT IGE RAST QL: 0
DEPRECATED MUGWORT IGE RAST QL: 0
DEPRECATED P NOTATUM IGE RAST QL: 0
DEPRECATED RED CEDAR IGE RAST QL: 0
DEPRECATED ROACH IGE RAST QL: 0
DEPRECATED SHEEP SORREL IGE RAST QL: 0
DEPRECATED SILVER BIRCH IGE RAST QL: 0
DEPRECATED TIMOTHY IGE RAST QL: 0
DEPRECATED WHITE ASH IGE RAST QL: 0
DEPRECATED WHITE OAK IGE RAST QL: 0
DOG DANDER IGE QN: <0.1 KUA/L
GOOSEFOOT IGE QN: <0.1 KUA/L
LONDON PLANE IGE QN: <0.1 KUA/L
MOUSE URINE PROT IGE QN: <0.1 KUA/L
MUGWORT IGE QN: <0.1 KUA/L
MULBERRY (T70) CLASS: 0
MULBERRY (T70) CONC: <0.1 KUA/L
P NOTATUM IGE QN: <0.1 KUA/L
RED CEDAR IGE QN: <0.1 KUA/L
ROACH IGE QN: <0.1 KUA/L
SHEEP SORREL IGE QN: <0.1 KUA/L
SILVER BIRCH IGE QN: <0.1 KUA/L
TIMOTHY IGE QN: <0.1 KUA/L
TREE ALLERG MIX1 IGE QL: 0
WHITE ASH IGE QN: <0.1 KUA/L
WHITE ELM IGE QN: 0
WHITE ELM IGE QN: <0.1 KUA/L
WHITE OAK IGE QN: <0.1 KUA/L

## 2022-07-11 PROCEDURE — 99214 OFFICE O/P EST MOD 30 MIN: CPT

## 2022-07-12 LAB
HPIV4 RNA SPEC QL NAA+PROBE: DETECTED
RAPID RVP RESULT: DETECTED
SARS-COV-2 RNA PNL RESP NAA+PROBE: NOT DETECTED

## 2022-07-12 NOTE — PHYSICAL EXAM
[No Acute Distress] : no acute distress [Well Nourished] : well nourished [Well Developed] : well developed [Well-Appearing] : well-appearing [Normal Sclera/Conjunctiva] : normal sclera/conjunctiva [PERRL] : pupils equal round and reactive to light [EOMI] : extraocular movements intact [Normal Outer Ear/Nose] : the outer ears and nose were normal in appearance [Normal Oropharynx] : the oropharynx was normal [No JVD] : no jugular venous distention [No Lymphadenopathy] : no lymphadenopathy [Supple] : supple [Thyroid Normal, No Nodules] : the thyroid was normal and there were no nodules present [No Respiratory Distress] : no respiratory distress  [No Accessory Muscle Use] : no accessory muscle use [Clear to Auscultation] : lungs were clear to auscultation bilaterally [Normal Rate] : normal rate  [Regular Rhythm] : with a regular rhythm [Normal S1, S2] : normal S1 and S2 [No Murmur] : no murmur heard [No Carotid Bruits] : no carotid bruits [No Abdominal Bruit] : a ~M bruit was not heard ~T in the abdomen [No Varicosities] : no varicosities [Pedal Pulses Present] : the pedal pulses are present [No Edema] : there was no peripheral edema [No Palpable Aorta] : no palpable aorta [No Extremity Clubbing/Cyanosis] : no extremity clubbing/cyanosis [Soft] : abdomen soft [Non Tender] : non-tender [Non-distended] : non-distended [No Masses] : no abdominal mass palpated [No HSM] : no HSM [Normal Bowel Sounds] : normal bowel sounds [Normal Posterior Cervical Nodes] : no posterior cervical lymphadenopathy [Normal Anterior Cervical Nodes] : no anterior cervical lymphadenopathy [No CVA Tenderness] : no CVA  tenderness [No Spinal Tenderness] : no spinal tenderness [No Joint Swelling] : no joint swelling [Grossly Normal Strength/Tone] : grossly normal strength/tone [No Rash] : no rash [Coordination Grossly Intact] : coordination grossly intact [Normal Gait] : normal gait [No Focal Deficits] : no focal deficits [Deep Tendon Reflexes (DTR)] : deep tendon reflexes were 2+ and symmetric [Normal Affect] : the affect was normal [Normal Insight/Judgement] : insight and judgment were intact [de-identified] : Injected posterior [de-identified] : Wheezing noted left lower base [de-identified] : Minimal tenderness noted over cervical spine, and bilateral shoulders.  Negative empty can, painful arc , Pruitt , and Neer's exam of bilateral shoulders.  Significant crepitus noted in both shoulders.  Mild tenderness noted in proximal aspect of bilateral heels

## 2022-07-12 NOTE — HISTORY OF PRESENT ILLNESS
[de-identified] : Continues to suffer from dry persistent cough, with associated shortness of breath and wheezing.  Has not responded to nebulizer, or adding of Singulair.

## 2022-07-12 NOTE — ASSESSMENT
[FreeTextEntry1] : Worsening cough, wheezing, shortness of breath. \par RVP sent, has tested negative for COVID multiple times\par Due to worsening symptoms, and notable history of asthma we will treat with doxycycline and Medrol Dosepak\par Symptoms worsen please follow-up in person or report to emergency room

## 2022-08-04 ENCOUNTER — RX RENEWAL (OUTPATIENT)
Age: 42
End: 2022-08-04

## 2022-08-04 ENCOUNTER — APPOINTMENT (OUTPATIENT)
Dept: FAMILY MEDICINE | Facility: CLINIC | Age: 42
End: 2022-08-04

## 2022-08-04 PROCEDURE — 99443: CPT

## 2022-08-04 RX ORDER — BECLOMETHASONE DIPROPIONATE HFA 80 UG/1
80 AEROSOL, METERED RESPIRATORY (INHALATION)
Qty: 10.6 | Refills: 3 | Status: COMPLETED | COMMUNITY
Start: 2019-11-07 | End: 2022-08-04

## 2022-08-05 NOTE — HISTORY OF PRESENT ILLNESS
[FreeTextEntry8] : Telephone visit\par \par Patient tested positive for COVID with home test this morning\par Has been suffering from cough, muscle aches, shortness of breath, wheezing, and fatigue for about 2 days.\par \par Does suffer from significant asthma\par \par Symptoms described as mild to moderate\par \par Requesting paxlovid

## 2022-08-05 NOTE — ASSESSMENT
[FreeTextEntry1] : HIgh risk patient- will start paxlovid. INcrease hydration. Rest.  Quarantine.  Mucinex.  Continue to take nebulizers around-the-clock.  Symptoms worsen please report to ED

## 2022-08-05 NOTE — REVIEW OF SYSTEMS
[Nasal Discharge] : nasal discharge [Sore Throat] : sore throat [Shortness Of Breath] : shortness of breath [Wheezing] : wheezing [Cough] : cough [Negative] : Heme/Lymph

## 2022-08-08 ENCOUNTER — APPOINTMENT (OUTPATIENT)
Dept: FAMILY MEDICINE | Facility: CLINIC | Age: 42
End: 2022-08-08

## 2022-08-08 DIAGNOSIS — R51.9 HEADACHE, UNSPECIFIED: ICD-10-CM

## 2022-08-08 DIAGNOSIS — G43.109 MIGRAINE WITH AURA, NOT INTRACTABLE, W/OUT STATUS MIGRAINOSUS: ICD-10-CM

## 2022-08-08 DIAGNOSIS — G89.29 HEADACHE, UNSPECIFIED: ICD-10-CM

## 2022-08-08 DIAGNOSIS — U07.1 COVID-19: ICD-10-CM

## 2022-08-08 PROCEDURE — 99443: CPT

## 2022-08-08 NOTE — REVIEW OF SYSTEMS
[Fever] : no fever [Chills] : no chills [Fatigue] : fatigue [Vision Problems] : no vision problems [Nasal Discharge] : no nasal discharge [Chest Pain] : no chest pain [Palpitations] : no palpitations [Shortness Of Breath] : no shortness of breath [Wheezing] : no wheezing [Cough] : cough [Abdominal Pain] : no abdominal pain [Nausea] : nausea [Constipation] : no constipation [Diarrhea] : diarrhea [Vomiting] : no vomiting [Dysuria] : no dysuria [Incontinence] : no incontinence [Frequency] : no frequency [Skin Rash] : no skin rash [Headache] : headache [Dizziness] : no dizziness [Fainting] : no fainting [Confusion] : no confusion [Memory Loss] : no memory loss

## 2022-08-08 NOTE — HISTORY OF PRESENT ILLNESS
[Home] : at home, [unfilled] , at the time of the visit. [Verbal consent obtained from patient] : the patient, [unfilled] [FreeTextEntry1] : follow up COVID-19, headaches [de-identified] : Ms Destiny Cobb is a 40 yo female presents today via telephonic visit for follow up. Pt reports positive for COVID-19, treated with paxlovid. Pt reports not tolerating medication, headaches severe, nausea, vomiting. Pt does reports prior hx of migraine headaches. Attempt to treat today with imitrex, however, reports prior hx of on/off angina, mitral valve prolapse, appears contraindication. Advised will treat with Meloxicam for headaches. States in the past was also following up with neurologist, not lately. Advised will need to follow up with new neurologist, referral provided today. Pt also reports taking Paxlovid but missed several doses, since not tolerating medication, advised to now hold off medication. Instructed today, if headaches gets even more severe, nausea, vomiting worsening, visual disturbance, dizziness, chest pain, go to nearest ER for prompt evaluation. Recommended to call back office in a week to follow up.

## 2022-08-14 ENCOUNTER — RX RENEWAL (OUTPATIENT)
Age: 42
End: 2022-08-14

## 2022-08-31 ENCOUNTER — RX RENEWAL (OUTPATIENT)
Age: 42
End: 2022-08-31

## 2022-11-08 ENCOUNTER — RESULT REVIEW (OUTPATIENT)
Age: 42
End: 2022-11-08

## 2022-11-08 ENCOUNTER — APPOINTMENT (OUTPATIENT)
Dept: FAMILY MEDICINE | Facility: CLINIC | Age: 42
End: 2022-11-08

## 2022-11-08 VITALS
RESPIRATION RATE: 16 BRPM | BODY MASS INDEX: 29.5 KG/M2 | WEIGHT: 156.25 LBS | TEMPERATURE: 97.6 F | HEART RATE: 80 BPM | HEIGHT: 61 IN | DIASTOLIC BLOOD PRESSURE: 90 MMHG | OXYGEN SATURATION: 99 % | SYSTOLIC BLOOD PRESSURE: 120 MMHG

## 2022-11-08 DIAGNOSIS — M25.542 PAIN IN JOINTS OF RIGHT HAND: ICD-10-CM

## 2022-11-08 DIAGNOSIS — L65.9 NONSCARRING HAIR LOSS, UNSPECIFIED: ICD-10-CM

## 2022-11-08 DIAGNOSIS — R10.812 LEFT UPPER QUADRANT ABDOMINAL TENDERNESS: ICD-10-CM

## 2022-11-08 DIAGNOSIS — J01.90 ACUTE SINUSITIS, UNSPECIFIED: ICD-10-CM

## 2022-11-08 DIAGNOSIS — M25.541 PAIN IN JOINTS OF RIGHT HAND: ICD-10-CM

## 2022-11-08 DIAGNOSIS — M79.673 PAIN IN UNSPECIFIED FOOT: ICD-10-CM

## 2022-11-08 DIAGNOSIS — M54.9 DORSALGIA, UNSPECIFIED: ICD-10-CM

## 2022-11-08 DIAGNOSIS — G89.29 DORSALGIA, UNSPECIFIED: ICD-10-CM

## 2022-11-08 PROCEDURE — 36415 COLL VENOUS BLD VENIPUNCTURE: CPT

## 2022-11-08 PROCEDURE — 99215 OFFICE O/P EST HI 40 MIN: CPT | Mod: 25

## 2022-11-08 RX ORDER — NORETHINDRONE 0.35 MG/1
0.35 TABLET ORAL DAILY
Qty: 3 | Refills: 3 | Status: COMPLETED | COMMUNITY
Start: 2021-10-12 | End: 2022-11-08

## 2022-11-14 ENCOUNTER — RESULT REVIEW (OUTPATIENT)
Age: 42
End: 2022-11-14

## 2022-11-14 ENCOUNTER — APPOINTMENT (OUTPATIENT)
Dept: CT IMAGING | Facility: HOSPITAL | Age: 42
End: 2022-11-14

## 2022-11-14 ENCOUNTER — APPOINTMENT (OUTPATIENT)
Dept: RADIOLOGY | Facility: HOSPITAL | Age: 42
End: 2022-11-14

## 2022-11-14 ENCOUNTER — OUTPATIENT (OUTPATIENT)
Dept: OUTPATIENT SERVICES | Facility: HOSPITAL | Age: 42
LOS: 1 days | End: 2022-11-14
Payer: MEDICARE

## 2022-11-14 DIAGNOSIS — Z90.89 ACQUIRED ABSENCE OF OTHER ORGANS: Chronic | ICD-10-CM

## 2022-11-14 DIAGNOSIS — Z98.891 HISTORY OF UTERINE SCAR FROM PREVIOUS SURGERY: Chronic | ICD-10-CM

## 2022-11-14 DIAGNOSIS — O03.9 COMPLETE OR UNSPECIFIED SPONTANEOUS ABORTION WITHOUT COMPLICATION: Chronic | ICD-10-CM

## 2022-11-14 DIAGNOSIS — M54.2 CERVICALGIA: ICD-10-CM

## 2022-11-14 PROCEDURE — 74177 CT ABD & PELVIS W/CONTRAST: CPT | Mod: 26

## 2022-11-14 PROCEDURE — 71250 CT THORAX DX C-: CPT | Mod: 26

## 2022-11-14 PROCEDURE — 72050 X-RAY EXAM NECK SPINE 4/5VWS: CPT | Mod: 26

## 2022-11-14 PROCEDURE — 74177 CT ABD & PELVIS W/CONTRAST: CPT

## 2022-11-14 PROCEDURE — 72050 X-RAY EXAM NECK SPINE 4/5VWS: CPT

## 2022-11-14 PROCEDURE — 71250 CT THORAX DX C-: CPT

## 2022-11-17 PROBLEM — M54.9 CHRONIC BACK PAIN: Status: ACTIVE | Noted: 2018-07-17

## 2022-11-17 PROBLEM — M79.673 HEEL PAIN: Status: ACTIVE | Noted: 2022-11-08

## 2022-11-17 PROBLEM — R10.812 LUQ ABDOMINAL TENDERNESS: Status: ACTIVE | Noted: 2021-03-18

## 2022-11-17 PROBLEM — J01.90 ACUTE SINUSITIS: Status: RESOLVED | Noted: 2022-11-08 | Resolved: 2022-12-08

## 2022-11-17 PROBLEM — M25.541 JOINT PAIN IN BOTH HANDS: Status: ACTIVE | Noted: 2022-11-08

## 2022-11-17 PROBLEM — L65.9 ALOPECIA: Status: ACTIVE | Noted: 2022-11-08

## 2022-11-17 NOTE — PHYSICAL EXAM
[No Acute Distress] : no acute distress [Well Nourished] : well nourished [Well Developed] : well developed [Well-Appearing] : well-appearing [Normal Sclera/Conjunctiva] : normal sclera/conjunctiva [PERRL] : pupils equal round and reactive to light [EOMI] : extraocular movements intact [Normal Outer Ear/Nose] : the outer ears and nose were normal in appearance [Normal Oropharynx] : the oropharynx was normal [No JVD] : no jugular venous distention [No Lymphadenopathy] : no lymphadenopathy [Supple] : supple [Thyroid Normal, No Nodules] : the thyroid was normal and there were no nodules present [No Respiratory Distress] : no respiratory distress  [No Accessory Muscle Use] : no accessory muscle use [Clear to Auscultation] : lungs were clear to auscultation bilaterally [Normal Rate] : normal rate  [Regular Rhythm] : with a regular rhythm [Normal S1, S2] : normal S1 and S2 [No Murmur] : no murmur heard [No Carotid Bruits] : no carotid bruits [No Abdominal Bruit] : a ~M bruit was not heard ~T in the abdomen [No Varicosities] : no varicosities [Pedal Pulses Present] : the pedal pulses are present [No Edema] : there was no peripheral edema [No Palpable Aorta] : no palpable aorta [No Extremity Clubbing/Cyanosis] : no extremity clubbing/cyanosis [Soft] : abdomen soft [Non Tender] : non-tender [No Masses] : no abdominal mass palpated [Non-distended] : non-distended [No HSM] : no HSM [Normal Bowel Sounds] : normal bowel sounds [Normal Posterior Cervical Nodes] : no posterior cervical lymphadenopathy [Normal Anterior Cervical Nodes] : no anterior cervical lymphadenopathy [No CVA Tenderness] : no CVA  tenderness [No Spinal Tenderness] : no spinal tenderness [No Joint Swelling] : no joint swelling [Grossly Normal Strength/Tone] : grossly normal strength/tone [No Rash] : no rash [Coordination Grossly Intact] : coordination grossly intact [No Focal Deficits] : no focal deficits [Normal Gait] : normal gait [Deep Tendon Reflexes (DTR)] : deep tendon reflexes were 2+ and symmetric [Normal Affect] : the affect was normal [Normal Insight/Judgement] : insight and judgment were intact [de-identified] : Moderate maxillary sinus tenderness [de-identified] : Mild lower back tenderness [de-identified] : Generalized tenderness of feet and hands [de-identified] : Generalized thinning of hair with prominent middle part noted

## 2022-11-17 NOTE — REVIEW OF SYSTEMS
[Fatigue] : fatigue [Nasal Discharge] : nasal discharge [Sore Throat] : no sore throat [Shortness Of Breath] : no shortness of breath [Wheezing] : no wheezing [Cough] : no cough [Joint Pain] : joint pain [Joint Stiffness] : joint stiffness [Muscle Pain] : muscle pain [Back Pain] : back pain [Hair Changes] : hair changes [Headache] : headache [Negative] : Heme/Lymph

## 2022-11-17 NOTE — HISTORY OF PRESENT ILLNESS
[de-identified] : Persistent bilateral heel pain that has been worsening for months to about a year. \par Pain noted mostly noted in soles, and mostly worse in the mornings. Does improve over the day. \par Generally worse in the morning. \par \par \par Cramps and pain in palms with associated tingling and numbness with generalized weakness. Pain is 5 out of 10 dull aching pain .\par Worse when cooking or exercise at the gym. \par HAs been worsening over the past couple of months. \par \par \par Worsening constant lower back pain that at times radiates down both legs. Has had similar pains when she was pregnant. \par Has attempted physical therapy in the past with moderate relief. She reports pain feels different. \par \par NOted significant hair loss over the past two to three weeks that  has been moderate. \par Had covid in July. NO recent illness or acute stressor noted. \par Scalp pain . \par \par Has been suffering from moderate sinus pressure that has been persistent for couple weeks.  Unrelieved with symptomatic relief

## 2022-11-22 ENCOUNTER — APPOINTMENT (OUTPATIENT)
Dept: FAMILY MEDICINE | Facility: CLINIC | Age: 42
End: 2022-11-22

## 2023-01-26 ENCOUNTER — NON-APPOINTMENT (OUTPATIENT)
Age: 43
End: 2023-01-26

## 2023-02-09 ENCOUNTER — LABORATORY RESULT (OUTPATIENT)
Age: 43
End: 2023-02-09

## 2023-02-09 ENCOUNTER — APPOINTMENT (OUTPATIENT)
Dept: FAMILY MEDICINE | Facility: CLINIC | Age: 43
End: 2023-02-09
Payer: MEDICAID

## 2023-02-09 VITALS
BODY MASS INDEX: 28.51 KG/M2 | HEIGHT: 61 IN | HEART RATE: 73 BPM | WEIGHT: 151 LBS | TEMPERATURE: 97.6 F | SYSTOLIC BLOOD PRESSURE: 120 MMHG | DIASTOLIC BLOOD PRESSURE: 66 MMHG | RESPIRATION RATE: 16 BRPM | OXYGEN SATURATION: 98 %

## 2023-02-09 DIAGNOSIS — R14.0 ABDOMINAL DISTENSION (GASEOUS): ICD-10-CM

## 2023-02-09 DIAGNOSIS — R10.814 LEFT LOWER QUADRANT ABDOMINAL TENDERNESS: ICD-10-CM

## 2023-02-09 DIAGNOSIS — N94.9 UNSPECIFIED CONDITION ASSOCIATED WITH FEMALE GENITAL ORGANS AND MENSTRUAL CYCLE: ICD-10-CM

## 2023-02-09 PROCEDURE — 99214 OFFICE O/P EST MOD 30 MIN: CPT | Mod: 25

## 2023-02-09 RX ORDER — NAPROXEN 500 MG/1
500 TABLET ORAL
Qty: 14 | Refills: 2 | Status: COMPLETED | COMMUNITY
Start: 2021-04-02 | End: 2023-02-09

## 2023-02-09 RX ORDER — MELOXICAM 7.5 MG/1
7.5 TABLET ORAL TWICE DAILY
Qty: 28 | Refills: 1 | Status: COMPLETED | COMMUNITY
Start: 2022-08-08 | End: 2023-02-09

## 2023-02-09 RX ORDER — TIZANIDINE 2 MG/1
2 TABLET ORAL
Qty: 21 | Refills: 3 | Status: COMPLETED | COMMUNITY
Start: 2021-12-22 | End: 2023-02-09

## 2023-02-09 RX ORDER — TIZANIDINE HYDROCHLORIDE 2 MG/1
2 CAPSULE ORAL
Qty: 20 | Refills: 0 | Status: COMPLETED | COMMUNITY
Start: 2021-12-16 | End: 2023-02-09

## 2023-02-09 RX ORDER — NIRMATRELVIR AND RITONAVIR 300-100 MG
20 X 150 MG & KIT ORAL
Qty: 1 | Refills: 0 | Status: COMPLETED | COMMUNITY
Start: 2022-08-04 | End: 2023-02-09

## 2023-02-09 RX ORDER — NITROFURANTOIN MACROCRYSTALS 100 MG/1
100 CAPSULE ORAL
Qty: 14 | Refills: 0 | Status: COMPLETED | COMMUNITY
Start: 2022-01-08 | End: 2023-02-09

## 2023-02-09 RX ORDER — AMOXICILLIN AND CLAVULANATE POTASSIUM 875; 125 MG/1; MG/1
875-125 TABLET, COATED ORAL
Qty: 14 | Refills: 0 | Status: COMPLETED | COMMUNITY
Start: 2022-11-08 | End: 2023-02-09

## 2023-02-09 RX ORDER — NAPROXEN 500 MG/1
500 TABLET ORAL
Qty: 14 | Refills: 0 | Status: COMPLETED | COMMUNITY
Start: 2021-12-16 | End: 2023-02-09

## 2023-02-09 NOTE — REVIEW OF SYSTEMS
[Nasal Discharge] : nasal discharge [Sore Throat] : sore throat [Postnasal Drip] : postnasal drip [Abdominal Pain] : abdominal pain [Negative] : Heme/Lymph

## 2023-02-09 NOTE — HISTORY OF PRESENT ILLNESS
[FreeTextEntry8] : Nasal congestion, sinus pressure, cough, and sore throat that has been worsening for 1 week.  Has now also moved down to lungs and she feels mild chest congestion as well that is worse at night.  Has not taken anything for the symptoms.  Has used albuterol inhaler with moderate relief.  Denies sick contact.  Has tested negative for COVID.\par Also notes left-sided lymphadenopathy that is tender to touch.\par \par \par Chronically has suffered from lower abdominal pains.  Continues to have an intermittent very sharp left lower abdominal pain.  She is unaware what makes pain better or worse.  Previous abdominal/pelvic imaging showed adnexal cyst.  Has followed up with GYN.  Times notes rather frequent abdominal bloating, and nausea that she associates with migraines.\par \par \par Needs follow-up with pulmonologist.  History of 3 pulmonary nodules.  Referral given

## 2023-02-09 NOTE — PHYSICAL EXAM
[No Acute Distress] : no acute distress [Well Nourished] : well nourished [Well Developed] : well developed [Well-Appearing] : well-appearing [Normal Sclera/Conjunctiva] : normal sclera/conjunctiva [PERRL] : pupils equal round and reactive to light [EOMI] : extraocular movements intact [Normal Outer Ear/Nose] : the outer ears and nose were normal in appearance [Normal Oropharynx] : the oropharynx was normal [No JVD] : no jugular venous distention [No Lymphadenopathy] : no lymphadenopathy [Supple] : supple [Thyroid Normal, No Nodules] : the thyroid was normal and there were no nodules present [No Respiratory Distress] : no respiratory distress  [No Accessory Muscle Use] : no accessory muscle use [Clear to Auscultation] : lungs were clear to auscultation bilaterally [Normal Rate] : normal rate  [Regular Rhythm] : with a regular rhythm [Normal S1, S2] : normal S1 and S2 [No Murmur] : no murmur heard [No Carotid Bruits] : no carotid bruits [No Abdominal Bruit] : a ~M bruit was not heard ~T in the abdomen [No Varicosities] : no varicosities [Pedal Pulses Present] : the pedal pulses are present [No Edema] : there was no peripheral edema [No Palpable Aorta] : no palpable aorta [No Extremity Clubbing/Cyanosis] : no extremity clubbing/cyanosis [Soft] : abdomen soft [Non Tender] : non-tender [Non-distended] : non-distended [No Masses] : no abdominal mass palpated [No HSM] : no HSM [Normal Bowel Sounds] : normal bowel sounds [Normal Posterior Cervical Nodes] : no posterior cervical lymphadenopathy [Normal Anterior Cervical Nodes] : no anterior cervical lymphadenopathy [No CVA Tenderness] : no CVA  tenderness [No Spinal Tenderness] : no spinal tenderness [No Joint Swelling] : no joint swelling [Grossly Normal Strength/Tone] : grossly normal strength/tone [No Rash] : no rash [Coordination Grossly Intact] : coordination grossly intact [No Focal Deficits] : no focal deficits [Normal Gait] : normal gait [Deep Tendon Reflexes (DTR)] : deep tendon reflexes were 2+ and symmetric [Normal Affect] : the affect was normal [Normal Insight/Judgement] : insight and judgment were intact [de-identified] : Tender frontal sinuses.  Injected edematous nasal turbinates [de-identified] : Left-sided subclavicular enlarged tender lymph nodes [de-identified] : Grossly clear breath sounds [de-identified] : Generalized tenderness of lower abdomen

## 2023-02-13 LAB
ALBUMIN SERPL ELPH-MCNC: 4.7 G/DL
ALP BLD-CCNC: 61 U/L
ALT SERPL-CCNC: 17 U/L
AMYLASE/CREAT SERPL: 112 U/L
ANION GAP SERPL CALC-SCNC: 13 MMOL/L
APPEARANCE: CLEAR
AST SERPL-CCNC: 15 U/L
BASOPHILS # BLD AUTO: 0.06 K/UL
BASOPHILS NFR BLD AUTO: 0.7 %
BILIRUB SERPL-MCNC: 0.2 MG/DL
BILIRUBIN URINE: NEGATIVE
BLOOD URINE: NEGATIVE
BUN SERPL-MCNC: 8 MG/DL
CALCIUM SERPL-MCNC: 10 MG/DL
CHLORIDE SERPL-SCNC: 104 MMOL/L
CO2 SERPL-SCNC: 21 MMOL/L
COLOR: COLORLESS
CREAT SERPL-MCNC: 0.63 MG/DL
EGFR: 114 ML/MIN/1.73M2
EOSINOPHIL # BLD AUTO: 0.08 K/UL
EOSINOPHIL NFR BLD AUTO: 0.9 %
GLUCOSE QUALITATIVE U: NEGATIVE
GLUCOSE SERPL-MCNC: 94 MG/DL
HCT VFR BLD CALC: 41.2 %
HGB BLD-MCNC: 13.7 G/DL
IMM GRANULOCYTES NFR BLD AUTO: 0.1 %
KETONES URINE: NEGATIVE
LEUKOCYTE ESTERASE URINE: ABNORMAL
LPL SERPL-CCNC: 31 U/L
LYMPHOCYTES # BLD AUTO: 2.88 K/UL
LYMPHOCYTES NFR BLD AUTO: 33.8 %
MAN DIFF?: NORMAL
MCHC RBC-ENTMCNC: 29 PG
MCHC RBC-ENTMCNC: 33.3 GM/DL
MCV RBC AUTO: 87.1 FL
MONOCYTES # BLD AUTO: 0.59 K/UL
MONOCYTES NFR BLD AUTO: 6.9 %
NEUTROPHILS # BLD AUTO: 4.9 K/UL
NEUTROPHILS NFR BLD AUTO: 57.6 %
NITRITE URINE: NEGATIVE
PH URINE: 7.5
PLATELET # BLD AUTO: 342 K/UL
POTASSIUM SERPL-SCNC: 3.9 MMOL/L
PROT SERPL-MCNC: 7.4 G/DL
PROTEIN URINE: NEGATIVE
RBC # BLD: 4.73 M/UL
RBC # FLD: 12 %
SODIUM SERPL-SCNC: 139 MMOL/L
SPECIFIC GRAVITY URINE: 1
UROBILINOGEN URINE: NORMAL
WBC # FLD AUTO: 8.52 K/UL

## 2023-02-17 ENCOUNTER — APPOINTMENT (OUTPATIENT)
Dept: RHEUMATOLOGY | Facility: CLINIC | Age: 43
End: 2023-02-17
Payer: MEDICAID

## 2023-02-17 VITALS
TEMPERATURE: 98.2 F | SYSTOLIC BLOOD PRESSURE: 122 MMHG | HEART RATE: 80 BPM | DIASTOLIC BLOOD PRESSURE: 79 MMHG | WEIGHT: 150 LBS | HEIGHT: 61 IN | OXYGEN SATURATION: 98 % | RESPIRATION RATE: 14 BRPM | BODY MASS INDEX: 28.32 KG/M2

## 2023-02-17 DIAGNOSIS — M54.50 LOW BACK PAIN, UNSPECIFIED: ICD-10-CM

## 2023-02-17 DIAGNOSIS — M54.2 CERVICALGIA: ICD-10-CM

## 2023-02-17 DIAGNOSIS — M79.641 PAIN IN RIGHT HAND: ICD-10-CM

## 2023-02-17 DIAGNOSIS — M79.642 PAIN IN RIGHT HAND: ICD-10-CM

## 2023-02-17 DIAGNOSIS — M79.672 PAIN IN RIGHT FOOT: ICD-10-CM

## 2023-02-17 DIAGNOSIS — M46.1 SACROILIITIS, NOT ELSEWHERE CLASSIFIED: ICD-10-CM

## 2023-02-17 DIAGNOSIS — M79.7 FIBROMYALGIA: ICD-10-CM

## 2023-02-17 DIAGNOSIS — M72.2 PLANTAR FASCIAL FIBROMATOSIS: ICD-10-CM

## 2023-02-17 DIAGNOSIS — M79.671 PAIN IN RIGHT FOOT: ICD-10-CM

## 2023-02-17 DIAGNOSIS — G89.29 LOW BACK PAIN, UNSPECIFIED: ICD-10-CM

## 2023-02-17 PROCEDURE — 99214 OFFICE O/P EST MOD 30 MIN: CPT

## 2023-02-18 PROBLEM — M54.50 LOW BACK PAIN: Status: ACTIVE | Noted: 2021-04-02

## 2023-02-18 PROBLEM — M79.7 FIBROMYALGIA: Status: ACTIVE | Noted: 2023-02-18

## 2023-02-18 NOTE — PHYSICAL EXAM
[General Appearance - Alert] : alert [General Appearance - In No Acute Distress] : in no acute distress [General Appearance - Well Nourished] : well nourished [Sclera] : the sclera and conjunctiva were normal [PERRL With Normal Accommodation] : pupils were equal in size, round, and reactive to light [Extraocular Movements] : extraocular movements were intact [Outer Ear] : the ears and nose were normal in appearance [Oropharynx] : the oropharynx was normal [Neck Appearance] : the appearance of the neck was normal [Thyroid Diffuse Enlargement] : the thyroid was not enlarged [Auscultation Breath Sounds / Voice Sounds] : lungs were clear to auscultation bilaterally [Heart Rate And Rhythm] : heart rate was normal and rhythm regular [Heart Sounds] : normal S1 and S2 [Murmurs] : no murmurs [Edema] : there was no peripheral edema [Bowel Sounds] : normal bowel sounds [Abdomen Soft] : soft [Abdomen Tenderness] : non-tender [No CVA Tenderness] : no ~M costovertebral angle tenderness [No Spinal Tenderness] : no spinal tenderness [Abnormal Walk] : normal gait [Nail Clubbing] : no clubbing  or cyanosis of the fingernails [Musculoskeletal - Swelling] : no joint swelling seen [Motor Tone] : muscle strength and tone were normal [Skin Color & Pigmentation] : normal skin color and pigmentation [] : no rash [Motor Exam] : the motor exam was normal [No Focal Deficits] : no focal deficits [Oriented To Time, Place, And Person] : oriented to person, place, and time [Affect] : the affect was normal [FreeTextEntry1] : Poor memory

## 2023-02-18 NOTE — ASSESSMENT
[FreeTextEntry1] : NEDRA WELSH is a 42 year old woman with --\par \par # suspected FMS - diffuse arthralgias, myalgias, fatigue, poor sleep, soft tissue tender points, skin hypersensitivity which has worsened s/p covid vaccine \par - stopped Cymbalta but can't recall why, discussed other med options but not amenable to meds at present\par - c/w gym sessions as helps her\par - c/w tylenol prn \par - Reviewed stretching, light aerobic exercises, massage, heat as adjuncts for FMS pain. Patient verbalized understanding. \par \par # Ongoing spinal pain, now both neck and low back, prior imaging showing "sacroiliitis but stable," and now more symmetrical hand and foot/ankle sx -- ?inflammatory arthritis \par - XR hands, feet/ankles\par - MRI pelvis to better evaluate sacroiliitis interval change and ?MRI activity \par - PT referral \par \par RTC in 3 months

## 2023-02-18 NOTE — HISTORY OF PRESENT ILLNESS
[FreeTextEntry1] : NEDRA WELSH is a 40 year old woman who presents with progressive arthralgias and myalgias diffusely, no localizing areas but does have plantar and palmar fascial involvement, since Covid vaccine in April 2021. No improvement with Tylenol, exercising does partially help. Some occasional cramping. Chronic low back pain, intermittent but no fixed loss of ROM. Occasional neck pain, no radicular sx. + fatigue, skin sensitivity, mood disorder but feels this is stable. \par \par Inflammatory arthritis ROS negative for symmetrical peripheral joint synovitis, prolonged AM stiffness, enthesitis, dactylitis, psoriasis/ rashes, eye inflammation, IBD. \par \par SLE ROS negative for alopecia, sicca, salivary gland swelling, oral ulcers, malar rash, photosensitivity, SOB, chest pain, serositis, abd pain, dysuria, hematuria, rash, joint AM stiffness/synovitis, hematologic abnormalities, Raynauds. APLS ROS - 1 pregnancy with preeclampsia/CHF, 1 2nd trimester miscarriage, no thrombotic events. \par \par + sacroiliitis on LS XRay, but unchanged from prior in 2018. \par Labs - Negative - HLA B27, NICOLAS, RF, ESR/CRP, STD screen, Lyme\par FH - no autoimmune d/o \par \par ---------\par 2/17/23 -- Referred back for ongoing arthralgias diffusely - worst is b/l plantar fasciitis, low back without radicular sx, b/l hands without swelling, sometimes numb. Using prn Tylenol, off NSAIDs as previously was overusing and advised to stop. Inflammatory arthritis ROS negative for symmetrical peripheral joint synovitis, prolonged AM stiffness, enthesitis, dactylitis, psoriasis/ rashes, eye inflammation, IBD.

## 2023-02-18 NOTE — REVIEW OF SYSTEMS
[Feeling Poorly] : feeling poorly [Feeling Tired] : feeling tired [Arthralgias] : arthralgias [Joint Pain] : joint pain [Sleep Disturbances] : sleep disturbances [Negative] : Heme/Lymph [Joint Swelling] : no joint swelling [Joint Stiffness] : no joint stiffness [As Noted in HPI] : as noted in HPI [Suicidal] : not suicidal [de-identified] : poor memory chronically, pt attributes to migraines

## 2023-02-27 ENCOUNTER — APPOINTMENT (OUTPATIENT)
Dept: MRI IMAGING | Facility: HOSPITAL | Age: 43
End: 2023-02-27
Payer: MEDICAID

## 2023-02-27 ENCOUNTER — OUTPATIENT (OUTPATIENT)
Dept: OUTPATIENT SERVICES | Facility: HOSPITAL | Age: 43
LOS: 1 days | End: 2023-02-27
Payer: MEDICARE

## 2023-02-27 DIAGNOSIS — Z98.891 HISTORY OF UTERINE SCAR FROM PREVIOUS SURGERY: Chronic | ICD-10-CM

## 2023-02-27 DIAGNOSIS — Z90.89 ACQUIRED ABSENCE OF OTHER ORGANS: Chronic | ICD-10-CM

## 2023-02-27 DIAGNOSIS — M46.1 SACROILIITIS, NOT ELSEWHERE CLASSIFIED: ICD-10-CM

## 2023-02-27 DIAGNOSIS — O03.9 COMPLETE OR UNSPECIFIED SPONTANEOUS ABORTION WITHOUT COMPLICATION: Chronic | ICD-10-CM

## 2023-02-27 PROCEDURE — 72195 MRI PELVIS W/O DYE: CPT

## 2023-02-27 PROCEDURE — 72195 MRI PELVIS W/O DYE: CPT | Mod: 26

## 2023-03-29 ENCOUNTER — APPOINTMENT (OUTPATIENT)
Dept: OBGYN | Facility: CLINIC | Age: 43
End: 2023-03-29
Payer: MEDICAID

## 2023-03-29 VITALS
BODY MASS INDEX: 28.32 KG/M2 | HEIGHT: 61 IN | TEMPERATURE: 97.9 F | HEART RATE: 80 BPM | DIASTOLIC BLOOD PRESSURE: 62 MMHG | SYSTOLIC BLOOD PRESSURE: 120 MMHG | OXYGEN SATURATION: 99 % | WEIGHT: 150 LBS

## 2023-03-29 DIAGNOSIS — R92.2 INCONCLUSIVE MAMMOGRAM: ICD-10-CM

## 2023-03-29 DIAGNOSIS — Z01.419 ENCOUNTER FOR GYNECOLOGICAL EXAMINATION (GENERAL) (ROUTINE) W/OUT ABNORMAL FINDINGS: ICD-10-CM

## 2023-03-29 DIAGNOSIS — N94.6 DYSMENORRHEA, UNSPECIFIED: ICD-10-CM

## 2023-03-29 DIAGNOSIS — N76.0 ACUTE VAGINITIS: ICD-10-CM

## 2023-03-29 DIAGNOSIS — R10.2 PELVIC AND PERINEAL PAIN: ICD-10-CM

## 2023-03-29 LAB
HCG UR QL: NEGATIVE
QUALITY CONTROL: YES

## 2023-03-29 PROCEDURE — 81025 URINE PREGNANCY TEST: CPT

## 2023-03-29 PROCEDURE — 99396 PREV VISIT EST AGE 40-64: CPT

## 2023-03-29 NOTE — PHYSICAL EXAM
[Appropriately responsive] : appropriately responsive [Alert] : alert [No Acute Distress] : no acute distress [No Lymphadenopathy] : no lymphadenopathy [Soft] : soft [Non-tender] : non-tender [Non-distended] : non-distended [No HSM] : No HSM [No Lesions] : no lesions [No Mass] : no mass [Oriented x3] : oriented x3 [Examination Of The Breasts] : a normal appearance [No Masses] : no breast masses were palpable [Labia Majora] : normal [Labia Minora] : normal [Normal] : normal [Adnexa Tenderness On The Left] : tender

## 2023-03-29 NOTE — PLAN
[FreeTextEntry1] : \par \par I spent the time noted on the day of this patient encounter preparing for, providing and documenting the above service. I have  counseled and educated the patient on the differential, workup, disease course, and treatment/management plan. Education was provided to the patient during this encounter. All questions and concerns were answered and addressed in detail.\par \par Estela Boyle MD\par

## 2023-03-29 NOTE — HISTORY OF PRESENT ILLNESS
[FreeTextEntry1] : Pt is a 42 year old presents today for well woman exam. Pt is complaining of pelvic pain ( scar) and dysmenorrhea. \par \par LMP: 3/12/2023\par POB: VD x 1, CS x 1, SAB x 1\par PGYN: dysmenorrhea, denies pelvic infection, NL paps \par PMH: migraines- told by neuro that cant have JAYCEE\par PSH: CS\par Med: please see MAR \par All: shellfish \par SH: denies\par Mammo: due \par

## 2023-03-29 NOTE — COUNSELING
[Breast Self Exam] : breast self exam [Confidentiality] : confidentiality [STD (testing, results, tx)] : STD (testing, results, tx) [Lab Results] : lab results

## 2023-03-30 ENCOUNTER — NON-APPOINTMENT (OUTPATIENT)
Age: 43
End: 2023-03-30

## 2023-03-30 LAB
C TRACH RRNA SPEC QL NAA+PROBE: NOT DETECTED
HPV HIGH+LOW RISK DNA PNL CVX: NOT DETECTED
N GONORRHOEA RRNA SPEC QL NAA+PROBE: NOT DETECTED
SOURCE AMPLIFICATION: NORMAL

## 2023-03-31 LAB
CANDIDA VAG CYTO: NOT DETECTED
G VAGINALIS+PREV SP MTYP VAG QL MICRO: NOT DETECTED
T VAGINALIS VAG QL WET PREP: NOT DETECTED

## 2023-04-03 LAB — CYTOLOGY CVX/VAG DOC THIN PREP: NORMAL

## 2023-05-19 ENCOUNTER — APPOINTMENT (OUTPATIENT)
Dept: RHEUMATOLOGY | Facility: CLINIC | Age: 43
End: 2023-05-19

## 2023-05-23 ENCOUNTER — NON-APPOINTMENT (OUTPATIENT)
Age: 43
End: 2023-05-23

## 2023-05-23 ENCOUNTER — APPOINTMENT (OUTPATIENT)
Dept: FAMILY MEDICINE | Facility: CLINIC | Age: 43
End: 2023-05-23
Payer: MEDICAID

## 2023-05-23 VITALS
DIASTOLIC BLOOD PRESSURE: 74 MMHG | WEIGHT: 152 LBS | HEIGHT: 61 IN | OXYGEN SATURATION: 98 % | HEART RATE: 86 BPM | TEMPERATURE: 98.3 F | BODY MASS INDEX: 28.7 KG/M2 | SYSTOLIC BLOOD PRESSURE: 117 MMHG | RESPIRATION RATE: 16 BRPM

## 2023-05-23 DIAGNOSIS — M25.50 PAIN IN UNSPECIFIED JOINT: ICD-10-CM

## 2023-05-23 DIAGNOSIS — G43.909 MIGRAINE, UNSPECIFIED, NOT INTRACTABLE, W/OUT STATUS MIGRAINOSUS: ICD-10-CM

## 2023-05-23 DIAGNOSIS — R41.3 OTHER AMNESIA: ICD-10-CM

## 2023-05-23 DIAGNOSIS — I34.0 NONRHEUMATIC MITRAL (VALVE) INSUFFICIENCY: ICD-10-CM

## 2023-05-23 PROCEDURE — 99396 PREV VISIT EST AGE 40-64: CPT | Mod: 25

## 2023-05-23 PROCEDURE — 36415 COLL VENOUS BLD VENIPUNCTURE: CPT

## 2023-05-23 PROCEDURE — 93000 ELECTROCARDIOGRAM COMPLETE: CPT

## 2023-05-23 RX ORDER — DOXYCYCLINE HYCLATE 100 MG/1
100 TABLET ORAL
Qty: 14 | Refills: 0 | Status: COMPLETED | COMMUNITY
Start: 2022-07-11 | End: 2023-05-23

## 2023-05-24 ENCOUNTER — APPOINTMENT (OUTPATIENT)
Dept: GASTROENTEROLOGY | Facility: CLINIC | Age: 43
End: 2023-05-24

## 2023-05-24 LAB
ALBUMIN SERPL ELPH-MCNC: 4.4 G/DL
ALP BLD-CCNC: 47 U/L
ALT SERPL-CCNC: 17 U/L
ANION GAP SERPL CALC-SCNC: 14 MMOL/L
APPEARANCE: CLEAR
AST SERPL-CCNC: 17 U/L
BACTERIA: NEGATIVE /HPF
BILIRUB SERPL-MCNC: <0.2 MG/DL
BILIRUBIN URINE: NEGATIVE
BLOOD URINE: NEGATIVE
BUN SERPL-MCNC: 7 MG/DL
CALCIUM SERPL-MCNC: 9.9 MG/DL
CAST: 0 /LPF
CHLORIDE SERPL-SCNC: 104 MMOL/L
CHOLEST SERPL-MCNC: 187 MG/DL
CO2 SERPL-SCNC: 21 MMOL/L
COLOR: YELLOW
CREAT SERPL-MCNC: 0.59 MG/DL
CRP SERPL-MCNC: <3 MG/L
EGFR: 116 ML/MIN/1.73M2
EPITHELIAL CELLS: 0 /HPF
ERYTHROCYTE [SEDIMENTATION RATE] IN BLOOD BY WESTERGREN METHOD: 8 MM/HR
ESTIMATED AVERAGE GLUCOSE: 114 MG/DL
GLUCOSE QUALITATIVE U: NEGATIVE MG/DL
GLUCOSE SERPL-MCNC: 96 MG/DL
HBA1C MFR BLD HPLC: 5.6 %
HCG SERPL-MCNC: 2313 MIU/ML
HDLC SERPL-MCNC: 56 MG/DL
KETONES URINE: NEGATIVE MG/DL
LDLC SERPL CALC-MCNC: 117 MG/DL
LEUKOCYTE ESTERASE URINE: NEGATIVE
MICROSCOPIC-UA: NORMAL
NITRITE URINE: NEGATIVE
NONHDLC SERPL-MCNC: 131 MG/DL
PH URINE: 7
POTASSIUM SERPL-SCNC: 4.3 MMOL/L
PROT SERPL-MCNC: 7.1 G/DL
PROTEIN URINE: NEGATIVE MG/DL
RED BLOOD CELLS URINE: 0 /HPF
SODIUM SERPL-SCNC: 138 MMOL/L
SPECIFIC GRAVITY URINE: 1.01
T4 FREE SERPL-MCNC: 1.3 NG/DL
TRIGL SERPL-MCNC: 70 MG/DL
TSH SERPL-ACNC: 1.04 UIU/ML
UROBILINOGEN URINE: 0.2 MG/DL
WHITE BLOOD CELLS URINE: 0 /HPF

## 2023-05-24 NOTE — HEALTH RISK ASSESSMENT
[Fair] : ~his/her~ current health as fair  [Good] : ~his/her~  mood as  good [No] : In the past 12 months have you used drugs other than those required for medical reasons? No [No falls in past year] : Patient reported no falls in the past year [0] : 2) Feeling down, depressed, or hopeless: Not at all (0) [Not at All (0)] : 9.) Thoughts that you would be off dead or of hurting yourself in some way? Not at all [Patient reported PAP Smear was normal] : Patient reported PAP Smear was normal [HIV test declined] : HIV test declined [Hepatitis C test declined] : Hepatitis C test declined [With Family] : lives with family [Employed] : employed [College] : College [Single] : single [Sexually Active] : sexually active [Name: ___] : Health Care Proxy's Name: [unfilled]  [Relationship: ___] : Relationship: [unfilled] [Former] : Former [0-4] : 0-4 [> 15 Years] : > 15 Years [FreeTextEntry1] : Healthy baby  [PHQ-2 Negative - No further assessment needed] : PHQ-2 Negative - No further assessment needed [PHQ-9 Negative - No further assessment needed] : PHQ-9 Negative - No further assessment needed [de-identified] : NOne  [de-identified] : Rheumatologist, GYN  [[id number]] : 0 [Reports changes in hearing] : Reports no changes in hearing [Reports changes in vision] : Reports no changes in vision [Reports changes in dental health] : Reports no changes in dental health [MammogramComments] : never had mammogram  [PapSmearDate] : 01/23 [FreeTextEntry2] : Real estate

## 2023-05-24 NOTE — PLAN
[FreeTextEntry1] : HCG ordred to confirm pregnancy. HAs gyn appointment pending. \par Due for mammogram - patient will discuss with gyn. \par FIbromyalgia - currently encouraged patient to continue PT and to implement lifestyle lifestyle modifications. TYlenol PRN. \par \par LUng nodules - has not followed up with pulmonology as recommended. Encouraged patient to fu with pulmonology.  Due to possible pregnancy will hold off for now with surveillance scan.\par \par Memory loss/ MIgraines - encouraged patient to reestablish care with neurology \par \par History of valvular disease during previous pregnancy - encouraged fu with cardiology

## 2023-05-24 NOTE — HISTORY OF PRESENT ILLNESS
[de-identified] : Presents for physical and also for confirmation of pregnancy. First day of last menstrual period was 4/11.  Reports positive pregnancy test on 5/15/2023. Has been feeling more fatigued, nausea and hungry over the past week.  Will be a high risk pregnancy due to age, and history of multiple pregnancies including miscarriages.  Pending appointment with GYN in a couple of days.\par \par Continues to suffer from bilateral knee, and ankle pain.\par Rheumatologist had high suspicion for fibromyalgia.  Has not tolerated Cymbalta x2\par \par Continues to suffer from migraines and memory loss.  Has been worked up previously by neurology.\par \par Pulmonary nodules.  Has not follow-up with pulmonology. Due for surveillance scan\par \par NOt taking any medications currently other than prenatal vitamin\par \par \par

## 2023-05-25 ENCOUNTER — APPOINTMENT (OUTPATIENT)
Dept: OBGYN | Facility: CLINIC | Age: 43
End: 2023-05-25

## 2023-05-30 ENCOUNTER — APPOINTMENT (OUTPATIENT)
Dept: ULTRASOUND IMAGING | Facility: CLINIC | Age: 43
End: 2023-05-30
Payer: MEDICAID

## 2023-05-30 ENCOUNTER — RESULT REVIEW (OUTPATIENT)
Age: 43
End: 2023-05-30

## 2023-05-30 ENCOUNTER — NON-APPOINTMENT (OUTPATIENT)
Age: 43
End: 2023-05-30

## 2023-05-30 ENCOUNTER — OUTPATIENT (OUTPATIENT)
Dept: OUTPATIENT SERVICES | Facility: HOSPITAL | Age: 43
LOS: 1 days | End: 2023-05-30
Payer: MEDICAID

## 2023-05-30 DIAGNOSIS — O03.9 COMPLETE OR UNSPECIFIED SPONTANEOUS ABORTION WITHOUT COMPLICATION: Chronic | ICD-10-CM

## 2023-05-30 DIAGNOSIS — Z98.891 HISTORY OF UTERINE SCAR FROM PREVIOUS SURGERY: Chronic | ICD-10-CM

## 2023-05-30 DIAGNOSIS — I35.0 NONRHEUMATIC AORTIC (VALVE) STENOSIS: ICD-10-CM

## 2023-05-30 DIAGNOSIS — Z90.89 ACQUIRED ABSENCE OF OTHER ORGANS: Chronic | ICD-10-CM

## 2023-05-30 PROCEDURE — 76817 TRANSVAGINAL US OBSTETRIC: CPT | Mod: 26

## 2023-05-30 PROCEDURE — 76817 TRANSVAGINAL US OBSTETRIC: CPT

## 2023-05-31 LAB — HCG SERPL-MCNC: 8026 MIU/ML

## 2023-06-01 ENCOUNTER — APPOINTMENT (OUTPATIENT)
Dept: OBGYN | Facility: CLINIC | Age: 43
End: 2023-06-01
Payer: MEDICAID

## 2023-06-01 VITALS
HEIGHT: 61 IN | SYSTOLIC BLOOD PRESSURE: 118 MMHG | OXYGEN SATURATION: 99 % | TEMPERATURE: 97.4 F | DIASTOLIC BLOOD PRESSURE: 78 MMHG | WEIGHT: 152 LBS | HEART RATE: 86 BPM | BODY MASS INDEX: 28.7 KG/M2

## 2023-06-01 LAB
HCG SERPL-MCNC: 9862 MIU/ML
HCG UR QL: POSITIVE
QUALITY CONTROL: YES

## 2023-06-01 PROCEDURE — 99213 OFFICE O/P EST LOW 20 MIN: CPT

## 2023-06-01 NOTE — PLAN
[FreeTextEntry1] : Patient advised risks for possible ectopic pregnancy and option to treat with methotrexate Risks and benefits discussed in detail  Patient opts for conservative management Repeat HCG on 23 and F/U sono next week failing spontaneous  Advised of Sx associated with ectopic pregnancy and to go to ED for heavy bleeding and/or pelvic pain

## 2023-06-01 NOTE — REVIEW OF SYSTEMS
[address] Progress Note     Patient Name: Alyssia WEEKS Date: 12/8/2019  Problem List  Principal Problem:    Femur fracture, right ([facility] )  Active Problems:    Sensorineural hearing loss (SNHL)    Essential hypertension    Recurrent major depressive disorder, in full remission ([facility] )    Hyperlipidemia    Closed femur fracture ([facility] )    Acute kidney injury ([facility] )    Acute blood loss anemia    Impaired vision    Conductive hearing loss, bilateral    S/P IVC filter    Scrotal swelling    Acute pulmonary embolism (HCC)    Sinus tachycardia            12/08/19 1108   Restrictions/Precautions   Weight Bearing Precautions Per Order Yes   RLE Weight Bearing Per Order TTWB   Other Precautions Cognitive; Bed Alarm; Fall Risk;Pain;WBS;Multiple lines;Telemetry;Hard of hearing   General   Response to Previous Treatment Patient with no complaints from previous session   Lifestyle   Autonomy At baseline, pt was I w/ ADLs, IADLs, and functional mobility/transfers w/o use of AD, (+) , and reports 1 fall PTA  Reciprocal Relationships Spouse   Service to Others Retired   [address] [facility]   Pain Assessment   Pain Assessment 0-10   Pain Score 5   Pain Type Surgical pain   Pain Location Hip   Pain Orientation [facility]   Hospital Pain Intervention(s) Repositioned; Ambulation/increased activity; Emotional support; Rest   Response to Interventions Tolerated OT   Multiple Pain Sites No   ADL   Where Assessed Chair   Grooming Assistance 5  Supervision/Setup   Grooming Deficit Setup;Supervision/safety; Increased time to complete   Grooming Comments Setup required   UB Bathing Assistance 4  Minimal Assistance   UB Bathing Deficit Setup;Verbal cueing;Supervision/safety; Increased time to complete   UB Bathing Comments Assist for thoroughness   LB Bathing Assistance 2  Maximal Assistance   LB Bathing Deficit Setup;Steadying;Verbal cueing;Supervision/safety; Increased time to complete;Perineal area;Buttocks;Right lower leg including foot; Left lower leg including foot   LB Bathing Comments Impaired functional reach, decreased dynamic standing balance   UB Dressing Assistance 4  Minimal Assistance   UB Dressing Deficit Setup;Verbal cueing;Supervision/safety; Increased time to complete;Pull around back;Pull down in back   LB Dressing Assistance 2  Maximal Assistance   LB Dressing Deficit Setup;Verbal cueing;Supervision/safety; Increased time to complete; Don/doff R sock; Don/doff L sock   LB Dressing Comments Limited functional reach demonstrated   Functional Standing Tolerance   Time 5 mins   Activity ADL routine while standing   Comments Pt requiring BUE support on RW when standing for task with cues for TTWB R LE   Bed Mobility   Supine to Sit 3  Moderate assistance   Additional items Assist x 2;HOB elevated; Bedrails; Increased time required;Verbal cues;LE management   Sit to Supine Unable to assess   Additional Comments Pt seated OOB in chair at end of session with call bell and phone within reach  All needs met and pt reports no further questions for OT at this time   Transfers   Sit to Stand 2  Maximal assistance   Additional items Assist x 2; Increased time required;Verbal cues   Stand to Sit 3  Moderate assistance   Additional items Assist x 2;Armrests; Increased time required;Verbal cues   Additional Comments Cues for safe technique, hand placement, and compliance with WBS   Functional Mobility   Additional Comments Pt unable to take steps at this time while adhering to WBS, therefore trial with Quick Move performed  Pt able to stand with Mod A of 2 with use of Quick Move and transferred to bedside chair with 2 staff members present for optimal pt safety  Cognition   Overall Cognitive Status Impaired   Arousal/Participation Alert; Cooperative   Attention Within functional limits   Orientation Level Oriented X4   Memory Decreased short term memory   Following Commands Follows one step commands with increased time or repetition   Activity Tolerance   Activity Tolerance Patient limited by pain; Patient limited by fatigue   Medical Staff [id number], RN   Assessment   Assessment Pt seen for OT treatment session this AM focusing on functional activity tolerance, bed mobility, ADLs, and functional mobility/transfers  Pt alert and cooperative throughout session  Pt lying supine at start of session, requiring Mod A of 2 for supine>sit with assist for LE management and trunk control  BP seated EOB: 166/98, HR: 133 bpm  Transfers completed with Max A of 2 with cues for safe technique and hand placement  Increased assist required to initiate forward weight shift from surface for sit>stand and assist for controlled descent to surface for stand>sit  Pt unable to take steps at this time while adhering to WBS, therefore trial with Quick Move performed  Pt able to stand with Mod A of 2 with use of Quick Move and transferred to bedside chair with 2 staff members present for optimal pt safety  BP in chair: 146/87, HR: 137 bpm   ADL routine completed while seated in chair  UB ADLs completed with Min A with assist for thoroughness for UB bathing and assist to bring gown around/down in back for UB dressing  LB ADLs completed with Max A with impaired functional reach and decreased dynamic standing balance, with pt requiring BUE support on RW when standing for bathing tasks  Pt seated OOB in chair at end of session with call bell and phone within reach  All needs met and pt reports no further questions for OT at this time  Continue to recommend STR when medically cleared  OT to follow pt on caseload  Plan   Treatment Interventions ADL retraining;Functional transfer training;UE strengthening/ROM; Endurance training;Patient/family training;Equipment evaluation/education; Compensatory technique education; Energy conservation; Activityengagement   Goal Expiration Date 12/19/19   OT Treatment Day 2   OT Frequency 3-5x/wk Recommendation   OT Discharge Recommendation Short Term Rehab   OT - OK to Discharge Yes  (when medically cleared to rehab)   Barthel Index   Feeding 10   Bathing 0   Grooming Score 0   Dressing Score 5   Bladder Score 5   Bowels Score 10   Toilet Use Score 5   Transfers (Bed/Chair) Score 5   Mobility (Level Surface) Score 0   Stairs Score 0   Barthel Index Score 40   Modified [place] Scale   Modified [place] Scale 4          Geovanna Marie, OTR/L [Negative] : Heme/Lymph

## 2023-06-01 NOTE — PHYSICAL EXAM
[Chaperone Present] : A chaperone was present in the examining room during all aspects of the physical examination [FreeTextEntry1] : MILAGRO TORRES [Labia Majora] : normal [Labia Minora] : normal [Moderate] : There was moderate vaginal bleeding [Soft] : soft [Normal] : normal [Normal Position] : in a normal position [Tenderness] : nontender [Enlarged ___ wks] : not enlarged [Mass ___ cm] : no uterine mass was palpated [Uterine Adnexae] : normal

## 2023-06-01 NOTE — HISTORY OF PRESENT ILLNESS
[FreeTextEntry1] : F/U early pregnancy  P 1 1 10 2 Spotting since Thurs Denies lower abdominal pain + LBP + sciatica \par \par  LMP 4/10/23  Pelvic sono 5/30/23 showed no definite gest sac , yolk sac or fetal pole\par \par S/P multiple pregnancy terminations Last 2018 S/P spont AB second trimester 2016

## 2023-06-05 ENCOUNTER — APPOINTMENT (OUTPATIENT)
Dept: OBGYN | Facility: CLINIC | Age: 43
End: 2023-06-05

## 2023-06-06 ENCOUNTER — NON-APPOINTMENT (OUTPATIENT)
Age: 43
End: 2023-06-06

## 2023-06-07 ENCOUNTER — APPOINTMENT (OUTPATIENT)
Dept: OBGYN | Facility: HOSPITAL | Age: 43
End: 2023-06-07
Payer: MEDICAID

## 2023-06-07 ENCOUNTER — RESULT REVIEW (OUTPATIENT)
Age: 43
End: 2023-06-07

## 2023-06-07 ENCOUNTER — OUTPATIENT (OUTPATIENT)
Dept: OUTPATIENT SERVICES | Facility: HOSPITAL | Age: 43
LOS: 1 days | End: 2023-06-07

## 2023-06-07 VITALS
SYSTOLIC BLOOD PRESSURE: 127 MMHG | TEMPERATURE: 98.1 F | DIASTOLIC BLOOD PRESSURE: 82 MMHG | WEIGHT: 153 LBS | HEIGHT: 61 IN | HEART RATE: 79 BPM | BODY MASS INDEX: 28.89 KG/M2

## 2023-06-07 DIAGNOSIS — Z98.891 HISTORY OF UTERINE SCAR FROM PREVIOUS SURGERY: Chronic | ICD-10-CM

## 2023-06-07 DIAGNOSIS — Z90.89 ACQUIRED ABSENCE OF OTHER ORGANS: Chronic | ICD-10-CM

## 2023-06-07 DIAGNOSIS — O02.89 OTHER ABNORMAL PRODUCTS OF CONCEPTION: ICD-10-CM

## 2023-06-07 LAB — HCG SERPL-ACNC: SIGNIFICANT CHANGE UP MIU/ML

## 2023-06-07 PROCEDURE — 99213 OFFICE O/P EST LOW 20 MIN: CPT | Mod: GE

## 2023-06-08 ENCOUNTER — APPOINTMENT (OUTPATIENT)
Dept: OBGYN | Facility: CLINIC | Age: 43
End: 2023-06-08

## 2023-06-09 ENCOUNTER — NON-APPOINTMENT (OUTPATIENT)
Age: 43
End: 2023-06-09

## 2023-06-09 DIAGNOSIS — O02.89 OTHER ABNORMAL PRODUCTS OF CONCEPTION: ICD-10-CM

## 2023-06-26 ENCOUNTER — OUTPATIENT (OUTPATIENT)
Dept: OUTPATIENT SERVICES | Facility: HOSPITAL | Age: 43
LOS: 1 days | End: 2023-06-26

## 2023-06-26 ENCOUNTER — NON-APPOINTMENT (OUTPATIENT)
Age: 43
End: 2023-06-26

## 2023-06-26 ENCOUNTER — APPOINTMENT (OUTPATIENT)
Dept: OBGYN | Facility: HOSPITAL | Age: 43
End: 2023-06-26
Payer: MEDICAID

## 2023-06-26 ENCOUNTER — RESULT REVIEW (OUTPATIENT)
Age: 43
End: 2023-06-26

## 2023-06-26 VITALS
SYSTOLIC BLOOD PRESSURE: 131 MMHG | HEART RATE: 80 BPM | TEMPERATURE: 98 F | HEIGHT: 61 IN | WEIGHT: 153 LBS | DIASTOLIC BLOOD PRESSURE: 81 MMHG | BODY MASS INDEX: 28.89 KG/M2

## 2023-06-26 DIAGNOSIS — Z98.891 HISTORY OF UTERINE SCAR FROM PREVIOUS SURGERY: Chronic | ICD-10-CM

## 2023-06-26 DIAGNOSIS — O03.9 COMPLETE OR UNSPECIFIED SPONTANEOUS ABORTION WITHOUT COMPLICATION: Chronic | ICD-10-CM

## 2023-06-26 DIAGNOSIS — Z90.89 ACQUIRED ABSENCE OF OTHER ORGANS: Chronic | ICD-10-CM

## 2023-06-26 LAB — HCG SERPL-ACNC: SIGNIFICANT CHANGE UP MIU/ML

## 2023-06-26 PROCEDURE — 76830 TRANSVAGINAL US NON-OB: CPT | Mod: 26

## 2023-06-26 PROCEDURE — 99213 OFFICE O/P EST LOW 20 MIN: CPT | Mod: GC,25

## 2023-06-28 DIAGNOSIS — O20.0 THREATENED ABORTION: ICD-10-CM

## 2023-06-28 DIAGNOSIS — O36.80X0 PREGNANCY WITH INCONCLUSIVE FETAL VIABILITY, NOT APPLICABLE OR UNSPECIFIED: ICD-10-CM

## 2023-06-29 ENCOUNTER — TRANSCRIPTION ENCOUNTER (OUTPATIENT)
Age: 43
End: 2023-06-29

## 2023-06-29 ENCOUNTER — NON-APPOINTMENT (OUTPATIENT)
Age: 43
End: 2023-06-29

## 2023-06-29 NOTE — ASU PATIENT PROFILE, ADULT - PAIN CHRONIC, PROFILE
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 Please call pt at [phone]. Thank you   no

## 2023-06-29 NOTE — ASU PATIENT PROFILE, ADULT - SURGICAL SITE INCISION
Problem: Skin Integrity:  Goal: Absence of new skin breakdown  Description: Absence of new skin breakdown  4/1/2022 2212 by Maddy Dee RN  Outcome: Met This Shift  4/1/2022 1203 by Estela Cope RN  Outcome: Ongoing  Note: No new skin breakdown present. Pt turns self frequently and is able to reposition independently. Pt offered Q2H turns with pillow support. Will continue to monitor. Problem: Falls - Risk of:  Goal: Will remain free from falls  Description: Will remain free from falls  4/1/2022 2212 by Maddy Dee RN  Outcome: Met This Shift  4/1/2022 1203 by Estela Cope RN  Outcome: Ongoing  Note: Pt remains free from falls. She is a one assist with a walker and a gait belt. Standard fall precautions are in place. Will continue to monitor. Problem: Pain:  Goal: Pain level will decrease  Description: Pain level will decrease  4/1/2022 2212 by Maddy Dee RN  Outcome: Met This Shift  4/1/2022 1203 by Estela Cope RN  Outcome: Ongoing  Note: Pt denies pain at this time, rating it a \"0/10. \" PRN medication available per MAR. Pt denies any further questions or complaints at this time. Will continue to monitor.       Problem: Skin Integrity:  Goal: Risk for impaired skin integrity will decrease  Description: Risk for impaired skin integrity will decrease  4/1/2022 1203 by Estela Cope RN  Outcome: Completed  Goal: Will show no infection signs and symptoms  Description: Will show no infection signs and symptoms  4/1/2022 1203 by Estela Cope RN  Outcome: Completed     Problem: Falls - Risk of:  Goal: Absence of physical injury  Description: Absence of physical injury  4/1/2022 1203 by Estela Cope RN  Outcome: Completed     Problem: Pain:  Goal: Control of acute pain  Description: Control of acute pain  4/1/2022 1203 by Estela Cope RN  Outcome: Completed  Goal: Control of chronic pain  Description: Control of chronic pain  4/1/2022 1203 by Estela Cope RN  Outcome: Completed no

## 2023-06-29 NOTE — ASU PATIENT PROFILE, ADULT - FALL HARM RISK - PT AGE POPULATION HIDDEN
Adult
Constitutional: no fevers; no chills  HEENT: no visual changes, no sore throat, no rhinorrhea  CV: cp; no palpitations  Resp: sob; no cough  GI: no abd pain, no nausea, no vomiting, no diarrhea, no constipation  : no dysuria, no hematuria  MSK: no myalgais; no arthralgias  skin: no rashes  neuro: no HA, no numbness; no weakness, no tingling  ROS statement: all other ROS negative except as per HPI

## 2023-06-30 ENCOUNTER — OUTPATIENT (OUTPATIENT)
Dept: OUTPATIENT SERVICES | Facility: HOSPITAL | Age: 43
LOS: 1 days | Discharge: HOME | End: 2023-06-30
Payer: MEDICAID

## 2023-06-30 ENCOUNTER — TRANSCRIPTION ENCOUNTER (OUTPATIENT)
Age: 43
End: 2023-06-30

## 2023-06-30 ENCOUNTER — RESULT REVIEW (OUTPATIENT)
Age: 43
End: 2023-06-30

## 2023-06-30 VITALS
HEIGHT: 61 IN | SYSTOLIC BLOOD PRESSURE: 119 MMHG | TEMPERATURE: 98 F | RESPIRATION RATE: 15 BRPM | WEIGHT: 156.09 LBS | OXYGEN SATURATION: 100 % | HEART RATE: 77 BPM | DIASTOLIC BLOOD PRESSURE: 69 MMHG

## 2023-06-30 VITALS
HEART RATE: 70 BPM | RESPIRATION RATE: 15 BRPM | SYSTOLIC BLOOD PRESSURE: 108 MMHG | DIASTOLIC BLOOD PRESSURE: 74 MMHG | OXYGEN SATURATION: 98 %

## 2023-06-30 DIAGNOSIS — O36.80X0 PREGNANCY WITH INCONCLUSIVE FETAL VIABILITY, NOT APPLICABLE OR UNSPECIFIED: ICD-10-CM

## 2023-06-30 DIAGNOSIS — Z98.891 HISTORY OF UTERINE SCAR FROM PREVIOUS SURGERY: Chronic | ICD-10-CM

## 2023-06-30 DIAGNOSIS — O03.9 COMPLETE OR UNSPECIFIED SPONTANEOUS ABORTION WITHOUT COMPLICATION: Chronic | ICD-10-CM

## 2023-06-30 DIAGNOSIS — Z90.89 ACQUIRED ABSENCE OF OTHER ORGANS: Chronic | ICD-10-CM

## 2023-06-30 LAB — HCG SERPL-ACNC: SIGNIFICANT CHANGE UP MIU/ML

## 2023-06-30 PROCEDURE — 59820 CARE OF MISCARRIAGE: CPT

## 2023-06-30 PROCEDURE — 76998 US GUIDE INTRAOP: CPT | Mod: 26

## 2023-06-30 PROCEDURE — 88305 TISSUE EXAM BY PATHOLOGIST: CPT | Mod: 26

## 2023-06-30 RX ORDER — OXYCODONE HYDROCHLORIDE 5 MG/1
5 TABLET ORAL ONCE
Refills: 0 | Status: DISCONTINUED | OUTPATIENT
Start: 2023-06-30 | End: 2023-06-30

## 2023-06-30 RX ORDER — FENTANYL CITRATE 50 UG/ML
50 INJECTION INTRAVENOUS
Refills: 0 | Status: DISCONTINUED | OUTPATIENT
Start: 2023-06-30 | End: 2023-06-30

## 2023-06-30 RX ORDER — RIBOFLAVIN (VITAMIN B2) 25 MG
2 TABLET ORAL
Qty: 0 | Refills: 0 | DISCHARGE

## 2023-06-30 RX ORDER — DIPHENHYDRAMINE HCL 50 MG
6.12 CAPSULE ORAL ONCE
Refills: 0 | Status: DISCONTINUED | OUTPATIENT
Start: 2023-06-30 | End: 2023-06-30

## 2023-06-30 RX ORDER — SODIUM CHLORIDE 9 MG/ML
1000 INJECTION, SOLUTION INTRAVENOUS
Refills: 0 | Status: DISCONTINUED | OUTPATIENT
Start: 2023-06-30 | End: 2023-07-14

## 2023-06-30 RX ORDER — DIPHENHYDRAMINE HCL 50 MG
7.5 CAPSULE ORAL ONCE
Refills: 0 | Status: COMPLETED | OUTPATIENT
Start: 2023-06-30 | End: 2023-06-30

## 2023-06-30 RX ORDER — MAGNESIUM OXIDE 400 MG ORAL TABLET 241.3 MG
1 TABLET ORAL
Qty: 0 | Refills: 0 | DISCHARGE

## 2023-06-30 RX ORDER — FENTANYL CITRATE 50 UG/ML
25 INJECTION INTRAVENOUS
Refills: 0 | Status: DISCONTINUED | OUTPATIENT
Start: 2023-06-30 | End: 2023-06-30

## 2023-06-30 RX ORDER — ONDANSETRON 8 MG/1
4 TABLET, FILM COATED ORAL ONCE
Refills: 0 | Status: COMPLETED | OUTPATIENT
Start: 2023-06-30 | End: 2023-06-30

## 2023-06-30 RX ADMIN — OXYCODONE HYDROCHLORIDE 5 MILLIGRAM(S): 5 TABLET ORAL at 14:08

## 2023-06-30 RX ADMIN — FENTANYL CITRATE 25 MICROGRAM(S): 50 INJECTION INTRAVENOUS at 14:48

## 2023-06-30 RX ADMIN — FENTANYL CITRATE 25 MICROGRAM(S): 50 INJECTION INTRAVENOUS at 14:25

## 2023-06-30 RX ADMIN — FENTANYL CITRATE 25 MICROGRAM(S): 50 INJECTION INTRAVENOUS at 15:18

## 2023-06-30 RX ADMIN — ONDANSETRON 4 MILLIGRAM(S): 8 TABLET, FILM COATED ORAL at 14:07

## 2023-06-30 RX ADMIN — OXYCODONE HYDROCHLORIDE 5 MILLIGRAM(S): 5 TABLET ORAL at 14:38

## 2023-06-30 RX ADMIN — Medication 7.5 MILLIGRAM(S): at 16:00

## 2023-06-30 NOTE — ASU DISCHARGE PLAN (ADULT/PEDIATRIC) - NS MD DC FALL RISK RISK
For information on Fall & Injury Prevention, visit: [email or web address] OR  [email or web address] OR  https://www.cdc.gov/steadi/patient.html

## 2023-06-30 NOTE — H&P ADULT - ATTENDING COMMENTS
R/A/B of EUA, DVC discussed with pt including but not limited to infection, bleeding and injury to uterus.  PUL with solely an intrauterine gestational sac. Will check HCG after dvc and f/up pathology

## 2023-06-30 NOTE — BRIEF OPERATIVE NOTE - NSICDXBRIEFPROCEDURE_GEN_ALL_CORE_FT
PROCEDURES:  Dilation and curettage, uterus, using suction, with US guidance 30-Jun-2023 13:29:38  Elisa Hong

## 2023-06-30 NOTE — H&P ADULT - NSICDXPASTMEDICALHX_GEN_ALL_CORE_FT
PAST MEDICAL HISTORY:  Acute congestive heart failure, unspecified congestive heart failure type during pregnancy     Asthma     Asthma mild, intermittent    Encounter for elective termination of pregnancy     Gestational diabetes mellitus (GDM), antepartum, gestational diabetes method of control unspecified     Migraine     Missed  2015    Mitral valve regurgitation noted during pregnancy, reports resolved, states no further issues    Non-rheumatic mitral regurgitation     Preeclampsia in postpartum period

## 2023-06-30 NOTE — BRIEF OPERATIVE NOTE - OPERATION/FINDINGS
EUA revealed 7 wk size anteverted uterus. Ultrasound of uterus showing gestational sac but no clear yolk sac. After procedure ultrasound with thin endometrial stripe. Excellent hemostasis noted.

## 2023-06-30 NOTE — PACU DISCHARGE NOTE - COMMENTS
T(C): 36.7 (06-30-23 @ 16:15), Max: 37.8 (06-30-23 @ 13:20)  HR: 65 (06-30-23 @ 16:45) (60 - 78)  BP: 109/78 (06-30-23 @ 16:45) (102/67 - 119/69)  RR: 13 (06-30-23 @ 16:45) (11 - 15)  SpO2: 98% (06-30-23 @ 16:45) (96% - 100%)    No apparent anesthesia complications. Vital signs stable, non-labored breathing with adequate oxygen saturation on room air. Pain and nausea are controlled. All questions answered. Stable for discharge home with an escort.

## 2023-06-30 NOTE — H&P ADULT - ASSESSMENT
Patient is a 41yo ,0,12,2 here for DVC for PUL.    #Surgical prep  - Type + screen  - CBC  - hemorrhage kit in room  - consents and [place] state demise documentation completed    GLENN Hong PGY1  d/w Dr. Avendano

## 2023-06-30 NOTE — ASU DISCHARGE PLAN (ADULT/PEDIATRIC) - NURSING INSTRUCTIONS
You received IV Tylenol for pain management at 1PM. Please DO NOT take any Tylenol (Acetaminophen) containing products, such as Vicodin, Percocet, Excedrin, and cold medications for the next 6 hours (until 7PM). DO NOT TAKE MORE THAN 3000 MG OF TYLENOL in a 24 hour period.

## 2023-06-30 NOTE — ASU DISCHARGE PLAN (ADULT/PEDIATRIC) - MEDICATION INSTRUCTIONS
Dr Zeenat Falk    [phone]    Patient states that she is have a flare  Her entire body hurts  Her hands are both swollen, she has clumps of hair coming out, and she is have severe pain  Please advise 
If she calls back, let her know I'm sending a prednisone course to her pharmacy
Patient called back  Advised you sent Prednisone 
Patient is calling back to check on the status of her message  She is requesting a callback as soon as possible        CB: [phone]
Patient is calling in again checking status on message         Please advise,       Edin#: [phone]
Alternate using Motrin 600mg every 6 hours and Tylenol 975mg (2 extra strength or 3 regular tabs) every 6 hours for pain. Example pain schedule as follows: 9AM Tylenol 975mg, 12PM Motrin 600mg, 3PM Tylenol 975mg, 6PM Motrin 600mg, etc.

## 2023-06-30 NOTE — ASU DISCHARGE PLAN (ADULT/PEDIATRIC) - CARE PROVIDER_API CALL
OBGYN Ambulatory Clinic,   [facility]  Oncology building, basement floor  **Please call for an appointment in 2 weeks  Phone: [phone]  Fax: (   )    -  Follow Up Time: 2 weeks  
eyeglasses

## 2023-06-30 NOTE — ASU DISCHARGE PLAN (ADULT/PEDIATRIC) - PROVIDER TOKENS
FREE:[LAST:[OBGYN Ambulatory Clinic],PHONE:[phone]],FAX:[(   [phone]    -],ADDRESS:[[facility]  Oncology building, basement floor  **Please call for an appointment in 2 weeks],FOLLOWUP:[2 weeks]]

## 2023-06-30 NOTE — ASU DISCHARGE PLAN (ADULT/PEDIATRIC) - ASU DC SPECIAL INSTRUCTIONSFT
Regular diet as tolerated, regular activity as tolerated, no heavy lifting for first two weeks.  Nothing per vagina: no intercourse, tampons or douching.  Call your provider if you experience fevers, chills, worsening abdominal pain, inability to urinate or worsening vaginal bleeding.  Follow up with the clinic in 2 weeks.

## 2023-07-03 ENCOUNTER — EMERGENCY (EMERGENCY)
Facility: HOSPITAL | Age: 43
LOS: 1 days | Discharge: HOME | End: 2023-07-03
Attending: STUDENT IN AN ORGANIZED HEALTH CARE EDUCATION/TRAINING PROGRAM | Admitting: EMERGENCY MEDICINE
Payer: MEDICAID

## 2023-07-03 VITALS
SYSTOLIC BLOOD PRESSURE: 126 MMHG | DIASTOLIC BLOOD PRESSURE: 80 MMHG | HEIGHT: 61 IN | OXYGEN SATURATION: 100 % | TEMPERATURE: 98 F | HEART RATE: 72 BPM | RESPIRATION RATE: 18 BRPM

## 2023-07-03 DIAGNOSIS — O03.9 COMPLETE OR UNSPECIFIED SPONTANEOUS ABORTION WITHOUT COMPLICATION: Chronic | ICD-10-CM

## 2023-07-03 DIAGNOSIS — Z98.891 HISTORY OF UTERINE SCAR FROM PREVIOUS SURGERY: Chronic | ICD-10-CM

## 2023-07-03 DIAGNOSIS — Z90.89 ACQUIRED ABSENCE OF OTHER ORGANS: Chronic | ICD-10-CM

## 2023-07-03 LAB
ALBUMIN SERPL ELPH-MCNC: 4.8 G/DL — SIGNIFICANT CHANGE UP (ref 3.3–5)
ALP SERPL-CCNC: 49 U/L — SIGNIFICANT CHANGE UP (ref 40–120)
ALT FLD-CCNC: 24 U/L — SIGNIFICANT CHANGE UP (ref 4–33)
ANION GAP SERPL CALC-SCNC: 14 MMOL/L — SIGNIFICANT CHANGE UP (ref 7–14)
AST SERPL-CCNC: 18 U/L — SIGNIFICANT CHANGE UP (ref 4–32)
BASOPHILS # BLD AUTO: 0.03 K/UL — SIGNIFICANT CHANGE UP (ref 0–0.2)
BASOPHILS NFR BLD AUTO: 0.3 % — SIGNIFICANT CHANGE UP (ref 0–2)
BILIRUB SERPL-MCNC: 0.2 MG/DL — SIGNIFICANT CHANGE UP (ref 0.2–1.2)
BLD GP AB SCN SERPL QL: NEGATIVE — SIGNIFICANT CHANGE UP
BUN SERPL-MCNC: 9 MG/DL — SIGNIFICANT CHANGE UP (ref 7–23)
CALCIUM SERPL-MCNC: 9.3 MG/DL — SIGNIFICANT CHANGE UP (ref 8.4–10.5)
CHLORIDE SERPL-SCNC: 102 MMOL/L — SIGNIFICANT CHANGE UP (ref 98–107)
CO2 SERPL-SCNC: 22 MMOL/L — SIGNIFICANT CHANGE UP (ref 22–31)
CREAT SERPL-MCNC: 0.62 MG/DL — SIGNIFICANT CHANGE UP (ref 0.5–1.3)
EGFR: 115 ML/MIN/1.73M2 — SIGNIFICANT CHANGE UP
EOSINOPHIL # BLD AUTO: 0.18 K/UL — SIGNIFICANT CHANGE UP (ref 0–0.5)
EOSINOPHIL NFR BLD AUTO: 2 % — SIGNIFICANT CHANGE UP (ref 0–6)
GLUCOSE SERPL-MCNC: 91 MG/DL — SIGNIFICANT CHANGE UP (ref 70–99)
HCG SERPL-ACNC: 1119 MIU/ML — SIGNIFICANT CHANGE UP
HCT VFR BLD CALC: 40.7 % — SIGNIFICANT CHANGE UP (ref 34.5–45)
HGB BLD-MCNC: 13.3 G/DL — SIGNIFICANT CHANGE UP (ref 11.5–15.5)
IANC: 5.14 K/UL — SIGNIFICANT CHANGE UP (ref 1.8–7.4)
IMM GRANULOCYTES NFR BLD AUTO: 0.3 % — SIGNIFICANT CHANGE UP (ref 0–0.9)
LYMPHOCYTES # BLD AUTO: 2.8 K/UL — SIGNIFICANT CHANGE UP (ref 1–3.3)
LYMPHOCYTES # BLD AUTO: 31.4 % — SIGNIFICANT CHANGE UP (ref 13–44)
MCHC RBC-ENTMCNC: 29 PG — SIGNIFICANT CHANGE UP (ref 27–34)
MCHC RBC-ENTMCNC: 32.7 GM/DL — SIGNIFICANT CHANGE UP (ref 32–36)
MCV RBC AUTO: 88.9 FL — SIGNIFICANT CHANGE UP (ref 80–100)
MONOCYTES # BLD AUTO: 0.74 K/UL — SIGNIFICANT CHANGE UP (ref 0–0.9)
MONOCYTES NFR BLD AUTO: 8.3 % — SIGNIFICANT CHANGE UP (ref 2–14)
NEUTROPHILS # BLD AUTO: 5.14 K/UL — SIGNIFICANT CHANGE UP (ref 1.8–7.4)
NEUTROPHILS NFR BLD AUTO: 57.7 % — SIGNIFICANT CHANGE UP (ref 43–77)
NRBC # BLD: 0 /100 WBCS — SIGNIFICANT CHANGE UP (ref 0–0)
NRBC # FLD: 0 K/UL — SIGNIFICANT CHANGE UP (ref 0–0)
PLATELET # BLD AUTO: 323 K/UL — SIGNIFICANT CHANGE UP (ref 150–400)
POTASSIUM SERPL-MCNC: 4 MMOL/L — SIGNIFICANT CHANGE UP (ref 3.5–5.3)
POTASSIUM SERPL-SCNC: 4 MMOL/L — SIGNIFICANT CHANGE UP (ref 3.5–5.3)
PROT SERPL-MCNC: 7.8 G/DL — SIGNIFICANT CHANGE UP (ref 6–8.3)
RBC # BLD: 4.58 M/UL — SIGNIFICANT CHANGE UP (ref 3.8–5.2)
RBC # FLD: 11.9 % — SIGNIFICANT CHANGE UP (ref 10.3–14.5)
RH IG SCN BLD-IMP: POSITIVE — SIGNIFICANT CHANGE UP
SODIUM SERPL-SCNC: 138 MMOL/L — SIGNIFICANT CHANGE UP (ref 135–145)
WBC # BLD: 8.92 K/UL — SIGNIFICANT CHANGE UP (ref 3.8–10.5)
WBC # FLD AUTO: 8.92 K/UL — SIGNIFICANT CHANGE UP (ref 3.8–10.5)

## 2023-07-03 PROCEDURE — 99284 EMERGENCY DEPT VISIT MOD MDM: CPT

## 2023-07-03 NOTE — ED PROVIDER NOTE - PHYSICAL EXAMINATION
Gen: Well appearing in NAD  Head: NC/AT  Neck: trachea midline  Resp:  No distress  abd nontender   Ext: no deformities  Neuro:  A&O appears non focal  Skin:  Warm and dry as visualized  Psych:  Normal affect and mood

## 2023-07-03 NOTE — ED PROVIDER NOTE - CLINICAL SUMMARY MEDICAL DECISION MAKING FREE TEXT BOX
42-year-old female , 0 12 2 with recent D&C on  for ultrasound with gestational sac measuring 7 weeks and downtrending beta and patient states she was told that she had multiple gestational sacs and had a D&C. was called by her GYN today to come to the ER [facility] to rule out ectopic.  Patient is unsure why she got this call.  Denies pain or bleeding at this time. exam within normal limits and abdomen is nontender, will check hCG and discuss with GYN regarding need for further ultrasound imaging

## 2023-07-03 NOTE — CONSULT NOTE ADULT - SUBJECTIVE AND OBJECTIVE BOX
NEDRA WELSH  42y  Female [id number]    HPI: 43yo [id number] (LMP 5/10) presents POD#3 s/p DVC for MAB for f/u bHCG. Patient had uncomplicated DVC on  during which gestational sac was seen but no yolk sac. Patient was instructed to follow up today for bHCG to confirm removal of products of conception. Patient feels well today with no complaints. Denies fever, chills, nausea, vomiting, diarrhea, headache, constipation, dizziness, syncope, chest pain, palpitations, shortness of breath, dysuria, urgency, frequency, abdominal/pelvic pain, vaginal bleeding/discharge/odor/pain/itching.    bHCG trend as follows: 35643()->80242()->06846()->1119(7/3)    Name of GYN Physician: [facility]    OBH: TOP x11 (MVAs and mTOPs at [facility] x10); NSVDx1, C/Sx1 (, ); SAB@12w requiring admission for heavy vaginal bleeding(); h/o preeclampsia, postpartum-associated moderate-severe mitral regurgitation, gestational diabetes  GynHx: s/p Mirena IUD, denies fibroids, cysts, abd pap, STIs  PMH: mitral regurgitation, asthma, pulm nodules  PSH: MVAs (unk #), pLTCSx1, tonsillectomy, D&C for TOP@11w w/ Mirena IUD insertion in   All: NKDA    Vital Signs Last 24 Hrs  T(C): 36.4 (2023 19:24), Max: 36.4 (2023 19:24)  T(F): 97.5 (2023 19:24), Max: 97.5 (2023 19:24)  HR: 72 (2023 19:24) (72 - 72)  BP: 126/80 (2023 19:24) (126/80 - 126/80)  RR: 18 (2023 19:24) (18 - 18)  SpO2: 100% (2023 19:24) (100% - 100%)    Parameters below as of 2023 19:24  Patient On (Oxygen Delivery Method): room air        Physical Exam:   General: sitting comfortably in bed, NAD   HEENT: neck supple, full ROM  CV: RR S1S2 no m/r/g  Lungs: CTA b/l, good air flow b/l   Back: No CVA tenderness  Abd: Soft, non-tender, non-distended.  Bowel sounds present.    :  No bleeding on pad. External labia wnl. Bimanual exam with no cervical motion tenderness, uterus wnl, adnexa non palpable b/l.  Cervix closed vs. Cervix dilated *** cm   Speculum Exam: No active bleeding from os. Physiologic discharge.    Ext: non-tender b/l, no edema     Chaperoned by                                 13.3   8.92  )-----------( 323      ( 2023 20:53 )             40.7     07-03    138  |  102  |  9   ----------------------------<  91  4.0   |  22  |  0.62    Ca    9.3      2023 20:53    TPro  7.8  /  Alb  4.8  /  TBili  0.2  /  DBili  x   /  AST  18  /  ALT  24  /  AlkPhos  49  07-03    I&O's Detail      Urinalysis Basic - ( 2023 20:53 )    Color: x / Appearance: x / SG: x / pH: x  Gluc: 91 mg/dL / Ketone: x  / Bili: x / Urobili: x   Blood: x / Protein: x / Nitrite: x   Leuk Esterase: x / RBC: x / WBC x   Sq Epi: x / Non Sq Epi: x / Bacteria: x        RADIOLOGY & ADDITIONAL STUDIES: NEDRA WESLH  42y  Female [id number]    HPI: 41yo [id number] (LMP 5/10) presents POD#3 s/p DVC for MAB for f/u bHCG. Patient had uncomplicated DVC on  during which gestational sac was seen but no yolk sac. Patient was instructed to follow up today for bHCG to confirm removal of products of conception. Patient feels well today with no complaints. Denies fever, chills, nausea, vomiting, diarrhea, headache, constipation, dizziness, syncope, chest pain, palpitations, shortness of breath, dysuria, urgency, frequency, abdominal/pelvic pain, vaginal bleeding/discharge/odor/pain/itching. Patient states she does not yet have post-op follow-up appointment with clinic.    bHCG trend as follows: 43093()->93400()->25452()->1119(7/3)    Name of GYN Physician: [facility]    OBH: TOP x11 (MVAs and mTOPs at [facility] x10); NSVDx1, C/Sx1 (, ); SAB@12w requiring admission for heavy vaginal bleeding(); h/o preeclampsia, postpartum-associated moderate-severe mitral regurgitation, gestational diabetes  GynHx: s/p Mirena IUD, denies fibroids, cysts, abd pap, STIs  PMH: mitral regurgitation, asthma, pulm nodules  PSH: MVAs (unk #), pLTCSx1, tonsillectomy, D&C for TOP@11w w/ Mirena IUD insertion in   All: NKDA    Vital Signs Last 24 Hrs  T(C): 36.4 (2023 19:24), Max: 36.4 (2023 19:24)  T(F): 97.5 (2023 19:24), Max: 97.5 (2023 19:24)  HR: 72 (2023 19:24) (72 - 72)  BP: 126/80 (2023 19:24) (126/80 - 126/80)  RR: 18 (2023 19:24) (18 - 18)  SpO2: 100% (2023 19:24) (100% - 100%)    Parameters below as of 2023 19:24  Patient On (Oxygen Delivery Method): room air    Physical Exam:   General: sitting comfortably in bed, NAD   HEENT: neck supple, full ROM  CV: clinically well perfused  Lungs: unlabored respirations  Back: No CVA tenderness  Abd: Soft, non-tender, non-distended, no rebound or guarding  :  No bleeding on pad. External labia wnl. Bimanual exam with no cervical motion tenderness, uterus wnl, adnexa non palpable b/l.  Cervix closed.  Speculum Exam: No active bleeding from os. Physiologic discharge.    Ext: non-tender b/l, no edema       LABS:             HCG Quantitative, Serum: 1119.0: For pregnancy evaluation the reference values are as follows:   Negative: <5 mIU/mL   Indeterminate: 5-25 mIU/mL (suggest repeat testing in 72hrs)   Positive: >25 mIU/mL   Note: hCG results of false positive and false negative for pregnancy are   rare, but can occur with this, and other hCG tests. Hilda- and   post-menopausal females may have mildly elevated hCG concentrations,   usually less than 14 mIU/mL, that are constant over time.   Weeks of pregnancy: mIU/mL   ------------------ -------   3 6 - 71   4 10 - 750   5 220 - 7,100   6 160 - 32,000   7 3,700 - 164,000   8 32,000 - 150,000   9 64,000 - 151,000   10 47,000 - 187,000   12 28,000 - 211,000   14 14,000 - 63,000   15 12,000 - 71,000   16 - 18 8,000 - 58,000 mIU/mL (23 @ 20:53)              13.3   8.92  )-----------( 323      ( 2023 20:53 )             40.7     07-03    138  |  102  |  9   ----------------------------<  91  4.0   |  22  |  0.62    Ca    9.3      2023 20:53    TPro  7.8  /  Alb  4.8  /  TBili  0.2  /  DBili  x   /  AST  18  /  ALT  24  /  AlkPhos  49  07-03          Urinalysis Basic - ( 2023 20:53 )    Color: x / Appearance: x / SG: x / pH: x  Gluc: 91 mg/dL / Ketone: x  / Bili: x / Urobili: x   Blood: x / Protein: x / Nitrite: x   Leuk Esterase: x / RBC: x / WBC x   Sq Epi: x / Non Sq Epi: x / Bacteria: x

## 2023-07-03 NOTE — ED ADULT NURSE NOTE - NSFALLOOBATTEMPT_ED_ALL_ED
4 6 cm infrarenal abdominal aortic aneurysm  We discussed the findings on recent CT and aortic duplex along with the pathophysiology of aneurysmal disease and the indications for further evaluation and treatment  At this point no further intervention is necessary other than duplex follow-up in 6 months  From a vascular standpoint there is no contraindication to proceeding with TAVR 
No

## 2023-07-03 NOTE — CONSULT NOTE ADULT - ASSESSMENT
A/P: 41yo [id number] (LMP 5/10) presents POD#3 s/p DVC for MAB for f/u bHCG. bHCG downtrended appropriately s/p DVC [12102()->1119(7/3)], indicating that POC removed with procedure. Patient without complaints today, hemodynamically stable, and progressing appropriately post-operatively.  -Recommend patient follow up for post-op appointment and repeat bHCG at [facility] clinic in ~1 week  -Patient to remain on GYN follow-up list to follow up pathology with patient    D/w Dr. Jon Frankel, PGY-2

## 2023-07-03 NOTE — ED PROVIDER NOTE - PATIENT PORTAL LINK FT
You can access the FollowMyHealth Patient Portal offered by [facility] by registering at the following website: [email or web address]. By joining [facility]’s FollowMyHealth portal, you will also be able to view your health information using other applications (apps) compatible with our system.

## 2023-07-03 NOTE — ED PROVIDER NOTE - NSFOLLOWUPINSTRUCTIONS_ED_ALL_ED_FT
[facility]  OB/GYN & Surg  [phone] [place] [address]  Phone: [phone]  In 1 week     Abdominal Pain in Pregnancy    WHAT YOU NEED TO KNOW:    Abdominal pain during pregnancy is common. Some of the causes include heartburn, constipation, gas, false labor, and round ligament pain. Round ligament pain is caused by stretching of the ligaments that support your uterus. Abdominal pain may be caused by a health problem, such as a stomach virus or appendicitis (inflammation of the appendix). The pain may also be caused by a problem with your pregnancy, such as a threatened miscarriage or  labor.    DISCHARGE INSTRUCTIONS:    Call your local emergency number (911 in the ) if:    You have a fast heartbeat.    You have shortness of breath.    You feel lightheaded or faint.  Return to the emergency department if:    You have sudden, severe pain or cramps that are so bad that you cannot walk or talk.    You have vaginal bleeding or discharge.    You have nausea, vomiting, fever, and severe pain on your right side.  Call your obstetrician if:    You have light vaginal bleeding or spotting.    You continue to have abdominal pain that cannot be relieved.    You have a fever.    You have questions or concerns about your condition or care.  Medicines: Ask your healthcare provider before you take any medicine during pregnancy, including over-the-counter pain medicines.    Acetaminophen may be recommended. Ask how much to take and how often to take it. Follow directions. Acetaminophen can cause liver damage if not taken correctly. Your provider will tell you how much is safe to take each day during pregnancy. Too much medicine can be harmful to your baby. Read the labels of all other medicines you are using to see if they also contain acetaminophen, or ask your doctor or pharmacist.    Take your medicine as directed. Contact your healthcare provider if you think your medicine is not helping or if you have side effects. Tell your provider if you are allergic to any medicine. Keep a list of the medicines, vitamins, and herbs you take. Include the amounts, and when and why you take them. Bring the list or the pill bottles to follow-up visits. Carry your medicine list with you in case of an emergency.  Self-care:    Rest as needed. Rest may help to relieve pain. Your healthcare provider may recommend that you rest on your side instead of on your back. He or she may tell you to lie on your left side, if possible. Place a pillow under your abdomen. Keep another pillow between your knees. Ask your provider about other ways to relieve this pain, such as a supportive belt or pregnancy exercises.    Do not lie flat in bed or bend over if you have heartburn. Ask your obstetrician if you should make any changes to the foods you eat. Ask if you can take any medicines for heartburn.  Prevent GERD      Move slowly. Avoid quick changes in position or movements that cause pain.    Exercise as directed. Gentle exercise can keep the ligaments loose and strengthen core (abdominal) muscles. An example is swimming, or a yoga program designed for pregnancy. Ask your healthcare provider which exercises are safe for you and how often to exercise. For most healthy women, a good goal is to try to get at least 30 minutes of exercise every day. If activity causes pain, try not to walk too long or too far at one time. Break your exercise up into short amounts.  Walking During Pregnancy      Apply a warm compress to the area. Warmth can relieve pain and muscle spasms. Ask your healthcare provider if you can take a warm bath or use a heating pad. Keep all heat settings low. High heat can be dangerous for your baby. Do not sit in a hot tub or use hot water in your bath. You may also be able to massage the area gently while you are applying heat. Massage can help relieve pain.    Eat more fiber and drink more liquids to relieve constipation. Fiber is found in fruits, vegetables, and whole-grain foods, such as whole-wheat bread and cereals. Ask how much liquid to drink each day and which liquids are best for you.    Follow up with your obstetrician within 3 days or as directed: Write down your questions so you remember to ask them during your visits.

## 2023-07-03 NOTE — ED ADULT NURSE NOTE - OBJECTIVE STATEMENT
Patient received in intake. A&O4. Ambulatory. Came into ED to get HCG levels checked. States getting D&C on friday and needs follow up hcg levels. Patient appears well. No signs of acute distress noted. respirations even and unlabored. Denies SOB, Chest pain, Vomiting, diarrhea. Endorses nausea. 20g IV placed right ac. Labs drawn and sent. Stretcher in lowest position. Comfort and safety maintained.

## 2023-07-07 LAB — SURGICAL PATHOLOGY STUDY: SIGNIFICANT CHANGE UP

## 2023-07-12 NOTE — HISTORY OF PRESENT ILLNESS
[FreeTextEntry1] : 41 yo G 15 P2 0 12 2 (LMP 5/10) who presents today for f/u of PUL. Pt initially seen in clinic on 6/7 for a second opinion on highly desired pregnancy in context of inappropriately rising bHCG with outside provider expressing c/f ectopic pregnancy. \par \par Pt was seen by Dr. Boyle OBGYN at [facility], who noted inappropriate bHCG rise (see below). She underwent a TVUS on 5/30 with ?GSx2 vs a misformed fluid collection, no adnexal masses. She was counseled on MTX for possible ectopic pregnancy at the time of that sono. However, given highly desired nature of the current pregnancy, patient went to see Dr. Fuentes for re-eval who believed the pregnancy to be an IUP. She then came to our clinic on 6/7. bHCG was noted to have increased on 6/7 relative to 6/1 but not appropriately. Bedside sono was performed, which demonstrated empty GS x2 (both between 7-10mm), no YS/FP/HR. Pt was counseled on the need for D&C, which would be added on for the following day, to confirm failed IUP vs. ectopic pregnancy. Patient was then contacted multiple times in attempt to schedule surgery but did not answer the phone. As of 6/9, a certified letter was sent to the patient given multiple attempts to contact. However, patient presents to clinic today for f/u. \par \par Pt states that she was intentionally ignoring our calls because she wanted to "see what would happen" with time. She reports light vaginal spotting since end of May/early June but states that she passed a clot on Tuesday, 6/20 and had heavier VB on on 6/24 - 25. She denies any abdominal pain or cramping. Denies fevers/chills, HA/dizziness/lightheadedness, CP/SOB. \par \par \par OBHx: TOP x11 (MVAs and mTOPs at [facility] x10); NSVDx1, C/Sx1 (2014, 2017); SAB@12w requiring admission for heavy vaginal bleeding(2015); h/o preeclampsia, postpartum-associated moderate-severe mitral regurgitation, gestational diabetes\par GynHx: s/p Mirena IUD, denies fibroids, cysts, abd pap, STIs\par PMH: mitral regurgitation, asthma, pulm nodules\par PSH: MVAs (unk #), pLTCSx1, tonsillectomy, D&C for TOP@11w w/ Mirena IUD insertion in 2017\par All: NKDA\par

## 2023-07-12 NOTE — PROCEDURE
[Transvaginal OB Sonogram] : Transvaginal OB Sonogram [Fetal Heart] : no fetal heart [Yolk Sac] : no yolk sac [FreeTextEntry1] : double gestational sac with no YS or FP

## 2023-07-12 NOTE — PLAN
[FreeTextEntry1] : 41yo G 15 P 2 0 12 2 (LMP 5/10) presents for management of PUL after being lost to f/u following initial apt on . Pt initially seen in clinic on  for third opinion on PUL as pregnancy was highly desired. \par TVUS on  demonstrated empty GS x2 (both between 7-10mm), no YS/FP/HR, consistent with TVUS findings on  with original OB, Dr. Boyle. [facility] [place] inappropriately (trend below). Patient was counseled on PUL and the fact that ectopic pregnancy could not be ruled out based on sonographic findings of empty GS and recommendation was made for urgent D&C to confirmed failed pregnancy vs ectopic pregnancy. Pt was subsequently contacted on multiple occasions in attempt to schedule D&C but could not be reached and certified letter was therefore sent. \par \par B-hC ()-> 8026 ()->9862 ()->25809 () -> 17,911 ()\par \par Today, patient returns to clinic with similar complaints as initial visit - light vaginal bleeding + spotting but no pelvic pain + cramping. B-hCG today 17,911. Bedside TVUS demonstrating two empty GS with no FP or YS and no adnexal masses. PE remarkable for light vaginal bleeding but no abdominal or pelvic pain/tenderness. VSS. Pt. re-counseled on PUL and the fact that an ectopic pregnancy could not be ruled out without evidence of at least a YS in the uterus. Counseled on MTX empirically vs. original plan for D&C +/- MTX. Pt does not desire proceeding with MTX and would prefer to have D&C to eval for POC in uterus. She understands that she will need MTX with close f/u if there are no POC identified on D&C. Risks of ectopic pregnancy were again reviewed, including risks of rupture and internal bleeding which could be life threatening. Strict ED precautions were reviewed with patient - heavy VB, new onset abdominal/pelvic pain that is persistent, s/s of infection, HA/dizziness/lightheadedness. \par \par - gyn team made aware, pt re-added to beta list \par - booking sheet completed and submitted to ; f/u email to be sent \par - best contact # for patient: [phone] with partner as back up [phone]. Partner is aware of entire situation \par \par d/w Dr. Holley \par ÁNGELA Schwarz, PGY-3

## 2023-07-12 NOTE — REVIEW OF SYSTEMS
[Abn Vaginal bleeding] : abnormal vaginal bleeding [Negative] : Gastrointestinal [Urgency] : no urgency [Urethral Discharge] : no urethral discharge [Genital Rash/Irritation] : no genital rash/irritation

## 2023-07-12 NOTE — PHYSICAL EXAM
[Chaperone Present] : A chaperone was present in the examining room during all aspects of the physical examination [Appropriately responsive] : appropriately responsive [Soft] : soft [Non-tender] : non-tender [Non-distended] : non-distended [No Mass] : no mass [Labia Majora] : normal [Labia Minora] : normal [Moderate] : There was moderate vaginal bleeding [Normal] : normal [Uterine Adnexae] : non-palpable [Tenderness] : nontender

## 2023-08-08 NOTE — ASU PREOP CHECKLIST - HEIGHT IN INCHES
1 [General Appearance - Well Developed] : well developed [Normal Appearance] : normal appearance [Well Groomed] : well groomed [General Appearance - Well Nourished] : well nourished [No Deformities] : no deformities [General Appearance - In No Acute Distress] : no acute distress [Normal Conjunctiva] : the conjunctiva exhibited no abnormalities [Normal Oral Mucosa] : normal oral mucosa [Normal Jugular Venous A Waves Present] : normal jugular venous A waves present [Normal Jugular Venous V Waves Present] : normal jugular venous V waves present [No Jugular Venous Chairez A Waves] : no jugular venous chairez A waves [] : no respiratory distress [Respiration, Rhythm And Depth] : normal respiratory rhythm and effort [Exaggerated Use Of Accessory Muscles For Inspiration] : no accessory muscle use [Bowel Sounds] : normal bowel sounds [Auscultation Breath Sounds / Voice Sounds] : lungs were clear to auscultation bilaterally [Abdomen Soft] : soft [Abdomen Tenderness] : non-tender [Abnormal Walk] : normal gait [Gait - Sufficient For Exercise Testing] : the gait was sufficient for exercise testing [Nail Clubbing] : no clubbing of the fingernails [Cyanosis, Localized] : no localized cyanosis [Skin Color & Pigmentation] : normal skin color and pigmentation [Skin Turgor] : normal skin turgor [Oriented To Time, Place, And Person] : oriented to person, place, and time [Impaired Insight] : insight and judgment were intact [No Anxiety] : not feeling anxious [Normal] : normal [No Precordial Heave] : no precordial heave was noted [Normal Rate] : normal [Normal S1] : normal S1 [Normal S2] : normal S2 [No Murmur] : no murmurs heard [2+] : left 2+ [No Abnormalities] : the abdominal aorta was not enlarged and no bruit was heard [No Pitting Edema] : no pitting edema present [Apical Thrill] : no thrill palpable at the apex [S3] : no S3 [S4] : no S4 [Right Carotid Bruit] : no bruit heard over the right carotid [Left Carotid Bruit] : no bruit heard over the left carotid [Right Femoral Bruit] : no bruit heard over the right femoral artery [Left Femoral Bruit] : no bruit heard over the left femoral artery

## 2024-02-29 ENCOUNTER — APPOINTMENT (OUTPATIENT)
Dept: FAMILY MEDICINE | Facility: CLINIC | Age: 44
End: 2024-02-29
Payer: MEDICAID

## 2024-02-29 VITALS
DIASTOLIC BLOOD PRESSURE: 82 MMHG | TEMPERATURE: 97.8 F | HEART RATE: 86 BPM | RESPIRATION RATE: 16 BRPM | HEIGHT: 61 IN | SYSTOLIC BLOOD PRESSURE: 132 MMHG | OXYGEN SATURATION: 99 % | BODY MASS INDEX: 28.89 KG/M2 | WEIGHT: 153 LBS

## 2024-02-29 DIAGNOSIS — J06.9 ACUTE UPPER RESPIRATORY INFECTION, UNSPECIFIED: ICD-10-CM

## 2024-02-29 DIAGNOSIS — Z00.00 ENCOUNTER FOR GENERAL ADULT MEDICAL EXAMINATION W/OUT ABNORMAL FINDINGS: ICD-10-CM

## 2024-02-29 PROCEDURE — 99213 OFFICE O/P EST LOW 20 MIN: CPT

## 2024-02-29 NOTE — ASSESSMENT
[FreeTextEntry1] : Likely suffering from viral upper respiratory infection. Will screen with flu panel Discussed symptomatic relief including warm liquids, tea and honey, Mucinex DM Continue to take albuterol as needed If symptoms worsen report to ED Return to office if symptoms persist

## 2024-02-29 NOTE — REVIEW OF SYSTEMS
[Fatigue] : fatigue [Nasal Discharge] : nasal discharge [Sore Throat] : sore throat [Postnasal Drip] : postnasal drip [Shortness Of Breath] : shortness of breath [Cough] : cough [Headache] : headache [Negative] : Heme/Lymph

## 2024-02-29 NOTE — PHYSICAL EXAM
[No Acute Distress] : no acute distress [Well Nourished] : well nourished [Well-Appearing] : well-appearing [Well Developed] : well developed [Normal Sclera/Conjunctiva] : normal sclera/conjunctiva [PERRL] : pupils equal round and reactive to light [EOMI] : extraocular movements intact [Normal Outer Ear/Nose] : the outer ears and nose were normal in appearance [Normal Oropharynx] : the oropharynx was normal [No JVD] : no jugular venous distention [Supple] : supple [No Lymphadenopathy] : no lymphadenopathy [Thyroid Normal, No Nodules] : the thyroid was normal and there were no nodules present [No Respiratory Distress] : no respiratory distress  [No Accessory Muscle Use] : no accessory muscle use [Clear to Auscultation] : lungs were clear to auscultation bilaterally [Normal Rate] : normal rate  [Regular Rhythm] : with a regular rhythm [Normal S1, S2] : normal S1 and S2 [No Murmur] : no murmur heard [No Abdominal Bruit] : a ~M bruit was not heard ~T in the abdomen [No Carotid Bruits] : no carotid bruits [No Varicosities] : no varicosities [No Edema] : there was no peripheral edema [Pedal Pulses Present] : the pedal pulses are present [No Palpable Aorta] : no palpable aorta [No Extremity Clubbing/Cyanosis] : no extremity clubbing/cyanosis [Soft] : abdomen soft [Non Tender] : non-tender [Non-distended] : non-distended [No Masses] : no abdominal mass palpated [No HSM] : no HSM [Normal Posterior Cervical Nodes] : no posterior cervical lymphadenopathy [Normal Bowel Sounds] : normal bowel sounds [Normal Anterior Cervical Nodes] : no anterior cervical lymphadenopathy [No Spinal Tenderness] : no spinal tenderness [No CVA Tenderness] : no CVA  tenderness [No Joint Swelling] : no joint swelling [Grossly Normal Strength/Tone] : grossly normal strength/tone [No Rash] : no rash [Coordination Grossly Intact] : coordination grossly intact [No Focal Deficits] : no focal deficits [Normal Gait] : normal gait [Normal Affect] : the affect was normal [Deep Tendon Reflexes (DTR)] : deep tendon reflexes were 2+ and symmetric [Normal Insight/Judgement] : insight and judgment were intact [de-identified] : Injected nasal turbinates and posterior pharynx

## 2024-03-01 LAB
INFLUENZA A RESULT: NOT DETECTED
INFLUENZA B RESULT: NOT DETECTED
RESP SYN VIRUS RESULT: NOT DETECTED
SARS-COV-2 RESULT: DETECTED

## 2024-04-02 NOTE — PATIENT PROFILE ADULT - HCP AGENT'S NAME
Instructions: This plan will send the code FBSE to the PM system.  DO NOT or CHANGE the price. Detail Level: Generalized Price (Do Not Change): 0.00 Anshu Cobb

## 2024-04-23 ENCOUNTER — APPOINTMENT (OUTPATIENT)
Dept: FAMILY MEDICINE | Facility: CLINIC | Age: 44
End: 2024-04-23
Payer: MEDICAID

## 2024-04-23 VITALS
OXYGEN SATURATION: 97 % | BODY MASS INDEX: 27.94 KG/M2 | WEIGHT: 148 LBS | HEART RATE: 98 BPM | RESPIRATION RATE: 16 BRPM | SYSTOLIC BLOOD PRESSURE: 130 MMHG | DIASTOLIC BLOOD PRESSURE: 80 MMHG | HEIGHT: 61 IN | TEMPERATURE: 97.5 F

## 2024-04-23 DIAGNOSIS — N91.2 AMENORRHEA, UNSPECIFIED: ICD-10-CM

## 2024-04-23 PROCEDURE — 99213 OFFICE O/P EST LOW 20 MIN: CPT

## 2024-04-24 LAB
ALBUMIN SERPL ELPH-MCNC: 4.5 G/DL
ALP BLD-CCNC: 56 U/L
ALT SERPL-CCNC: 16 U/L
ANION GAP SERPL CALC-SCNC: 17 MMOL/L
APPEARANCE: CLEAR
AST SERPL-CCNC: 19 U/L
BACTERIA: NEGATIVE /HPF
BASOPHILS # BLD AUTO: 0.05 K/UL
BASOPHILS NFR BLD AUTO: 0.6 %
BILIRUB SERPL-MCNC: 0.4 MG/DL
BILIRUBIN URINE: NEGATIVE
BLOOD URINE: NEGATIVE
BUN SERPL-MCNC: 11 MG/DL
C TRACH RRNA SPEC QL NAA+PROBE: NOT DETECTED
CALCIUM SERPL-MCNC: 9.4 MG/DL
CAST: 0 /LPF
CHLORIDE SERPL-SCNC: 105 MMOL/L
CHOLEST SERPL-MCNC: 184 MG/DL
CO2 SERPL-SCNC: 18 MMOL/L
COLOR: YELLOW
CREAT SERPL-MCNC: 0.68 MG/DL
EGFR: 111 ML/MIN/1.73M2
EOSINOPHIL # BLD AUTO: 0.15 K/UL
EOSINOPHIL NFR BLD AUTO: 1.9 %
EPITHELIAL CELLS: 1 /HPF
ESTIMATED AVERAGE GLUCOSE: 114 MG/DL
GLUCOSE QUALITATIVE U: NEGATIVE MG/DL
GLUCOSE SERPL-MCNC: 97 MG/DL
HBA1C MFR BLD HPLC: 5.6 %
HCG SERPL-MCNC: <1 MIU/ML
HCT VFR BLD CALC: 41.9 %
HCV RNA SERPL NAA+PROBE-LOG IU: NOT DETECTED LOGIU/ML
HDLC SERPL-MCNC: 60 MG/DL
HEPC RNA INTERP: NOT DETECTED
HGB BLD-MCNC: 14.1 G/DL
HIV1+2 AB SPEC QL IA.RAPID: NONREACTIVE
IMM GRANULOCYTES NFR BLD AUTO: 0.2 %
KETONES URINE: NEGATIVE MG/DL
LDLC SERPL CALC-MCNC: 110 MG/DL
LEUKOCYTE ESTERASE URINE: ABNORMAL
LYMPHOCYTES # BLD AUTO: 2.62 K/UL
LYMPHOCYTES NFR BLD AUTO: 32.6 %
MAN DIFF?: NORMAL
MCHC RBC-ENTMCNC: 29.2 PG
MCHC RBC-ENTMCNC: 33.7 GM/DL
MCV RBC AUTO: 86.7 FL
MICROSCOPIC-UA: NORMAL
MONOCYTES # BLD AUTO: 0.58 K/UL
MONOCYTES NFR BLD AUTO: 7.2 %
N GONORRHOEA RRNA SPEC QL NAA+PROBE: NOT DETECTED
NEUTROPHILS # BLD AUTO: 4.62 K/UL
NEUTROPHILS NFR BLD AUTO: 57.5 %
NITRITE URINE: NEGATIVE
NONHDLC SERPL-MCNC: 124 MG/DL
PH URINE: 7.5
PLATELET # BLD AUTO: 375 K/UL
POTASSIUM SERPL-SCNC: 5.1 MMOL/L
PROT SERPL-MCNC: 7.3 G/DL
PROTEIN URINE: NEGATIVE MG/DL
RBC # BLD: 4.83 M/UL
RBC # FLD: 12.5 %
RED BLOOD CELLS URINE: 0 /HPF
SODIUM SERPL-SCNC: 139 MMOL/L
SOURCE AMPLIFICATION: NORMAL
SPECIFIC GRAVITY URINE: 1.01
T PALLIDUM AB SER QL IA: NEGATIVE
TRIGL SERPL-MCNC: 76 MG/DL
TSH SERPL-ACNC: 1.27 UIU/ML
UROBILINOGEN URINE: 0.2 MG/DL
WBC # FLD AUTO: 8.04 K/UL
WHITE BLOOD CELLS URINE: 0 /HPF

## 2024-04-29 NOTE — ASSESSMENT
[FreeTextEntry1] : Appears to be suffering from contact dermatitis.  Will hold off 1 day to await pregnancy test results.  If negative will treat with Medrol Dosepak Can take antihistamine

## 2024-04-29 NOTE — PHYSICAL EXAM
[Normal] : normal rate, regular rhythm, normal S1 and S2 and no murmur heard [de-identified] : Pinkish papular generalized rash noted on bilateral shins.  Confluent pinkish papular rash noted on right upper arm, and in bilateral axilla's

## 2024-04-29 NOTE — HISTORY OF PRESENT ILLNESS
[de-identified] : Spreading itchy peripheral rash that has been worsening for 5 days.  Has been performing yard work 1-2 days prior to rash to develops.  Was covered while gardening.  Has taken zyrtec   [id number]-believes she is on a week or 2 late for her menstruation

## 2024-05-07 ENCOUNTER — LABORATORY RESULT (OUTPATIENT)
Age: 44
End: 2024-05-07

## 2024-05-07 ENCOUNTER — APPOINTMENT (OUTPATIENT)
Dept: FAMILY MEDICINE | Facility: CLINIC | Age: 44
End: 2024-05-07
Payer: MEDICAID

## 2024-05-07 VITALS
SYSTOLIC BLOOD PRESSURE: 128 MMHG | OXYGEN SATURATION: 98 % | RESPIRATION RATE: 16 BRPM | HEART RATE: 71 BPM | DIASTOLIC BLOOD PRESSURE: 78 MMHG | BODY MASS INDEX: 27.75 KG/M2 | TEMPERATURE: 97.6 F | WEIGHT: 147 LBS | HEIGHT: 61 IN

## 2024-05-07 DIAGNOSIS — R91.1 SOLITARY PULMONARY NODULE: ICD-10-CM

## 2024-05-07 DIAGNOSIS — J45.909 UNSPECIFIED ASTHMA, UNCOMPLICATED: ICD-10-CM

## 2024-05-07 DIAGNOSIS — L30.9 DERMATITIS, UNSPECIFIED: ICD-10-CM

## 2024-05-07 PROCEDURE — 99214 OFFICE O/P EST MOD 30 MIN: CPT

## 2024-05-07 NOTE — REVIEW OF SYSTEMS
[Shortness Of Breath] : shortness of breath [Cough] : cough [Skin Rash] : skin rash [Negative] : Heme/Lymph

## 2024-05-07 NOTE — ASSESSMENT
[FreeTextEntry1] : Likely asthma exacerbation and contact dermatitis. Will recommend to repeat Medrol Dosepak and continue Zyrtec. Will also add montelukast and will cover with a Z-Tyrone due to persistent nature of cough and inflamed nasal turbinates. Continue with symptomatic relief  Follow-up in 10 days  Patient is asking for allergy workup-discussed the risk versus benefits of serum screenings.  Will send asthmatic screening to further assess.  Strong recommendation is for patient to follow-up with pulmonology to further evaluate and treat asthma and to continue to follow-up pulmonary nodules  Would be appropriate to repeat surveillance CT screening chest.

## 2024-05-07 NOTE — PHYSICAL EXAM
[No Acute Distress] : no acute distress [Well Nourished] : well nourished [Well Developed] : well developed [Well-Appearing] : well-appearing [Normal Sclera/Conjunctiva] : normal sclera/conjunctiva [PERRL] : pupils equal round and reactive to light [EOMI] : extraocular movements intact [Normal Outer Ear/Nose] : the outer ears and nose were normal in appearance [Normal Oropharynx] : the oropharynx was normal [No JVD] : no jugular venous distention [No Lymphadenopathy] : no lymphadenopathy [Supple] : supple [Thyroid Normal, No Nodules] : the thyroid was normal and there were no nodules present [No Respiratory Distress] : no respiratory distress  [No Accessory Muscle Use] : no accessory muscle use [Clear to Auscultation] : lungs were clear to auscultation bilaterally [Normal Rate] : normal rate  [Regular Rhythm] : with a regular rhythm [Normal S1, S2] : normal S1 and S2 [No Murmur] : no murmur heard [No Carotid Bruits] : no carotid bruits [No Abdominal Bruit] : a ~M bruit was not heard ~T in the abdomen [No Varicosities] : no varicosities [Pedal Pulses Present] : the pedal pulses are present [No Edema] : there was no peripheral edema [No Palpable Aorta] : no palpable aorta [No Extremity Clubbing/Cyanosis] : no extremity clubbing/cyanosis [Soft] : abdomen soft [Non Tender] : non-tender [Non-distended] : non-distended [No Masses] : no abdominal mass palpated [No HSM] : no HSM [Normal Bowel Sounds] : normal bowel sounds [Normal Posterior Cervical Nodes] : no posterior cervical lymphadenopathy [Normal Anterior Cervical Nodes] : no anterior cervical lymphadenopathy [No CVA Tenderness] : no CVA  tenderness [No Spinal Tenderness] : no spinal tenderness [No Joint Swelling] : no joint swelling [Grossly Normal Strength/Tone] : grossly normal strength/tone [No Rash] : no rash [Coordination Grossly Intact] : coordination grossly intact [No Focal Deficits] : no focal deficits [Normal Gait] : normal gait [Deep Tendon Reflexes (DTR)] : deep tendon reflexes were 2+ and symmetric [Normal Affect] : the affect was normal [Normal Insight/Judgement] : insight and judgment were intact [de-identified] : Confluent erythematous papular rash noted on proximal aspect of chest

## 2024-05-07 NOTE — PHYSICAL EXAM
[No Acute Distress] : no acute distress [Well Nourished] : well nourished [Well Developed] : well developed [Well-Appearing] : well-appearing [Normal Sclera/Conjunctiva] : normal sclera/conjunctiva [PERRL] : pupils equal round and reactive to light [EOMI] : extraocular movements intact [Normal Outer Ear/Nose] : the outer ears and nose were normal in appearance [Normal Oropharynx] : the oropharynx was normal [No JVD] : no jugular venous distention [No Lymphadenopathy] : no lymphadenopathy [Supple] : supple [Thyroid Normal, No Nodules] : the thyroid was normal and there were no nodules present [No Respiratory Distress] : no respiratory distress  [No Accessory Muscle Use] : no accessory muscle use [Clear to Auscultation] : lungs were clear to auscultation bilaterally [Normal Rate] : normal rate  [Regular Rhythm] : with a regular rhythm [Normal S1, S2] : normal S1 and S2 [No Murmur] : no murmur heard [No Carotid Bruits] : no carotid bruits [No Abdominal Bruit] : a ~M bruit was not heard ~T in the abdomen [No Varicosities] : no varicosities [Pedal Pulses Present] : the pedal pulses are present [No Edema] : there was no peripheral edema [No Palpable Aorta] : no palpable aorta [No Extremity Clubbing/Cyanosis] : no extremity clubbing/cyanosis [Soft] : abdomen soft [Non Tender] : non-tender [Non-distended] : non-distended [No Masses] : no abdominal mass palpated [No HSM] : no HSM [Normal Bowel Sounds] : normal bowel sounds [Normal Posterior Cervical Nodes] : no posterior cervical lymphadenopathy [Normal Anterior Cervical Nodes] : no anterior cervical lymphadenopathy [No CVA Tenderness] : no CVA  tenderness [No Spinal Tenderness] : no spinal tenderness [No Joint Swelling] : no joint swelling [Grossly Normal Strength/Tone] : grossly normal strength/tone [No Rash] : no rash [Coordination Grossly Intact] : coordination grossly intact [No Focal Deficits] : no focal deficits [Normal Gait] : normal gait [Deep Tendon Reflexes (DTR)] : deep tendon reflexes were 2+ and symmetric [Normal Affect] : the affect was normal [Normal Insight/Judgement] : insight and judgment were intact [de-identified] : Confluent erythematous papular rash noted on proximal aspect of chest

## 2024-05-09 NOTE — HISTORY OF PRESENT ILLNESS
[de-identified] : For about 1 week developed itchy burning rash noted in right upper chest that developed about a day after sitting outside for an extended period of time.  Had similar rashes about 2 weeks ago that resolved with Medrol Dosepak.  For 2 weeks now has been having a very persistent dry cough with shortness of breath.  Has continued Flovent inhaler.  Denies any fever or chills.  Denies any mucus production.  Wheezing is minimal.  Does have history of lung nodule-has stopped following up with pulmonology since pulmonologist retired.  Overdue for surveillance scan of nodule.  Will attempt to order [FreeTextEntry8] : Recurrent rash noted on right upper chest that developed about three to 4 days ago.  Reports spreading  itchy / burning rash.  Was very itchy after being outside with exposed area open.  Has taken zytec with minimal relief.  Was evaluated on 04/23 with similar symptoms and was given medrol dose. Pack initial rash did resolve, but continues to have mild axillary urticaria.   Pateint present s with persistent dry cough and sob. HAs taken flovent since cough started. Overall worsening.

## 2024-05-09 NOTE — HISTORY OF PRESENT ILLNESS
[de-identified] : For about 1 week developed itchy burning rash noted in right upper chest that developed about a day after sitting outside for an extended period of time.  Had similar rashes about 2 weeks ago that resolved with Medrol Dosepak.  For 2 weeks now has been having a very persistent dry cough with shortness of breath.  Has continued Flovent inhaler.  Denies any fever or chills.  Denies any mucus production.  Wheezing is minimal.  Does have history of lung nodule-has stopped following up with pulmonology since pulmonologist retired.  Overdue for surveillance scan of nodule.  Will attempt to order [FreeTextEntry8] : Recurrent rash noted on right upper chest that developed about three to 4 days ago.  Reports spreading  itchy / burning rash.  Was very itchy after being outside with exposed area open.  Has taken zytec with minimal relief.  Was evaluated on 04/23 with similar symptoms and was given medrol dose. Pack initial rash did resolve, but continues to have mild axillary urticaria.   Pateint present s with persistent dry cough and sob. HAs taken flovent since cough started. Overall worsening.

## 2024-05-14 LAB
A ALTERNATA IGE QN: 0.68 KUA/L
A FUMIGATUS IGE QN: <0.1 KUA/L
ALMOND IGE QN: <0.1 KUA/L
BOXELDER IGE QN: <0.1 KUA/L
BRAZIL NUT IGE QN: <0.1 KUA/L
C ALBICANS IGE QN: <0.1 KUA/L
C HERBARUM IGE QN: <0.1 KUA/L
CASHEW NUT IGE QN: <0.1 KUA/L
CAT DANDER IGE QN: <0.1 KUA/L
CEDAR IGE QN: <0.1 KUA/L
COCKSFOOT IGE QN: <0.1 KUA/L
CODFISH IGE QN: <0.1 KUA/L
COMMON RAGWEED IGE QN: <0.1 KUA/L
COW MILK IGE QN: <0.1 KUA/L
D FARINAE IGE QN: <0.1 KUA/L
D PTERONYSS IGE QN: <0.1 KUA/L
DEPRECATED A ALTERNATA IGE RAST QL: 1 (ref 0–?)
DEPRECATED A FUMIGATUS IGE RAST QL: 0 (ref 0–?)
DEPRECATED ALMOND IGE RAST QL: 0 (ref 0–?)
DEPRECATED BOXELDER IGE RAST QL: 0 (ref 0–?)
DEPRECATED BRAZIL NUT IGE RAST QL: 0 (ref 0–?)
DEPRECATED C ALBICANS IGE RAST QL: 0
DEPRECATED C HERBARUM IGE RAST QL: 0 (ref 0–?)
DEPRECATED CASHEW NUT IGE RAST QL: 0 (ref 0–?)
DEPRECATED CAT DANDER IGE RAST QL: 0 (ref 0–?)
DEPRECATED CEDAR IGE RAST QL: 0
DEPRECATED COCKSFOOT IGE RAST QL: 0
DEPRECATED CODFISH IGE RAST QL: 0 (ref 0–?)
DEPRECATED COMMON RAGWEED IGE RAST QL: 0 (ref 0–?)
DEPRECATED COW MILK IGE RAST QL: 0 (ref 0–?)
DEPRECATED D FARINAE IGE RAST QL: 0 (ref 0–?)
DEPRECATED D PTERONYSS IGE RAST QL: 0 (ref 0–?)
DEPRECATED DOG DANDER IGE RAST QL: 0 (ref 0–?)
DEPRECATED EGG WHITE IGE RAST QL: 0 (ref 0–?)
DEPRECATED GIANT RAGWEED IGE RAST QL: 0
DEPRECATED HAZELNUT IGE RAST QL: 0 (ref 0–?)
DEPRECATED KENT BLUE GRASS IGE RAST QL: 0
DEPRECATED M RACEMOSUS IGE RAST QL: 0
DEPRECATED PEANUT IGE RAST QL: 0 (ref 0–?)
DEPRECATED RED TOP GRASS IGE RAST QL: 0
DEPRECATED ROACH IGE RAST QL: 0 (ref 0–?)
DEPRECATED SALMON IGE RAST QL: 0 (ref 0–?)
DEPRECATED SCALLOP IGE RAST QL: <0.1 KUA/L
DEPRECATED SESAME SEED IGE RAST QL: 0 (ref 0–?)
DEPRECATED SHRIMP IGE RAST QL: 0 (ref 0–?)
DEPRECATED SILVER BIRCH IGE RAST QL: 0 (ref 0–?)
DEPRECATED SOYBEAN IGE RAST QL: 0 (ref 0–?)
DEPRECATED TIMOTHY IGE RAST QL: 0 (ref 0–?)
DEPRECATED TUNA IGE RAST QL: 0 (ref 0–?)
DEPRECATED WALNUT IGE RAST QL: 0 (ref 0–?)
DEPRECATED WHEAT IGE RAST QL: 0 (ref 0–?)
DEPRECATED WHITE HICKORY IGE RAST QL: 0
DEPRECATED WHITE OAK IGE RAST QL: 0 (ref 0–?)
DOG DANDER IGE QN: <0.1 KUA/L
EGG WHITE IGE QN: <0.1 KUA/L
GIANT RAGWEED IGE QN: <0.1 KUA/L
HAZELNUT IGE QN: <0.1 KUA/L
KENT BLUE GRASS IGE QN: <0.1 KUA/L
M RACEMOSUS IGE QN: <0.1 KUA/L
PEANUT IGE QN: <0.1 KUA/L
RED TOP GRASS IGE QN: <0.1 KUA/L
ROACH IGE QN: <0.1 KUA/L
SALMON IGE QN: <0.1 KUA/L
SCALLOP IGE QN: 0 (ref 0–?)
SCALLOP IGE QN: <0.1 KUA/L
SESAME SEED IGE QN: <0.1 KUA/L
SILVER BIRCH IGE QN: <0.1 KUA/L
SOYBEAN IGE QN: <0.1 KUA/L
TIMOTHY IGE QN: <0.1 KUA/L
TOTAL IGE SMQN RAST: 6 KU/L
TUNA IGE QN: <0.1 KUA/L
WALNUT IGE QN: <0.1 KUA/L
WHEAT IGE QN: <0.1 KUA/L
WHITE HICKORY IGE QN: <0.1 KUA/L
WHITE OAK IGE QN: <0.1 KUA/L

## 2024-05-23 NOTE — DISCHARGE NOTE OB - DO YOU HAVE DIFFICULTY CLIMBING STAIRS
none in AM No denies suicide attempts, reports intermittent suicidal ideation pt reports history of trauma

## 2024-06-17 ENCOUNTER — APPOINTMENT (OUTPATIENT)
Dept: OBGYN | Facility: CLINIC | Age: 44
End: 2024-06-17
Payer: MEDICAID

## 2024-06-17 VITALS
BODY MASS INDEX: 27.75 KG/M2 | SYSTOLIC BLOOD PRESSURE: 106 MMHG | HEIGHT: 61 IN | DIASTOLIC BLOOD PRESSURE: 60 MMHG | WEIGHT: 147 LBS

## 2024-06-17 DIAGNOSIS — Z34.90 ENCOUNTER FOR SUPERVISION OF NORMAL PREGNANCY, UNSPECIFIED, UNSPECIFIED TRIMESTER: ICD-10-CM

## 2024-06-17 DIAGNOSIS — O34.219 MATERNAL CARE FOR UNSPECIFIED TYPE SCAR FROM PREVIOUS CESAREAN DELIVERY: ICD-10-CM

## 2024-06-17 DIAGNOSIS — Z86.19 PERSONAL HISTORY OF OTHER INFECTIOUS AND PARASITIC DISEASES: ICD-10-CM

## 2024-06-17 DIAGNOSIS — Z87.59 PERSONAL HISTORY OF OTHER COMPLICATIONS OF PREGNANCY, CHILDBIRTH AND THE PUERPERIUM: ICD-10-CM

## 2024-06-17 DIAGNOSIS — Z87.39 PERSONAL HISTORY OF OTHER DISEASES OF THE MUSCULOSKELETAL SYSTEM AND CONNECTIVE TISSUE: ICD-10-CM

## 2024-06-17 DIAGNOSIS — O99.519 DISEASES OF THE RESPIRATORY SYSTEM COMPLICATING PREGNANCY, UNSPECIFIED TRIMESTER: ICD-10-CM

## 2024-06-17 DIAGNOSIS — O03.4 INCOMPLETE SPONTANEOUS ABORTION W/OUT COMPLICATION: ICD-10-CM

## 2024-06-17 DIAGNOSIS — R39.9 UNSPECIFIED SYMPTOMS AND SIGNS INVOLVING THE GENITOURINARY SYSTEM: ICD-10-CM

## 2024-06-17 DIAGNOSIS — Z11.3 ENCOUNTER FOR SCREENING FOR INFECTIONS WITH A PREDOMINANTLY SEXUAL MODE OF TRANSMISSION: ICD-10-CM

## 2024-06-17 DIAGNOSIS — B02.29 OTHER POSTHERPETIC NERVOUS SYSTEM INVOLVEMENT: ICD-10-CM

## 2024-06-17 DIAGNOSIS — O09.529 SUPERVISION OF ELDERLY MULTIGRAVIDA, UNSPECIFIED TRIMESTER: ICD-10-CM

## 2024-06-17 DIAGNOSIS — J45.909 DISEASES OF THE RESPIRATORY SYSTEM COMPLICATING PREGNANCY, UNSPECIFIED TRIMESTER: ICD-10-CM

## 2024-06-17 DIAGNOSIS — O36.80X0 PREGNANCY WITH INCONCLUSIVE FETAL VIABILITY, NOT APPLICABLE OR UNSPECIFIED: ICD-10-CM

## 2024-06-17 DIAGNOSIS — Z30.09 ENCOUNTER FOR OTHER GENERAL COUNSELING AND ADVICE ON CONTRACEPTION: ICD-10-CM

## 2024-06-17 DIAGNOSIS — M25.512 PAIN IN RIGHT SHOULDER: ICD-10-CM

## 2024-06-17 DIAGNOSIS — Z32.01 ENCOUNTER FOR PREGNANCY TEST, RESULT POSITIVE: ICD-10-CM

## 2024-06-17 DIAGNOSIS — M25.511 PAIN IN RIGHT SHOULDER: ICD-10-CM

## 2024-06-17 DIAGNOSIS — Z87.09 PERSONAL HISTORY OF OTHER DISEASES OF THE RESPIRATORY SYSTEM: ICD-10-CM

## 2024-06-17 LAB
BILIRUB UR QL STRIP: NORMAL
GLUCOSE UR-MCNC: NORMAL
HCG UR QL: 0.2 EU/DL
HCG UR QL: POSITIVE
HGB UR QL STRIP.AUTO: NORMAL
KETONES UR-MCNC: NORMAL
LEUKOCYTE ESTERASE UR QL STRIP: NORMAL
NITRITE UR QL STRIP: NORMAL
PH UR STRIP: 6.5
PROT UR STRIP-MCNC: NORMAL
QUALITY CONTROL: YES
SP GR UR STRIP: 1.01

## 2024-06-17 PROCEDURE — 81025 URINE PREGNANCY TEST: CPT

## 2024-06-17 PROCEDURE — 76817 TRANSVAGINAL US OBSTETRIC: CPT

## 2024-06-17 PROCEDURE — 0501F PRENATAL FLOW SHEET: CPT

## 2024-06-17 PROCEDURE — 81003 URINALYSIS AUTO W/O SCOPE: CPT | Mod: NC,QW

## 2024-06-17 RX ORDER — METHYLPREDNISOLONE 4 MG/1
4 TABLET ORAL
Qty: 1 | Refills: 0 | Status: DISCONTINUED | COMMUNITY
Start: 2024-04-23 | End: 2024-06-17

## 2024-06-17 RX ORDER — MONTELUKAST 10 MG/1
10 TABLET, FILM COATED ORAL
Qty: 30 | Refills: 0 | Status: DISCONTINUED | COMMUNITY
Start: 2021-09-07 | End: 2024-06-17

## 2024-06-17 RX ORDER — METHYLPREDNISOLONE 4 MG/1
4 TABLET ORAL
Qty: 1 | Refills: 1 | Status: DISCONTINUED | COMMUNITY
Start: 2024-05-07 | End: 2024-06-17

## 2024-06-17 RX ORDER — AZITHROMYCIN 250 MG/1
250 TABLET, FILM COATED ORAL
Qty: 1 | Refills: 0 | Status: DISCONTINUED | COMMUNITY
Start: 2024-05-07 | End: 2024-06-17

## 2024-06-17 RX ORDER — FLUTICASONE PROPIONATE 220 UG/1
220 AEROSOL, METERED RESPIRATORY (INHALATION) TWICE DAILY
Qty: 1 | Refills: 3 | Status: DISCONTINUED | COMMUNITY
Start: 2022-08-04 | End: 2024-06-17

## 2024-06-17 RX ORDER — ASCORBIC ACID, CHOLECALCIFEROL, .ALPHA.-TOCOPHEROL ACETATE, DL-, PYRIDOXINE, FOLIC ACID, CYANOCOBALAMIN, CALCIUM, FERROUS FUMARATE, MAGNESIUM, DOCONEXENT 85; 200; 10; 25; 1; 12; 140; 27; 45; 300 [IU]/1; [IU]/1; [IU]/1; [IU]/1; MG/1; UG/1; MG/1; MG/1; MG/1; MG/1
27-0.6-0.4-3 CAPSULE, GELATIN COATED ORAL
Qty: 1 | Refills: 11 | Status: ACTIVE | COMMUNITY
Start: 2024-06-17 | End: 1900-01-01

## 2024-06-17 RX ORDER — IBUPROFEN 800 MG/1
800 TABLET, FILM COATED ORAL
Qty: 30 | Refills: 0 | Status: DISCONTINUED | COMMUNITY
Start: 2023-03-29 | End: 2024-06-17

## 2024-06-17 RX ORDER — IPRATROPIUM BROMIDE AND ALBUTEROL SULFATE 2.5; .5 MG/3ML; MG/3ML
0.5-2.5 (3) SOLUTION RESPIRATORY (INHALATION) 4 TIMES DAILY
Qty: 1 | Refills: 4 | Status: DISCONTINUED | COMMUNITY
Start: 2018-11-13 | End: 2024-06-17

## 2024-06-17 RX ORDER — ALBUTEROL SULFATE 90 UG/1
108 (90 BASE) AEROSOL, METERED RESPIRATORY (INHALATION)
Qty: 1 | Refills: 5 | Status: DISCONTINUED | COMMUNITY
Start: 2018-05-24 | End: 2024-06-17

## 2024-06-17 RX ORDER — ASPIRIN ENTERIC COATED TABLETS 81 MG 81 MG/1
81 TABLET, DELAYED RELEASE ORAL DAILY
Qty: 30 | Refills: 11 | Status: ACTIVE | COMMUNITY
Start: 2024-06-17 | End: 1900-01-01

## 2024-06-17 RX ORDER — ALBUTEROL SULFATE 90 UG/1
108 (90 BASE) AEROSOL, METERED RESPIRATORY (INHALATION)
Qty: 1 | Refills: 5 | Status: DISCONTINUED | COMMUNITY
Start: 2020-03-28 | End: 2024-06-17

## 2024-06-17 RX ORDER — AMOXICILLIN AND CLAVULANATE POTASSIUM 875; 125 MG/1; MG/1
875-125 TABLET, COATED ORAL
Qty: 14 | Refills: 0 | Status: DISCONTINUED | COMMUNITY
Start: 2023-02-09 | End: 2024-06-17

## 2024-06-17 RX ORDER — MONTELUKAST 10 MG/1
10 TABLET, FILM COATED ORAL
Qty: 30 | Refills: 3 | Status: DISCONTINUED | COMMUNITY
Start: 2024-05-07 | End: 2024-06-17

## 2024-06-17 RX ORDER — METHYLPREDNISOLONE 4 MG/1
4 TABLET ORAL
Qty: 1 | Refills: 0 | Status: DISCONTINUED | COMMUNITY
Start: 2022-07-11 | End: 2024-06-17

## 2024-06-19 LAB
BACTERIA UR CULT: NORMAL
SOURCE AMPLIFICATION: NORMAL
T VAGINALIS RRNA SPEC QL NAA+PROBE: NOT DETECTED

## 2024-07-15 ENCOUNTER — APPOINTMENT (OUTPATIENT)
Dept: OBGYN | Facility: CLINIC | Age: 44
End: 2024-07-15
Payer: MEDICAID

## 2024-07-15 ENCOUNTER — NON-APPOINTMENT (OUTPATIENT)
Age: 44
End: 2024-07-15

## 2024-07-15 VITALS
SYSTOLIC BLOOD PRESSURE: 116 MMHG | WEIGHT: 151 LBS | BODY MASS INDEX: 28.51 KG/M2 | DIASTOLIC BLOOD PRESSURE: 62 MMHG | HEIGHT: 61 IN

## 2024-07-15 DIAGNOSIS — O34.219 MATERNAL CARE FOR UNSPECIFIED TYPE SCAR FROM PREVIOUS CESAREAN DELIVERY: ICD-10-CM

## 2024-07-15 DIAGNOSIS — O21.9 VOMITING OF PREGNANCY, UNSPECIFIED: ICD-10-CM

## 2024-07-15 PROCEDURE — 99213 OFFICE O/P EST LOW 20 MIN: CPT

## 2024-07-15 RX ORDER — METOCLOPRAMIDE 10 MG/1
10 TABLET ORAL EVERY 6 HOURS
Qty: 120 | Refills: 0 | Status: ACTIVE | COMMUNITY
Start: 2024-07-15 | End: 1900-01-01

## 2024-07-16 ENCOUNTER — NON-APPOINTMENT (OUTPATIENT)
Age: 44
End: 2024-07-16

## 2024-07-16 RX ORDER — DOXYLAMINE SUCCINATE 25 MG
25 TABLET ORAL
Qty: 1 | Refills: 1 | Status: ACTIVE | COMMUNITY
Start: 2024-07-15 | End: 1900-01-01

## 2024-07-16 RX ORDER — PYRIDOXINE HCL (VITAMIN B6) 50 MG
50 TABLET ORAL TWICE DAILY
Qty: 60 | Refills: 1 | Status: ACTIVE | COMMUNITY
Start: 2024-07-15 | End: 1900-01-01

## 2024-07-18 ENCOUNTER — NON-APPOINTMENT (OUTPATIENT)
Age: 44
End: 2024-07-18

## 2024-07-22 ENCOUNTER — APPOINTMENT (OUTPATIENT)
Dept: HEART FAILURE | Facility: CLINIC | Age: 44
End: 2024-07-22
Payer: MEDICAID

## 2024-07-22 ENCOUNTER — NON-APPOINTMENT (OUTPATIENT)
Age: 44
End: 2024-07-22

## 2024-07-22 VITALS
HEART RATE: 77 BPM | SYSTOLIC BLOOD PRESSURE: 114 MMHG | HEIGHT: 61 IN | DIASTOLIC BLOOD PRESSURE: 76 MMHG | WEIGHT: 155 LBS | BODY MASS INDEX: 29.27 KG/M2 | OXYGEN SATURATION: 100 %

## 2024-07-22 DIAGNOSIS — Z3A.10 10 WEEKS GESTATION OF PREGNANCY: ICD-10-CM

## 2024-07-22 LAB
ABO + RH PNL BLD: NORMAL
BLD GP AB SCN SERPL QL: NORMAL
HCT VFR BLD CALC: 38.8 %
HGB BLD-MCNC: 12.8 G/DL
HIV1+2 AB SPEC QL IA.RAPID: NONREACTIVE
MCHC RBC-ENTMCNC: 29.6 PG
MCHC RBC-ENTMCNC: 33 GM/DL
MCV RBC AUTO: 89.8 FL
PLATELET # BLD AUTO: 336 K/UL
RBC # BLD: 4.32 M/UL
RBC # FLD: 12.7 %
TSH SERPL-ACNC: 0.76 UIU/ML
WBC # FLD AUTO: 9.19 K/UL

## 2024-07-22 PROCEDURE — 99243 OFF/OP CNSLTJ NEW/EST LOW 30: CPT

## 2024-07-22 PROCEDURE — 93000 ELECTROCARDIOGRAM COMPLETE: CPT

## 2024-07-22 NOTE — DISCUSSION/SUMMARY
[Patient] : the patient [___ Month(s)] : in [unfilled] month(s) [FreeTextEntry1] : Ordered echo. F/u in 3 months. [EKG obtained to assist in diagnosis and management of assessed problem(s)] : EKG obtained to assist in diagnosis and management of assessed problem(s)

## 2024-07-22 NOTE — CARDIOLOGY SUMMARY
[de-identified] : 6/28/21 NSR 82, NSST\par   [___] : [unfilled] [LVEF ___%] : LVEF [unfilled]% [None] : no pulmonary hypertension [Normal] : normal LA size [Mild] : mild mitral regurgitation

## 2024-07-22 NOTE — HISTORY OF PRESENT ILLNESS
[FreeTextEntry1] : 39 y/o female with a PMHX of preeclampsia, postpartum-associated moderate-severe mitral regurgitation, asthma, gestational diabetes,  s/p  17.  During the pregnancy TTEs revealed mild MR. TTE done 5/10/19 with normal LV systolic function and mild mod MR during hospitalization for several episodes (weakness, staring spells, LOC, memory issues, aphasia), concerning for possible seizures. 5/10/19 MRI with no acute infarct or hemorrhage. Last echo showed nl LVF.

## 2024-07-22 NOTE — REASON FOR VISIT
[Symptom and Test Evaluation] : symptom and test evaluation [Follow-Up - Clinic] : a clinic follow-up of [Mitral Regurgitation] : mitral regurgitation [FreeTextEntry2] : Admission at [facility] 5/11-5/14 secondary to several episodes of generalized weakness with LOC concerning for seizures

## 2024-07-22 NOTE — PHYSICAL EXAM
[General Appearance - Well Developed] : well developed [Normal Appearance] : normal appearance [Well Groomed] : well groomed [General Appearance - Well Nourished] : well nourished [No Deformities] : no deformities [General Appearance - In No Acute Distress] : no acute distress [Normal Conjunctiva] : the conjunctiva exhibited no abnormalities [Eyelids - No Xanthelasma] : the eyelids demonstrated no xanthelasmas [Normal Oral Mucosa] : normal oral mucosa [No Oral Pallor] : no oral pallor [No Oral Cyanosis] : no oral cyanosis [Normal Jugular Venous A Waves Present] : normal jugular venous A waves present [Normal Jugular Venous V Waves Present] : normal jugular venous V waves present [No Jugular Venous Barrow A Waves] : no jugular venous barrow A waves [Respiration, Rhythm And Depth] : normal respiratory rhythm and effort [Exaggerated Use Of Accessory Muscles For Inspiration] : no accessory muscle use [Auscultation Breath Sounds / Voice Sounds] : lungs were clear to auscultation bilaterally [Heart Rate And Rhythm] : heart rate and rhythm were normal [Heart Sounds] : normal S1 and S2 [Murmurs] : no murmurs present [Arterial Pulses Normal] : the arterial pulses were normal [Edema] : no peripheral edema present [Bowel Sounds] : normal bowel sounds [Abdomen Soft] : soft [Abdomen Tenderness] : non-tender [Abdomen Mass (___ Cm)] : no abdominal mass palpated [Abnormal Walk] : normal gait [Nail Clubbing] : no clubbing of the fingernails [Cyanosis, Localized] : no localized cyanosis [Petechial Hemorrhages (___cm)] : no petechial hemorrhages [FreeTextEntry1] : no edema or calf pain [Skin Color & Pigmentation] : normal skin color and pigmentation [] : no rash [No Venous Stasis] : no venous stasis [Skin Lesions] : no skin lesions [No Skin Ulcers] : no skin ulcer [No Xanthoma] : no  xanthoma was observed [Oriented To Time, Place, And Person] : oriented to person, place, and time [Affect] : the affect was normal [Mood] : the mood was normal [No Anxiety] : not feeling anxious [Well Nourished] : well nourished [No Acute Distress] : no acute distress [Normal Venous Pressure] : normal venous pressure [No Carotid Bruit] : no carotid bruit [Normal S1, S2] : normal S1, S2 [No Gallop] : no gallop [Clear Lung Fields] : clear lung fields [No Respiratory Distress] : no respiratory distress  [Soft] : abdomen soft [Non Tender] : non-tender [Normal Gait] : normal gait [No Edema] : no edema [Moves all extremities] : moves all extremities [Alert and Oriented] : alert and oriented

## 2024-07-23 LAB
HBV SURFACE AG SERPL QL IA: NONREACTIVE
HCV AB SER QL: NONREACTIVE
HCV S/CO RATIO: 0.15 S/CO
HGB A MFR BLD: 97.5 %
HGB A2 MFR BLD: 2.5 %
HGB FRACT BLD-IMP: NORMAL
LEAD BLD-MCNC: <1 UG/DL
MEV IGG FLD QL IA: >300 AU/ML
MEV IGG+IGM SER-IMP: POSITIVE
T PALLIDUM AB SER QL IA: NEGATIVE
VZV AB TITR SER: POSITIVE
VZV IGG SER IF-ACNC: 2936 INDEX

## 2024-07-23 NOTE — ED ADULT TRIAGE NOTE - STATUS:
A (CATHETER US EE PLATINUM 3.3-2.9FR 20MM 150CM 24CM 20MHZ RX) guide catheter was introduced. Applied

## 2024-07-24 LAB
B19V IGG SER QL IA: 0.19 INDEX
B19V IGG+IGM SER-IMP: NEGATIVE
B19V IGG+IGM SER-IMP: NORMAL
B19V IGM FLD-ACNC: 0.15 INDEX
B19V IGM SER-ACNC: NEGATIVE

## 2024-07-25 ENCOUNTER — LABORATORY RESULT (OUTPATIENT)
Age: 44
End: 2024-07-25

## 2024-07-25 ENCOUNTER — ASOB RESULT (OUTPATIENT)
Age: 44
End: 2024-07-25

## 2024-07-25 ENCOUNTER — APPOINTMENT (OUTPATIENT)
Dept: ANTEPARTUM | Facility: CLINIC | Age: 44
End: 2024-07-25

## 2024-07-25 PROCEDURE — 76813 OB US NUCHAL MEAS 1 GEST: CPT | Mod: 59

## 2024-07-25 PROCEDURE — 76801 OB US < 14 WKS SINGLE FETUS: CPT

## 2024-08-12 DIAGNOSIS — I34.0 NONRHEUMATIC MITRAL (VALVE) INSUFFICIENCY: ICD-10-CM

## 2024-08-22 ENCOUNTER — APPOINTMENT (OUTPATIENT)
Dept: ANTEPARTUM | Facility: CLINIC | Age: 44
End: 2024-08-22
Payer: MEDICAID

## 2024-08-22 ENCOUNTER — ASOB RESULT (OUTPATIENT)
Age: 44
End: 2024-08-22

## 2024-08-22 PROCEDURE — 76805 OB US >/= 14 WKS SNGL FETUS: CPT

## 2024-08-23 ENCOUNTER — NON-APPOINTMENT (OUTPATIENT)
Age: 44
End: 2024-08-23

## 2024-08-29 ENCOUNTER — ASOB RESULT (OUTPATIENT)
Age: 44
End: 2024-08-29

## 2024-08-29 ENCOUNTER — APPOINTMENT (OUTPATIENT)
Dept: MATERNAL FETAL MEDICINE | Facility: CLINIC | Age: 44
End: 2024-08-29

## 2024-08-29 PROCEDURE — 99205 OFFICE O/P NEW HI 60 MIN: CPT | Mod: 95

## 2024-08-30 ENCOUNTER — OUTPATIENT (OUTPATIENT)
Dept: OUTPATIENT SERVICES | Facility: HOSPITAL | Age: 44
LOS: 1 days | End: 2024-08-30
Payer: MEDICAID

## 2024-08-30 ENCOUNTER — RESULT REVIEW (OUTPATIENT)
Age: 44
End: 2024-08-30

## 2024-08-30 ENCOUNTER — APPOINTMENT (OUTPATIENT)
Dept: CV DIAGNOSITCS | Facility: HOSPITAL | Age: 44
End: 2024-08-30

## 2024-08-30 DIAGNOSIS — Z90.89 ACQUIRED ABSENCE OF OTHER ORGANS: Chronic | ICD-10-CM

## 2024-08-30 DIAGNOSIS — Z98.891 HISTORY OF UTERINE SCAR FROM PREVIOUS SURGERY: Chronic | ICD-10-CM

## 2024-08-30 DIAGNOSIS — I34.0 NONRHEUMATIC MITRAL (VALVE) INSUFFICIENCY: ICD-10-CM

## 2024-08-30 DIAGNOSIS — O03.9 COMPLETE OR UNSPECIFIED SPONTANEOUS ABORTION WITHOUT COMPLICATION: Chronic | ICD-10-CM

## 2024-08-30 PROCEDURE — 93306 TTE W/DOPPLER COMPLETE: CPT | Mod: 26

## 2024-08-30 PROCEDURE — 93356 MYOCRD STRAIN IMG SPCKL TRCK: CPT

## 2024-08-30 PROCEDURE — 76376 3D RENDER W/INTRP POSTPROCES: CPT | Mod: 26

## 2024-09-03 ENCOUNTER — APPOINTMENT (OUTPATIENT)
Dept: OBGYN | Facility: CLINIC | Age: 44
End: 2024-09-03
Payer: MEDICAID

## 2024-09-03 VITALS
SYSTOLIC BLOOD PRESSURE: 114 MMHG | DIASTOLIC BLOOD PRESSURE: 60 MMHG | BODY MASS INDEX: 30.4 KG/M2 | HEIGHT: 61 IN | WEIGHT: 161 LBS

## 2024-09-03 DIAGNOSIS — O09.529 SUPERVISION OF ELDERLY MULTIGRAVIDA, UNSPECIFIED TRIMESTER: ICD-10-CM

## 2024-09-03 DIAGNOSIS — Z3A.17 17 WEEKS GESTATION OF PREGNANCY: ICD-10-CM

## 2024-09-03 DIAGNOSIS — Z86.79 PERSONAL HISTORY OF OTHER DISEASES OF THE CIRCULATORY SYSTEM: ICD-10-CM

## 2024-09-03 PROCEDURE — 36415 COLL VENOUS BLD VENIPUNCTURE: CPT

## 2024-09-03 PROCEDURE — 0502F SUBSEQUENT PRENATAL CARE: CPT

## 2024-09-04 LAB
AF-AFP DISCLAIMER: NORMAL
AFP  MOM: 0.95
AFP CONCENTRATION: 35.25 NG/ML
AFP INTERPRETATION: NORMAL
AFP MOM CUT-OFF: 2.5
AFP PERCENTILE: 44
AFP SCREENING RESULT: NORMAL
AFTER SCREENING RISK OPEN SPINA BIFIDA: NORMAL
BEFORE SCREENING RISK OPEN SPINA BIFIDA: NORMAL
BILIRUB UR QL STRIP: NORMAL
CARBAMAZEPINE?: NO
CURRENT SMOKER: NORMAL
ESTIMATED DUE DATE: NORMAL
EXTREME ANALYTE ALERT: NO
FAMILY HISTORY OPEN SPINA BIFIDA: NORMAL
GESTATIONAL  AGE: NORMAL
GESTATIONAL AGE METHOD: NORMAL
GLUCOSE UR-MCNC: NORMAL
HCG UR QL: 0.2 EU/DL
HGB UR QL STRIP.AUTO: NORMAL
INSULIN DEPEND DIABETES: NORMAL
KETONES UR-MCNC: NORMAL
LEUKOCYTE ESTERASE UR QL STRIP: NORMAL
MATERNAL WGT: 161
MULTIPLE PREGNANCY STATUS: NORMAL
NITRITE UR QL STRIP: NORMAL
PH UR STRIP: 7
PROT UR STRIP-MCNC: NORMAL
RACE/ETHNICITY: NORMAL
SP GR UR STRIP: 1.01
VALPROIC ACID?: NORMAL

## 2024-09-06 LAB
RUBV IGG FLD-ACNC: 1.63 INDEX
RUBV IGG SER-IMP: POSITIVE

## 2024-09-24 ENCOUNTER — APPOINTMENT (OUTPATIENT)
Dept: FAMILY MEDICINE | Facility: CLINIC | Age: 44
End: 2024-09-24

## 2024-09-24 VITALS
DIASTOLIC BLOOD PRESSURE: 81 MMHG | OXYGEN SATURATION: 98 % | HEART RATE: 93 BPM | WEIGHT: 168.44 LBS | RESPIRATION RATE: 16 BRPM | SYSTOLIC BLOOD PRESSURE: 124 MMHG | HEIGHT: 61 IN | TEMPERATURE: 98 F | BODY MASS INDEX: 31.8 KG/M2

## 2024-09-24 DIAGNOSIS — M53.3 SACROCOCCYGEAL DISORDERS, NOT ELSEWHERE CLASSIFIED: ICD-10-CM

## 2024-09-24 DIAGNOSIS — M79.672 PAIN IN RIGHT FOOT: ICD-10-CM

## 2024-09-24 DIAGNOSIS — M79.671 PAIN IN RIGHT FOOT: ICD-10-CM

## 2024-09-24 DIAGNOSIS — M25.50 PAIN IN UNSPECIFIED JOINT: ICD-10-CM

## 2024-09-24 DIAGNOSIS — M79.641 PAIN IN RIGHT HAND: ICD-10-CM

## 2024-09-24 DIAGNOSIS — M79.642 PAIN IN RIGHT HAND: ICD-10-CM

## 2024-09-24 DIAGNOSIS — M79.7 FIBROMYALGIA: ICD-10-CM

## 2024-09-24 PROCEDURE — G2211 COMPLEX E/M VISIT ADD ON: CPT | Mod: NC

## 2024-09-24 PROCEDURE — 99214 OFFICE O/P EST MOD 30 MIN: CPT

## 2024-09-24 NOTE — HISTORY OF PRESENT ILLNESS
[FreeTextEntry8] : 21 weeks pregnant  05/02/24   Numbness and tingling in hand and fingers. Noted in all fingers that is fairly constant. worse when holding object. Does notice weakness in  strength. Worse in the morning.   Neck pain described as constant dull aching posterior pain. Unsure what makes pain better or worse.  Does notice neck stiffness throughout the days.   Dull aching - stiffness - consitent all the time   NUmbness noted on plantar aspect of feet.  does have shooting back pain that is stiff.       Continues to notice chronic bilateral shoulder, palms, and plantar aspect of feet.

## 2024-09-25 ENCOUNTER — NON-APPOINTMENT (OUTPATIENT)
Age: 44
End: 2024-09-25

## 2024-09-25 ENCOUNTER — APPOINTMENT (OUTPATIENT)
Dept: ANTEPARTUM | Facility: CLINIC | Age: 44
End: 2024-09-25
Payer: MEDICAID

## 2024-09-25 ENCOUNTER — ASOB RESULT (OUTPATIENT)
Age: 44
End: 2024-09-25

## 2024-09-25 PROCEDURE — 76816 OB US FOLLOW-UP PER FETUS: CPT

## 2024-09-25 PROCEDURE — 76819 FETAL BIOPHYS PROFIL W/O NST: CPT

## 2024-10-01 LAB
ALBUMIN SERPL ELPH-MCNC: 3.7 G/DL
ALP BLD-CCNC: 53 U/L
ALT SERPL-CCNC: 18 U/L
ANA SER IF-ACNC: NEGATIVE
ANION GAP SERPL CALC-SCNC: 18 MMOL/L
AST SERPL-CCNC: 14 U/L
BASOPHILS # BLD AUTO: 0.04 K/UL
BASOPHILS NFR BLD AUTO: 0.4 %
BILIRUB SERPL-MCNC: <0.2 MG/DL
BUN SERPL-MCNC: 6 MG/DL
CALCIUM SERPL-MCNC: 9 MG/DL
CHLORIDE SERPL-SCNC: 99 MMOL/L
CHOLEST SERPL-MCNC: 226 MG/DL
CO2 SERPL-SCNC: 20 MMOL/L
CREAT SERPL-MCNC: 0.43 MG/DL
CRP SERPL-MCNC: 7 MG/L
EGFR: 124 ML/MIN/1.73M2
EOSINOPHIL # BLD AUTO: 0.09 K/UL
EOSINOPHIL NFR BLD AUTO: 1 %
ERYTHROCYTE [SEDIMENTATION RATE] IN BLOOD BY WESTERGREN METHOD: 40 MM/HR
FOLATE SERPL-MCNC: >20 NG/ML
GLUCOSE SERPL-MCNC: 102 MG/DL
HCT VFR BLD CALC: 38.3 %
HDLC SERPL-MCNC: 85 MG/DL
HGB BLD-MCNC: 12.8 G/DL
IMM GRANULOCYTES NFR BLD AUTO: 0.3 %
LDLC SERPL CALC-MCNC: 118 MG/DL
LYMPHOCYTES # BLD AUTO: 2.58 K/UL
LYMPHOCYTES NFR BLD AUTO: 28.2 %
MAN DIFF?: NORMAL
MCHC RBC-ENTMCNC: 29.7 PG
MCHC RBC-ENTMCNC: 33.4 GM/DL
MCV RBC AUTO: 88.9 FL
MONOCYTES # BLD AUTO: 0.67 K/UL
MONOCYTES NFR BLD AUTO: 7.3 %
NEUTROPHILS # BLD AUTO: 5.74 K/UL
NEUTROPHILS NFR BLD AUTO: 62.8 %
NONHDLC SERPL-MCNC: 141 MG/DL
PLATELET # BLD AUTO: 309 K/UL
POTASSIUM SERPL-SCNC: 4.4 MMOL/L
PROT SERPL-MCNC: 6.6 G/DL
RBC # BLD: 4.31 M/UL
RBC # FLD: 13.4 %
RHEUMATOID FACT SER QL: <10 IU/ML
SODIUM SERPL-SCNC: 136 MMOL/L
T4 FREE SERPL-MCNC: 0.8 NG/DL
TRIGL SERPL-MCNC: 136 MG/DL
TSH SERPL-ACNC: 1.01 UIU/ML
VIT B12 SERPL-MCNC: 360 PG/ML
WBC # FLD AUTO: 9.15 K/UL

## 2024-10-02 ENCOUNTER — APPOINTMENT (OUTPATIENT)
Dept: NEUROLOGY | Facility: CLINIC | Age: 44
End: 2024-10-02

## 2024-10-02 VITALS
HEART RATE: 88 BPM | HEIGHT: 61 IN | SYSTOLIC BLOOD PRESSURE: 122 MMHG | WEIGHT: 167 LBS | DIASTOLIC BLOOD PRESSURE: 75 MMHG | OXYGEN SATURATION: 98 % | BODY MASS INDEX: 31.53 KG/M2

## 2024-10-02 DIAGNOSIS — R20.2 PARESTHESIA OF SKIN: ICD-10-CM

## 2024-10-02 PROCEDURE — 99204 OFFICE O/P NEW MOD 45 MIN: CPT

## 2024-10-02 NOTE — REVIEW OF SYSTEMS
[As Noted in HPI] : as noted in HPI [Negative] : Heme/Lymph Island Pedicle Flap Text: The defect edges were debeveled with a #15 scalpel blade.  Given the location of the defect, shape of the defect and the proximity to free margins an island pedicle advancement flap was deemed most appropriate.  Using a sterile surgical marker, an appropriate advancement flap was drawn incorporating the defect, outlining the appropriate donor tissue and placing the expected incisions within the relaxed skin tension lines where possible.    The area thus outlined was incised deep to adipose tissue with a #15 scalpel blade.  The skin margins were undermined to an appropriate distance in all directions around the primary defect and laterally outward around the island pedicle utilizing iris scissors.  There was minimal undermining beneath the pedicle flap.

## 2024-10-07 ENCOUNTER — APPOINTMENT (OUTPATIENT)
Dept: OBGYN | Facility: CLINIC | Age: 44
End: 2024-10-07
Payer: MEDICAID

## 2024-10-07 VITALS
SYSTOLIC BLOOD PRESSURE: 126 MMHG | BODY MASS INDEX: 31.53 KG/M2 | DIASTOLIC BLOOD PRESSURE: 70 MMHG | WEIGHT: 167 LBS | HEIGHT: 61 IN

## 2024-10-07 DIAGNOSIS — O09.529 SUPERVISION OF ELDERLY MULTIGRAVIDA, UNSPECIFIED TRIMESTER: ICD-10-CM

## 2024-10-07 DIAGNOSIS — Z3A.22 22 WEEKS GESTATION OF PREGNANCY: ICD-10-CM

## 2024-10-07 PROCEDURE — 0502F SUBSEQUENT PRENATAL CARE: CPT

## 2024-10-08 LAB
BILIRUB UR QL STRIP: NORMAL
GLUCOSE UR-MCNC: 1000
HCG UR QL: 0.2 EU/DL
HGB UR QL STRIP.AUTO: NORMAL
KETONES UR-MCNC: 15
LEUKOCYTE ESTERASE UR QL STRIP: NORMAL
NITRITE UR QL STRIP: NORMAL
PH UR STRIP: 6
PROT UR STRIP-MCNC: NORMAL
SP GR UR STRIP: 1.02

## 2024-10-09 NOTE — ED PROVIDER NOTE - PSYCHIATRIC, MLM
Nizoral cream requested for refills.   LOV: 4/30/24  Next: 11/12/24  Last written Rx was: 7/1/24, D#15 g with no refills.   Authorized D#15g with no refills.     
Alert and oriented to person, place, time/situation. normal mood and affect. no apparent risk to self or others.

## 2024-10-10 NOTE — HISTORY OF PRESENT ILLNESS
[FreeTextEntry1] : 44 yo woman who is 21 wks pregnant.  She has been having intermittent paresthesias in the hands and feet.

## 2024-10-10 NOTE — PHYSICAL EXAM
[FreeTextEntry1] : Awake, alert, oriented x 3.  Language fluent.  Comprehension intact.  Naming intact.  Repetition intact.  Affect normal.  Cranial nerves grossly intact.  Motor exam: normal bulk, normal tone, 5/5 in all four extremities.  No tremors or fasciculations.  Sensory exam: intact to LT/PP/SILVER/vibration.  DTRs: 2+ throughout, flexor plantar response bilaterally, no clonus.  Coordination: no dysmetria.  Gait: normal toe/heel/tandem gait.  Romberg - negative

## 2024-10-10 NOTE — HISTORY OF PRESENT ILLNESS
[FreeTextEntry1] : 42 yo woman who is 21 wks pregnant.  She has been having intermittent paresthesias in the hands and feet.

## 2024-10-24 ENCOUNTER — APPOINTMENT (OUTPATIENT)
Dept: HEART FAILURE | Facility: CLINIC | Age: 44
End: 2024-10-24
Payer: MEDICAID

## 2024-10-24 ENCOUNTER — NON-APPOINTMENT (OUTPATIENT)
Age: 44
End: 2024-10-24

## 2024-10-24 VITALS
HEIGHT: 61 IN | OXYGEN SATURATION: 99 % | BODY MASS INDEX: 33.04 KG/M2 | WEIGHT: 175 LBS | HEART RATE: 96 BPM | DIASTOLIC BLOOD PRESSURE: 86 MMHG | SYSTOLIC BLOOD PRESSURE: 134 MMHG

## 2024-10-24 VITALS — SYSTOLIC BLOOD PRESSURE: 133 MMHG | DIASTOLIC BLOOD PRESSURE: 78 MMHG

## 2024-10-24 DIAGNOSIS — O36.80X0 PREGNANCY WITH INCONCLUSIVE FETAL VIABILITY, NOT APPLICABLE OR UNSPECIFIED: ICD-10-CM

## 2024-10-24 PROCEDURE — G2211 COMPLEX E/M VISIT ADD ON: CPT | Mod: NC

## 2024-10-24 PROCEDURE — 99214 OFFICE O/P EST MOD 30 MIN: CPT

## 2024-10-24 PROCEDURE — 93000 ELECTROCARDIOGRAM COMPLETE: CPT

## 2024-11-11 ENCOUNTER — APPOINTMENT (OUTPATIENT)
Dept: OBGYN | Facility: CLINIC | Age: 44
End: 2024-11-11

## 2024-11-13 ENCOUNTER — NON-APPOINTMENT (OUTPATIENT)
Age: 44
End: 2024-11-13

## 2024-11-13 ENCOUNTER — ASOB RESULT (OUTPATIENT)
Age: 44
End: 2024-11-13

## 2024-11-13 ENCOUNTER — INPATIENT (INPATIENT)
Facility: HOSPITAL | Age: 44
LOS: 0 days | Discharge: HOME | End: 2024-11-13
Attending: OBSTETRICS & GYNECOLOGY | Admitting: OBSTETRICS & GYNECOLOGY
Payer: MEDICAID

## 2024-11-13 ENCOUNTER — APPOINTMENT (OUTPATIENT)
Dept: ANTEPARTUM | Facility: CLINIC | Age: 44
End: 2024-11-13
Payer: MEDICAID

## 2024-11-13 VITALS — HEART RATE: 92 BPM | OXYGEN SATURATION: 97 %

## 2024-11-13 VITALS
RESPIRATION RATE: 16 BRPM | DIASTOLIC BLOOD PRESSURE: 73 MMHG | HEART RATE: 98 BPM | TEMPERATURE: 98 F | SYSTOLIC BLOOD PRESSURE: 131 MMHG

## 2024-11-13 DIAGNOSIS — Z90.89 ACQUIRED ABSENCE OF OTHER ORGANS: Chronic | ICD-10-CM

## 2024-11-13 DIAGNOSIS — Z98.891 HISTORY OF UTERINE SCAR FROM PREVIOUS SURGERY: Chronic | ICD-10-CM

## 2024-11-13 DIAGNOSIS — O26.899 OTHER SPECIFIED PREGNANCY RELATED CONDITIONS, UNSPECIFIED TRIMESTER: ICD-10-CM

## 2024-11-13 DIAGNOSIS — O03.9 COMPLETE OR UNSPECIFIED SPONTANEOUS ABORTION WITHOUT COMPLICATION: Chronic | ICD-10-CM

## 2024-11-13 PROBLEM — O02.1 MISSED ABORTION: Chronic | Status: INACTIVE | Noted: 2017-11-27 | Resolved: 2024-11-13

## 2024-11-13 LAB
APPEARANCE UR: CLEAR — SIGNIFICANT CHANGE UP
BILIRUB UR-MCNC: NEGATIVE — SIGNIFICANT CHANGE UP
COLOR SPEC: YELLOW — SIGNIFICANT CHANGE UP
DIFF PNL FLD: NEGATIVE — SIGNIFICANT CHANGE UP
GLUCOSE UR QL: 250 MG/DL
KETONES UR-MCNC: NEGATIVE MG/DL — SIGNIFICANT CHANGE UP
LEUKOCYTE ESTERASE UR-ACNC: NEGATIVE — SIGNIFICANT CHANGE UP
NITRITE UR-MCNC: NEGATIVE — SIGNIFICANT CHANGE UP
PH UR: 6.5 — SIGNIFICANT CHANGE UP (ref 5–8)
PROT UR-MCNC: NEGATIVE MG/DL — SIGNIFICANT CHANGE UP
SP GR SPEC: 1.01 — SIGNIFICANT CHANGE UP (ref 1–1.03)
UROBILINOGEN FLD QL: 0.2 MG/DL — SIGNIFICANT CHANGE UP (ref 0.2–1)

## 2024-11-13 PROCEDURE — 76815 OB US LIMITED FETUS(S): CPT | Mod: 26

## 2024-11-13 PROCEDURE — 76819 FETAL BIOPHYS PROFIL W/O NST: CPT | Mod: 26

## 2024-11-13 PROCEDURE — 99253 IP/OBS CNSLTJ NEW/EST LOW 45: CPT | Mod: GC

## 2024-11-13 PROCEDURE — 93010 ELECTROCARDIOGRAM REPORT: CPT

## 2024-11-13 PROCEDURE — 76817 TRANSVAGINAL US OBSTETRIC: CPT | Mod: 26

## 2024-11-13 RX ORDER — CHOLINE 650 MG
0 TABLET ORAL
Refills: 0 | DISCHARGE

## 2024-11-13 RX ORDER — ASPIRIN/MAG CARB/ALUMINUM AMIN 325 MG
0 TABLET ORAL
Refills: 0 | DISCHARGE

## 2024-11-13 NOTE — OB RN TRIAGE NOTE - FALL HARM RISK - UNIVERSAL INTERVENTIONS
Bed in lowest position, wheels locked, appropriate side rails in place/Call bell, personal items and telephone in reach/Instruct patient to call for assistance before getting out of bed or chair/Non-slip footwear when patient is out of bed/[place] to call system/Physically safe environment - no spills, clutter or unnecessary equipment/Purposeful Proactive Rounding/Room/bathroom lighting operational, light cord in reach

## 2024-11-13 NOTE — OB PROVIDER TRIAGE NOTE - NS_OBGYNHISTORY_OBGYN_ALL_OB_FT
2014 FT  M 7#14-post partum readmit- PEC, congestive heart failure, mitral valve regurgitation  2017 NRFHR C/S @36weeks M 6#, GDM  [facility]/TOP x10? pt unsure (d&c x3)

## 2024-11-13 NOTE — CHART NOTE - NSCHARTNOTEFT_GEN_A_CORE
after patient was admitted    pt was seen again by Cardiology  and decision made that patient could have echo done as outpatient    pt feels better  denied any SOB CP     denied any contractions  good FM      case d/w D [place] Dr Goldberg  aware of cardiology  findings and recommendation    patient can be discharge  home     admission voided by admitting      instruction given and patient instructed to call cardiologist for echo next week    and advised to return if any further symptoms    OB office follow up next week      ASHKAN MARTINEZ after patient was admitted    pt was seen again by Cardiology  and decision made that patient could have echo done as outpatient    pt feels better  denied any SOB CP     denied any contractions  good FM      case d/w D [place] Dr Goldberg  aware of cardiology  findings and recommendation    patient can be discharge  home     admission voided by admitting      instruction given and patient instructed to call cardiologist for echo next week    and advised to return if any further symptoms    OB office follow up next week    discharge time   [id number]   ASHKAN MARTINEZ

## 2024-11-13 NOTE — OB RN TRIAGE NOTE - CURRENT PREGNANCY COMPLICATIONS, OB PROFILE
chronic migraines/pulmonary nodule/Maternal Medical Condition/Gestational Age less than 36 Weeks/Cardiac Disease

## 2024-11-13 NOTE — CONSULT NOTE ADULT - ASSESSMENT
42 y/o female ~27 weeks gestation with a PMHx of post-partum pre-eclampsia, thickened mitral valve leaflets c/b mod-to-severe mitral valve regurgitation in the post-partum period (2014), who presented to the hospital for abdominal pain and contractions. Cardiology was consulted given prior history of mitral valve regurgitation.    - Thickened mitral valve leaflets  - Hx of mod-to-severe mitral valve regurgitation    Upon further review of her prior echocardiograms, it appears that her mitral valve leaflets are mildly thickened.     Prior TTEs in 2014 showed mod-to-severe MR. These TTEs were performed during the [facility]-partum / post-partum period when patient was experiencing elevated blood pressures and increased plasma volume, which likely worsened the severity of her MR.  Additional TTEs performed in the following years have shown mild MR. Most recent TTE (8/30/24) continues to show mildly thickened mitral valve leaflets, but only mild MR.    She currently appears euvolemic. BP ~115-120 / 70-80.  HR 80. Sat 100% on room air. Exam within normal limits.     As she progresses through the remainder of her pregnancy, it will become crucial that her blood pressure and volume status remain optimized to prevent decompensation from valvular heart disease.    RECOMMENDATIONS  - advised patient to keep a daily BP log, and call her obstetrician +/- cardiologist if she notices an up-trend  - close outpatient follow-up with her obstetrician and cardiologist  - no additional cardiac testing necessary inpatient          Ian Murray (PGY 4)  Cardiology Fellow      Of note, these recommendations are preliminary. Case to be discussed with attending.

## 2024-11-13 NOTE — OB RN TRIAGE NOTE - FALL HARM RISK - FALL HARM RISK

## 2024-11-13 NOTE — OB PROVIDER H&P - NSHOSPITALIZATION_OBGYN_ALL_OB
No
55 y/o m, PMHx of hypertension, diabetes mellitus, seizure disorder, ESRD (HD M/W/F), hypertrophic cardiomyopathy with LVOT, loop recorder implantation  ( Dr. Johansen 12/7/2018) for surveillance of occult AF and NSVT as patient had a history of "seizures", presents today for elective loop recorder exchange with Dr. Montaño. Since implantation his ILR has demonstrated increasing episodes of AV block which were first noticed in early 2020 including periods of complete heart block, though these occur predominantly at night. Review of rate histograms has also shown progressive bradycardia with Mobitz I block. Pt underwent additional work up with cardiac PET and nuclear pyrophosphate scans which were negative for cardiac sarcoid and amyloidosis, respectively. Pts device has reached RRT and pt has agreed to reimplantation for further longitudinal monitoring of arrhythmias. Pt reports no complaints at time of arrival to [facility]. Denies dizziness, SOB, palpitations, and chest pain. COVID - 19 PCR negative 4/17/22.    Cardiology Summary:    TTE: June 1, 2020 pressures left ventricle normal size and function with ejection fraction of 60-65%. Moderate concentric LVH noted. Moderate mitral regurgitation is noted which is a bit worse than his prior echo.    NST: January 28, 2021 was a pharmacologic study which was tolerated well. Blood pressure was elevated at baseline with a normal response to infusion. EKG showed horizontal ST depressions in V6. Mobitz type I heart block was noted through the exam. Evaluation of nuclear imaging showed no evidence of ischemia or infarct with ejection fraction of 64%. Moderate LVH was noted. Left ventricle is moderately dilated.

## 2024-11-13 NOTE — CONSULT NOTE ADULT - ATTENDING COMMENTS
On exam, no signs of fluid overload, ADHF, valvular incompetence  Last pregnancy in 2014 complicated by HF from MR, likely driven by postpartum PEC and fluid shifts with underlying thickened, myxomatous valve  Patient with tendency to have recurrence of this if fluid status is imbalanced or BPs increase in third trimester, [facility] and post partum  Advised to check her BPs daily and for her to monitor for dyspnea, edema, noted change in fatigue,   Will arrange follow up with her Cardiologist, Dr. Ledbetter  No need for TTE on this admission

## 2024-11-13 NOTE — CONSULT NOTE ADULT - SUBJECTIVE AND OBJECTIVE BOX
42 y/o female ~27 weeks gestation with a PMHx of post-partum pre-eclampsia, thickened mitral valve leaflets c/b mod-to-severe mitral valve regurgitation in the post-partum period (), who presented to the hospital for abdominal pain and contractions.    Yesterday, she noticed contractions and abdominal pain, which she initially attributed to [place] contractions. However, she continued to experience this discomfort and became worried. She also reports experiencing a brief episode of shortness of breath and palpitations after this pain this began. She denies any recent cough, chest pain, paroxysmal nocturnal dyspnea or orthopnea. She has noticed progressively increasing fatigue with exertion and trace leg swelling over the last couple months as her weight has increased due to pregnancy. She works as a  and likes to stay active. She enjoys walking her dog. She can ambulate ~8-10 blocks and climb stairs independently.    She follows with Dr. Augustine Ledbetter for outpatient cardiology care (Last seen 10/24/24) TTE was performed on 24, which showed normal LV/RV function. Mitral valve appears thickened, but only showed mild MR.     PAST MEDICAL & SURGICAL HISTORY:  Encounter for elective termination of pregnancy  Non-rheumatic mitral regurgitation  Acute congestive heart failure, unspecified congestive heart failure type  during pregnancy   Gestational diabetes mellitus (GDM), antepartum, gestational diabetes method of control unspecified    Asthma  mild, intermittent  Migraine  Mitral valve regurgitation  noted during pregnancy, reports resolved, states no further issues    S/P tonsillectomy  childhood  S/P  section  May 2017  S/P     FAMILY HISTORY: relevant information in [facility]    SOCIAL HISTORY:  relevant information in [facility]    MEDICATIONS:    -------------------------------------------------------------------------------------------  PHYSICAL EXAM:  T(C): 36.6 (24 @ 12:47), Max: 36.6 (24 @ 12:19)  HR: 88 (24 @ 16:57) (82 - 99)  BP: 120/71 (24 @ 16:51) (109/66 - 131/73)  RR: 16 (24 @ 12:47) (16 - 16)  SpO2: 100% (24 @ 16:57) (90% - 100%)    GENERAL: no acute distress  NECK: JVP is not elevated  CHEST/LUNG: clear to auscultation bilaterally, unlabored breathing  HEART: regular rate and rhythm, no murmurs or gallops.  ABDOMEN: soft, non-tender  EXTREMITIES:  warm, no LE edema    -------------------------------------------------------------------------------------------  RELEVANT LABS:  none    -------------------------------------------------------------------------------------------  RELEVANT IMAGING:    ECG: sinus rhythm with no clear ischemic changes    Echo (24)   1. Left ventricular systolic function is normal with an ejection fraction of 66 % by Munoz's method of disks. There are no regional wall motion abnormalities seen.   2. Left ventricular global longitudinal strain is -21.8 % which is normal (< -18%). Images were acquired on a Addy ultrasound system and processed on the ultrasound machine with a heart rate of 82 bpm and a blood pressure of 119/76 mmHg.   3. Normal right ventricular cavity size and normal right ventricular systolic function. Tricuspid annular plane systolic excursion (TAPSE) is 2.4 cm (normal >=1.7 cm).   4. Left atrium is normal in size.   5. The right atrium is normal in size.   6. No significant valvular disease.   7. No pericardial effusion seen.   8. No prior echocardiogram is available for comparison.    -------------------------------------------------------------------------------------------

## 2024-11-13 NOTE — OB RN TRIAGE NOTE - NS_OBGYNHISTORY_OBGYN_ALL_OB_FT
-post partum PEC= congestive heart failure  2017 [facility] C/S @36weeks  D&C SAB ?10x 2014 FT  M 7#14-post partum readmit- PEC, congestive heart failure, mitral valve regurgitation  2017 [facility] C/S @36weeks M 6#, GDM  D&C SAB ?10x 2014 FT  M 7#14-post partum readmit- PEC, congestive heart failure, mitral valve regurgitation  2017 [facility] C/S @36weeks M 6#, GDM  [facility]/TOP x10? pt unsure (d&c x3)

## 2024-11-13 NOTE — OB PROVIDER H&P - NSHPPHYSICALEXAM_GEN_ALL_CORE
pt seen and examined     ICU Vital Signs Last 24 Hrs  T(C): 36.6 (13 Nov 2024 12:47), Max: 36.6 (13 Nov 2024 12:19)  T(F): 97.88 (13 Nov 2024 12:47), Max: 97.9 (13 Nov 2024 12:19)  HR: 92 (13 Nov 2024 18:02) (82 - 99)  BP: 112/65 (13 Nov 2024 17:51) (109/66 - 132/78)  BP(mean): --  ABP: --  ABP(mean): --  RR: 16 (13 Nov 2024 12:47) (16 - 16)  SpO2: 97% (13 Nov 2024 18:02) (90% - 100%)    pt in NAD   lungs clear   hearts 1 s2   abd soft gravid non tender    SSE  cx closed no vb      GBS done    TV scan cx length 3-cm  no previa     Scan Breech  aafi bpp 8/8    images saved to asob    EKG  NSR   reviewed with Cardio MD

## 2024-11-13 NOTE — OB RN TRIAGE NOTE - NSLDARRIVAL_OBGYN_ALL_OB_DIFF_HHMM
Subjective:       Patient ID: Daniel Jones is a 76 y.o. female.    Chief Complaint: She       MAIC (rev cxr rev [place]) and Bronchiectasis    HPI     Feels very well    Chronic Obstructive Pulmonary Disease / Bronchiectesis  Bronchiectasis  She presents for evaluation and treatment of bronchiectasis. The patient was diagnosed with bronchiectasis on 8/1/2017 on the basis of a CT scan. The bronchiectasis was localized to the lingula and localized to the right middle lobe. The patient has a history of atypical mycobacterium infection, first diagnosed 8/2018. Prior testing has included pulmonary function testing and sputum studies for fungus and mycobacteria which revealed evidence of bronchiectesis. The patient is having constitutional symptoms, including weight loss. There evidence of MAIC history of pneumonia, and the patient has not been hospitalized for this condition before. She has a history of 5 pack years. The patient has no exercise limitations. Prior treatments have included antibiotics for MAIC, which have provided no relief of symptoms.      COPD  She presents for evaluation and treatment of COPD. The patient is not currently have symptoms / an exacerbation. The patient has COPD for approximately 1 years. Symptoms in previous episodes have included cough, fatigue and weight loss, and typically last 1 months. Previous episodes have been exacerbated by strenuous activity. Current treatment includes none, which generally provides no relief of symptoms.   She uses 1 pillows at night. Patient currently is not on home oxygen therapy.. The patient is having constitutional symptoms, including weight loss. The patient has not been hospitalized for this condition before. She has a history of 5 pack years. The patient has no exercise limitations.         Past Medical History:   Diagnosis Date    Arthritis     Hypertension     Thyroid disease      Past Surgical History:   Procedure Laterality Date    tonsilectomy  N/A      Social History     Social History    Marital status:      Spouse name: N/A    Number of children: N/A    Years of education: N/A     Occupational History    Not on file.     Social History Main Topics    Smoking status: Former Smoker     Types: Cigarettes     Quit date: 1970    Smokeless tobacco: Never Used    Alcohol use No    Drug use: No    Sexual activity: No     Other Topics Concern    Not on file     Social History Narrative    No narrative on file     Review of Systems   Constitutional: Negative for fever and fatigue.   HENT: Negative for postnasal drip and rhinorrhea.    Eyes: Negative for redness and itching.   Respiratory: Positive for shortness of breath and dyspnea on extertion. Negative for cough, wheezing and Paroxysmal Nocturnal Dyspnea.    Cardiovascular: Negative for chest pain.   Genitourinary: Negative for difficulty urinating and hematuria.   Endocrine: Negative for polyphagia, cold intolerance and heat intolerance.    Musculoskeletal: Negative for arthralgias.   Skin: Negative for rash.   Gastrointestinal: Negative for nausea, vomiting, abdominal pain and abdominal distention.   Neurological: Negative for dizziness and headaches.   Hematological: Negative for adenopathy. Does not bruise/bleed easily and no excessive bruising.   Psychiatric/Behavioral: The patient is not nervous/anxious.        Objective:      Physical Exam   Constitutional: She is oriented to person, place, and time. She appears well-developed and well-nourished.   HENT:   Head: Normocephalic and atraumatic.   Mouth/Throat: Oropharyngeal exudate present.   Eyes: Conjunctivae are normal. Pupils are equal, round, and reactive to light.   Neck: Neck supple. No JVD present. No tracheal deviation present. No thyromegaly present.   Cardiovascular: Normal rate, regular rhythm and normal heart sounds.    Pulmonary/Chest: Effort normal. No respiratory distress. She has decreased breath sounds. She has wheezes  in the right lower field and the left lower field. She has no rhonchi. She has no rales. She exhibits no tenderness.   Abdominal: Soft. Bowel sounds are normal.   Musculoskeletal: Normal range of motion. She exhibits no edema.   Lymphadenopathy:     She has no cervical adenopathy.   Neurological: She is alert and oriented to person, place, and time.   Skin: Skin is warm and dry.   Nursing note and vitals reviewed.    Personal Diagnostic Review  Chest x-ray: bronchiectesis      No flowsheet data found.      Assessment:       1. MAIC (mycobacterium avium-intracellulare complex)    2. Chronic obstructive pulmonary disease, unspecified COPD type    3. Bronchiectasis without complication    4. Medication monitoring encounter        Outpatient Encounter Prescriptions as of 3/1/2018   Medication Sig Dispense Refill    albuterol (PROVENTIL) 2.5 mg /3 mL (0.083 %) nebulizer solution Take 3 mLs (2.5 mg total) by nebulization every 6 (six) hours while awake. 360 mL 12    amitriptyline (ELAVIL) 10 MG tablet Take 10 mg by mouth nightly.      amLODIPine (NORVASC) 5 MG tablet Take 1 tablet by mouth once daily.      aspirin (ECOTRIN) 325 MG EC tablet Take 325 mg by mouth nightly.      coenzyme Q10 (CO Q-10) 100 mg capsule Take 100 mg by mouth once daily.      esomeprazole (NEXIUM) 40 MG capsule Take 40 mg by mouth before breakfast.      ethambutol (MYAMBUTOL) 400 MG Tab Take 3 tablets (1,200 mg total) by mouth 3 (three) times a week. 108 tablet 4    etodolac (LODINE) 400 MG tablet Take 1 tablet by mouth once daily.      flu vac-2017 [id number],PF, (FLUAD 9920-7698, 65 YR UP,,PF,) 45 mcg (15 mcg x 3)/0.5 mL Syrg 0.5 mLs.      fluocinonide 0.05% (LIDEX) 0.05 % cream LEODAN 1 APPLICATION ON THE SKIN BID      levothyroxine (SYNTHROID) 88 MCG tablet Take 88 mcg by mouth once daily.      losartan (COZAAR) 100 MG tablet Take 100 mg by mouth once daily.      montelukast (SINGULAIR) 10 mg tablet Take 1 tablet (10 mg total) by  "mouth once daily. 90 tablet 4    rifAMpin (RIFADIN) 300 MG capsule Take 1 capsule (300 mg total) by mouth 3 (three) times a week. 36 capsule 4    rosuvastatin (CRESTOR) 5 MG tablet Take 5 mg by mouth once daily.      VIOS AEROSOL DELIVERY SYSTEM Abby USE 1 VIAL NEBULIZED Q 6 H WHILE AWAKE  0    [DISCONTINUED] montelukast (SINGULAIR) 10 mg tablet Take 10 mg by mouth once daily.      alendronate (FOSAMAX) 70 MG tablet Take 70 mg by mouth once daily.      [START ON 3/2/2018] azithromycin (ZITHROMAX Z-CATIA) 250 MG tablet Take 2 tablets (500 mg total) by mouth every Mon, Wed, Fri. 36 tablet 4    [DISCONTINUED] azithromycin (ZITHROMAX Z-CATIA) 250 MG tablet Take 2 tablets (500 mg total) by mouth every Mon, Wed, Fri. 12 tablet 0    [DISCONTINUED] azithromycin (ZITHROMAX) 500 MG tablet Take 1 tablet (500 mg total) by mouth every Mon, Wed, Fri. 36 tablet 4     No facility-administered encounter medications on file as of 3/1/2018.      Orders Placed This Encounter   Procedures    HME - OTHER     Omron [id number]-U22V Micro Air Vibrating Mesh Nebulizer MicroAir Kit     Order Specific Question:   Type of Equipment:     Answer:   Portable Nebulizer -     Order Specific Question:   Height:     Answer:   5' 2" (1.575 m)     Order Specific Question:   Weight:     Answer:   58.1 kg (128 lb 1.4 oz)    Hepatic function panel     Standing Status:   Future     Standing Expiration Date:   4/30/2019    Ambulatory Referral to Pulmonary Disease Management     Referral Priority:   Routine     Referral Type:   Consultation     Referral Reason:   Specialty Services Required     Requested Specialty:   Pulmonary Disease     Number of Visits Requested:   1    Complete PFT with bronchodilator     Standing Status:   Future     Standing Expiration Date:   9/1/2019     Plan:       Requested Prescriptions     Signed Prescriptions Disp Refills    azithromycin (ZITHROMAX Z-CATIA) 250 MG tablet 36 tablet 4     Sig: Take 2 tablets (500 mg total) by mouth " every Mon, Wed, Fri.    montelukast (SINGULAIR) 10 mg tablet 90 tablet 4     Sig: Take 1 tablet (10 mg total) by mouth once daily.     MAIC (mycobacterium avium-intracellulare complex)  -     Discontinue: azithromycin (ZITHROMAX Z-CATIA) 250 MG tablet; Take 2 tablets (500 mg total) by mouth every Mon, Wed, Fri.  Dispense: 12 tablet; Refill: 0  -     azithromycin (ZITHROMAX Z-CATIA) 250 MG tablet; Take 2 tablets (500 mg total) by mouth every Mon, Wed, Fri.  Dispense: 36 tablet; Refill: 4  -     Hepatic function panel; Future; Expected date: 03/01/2018    Chronic obstructive pulmonary disease, unspecified COPD type  -     montelukast (SINGULAIR) 10 mg tablet; Take 1 tablet (10 mg total) by mouth once daily.  Dispense: 90 tablet; Refill: 4  -     HME - OTHER  -     Complete PFT with bronchodilator; Future; Expected date: 03/01/2018  -     Ambulatory Referral to Pulmonary Disease Management    Bronchiectasis without complication  -     Ambulatory Referral to Pulmonary Disease Management    Medication monitoring encounter  -     Hepatic function panel; Future; Expected date: 03/01/2018           Follow-up in about 6 months (around 9/1/2018) for PFT and liver panel.    MEDICAL DECISION MAKING: Moderate to high complexity.  Overall, the multiple problems listed are of moderate to high severity that may impact quality of life and activities of daily living. Side effects of medications, treatment plan as well as options and alternatives reviewed and discussed with patient. There was counseling of patient concerning these issues.    Total time spent in face to face counseling and coordination of care - 25  minutes over 50% of time was used in discussion of prognosis, risks, benefits of treatment, instructions and compliance with regimen . Discussion with other physicians or health care providers (DME, NP, pharmacy, respiratory therapy) occurred.   5 Hour(s) 55 Minute(s)

## 2024-11-13 NOTE — OB PROVIDER TRIAGE NOTE - HISTORY OF PRESENT ILLNESS
43 year old female  at 27.5 week who presenst ed with abd ominal pains and tightening and also noted SOB  for last 2 days    has cardiac history

## 2024-11-13 NOTE — OB RN TRIAGE NOTE - NSICDXPASTMEDICALHX_GEN_ALL_CORE_FT
PAST MEDICAL HISTORY:  Acute congestive heart failure, unspecified congestive heart failure type during pregnancy     Asthma     Asthma mild, intermittent    Encounter for elective termination of pregnancy     Gestational diabetes mellitus (GDM), antepartum, gestational diabetes method of control unspecified     Migraine     Missed  2015    Mitral valve regurgitation noted during pregnancy, reports resolved, states no further issues    Non-rheumatic mitral regurgitation     Preeclampsia in postpartum period      PAST MEDICAL HISTORY:  Acute congestive heart failure, unspecified congestive heart failure type during pregnancy 2014    Asthma mild, intermittent    Encounter for elective termination of pregnancy     Gestational diabetes mellitus (GDM), antepartum, gestational diabetes method of control unspecified 2016    Migraine     Mitral valve regurgitation noted during pregnancy, reports resolved, states no further issues    Non-rheumatic mitral regurgitation

## 2024-11-13 NOTE — OB PROVIDER TRIAGE NOTE - NSICDXPASTMEDICALHX_GEN_ALL_CORE_FT
PAST MEDICAL HISTORY:  Acute congestive heart failure, unspecified congestive heart failure type during pregnancy 2014    Asthma mild, intermittent    Encounter for elective termination of pregnancy     Gestational diabetes mellitus (GDM), antepartum, gestational diabetes method of control unspecified 2016    Migraine     Mitral valve regurgitation noted during pregnancy, reports resolved, states no further issues    Non-rheumatic mitral regurgitation

## 2024-11-13 NOTE — OB PROVIDER H&P - ASSESSMENT
43 year old female [id number] at 27.5 week  no evidence of PTL     SOB wit exertion   s/p Cardio consult     admitted  for observation  and for Echo scan in AM       continued observation and fetal monitoring  bid      case d/w Dr Brizuela    plan of care d/w patient     admission orders placed     ASHKAN MARTINEZ

## 2024-11-13 NOTE — OB PROVIDER H&P - NS_OBGYNHISTORY_OBGYN_ALL_OB_FT
2014 FT  M 7#14-post partum readmit- PEC, congestive heart failure, mitral valve regurgitation  2017 [facility] C/S @36weeks M 6#, GDM  [facility]/TOP x10? pt unsure (d&c x3) 2014 FT  M 7#14-post partum readmit- PEC, congestive heart failure dx as , mitral valve regurgitation  2017 [facility] C/S @36weeks M 6#, GDM  [facility]/TOP x10? pt unsure (d&c x3)

## 2024-11-13 NOTE — OB PROVIDER H&P - HISTORY OF PRESENT ILLNESS
43 year old female  at 27.5 week who presenst ed with abd ominal pains and tightening and also noted SOB  for last 2 days    has cardiac history     43 year old female  at 27.5 week who presents  with abdominal  pains and tightening    also noted SOB  for last 2 days  worsening  dyspnea on exertion and standing  better with rest and noted SOB with contraction pain at times     felt sob with palpitations at times     has cardiac history  hx of Mitral valve regurgitation and followed with cardiologist and had echo in 10/2024 which showed Mild MR  normal ejection fraction       PN Dr Brizuela    followed with cardiologist  hx of MVR  dx as PP  MVR 2014    hx of GDM in prior pregnancy

## 2024-11-18 ENCOUNTER — NON-APPOINTMENT (OUTPATIENT)
Age: 44
End: 2024-11-18

## 2024-11-18 ENCOUNTER — APPOINTMENT (OUTPATIENT)
Dept: HEART FAILURE | Facility: CLINIC | Age: 44
End: 2024-11-18
Payer: MEDICAID

## 2024-11-18 VITALS
WEIGHT: 175 LBS | HEIGHT: 61 IN | SYSTOLIC BLOOD PRESSURE: 127 MMHG | DIASTOLIC BLOOD PRESSURE: 77 MMHG | BODY MASS INDEX: 33.04 KG/M2 | HEART RATE: 93 BPM | OXYGEN SATURATION: 99 %

## 2024-11-18 VITALS — BODY MASS INDEX: 33.48 KG/M2 | WEIGHT: 177.2 LBS

## 2024-11-18 PROCEDURE — 93000 ELECTROCARDIOGRAM COMPLETE: CPT

## 2024-11-18 PROCEDURE — G2211 COMPLEX E/M VISIT ADD ON: CPT | Mod: NC

## 2024-11-18 PROCEDURE — 36415 COLL VENOUS BLD VENIPUNCTURE: CPT

## 2024-11-18 PROCEDURE — 99214 OFFICE O/P EST MOD 30 MIN: CPT

## 2024-11-19 LAB
ALBUMIN SERPL ELPH-MCNC: 3.5 G/DL
ALP BLD-CCNC: 90 U/L
ALT SERPL-CCNC: 13 U/L
ANION GAP SERPL CALC-SCNC: 16 MMOL/L
AST SERPL-CCNC: 12 U/L
BILIRUB SERPL-MCNC: 0.2 MG/DL
BUN SERPL-MCNC: 7 MG/DL
CALCIUM SERPL-MCNC: 9.6 MG/DL
CHLORIDE SERPL-SCNC: 101 MMOL/L
CO2 SERPL-SCNC: 20 MMOL/L
CREAT SERPL-MCNC: 0.45 MG/DL
EGFR: 123 ML/MIN/1.73M2
GLUCOSE SERPL-MCNC: 51 MG/DL
NT-PROBNP SERPL-MCNC: <36 PG/ML
POTASSIUM SERPL-SCNC: 4.3 MMOL/L
PROT SERPL-MCNC: 6.5 G/DL
SODIUM SERPL-SCNC: 137 MMOL/L

## 2024-11-20 ENCOUNTER — APPOINTMENT (OUTPATIENT)
Dept: ANTEPARTUM | Facility: CLINIC | Age: 44
End: 2024-11-20
Payer: MEDICAID

## 2024-11-20 ENCOUNTER — ASOB RESULT (OUTPATIENT)
Age: 44
End: 2024-11-20

## 2024-11-20 PROCEDURE — 76819 FETAL BIOPHYS PROFIL W/O NST: CPT | Mod: 59

## 2024-11-20 PROCEDURE — 76816 OB US FOLLOW-UP PER FETUS: CPT

## 2024-11-25 ENCOUNTER — APPOINTMENT (OUTPATIENT)
Dept: OBGYN | Facility: CLINIC | Age: 44
End: 2024-11-25
Payer: MEDICAID

## 2024-11-25 VITALS
DIASTOLIC BLOOD PRESSURE: 62 MMHG | HEIGHT: 61 IN | BODY MASS INDEX: 33.04 KG/M2 | SYSTOLIC BLOOD PRESSURE: 110 MMHG | WEIGHT: 175 LBS

## 2024-11-25 DIAGNOSIS — Z13.1 ENCOUNTER FOR SCREENING FOR DIABETES MELLITUS: ICD-10-CM

## 2024-11-25 DIAGNOSIS — O34.219 MATERNAL CARE FOR UNSPECIFIED TYPE SCAR FROM PREVIOUS CESAREAN DELIVERY: ICD-10-CM

## 2024-11-25 DIAGNOSIS — Z3A.29 29 WEEKS GESTATION OF PREGNANCY: ICD-10-CM

## 2024-11-25 PROCEDURE — 36415 COLL VENOUS BLD VENIPUNCTURE: CPT

## 2024-11-25 PROCEDURE — 0502F SUBSEQUENT PRENATAL CARE: CPT

## 2024-11-26 ENCOUNTER — NON-APPOINTMENT (OUTPATIENT)
Age: 44
End: 2024-11-26

## 2024-11-26 PROBLEM — Z13.1 SCREENING FOR DIABETES MELLITUS: Status: ACTIVE | Noted: 2024-11-26

## 2024-11-26 PROBLEM — O24.419 GESTATIONAL DIABETES MELLITUS (GDM) IN THIRD TRIMESTER: Status: ACTIVE | Noted: 2017-04-19

## 2024-11-26 LAB
BASOPHILS # BLD AUTO: 0.02 K/UL
BASOPHILS NFR BLD AUTO: 0.2 %
BILIRUB UR QL STRIP: NORMAL
EOSINOPHIL # BLD AUTO: 0.06 K/UL
EOSINOPHIL NFR BLD AUTO: 0.6 %
GLUCOSE 1H P 50 G GLC PO SERPL-MCNC: 181 MG/DL
GLUCOSE UR-MCNC: NORMAL
HCG UR QL: 0.2 EU/DL
HCT VFR BLD CALC: 36.6 %
HGB BLD-MCNC: 12 G/DL
HGB UR QL STRIP.AUTO: NORMAL
IMM GRANULOCYTES NFR BLD AUTO: 0.6 %
KETONES UR-MCNC: NORMAL
LEUKOCYTE ESTERASE UR QL STRIP: NORMAL
LYMPHOCYTES # BLD AUTO: 2.14 K/UL
LYMPHOCYTES NFR BLD AUTO: 22.8 %
MAN DIFF?: NORMAL
MCHC RBC-ENTMCNC: 29.7 PG
MCHC RBC-ENTMCNC: 32.8 G/DL
MCV RBC AUTO: 90.6 FL
MONOCYTES # BLD AUTO: 0.55 K/UL
MONOCYTES NFR BLD AUTO: 5.9 %
NEUTROPHILS # BLD AUTO: 6.55 K/UL
NEUTROPHILS NFR BLD AUTO: 69.9 %
NITRITE UR QL STRIP: NORMAL
PH UR STRIP: 6.5
PLATELET # BLD AUTO: 302 K/UL
PROT UR STRIP-MCNC: NORMAL
RBC # BLD: 4.04 M/UL
RBC # FLD: 12.9 %
SP GR UR STRIP: 1.02
WBC # FLD AUTO: 9.38 K/UL

## 2024-11-26 RX ORDER — BLOOD-GLUCOSE METER
W/DEVICE KIT MISCELLANEOUS
Qty: 1 | Refills: 0 | Status: ACTIVE | COMMUNITY
Start: 2024-11-26 | End: 1900-01-01

## 2024-11-26 RX ORDER — BLOOD SUGAR DIAGNOSTIC
STRIP MISCELLANEOUS 4 TIMES DAILY
Qty: 2 | Refills: 4 | Status: ACTIVE | COMMUNITY
Start: 2024-11-26 | End: 1900-01-01

## 2024-11-26 RX ORDER — ALCOHOL ANTISEPTIC PADS
PADS, MEDICATED (EA) TOPICAL
Qty: 2 | Refills: 2 | Status: ACTIVE | COMMUNITY
Start: 2024-11-26 | End: 1900-01-01

## 2024-12-02 ENCOUNTER — APPOINTMENT (OUTPATIENT)
Dept: OBGYN | Facility: CLINIC | Age: 44
End: 2024-12-02
Payer: MEDICAID

## 2024-12-02 DIAGNOSIS — O09.529 SUPERVISION OF ELDERLY MULTIGRAVIDA, UNSPECIFIED TRIMESTER: ICD-10-CM

## 2024-12-02 DIAGNOSIS — Z3A.30 30 WEEKS GESTATION OF PREGNANCY: ICD-10-CM

## 2024-12-02 PROCEDURE — 0502F SUBSEQUENT PRENATAL CARE: CPT

## 2024-12-05 ENCOUNTER — NON-APPOINTMENT (OUTPATIENT)
Age: 44
End: 2024-12-05

## 2024-12-09 ENCOUNTER — RX RENEWAL (OUTPATIENT)
Age: 44
End: 2024-12-09

## 2024-12-09 ENCOUNTER — APPOINTMENT (OUTPATIENT)
Dept: OBGYN | Facility: CLINIC | Age: 44
End: 2024-12-09
Payer: MEDICAID

## 2024-12-09 VITALS
SYSTOLIC BLOOD PRESSURE: 122 MMHG | HEIGHT: 61 IN | WEIGHT: 177 LBS | BODY MASS INDEX: 33.42 KG/M2 | DIASTOLIC BLOOD PRESSURE: 60 MMHG

## 2024-12-09 DIAGNOSIS — O24.419 GESTATIONAL DIABETES MELLITUS IN PREGNANCY, UNSPECIFIED CONTROL: ICD-10-CM

## 2024-12-09 DIAGNOSIS — Z3A.32 32 WEEKS GESTATION OF PREGNANCY: ICD-10-CM

## 2024-12-09 PROCEDURE — 0502F SUBSEQUENT PRENATAL CARE: CPT

## 2024-12-09 RX ORDER — UREA 10 %
50 LOTION (ML) TOPICAL
Qty: 60 | Refills: 1 | Status: ACTIVE | COMMUNITY
Start: 2024-12-09 | End: 1900-01-01

## 2024-12-10 ENCOUNTER — APPOINTMENT (OUTPATIENT)
Dept: ANTEPARTUM | Facility: CLINIC | Age: 44
End: 2024-12-10
Payer: MEDICAID

## 2024-12-10 ENCOUNTER — OUTPATIENT (OUTPATIENT)
Dept: INPATIENT UNIT | Facility: HOSPITAL | Age: 44
LOS: 1 days | Discharge: HOME | End: 2024-12-10
Payer: MEDICAID

## 2024-12-10 VITALS — DIASTOLIC BLOOD PRESSURE: 63 MMHG | HEART RATE: 85 BPM | SYSTOLIC BLOOD PRESSURE: 116 MMHG

## 2024-12-10 VITALS
SYSTOLIC BLOOD PRESSURE: 124 MMHG | HEART RATE: 77 BPM | RESPIRATION RATE: 15 BRPM | TEMPERATURE: 98 F | DIASTOLIC BLOOD PRESSURE: 70 MMHG

## 2024-12-10 DIAGNOSIS — O26.899 OTHER SPECIFIED PREGNANCY RELATED CONDITIONS, UNSPECIFIED TRIMESTER: ICD-10-CM

## 2024-12-10 DIAGNOSIS — Z98.891 HISTORY OF UTERINE SCAR FROM PREVIOUS SURGERY: Chronic | ICD-10-CM

## 2024-12-10 DIAGNOSIS — Z90.89 ACQUIRED ABSENCE OF OTHER ORGANS: Chronic | ICD-10-CM

## 2024-12-10 PROCEDURE — 99213 OFFICE O/P EST LOW 20 MIN: CPT | Mod: 25

## 2024-12-10 PROCEDURE — 76815 OB US LIMITED FETUS(S): CPT | Mod: 26

## 2024-12-10 PROCEDURE — 76819 FETAL BIOPHYS PROFIL W/O NST: CPT | Mod: 26

## 2024-12-10 PROCEDURE — 59025 FETAL NON-STRESS TEST: CPT | Mod: 26

## 2024-12-10 NOTE — OB PROVIDER TRIAGE NOTE - ADDITIONAL INSTRUCTIONS
- Discussed with Dr. Saavedra  - Patient to be discharged home with follow up and return precautions  - Please follow up with your obstetrician at your next scheduled appointment.   - Please return for decreased/no fetal movement, vaginal bleeding similar to that of a period, leaking/gush of fluid, regular contractions occurring 4-5 minutes for one hour or requiring pain medication   - Patient and partner and educated of plan and demonstrate understanding. All questions answered. Discharge instructions provided and signed.   - Discharged at [phone]

## 2024-12-10 NOTE — OB PROVIDER TRIAGE NOTE - NS_PHYSICALALLNEG_OBGYN_ALL_OB
Triage Assessment       Row Name 08/11/23 8585       Triage Assessment (Adult)    Airway WDL WDL       Respiratory WDL    Respiratory WDL WDL       Skin Circulation/Temperature WDL    Skin Circulation/Temperature WDL WDL       Cardiac WDL    Cardiac WDL WDL       Peripheral/Neurovascular WDL    Peripheral Neurovascular WDL WDL       Cognitive/Neuro/Behavioral WDL    Cognitive/Neuro/Behavioral WDL WDL                     traMADol (ULTRAM) 50 MG tablet  Take 1 tablet by mouth every 6 hours as needed for Pain.   Normal, Disp-30 tablet last filled 7/14/17 11/30/2017 Last office visit with Dr John Guillen Readings from Last 1 Encounters:   12/13/17 75.3 kg        BP Readings from Last 2 Encounters:   12/13/17 (!) 70/42   12/11/17 99/59   ]    Lab Results   Component Value Date    SODIUM 137 12/13/2017    POTASSIUM 3.2 (L) 12/13/2017    CHLORIDE 96 (L) 12/13/2017    CO2 27 12/13/2017    BUN 40 (H) 12/13/2017    CREATININE 1.23 (H) 12/13/2017    GLUCOSE 155 (H) 12/13/2017     Hemoglobin A1C (%)   Date Value   10/30/2017 7.6 (H)     TSH (mcUnits/mL)   Date Value   05/02/2016 2.152     Lab Results   Component Value Date    CHOLESTEROL 97 11/25/2017    HDL 26 (L) 11/25/2017    CALCLDL 57 11/25/2017    TRIGLYCERIDE 70 11/25/2017     Lab Results   Component Value Date    AST 74 (H) 11/24/2017     (H) 11/24/2017    ALKPT 81 11/24/2017    BILIRUBIN 0.8 11/24/2017 All other review of systems negative, except as noted in HPI

## 2024-12-10 NOTE — OB PROVIDER TRIAGE NOTE - PLAN OF CARE
- Discussed with Dr. Saavedra  - Patient to be discharged home with follow up and return precautions  - Please follow up with your obstetrician at your next scheduled appointment.   - Please return for decreased/no fetal movement, vaginal bleeding similar to that of a period, leaking/gush of fluid, regular contractions occurring 4-5 minutes for one hour or requiring pain medication   - Patient and partner and educated of plan and demonstrate understanding. All questions answered. Discharge instructions provided and signed.   - Discharged at [phone]

## 2024-12-10 NOTE — OB RN TRIAGE NOTE - CURRENT PREGNANCY COMPLICATIONS, OB PROFILE
chronic migraines/pulmonary nodule, GDMA1 ( diet)/Gestational Diabetes/Maternal Medical Condition/Gestational Age less than 36 Weeks/Cardiac Disease

## 2024-12-10 NOTE — OB PROVIDER TRIAGE NOTE - HISTORY OF PRESENT ILLNESS
This is a 44 y/o -1-10-2 who presents with decreased fetal movement throughout the day. Pt states she was busy today and realized tonight she had not felt the baby move after dinner however she states she now feels fetal movement. She denies contractions, leaking fluid, vaginal bleeding.     PNC with Dr Brizuela. AP Course complicated by:   - GDMA1: pt states that she has appointment on Monday with MFM to discuss glucose control. She states that her fasting glucose is usually  and postprandial ranges from 100-200   - H.o mitral valve regurgitation postpartum in . Pt follows with cardiologist.

## 2024-12-10 NOTE — OB PROVIDER TRIAGE NOTE - NSHPPHYSICALEXAM_GEN_ALL_CORE
T(C): 36.8 (12-10-24 @ 23:29), Max: 36.8 (12-10-24 @ 22:58)  HR: 85 (12-10-24 @ 23:50) (77 - 91)  BP: 116/63 (12-10-24 @ 23:50) (116/63 - 124/70)  RR: 15 (12-10-24 @ 23:29) (15 - 15)  SpO2: --    Physical Exam  Gen: NAD  Neuro: No facial asymmetry, no slurred speech, moves all 4 extremities  Pulm: Unlabored, breathing comfortably on room air  Abdomen: soft, nontender, gravid    TAUS: cephalic, anterior placenta, MVP 3.4/BRITTNEY 10.09 , BPP 8/8, FHR  144 bpm via m-mode, images saved in ASOB  NST: baseline 130, moderate variability, +accels, no decels. reactive  [place]: none

## 2024-12-10 NOTE — OB RN TRIAGE NOTE - NS_OBGYNHISTORY_OBGYN_ALL_OB_FT
2014 FT  M 7#14-post partum readmit- PEC, congestive heart failure, mitral valve regurgitation  2017 [facility] C/S @36weeks M 6#, GDM, (ruptured intestines for baby, ileostomy)  [facility]/TOP x10? pt unsure (d&c x3)

## 2024-12-10 NOTE — OB PROVIDER TRIAGE NOTE - NSOBPROVIDERNOTE_OBGYN_ALL_OB_FT
This is a 43y  @ 31w 4d female with decreased fetal movement, now with fetal movement. Cat 1 tracing. BPP , discharged to home.    - Discussed with Dr. Saavedra  - Patient to be discharged home with follow up and return precautions  - Please follow up with your obstetrician at your next scheduled appointment.   - Please return for decreased/no fetal movement, vaginal bleeding similar to that of a period, leaking/gush of fluid, regular contractions occurring 4-5 minutes for one hour or requiring pain medication   - Patient and partner and educated of plan and demonstrate understanding. All questions answered. Discharge instructions provided and signed.   - Discharged at [phone]
Pt presenting w/ chills, met sepsis criteria on admission with fever, tachycardia, and leukocytosis  - RVP+ for enterorhinovirus  - s/p 1x vancomycin and cefepime in the ED  - c/w cefepime 500mg (dose for eGFR)  - Currently requiring 2L NC, wean as tolerated  - Supportive care

## 2024-12-10 NOTE — OB PROVIDER TRIAGE NOTE - NSGESTATIONAGE1_OBGYN_ALL_OB_NU
Mother calling, states child was seen in the UC, he was prescribed Cefdinir, mother was expecting a refill on his albuterol inhaler. She thought that Dr was going to refill it.  Mother is currently at the pharmacy and there is no refill for albuterol inhaler.    RN called the UC, spoke with RN, she will discuss refill with MD and will send to pharmacy,    Mother notified.   37

## 2024-12-11 DIAGNOSIS — O99.513 DISEASES OF THE RESPIRATORY SYSTEM COMPLICATING PREGNANCY, THIRD TRIMESTER: ICD-10-CM

## 2024-12-11 DIAGNOSIS — O99.353 DISEASES OF THE NERVOUS SYSTEM COMPLICATING PREGNANCY, THIRD TRIMESTER: ICD-10-CM

## 2024-12-11 DIAGNOSIS — Z87.59 PERSONAL HISTORY OF OTHER COMPLICATIONS OF PREGNANCY, CHILDBIRTH AND THE PUERPERIUM: ICD-10-CM

## 2024-12-11 DIAGNOSIS — O36.8130 DECREASED FETAL MOVEMENTS, THIRD TRIMESTER, NOT APPLICABLE OR UNSPECIFIED: ICD-10-CM

## 2024-12-11 DIAGNOSIS — J45.909 UNSPECIFIED ASTHMA, UNCOMPLICATED: ICD-10-CM

## 2024-12-11 DIAGNOSIS — Z3A.31 31 WEEKS GESTATION OF PREGNANCY: ICD-10-CM

## 2024-12-11 DIAGNOSIS — O24.410 GESTATIONAL DIABETES MELLITUS IN PREGNANCY, DIET CONTROLLED: ICD-10-CM

## 2024-12-11 DIAGNOSIS — O34.211 MATERNAL CARE FOR LOW TRANSVERSE SCAR FROM PREVIOUS CESAREAN DELIVERY: ICD-10-CM

## 2024-12-11 DIAGNOSIS — G43.909 MIGRAINE, UNSPECIFIED, NOT INTRACTABLE, WITHOUT STATUS MIGRAINOSUS: ICD-10-CM

## 2024-12-11 DIAGNOSIS — O09.293 SUPERVISION OF PREGNANCY WITH OTHER POOR REPRODUCTIVE OR OBSTETRIC HISTORY, THIRD TRIMESTER: ICD-10-CM

## 2024-12-17 ENCOUNTER — APPOINTMENT (OUTPATIENT)
Dept: ANTEPARTUM | Facility: CLINIC | Age: 44
End: 2024-12-17

## 2024-12-17 ENCOUNTER — ASOB RESULT (OUTPATIENT)
Age: 44
End: 2024-12-17

## 2024-12-17 PROCEDURE — 76816 OB US FOLLOW-UP PER FETUS: CPT

## 2024-12-17 PROCEDURE — 76819 FETAL BIOPHYS PROFIL W/O NST: CPT | Mod: 59

## 2024-12-19 ENCOUNTER — ASOB RESULT (OUTPATIENT)
Age: 44
End: 2024-12-19

## 2024-12-19 ENCOUNTER — APPOINTMENT (OUTPATIENT)
Dept: MATERNAL FETAL MEDICINE | Facility: CLINIC | Age: 44
End: 2024-12-19

## 2024-12-19 PROCEDURE — G0108 DIAB MANAGE TRN  PER INDIV: CPT | Mod: 95

## 2024-12-23 ENCOUNTER — NON-APPOINTMENT (OUTPATIENT)
Age: 44
End: 2024-12-23

## 2024-12-23 ENCOUNTER — APPOINTMENT (OUTPATIENT)
Dept: OBGYN | Facility: CLINIC | Age: 44
End: 2024-12-23

## 2024-12-30 ENCOUNTER — APPOINTMENT (OUTPATIENT)
Dept: MATERNAL FETAL MEDICINE | Facility: CLINIC | Age: 44
End: 2024-12-30

## 2025-01-02 ENCOUNTER — APPOINTMENT (OUTPATIENT)
Dept: MATERNAL FETAL MEDICINE | Facility: CLINIC | Age: 45
End: 2025-01-02

## 2025-01-06 ENCOUNTER — APPOINTMENT (OUTPATIENT)
Dept: OBGYN | Facility: CLINIC | Age: 45
End: 2025-01-06
Payer: MEDICAID

## 2025-01-06 VITALS
SYSTOLIC BLOOD PRESSURE: 126 MMHG | HEIGHT: 61 IN | WEIGHT: 178 LBS | BODY MASS INDEX: 33.61 KG/M2 | DIASTOLIC BLOOD PRESSURE: 76 MMHG

## 2025-01-06 DIAGNOSIS — Z3A.35 35 WEEKS GESTATION OF PREGNANCY: ICD-10-CM

## 2025-01-06 PROCEDURE — 0502F SUBSEQUENT PRENATAL CARE: CPT

## 2025-01-07 LAB
BILIRUB UR QL STRIP: NORMAL
GLUCOSE UR-MCNC: 100
HCG UR QL: 0.2 EU/DL
HGB UR QL STRIP.AUTO: NORMAL
KETONES UR-MCNC: NORMAL
LEUKOCYTE ESTERASE UR QL STRIP: NORMAL
NITRITE UR QL STRIP: NORMAL
PH UR STRIP: 6.5
PROT UR STRIP-MCNC: NORMAL
SP GR UR STRIP: 1.01

## 2025-01-09 ENCOUNTER — ASOB RESULT (OUTPATIENT)
Age: 45
End: 2025-01-09

## 2025-01-09 ENCOUNTER — APPOINTMENT (OUTPATIENT)
Dept: ANTEPARTUM | Facility: CLINIC | Age: 45
End: 2025-01-09

## 2025-01-09 ENCOUNTER — APPOINTMENT (OUTPATIENT)
Dept: HEART FAILURE | Facility: CLINIC | Age: 45
End: 2025-01-09

## 2025-01-09 ENCOUNTER — APPOINTMENT (OUTPATIENT)
Dept: ANTEPARTUM | Facility: CLINIC | Age: 45
End: 2025-01-09
Payer: MEDICAID

## 2025-01-09 ENCOUNTER — NON-APPOINTMENT (OUTPATIENT)
Age: 45
End: 2025-01-09

## 2025-01-09 PROCEDURE — 76819 FETAL BIOPHYS PROFIL W/O NST: CPT

## 2025-01-09 PROCEDURE — 76816 OB US FOLLOW-UP PER FETUS: CPT

## 2025-01-10 ENCOUNTER — APPOINTMENT (OUTPATIENT)
Dept: MATERNAL FETAL MEDICINE | Facility: CLINIC | Age: 45
End: 2025-01-10

## 2025-01-10 ENCOUNTER — ASOB RESULT (OUTPATIENT)
Age: 45
End: 2025-01-10

## 2025-01-10 DIAGNOSIS — O24.414 GESTATIONAL DIABETES MELLITUS IN PREGNANCY, INSULIN CONTROLLED: ICD-10-CM

## 2025-01-10 PROCEDURE — G0108 DIAB MANAGE TRN  PER INDIV: CPT | Mod: 95

## 2025-01-10 RX ORDER — PEN NEEDLE, DIABETIC 29 G X1/2"
32G X 4 MM NEEDLE, DISPOSABLE MISCELLANEOUS
Qty: 2 | Refills: 0 | Status: ACTIVE | COMMUNITY
Start: 2025-01-10 | End: 1900-01-01

## 2025-01-10 RX ORDER — ALCOHOL ANTISEPTIC PADS
PADS, MEDICATED (EA) TOPICAL
Qty: 1 | Refills: 0 | Status: ACTIVE | COMMUNITY
Start: 2025-01-10 | End: 1900-01-01

## 2025-01-10 RX ORDER — INSULIN HUMAN 100 [IU]/ML
100 INJECTION, SUSPENSION SUBCUTANEOUS
Qty: 1 | Refills: 0 | Status: ACTIVE | COMMUNITY
Start: 2025-01-10 | End: 1900-01-01

## 2025-01-11 ENCOUNTER — OUTPATIENT (OUTPATIENT)
Dept: INPATIENT UNIT | Facility: HOSPITAL | Age: 45
LOS: 1 days | Discharge: HOME | End: 2025-01-11
Payer: MEDICAID

## 2025-01-11 ENCOUNTER — APPOINTMENT (OUTPATIENT)
Dept: ANTEPARTUM | Facility: CLINIC | Age: 45
End: 2025-01-11

## 2025-01-11 VITALS
TEMPERATURE: 99 F | DIASTOLIC BLOOD PRESSURE: 78 MMHG | HEART RATE: 92 BPM | SYSTOLIC BLOOD PRESSURE: 130 MMHG | RESPIRATION RATE: 16 BRPM

## 2025-01-11 VITALS — DIASTOLIC BLOOD PRESSURE: 66 MMHG | HEART RATE: 86 BPM | SYSTOLIC BLOOD PRESSURE: 105 MMHG

## 2025-01-11 DIAGNOSIS — Z98.891 HISTORY OF UTERINE SCAR FROM PREVIOUS SURGERY: Chronic | ICD-10-CM

## 2025-01-11 DIAGNOSIS — O03.9 COMPLETE OR UNSPECIFIED SPONTANEOUS ABORTION WITHOUT COMPLICATION: Chronic | ICD-10-CM

## 2025-01-11 DIAGNOSIS — O26.899 OTHER SPECIFIED PREGNANCY RELATED CONDITIONS, UNSPECIFIED TRIMESTER: ICD-10-CM

## 2025-01-11 DIAGNOSIS — Z90.89 ACQUIRED ABSENCE OF OTHER ORGANS: Chronic | ICD-10-CM

## 2025-01-11 PROCEDURE — 59025 FETAL NON-STRESS TEST: CPT | Mod: 26

## 2025-01-11 PROCEDURE — 99222 1ST HOSP IP/OBS MODERATE 55: CPT | Mod: 25

## 2025-01-13 ENCOUNTER — APPOINTMENT (OUTPATIENT)
Dept: OBGYN | Facility: CLINIC | Age: 45
End: 2025-01-13
Payer: MEDICAID

## 2025-01-13 VITALS
SYSTOLIC BLOOD PRESSURE: 126 MMHG | BODY MASS INDEX: 33.99 KG/M2 | DIASTOLIC BLOOD PRESSURE: 78 MMHG | HEIGHT: 61 IN | WEIGHT: 180 LBS

## 2025-01-13 DIAGNOSIS — O24.419 GESTATIONAL DIABETES MELLITUS IN PREGNANCY, UNSPECIFIED CONTROL: ICD-10-CM

## 2025-01-13 DIAGNOSIS — O34.219 MATERNAL CARE FOR UNSPECIFIED TYPE SCAR FROM PREVIOUS CESAREAN DELIVERY: ICD-10-CM

## 2025-01-13 DIAGNOSIS — O09.529 SUPERVISION OF ELDERLY MULTIGRAVIDA, UNSPECIFIED TRIMESTER: ICD-10-CM

## 2025-01-13 DIAGNOSIS — Z3A.36 36 WEEKS GESTATION OF PREGNANCY: ICD-10-CM

## 2025-01-13 PROCEDURE — 36415 COLL VENOUS BLD VENIPUNCTURE: CPT

## 2025-01-13 PROCEDURE — 0502F SUBSEQUENT PRENATAL CARE: CPT

## 2025-01-13 PROCEDURE — 59425 ANTEPARTUM CARE ONLY: CPT

## 2025-01-14 LAB
BASOPHILS # BLD AUTO: 0.03 K/UL
BASOPHILS NFR BLD AUTO: 0.4 %
EOSINOPHIL # BLD AUTO: 0.12 K/UL
EOSINOPHIL NFR BLD AUTO: 1.6 %
HCT VFR BLD CALC: 37.9 %
HGB BLD-MCNC: 12.2 G/DL
HIV1+2 AB SPEC QL IA.RAPID: NONREACTIVE
IMM GRANULOCYTES NFR BLD AUTO: 0.4 %
LYMPHOCYTES # BLD AUTO: 2.08 K/UL
LYMPHOCYTES NFR BLD AUTO: 27.9 %
MAN DIFF?: NORMAL
MCHC RBC-ENTMCNC: 27.7 PG
MCHC RBC-ENTMCNC: 32.2 G/DL
MCV RBC AUTO: 85.9 FL
MONOCYTES # BLD AUTO: 0.64 K/UL
MONOCYTES NFR BLD AUTO: 8.6 %
NEUTROPHILS # BLD AUTO: 4.55 K/UL
NEUTROPHILS NFR BLD AUTO: 61.1 %
PLATELET # BLD AUTO: 328 K/UL
RBC # BLD: 4.41 M/UL
RBC # FLD: 13.4 %
T PALLIDUM AB SER QL IA: NEGATIVE
WBC # FLD AUTO: 7.45 K/UL

## 2025-01-14 RX ORDER — INSULIN LISPRO 100 U/ML
100 INJECTION, SOLUTION SUBCUTANEOUS
Qty: 1 | Refills: 0 | Status: ACTIVE | COMMUNITY
Start: 2025-01-14 | End: 1900-01-01

## 2025-01-14 RX ORDER — INSULIN LISPRO 100 [IU]/ML
100 INJECTION, SOLUTION INTRAVENOUS; SUBCUTANEOUS
Qty: 1 | Refills: 0 | Status: DISCONTINUED | COMMUNITY
Start: 2025-01-10 | End: 2025-01-14

## 2025-01-15 ENCOUNTER — ASOB RESULT (OUTPATIENT)
Age: 45
End: 2025-01-15

## 2025-01-15 ENCOUNTER — APPOINTMENT (OUTPATIENT)
Dept: ANTEPARTUM | Facility: CLINIC | Age: 45
End: 2025-01-15
Payer: MEDICAID

## 2025-01-15 PROCEDURE — 76818 FETAL BIOPHYS PROFILE W/NST: CPT

## 2025-01-16 DIAGNOSIS — Z79.4 LONG TERM (CURRENT) USE OF INSULIN: ICD-10-CM

## 2025-01-16 DIAGNOSIS — G43.909 MIGRAINE, UNSPECIFIED, NOT INTRACTABLE, WITHOUT STATUS MIGRAINOSUS: ICD-10-CM

## 2025-01-16 DIAGNOSIS — O09.523 SUPERVISION OF ELDERLY MULTIGRAVIDA, THIRD TRIMESTER: ICD-10-CM

## 2025-01-16 DIAGNOSIS — I34.0 NONRHEUMATIC MITRAL (VALVE) INSUFFICIENCY: ICD-10-CM

## 2025-01-16 DIAGNOSIS — I50.9 HEART FAILURE, UNSPECIFIED: ICD-10-CM

## 2025-01-16 DIAGNOSIS — O24.414 GESTATIONAL DIABETES MELLITUS IN PREGNANCY, INSULIN CONTROLLED: ICD-10-CM

## 2025-01-16 DIAGNOSIS — Z87.59 PERSONAL HISTORY OF OTHER COMPLICATIONS OF PREGNANCY, CHILDBIRTH AND THE PUERPERIUM: ICD-10-CM

## 2025-01-16 DIAGNOSIS — O36.8130 DECREASED FETAL MOVEMENTS, THIRD TRIMESTER, NOT APPLICABLE OR UNSPECIFIED: ICD-10-CM

## 2025-01-16 DIAGNOSIS — O09.293 SUPERVISION OF PREGNANCY WITH OTHER POOR REPRODUCTIVE OR OBSTETRIC HISTORY, THIRD TRIMESTER: ICD-10-CM

## 2025-01-16 DIAGNOSIS — Z3A.36 36 WEEKS GESTATION OF PREGNANCY: ICD-10-CM

## 2025-01-16 DIAGNOSIS — O99.353 DISEASES OF THE NERVOUS SYSTEM COMPLICATING PREGNANCY, THIRD TRIMESTER: ICD-10-CM

## 2025-01-16 DIAGNOSIS — O99.413 DISEASES OF THE CIRCULATORY SYSTEM COMPLICATING PREGNANCY, THIRD TRIMESTER: ICD-10-CM

## 2025-01-17 ENCOUNTER — APPOINTMENT (OUTPATIENT)
Dept: MATERNAL FETAL MEDICINE | Facility: CLINIC | Age: 45
End: 2025-01-17

## 2025-01-17 PROBLEM — Z3A.36 36 WEEKS GESTATION OF PREGNANCY: Status: ACTIVE | Noted: 2025-01-17

## 2025-01-21 ENCOUNTER — NON-APPOINTMENT (OUTPATIENT)
Age: 45
End: 2025-01-21

## 2025-01-21 ENCOUNTER — APPOINTMENT (OUTPATIENT)
Dept: OBGYN | Facility: CLINIC | Age: 45
End: 2025-01-21

## 2025-01-22 ENCOUNTER — NON-APPOINTMENT (OUTPATIENT)
Age: 45
End: 2025-01-22

## 2025-01-23 ENCOUNTER — NON-APPOINTMENT (OUTPATIENT)
Age: 45
End: 2025-01-23

## 2025-01-23 ENCOUNTER — APPOINTMENT (OUTPATIENT)
Dept: ANTEPARTUM | Facility: CLINIC | Age: 45
End: 2025-01-23

## 2025-01-23 ENCOUNTER — INPATIENT (INPATIENT)
Facility: HOSPITAL | Age: 45
LOS: 2 days | Discharge: HOME | End: 2025-01-26
Attending: OBSTETRICS & GYNECOLOGY | Admitting: OBSTETRICS & GYNECOLOGY
Payer: MEDICAID

## 2025-01-23 VITALS — SYSTOLIC BLOOD PRESSURE: 135 MMHG | HEART RATE: 91 BPM | DIASTOLIC BLOOD PRESSURE: 85 MMHG

## 2025-01-23 DIAGNOSIS — O26.899 OTHER SPECIFIED PREGNANCY RELATED CONDITIONS, UNSPECIFIED TRIMESTER: ICD-10-CM

## 2025-01-23 DIAGNOSIS — Z90.89 ACQUIRED ABSENCE OF OTHER ORGANS: Chronic | ICD-10-CM

## 2025-01-23 DIAGNOSIS — Z98.891 HISTORY OF UTERINE SCAR FROM PREVIOUS SURGERY: Chronic | ICD-10-CM

## 2025-01-23 DIAGNOSIS — O03.9 COMPLETE OR UNSPECIFIED SPONTANEOUS ABORTION WITHOUT COMPLICATION: Chronic | ICD-10-CM

## 2025-01-23 LAB
BASOPHILS # BLD AUTO: 0.02 K/UL — SIGNIFICANT CHANGE UP (ref 0–0.2)
BASOPHILS NFR BLD AUTO: 0.4 % — SIGNIFICANT CHANGE UP (ref 0–2)
BLD GP AB SCN SERPL QL: NEGATIVE — SIGNIFICANT CHANGE UP
EOSINOPHIL # BLD AUTO: 0.03 K/UL — SIGNIFICANT CHANGE UP (ref 0–0.5)
EOSINOPHIL NFR BLD AUTO: 0.6 % — SIGNIFICANT CHANGE UP (ref 0–6)
FLUAV AG NPH QL: DETECTED
FLUBV AG NPH QL: SIGNIFICANT CHANGE UP
GLUCOSE BLDC GLUCOMTR-MCNC: 97 MG/DL — SIGNIFICANT CHANGE UP (ref 70–99)
HCT VFR BLD CALC: 37.1 % — SIGNIFICANT CHANGE UP (ref 34.5–45)
HGB BLD-MCNC: 12.4 G/DL — SIGNIFICANT CHANGE UP (ref 11.5–15.5)
IANC: 2.89 K/UL — SIGNIFICANT CHANGE UP (ref 1.8–7.4)
IMM GRANULOCYTES NFR BLD AUTO: 0.2 % — SIGNIFICANT CHANGE UP (ref 0–0.9)
LYMPHOCYTES # BLD AUTO: 1.75 K/UL — SIGNIFICANT CHANGE UP (ref 1–3.3)
LYMPHOCYTES # BLD AUTO: 34 % — SIGNIFICANT CHANGE UP (ref 13–44)
MCHC RBC-ENTMCNC: 27.9 PG — SIGNIFICANT CHANGE UP (ref 27–34)
MCHC RBC-ENTMCNC: 33.4 G/DL — SIGNIFICANT CHANGE UP (ref 32–36)
MCV RBC AUTO: 83.6 FL — SIGNIFICANT CHANGE UP (ref 80–100)
MONOCYTES # BLD AUTO: 0.44 K/UL — SIGNIFICANT CHANGE UP (ref 0–0.9)
MONOCYTES NFR BLD AUTO: 8.6 % — SIGNIFICANT CHANGE UP (ref 2–14)
NEUTROPHILS # BLD AUTO: 2.89 K/UL — SIGNIFICANT CHANGE UP (ref 1.8–7.4)
NEUTROPHILS NFR BLD AUTO: 56.2 % — SIGNIFICANT CHANGE UP (ref 43–77)
NRBC # BLD AUTO: 0 K/UL — SIGNIFICANT CHANGE UP (ref 0–0)
NRBC # BLD: 0 /100 WBCS — SIGNIFICANT CHANGE UP (ref 0–0)
NRBC # FLD: 0 K/UL — SIGNIFICANT CHANGE UP (ref 0–0)
NRBC BLD-RTO: 0 /100 WBCS — SIGNIFICANT CHANGE UP (ref 0–0)
PLATELET # BLD AUTO: 278 K/UL — SIGNIFICANT CHANGE UP (ref 150–400)
RBC # BLD: 4.44 M/UL — SIGNIFICANT CHANGE UP (ref 3.8–5.2)
RBC # FLD: 13.6 % — SIGNIFICANT CHANGE UP (ref 10.3–14.5)
RH IG SCN BLD-IMP: POSITIVE — SIGNIFICANT CHANGE UP
RH IG SCN BLD-IMP: POSITIVE — SIGNIFICANT CHANGE UP
RSV RNA NPH QL NAA+NON-PROBE: SIGNIFICANT CHANGE UP
SARS-COV-2 RNA SPEC QL NAA+PROBE: SIGNIFICANT CHANGE UP
WBC # BLD: 5.14 K/UL — SIGNIFICANT CHANGE UP (ref 3.8–10.5)
WBC # FLD AUTO: 5.14 K/UL — SIGNIFICANT CHANGE UP (ref 3.8–10.5)

## 2025-01-23 PROCEDURE — 59514 CESAREAN DELIVERY ONLY: CPT | Mod: U7

## 2025-01-23 PROCEDURE — 59515 CESAREAN DELIVERY: CPT | Mod: U7

## 2025-01-23 PROCEDURE — 76819 FETAL BIOPHYS PROFIL W/O NST: CPT | Mod: 26

## 2025-01-23 RX ORDER — FAMOTIDINE 10 MG/ML
20 INJECTION INTRAVENOUS ONCE
Refills: 0 | Status: DISCONTINUED | OUTPATIENT
Start: 2025-01-23 | End: 2025-01-24

## 2025-01-23 RX ORDER — ANTISEPTIC SURGICAL SCRUB 0.04 MG/ML
1 SOLUTION TOPICAL DAILY
Refills: 0 | Status: DISCONTINUED | OUTPATIENT
Start: 2025-01-23 | End: 2025-01-24

## 2025-01-23 RX ORDER — MORPHINE SULFATE 60 MG/1
0.15 TABLET, FILM COATED, EXTENDED RELEASE ORAL ONCE
Refills: 0 | Status: DISCONTINUED | OUTPATIENT
Start: 2025-01-23 | End: 2025-01-26

## 2025-01-23 RX ORDER — ONDANSETRON 4 MG/1
4 TABLET, ORALLY DISINTEGRATING ORAL EVERY 6 HOURS
Refills: 0 | Status: COMPLETED | OUTPATIENT
Start: 2025-01-23 | End: 2025-01-24

## 2025-01-23 RX ORDER — SODIUM CHLORIDE 9 G/ML
1000 INJECTION, SOLUTION INTRAVENOUS
Refills: 0 | Status: DISCONTINUED | OUTPATIENT
Start: 2025-01-23 | End: 2025-01-24

## 2025-01-23 RX ORDER — OXYTOCIN/0.9 % SODIUM CHLORIDE 10/500ML
167 PLASTIC BAG, INJECTION (ML) INTRAVENOUS
Qty: 30 | Refills: 0 | Status: DISCONTINUED | OUTPATIENT
Start: 2025-01-23 | End: 2025-01-24

## 2025-01-23 RX ORDER — OXYCODONE HYDROCHLORIDE 30 MG/1
5 TABLET ORAL
Refills: 0 | Status: DISCONTINUED | OUTPATIENT
Start: 2025-01-23 | End: 2025-01-23

## 2025-01-23 RX ORDER — DEXAMETHASONE SODIUM PHOSPHATE 4 MG/ML
4 INJECTION, SOLUTION INTRA-ARTICULAR; INTRALESIONAL; INTRAMUSCULAR; INTRAVENOUS; SOFT TISSUE EVERY 6 HOURS
Refills: 0 | Status: DISCONTINUED | OUTPATIENT
Start: 2025-01-23 | End: 2025-01-26

## 2025-01-23 RX ORDER — NALOXONE HYDROCHLORIDE 3 MG/.1ML
0.1 SPRAY NASAL
Refills: 0 | Status: DISCONTINUED | OUTPATIENT
Start: 2025-01-23 | End: 2025-01-26

## 2025-01-23 RX ORDER — SODIUM CITRATE AND CITRIC ACID MONOHYDRATE 1002; 1500 MG/15ML; MG/15ML
30 SOLUTION ORAL ONCE
Refills: 0 | Status: DISCONTINUED | OUTPATIENT
Start: 2025-01-23 | End: 2025-01-24

## 2025-01-23 NOTE — OB RN PATIENT PROFILE - FALL HARM RISK - UNIVERSAL INTERVENTIONS
ROSHNI mesa s/p cardiac cath
Bed in lowest position, wheels locked, appropriate side rails in place/Call bell, personal items and telephone in reach/Instruct patient to call for assistance before getting out of bed or chair/Non-slip footwear when patient is out of bed/[place] to call system/Physically safe environment - no spills, clutter or unnecessary equipment/Purposeful Proactive Rounding/Room/bathroom lighting operational, light cord in reach

## 2025-01-23 NOTE — OB PROVIDER H&P - HISTORY OF PRESENT ILLNESS
45 yo AMA obese //10/2 @ 37.6 weeks reports ROM at 345pm clear fluid still leaking fluid. reports contractions q 7-10 minutes 10/10 pain scale +GFM. AP course complicated by AMA obesity GDMA2 did not start insulin not checking blood sugar since thursday, hx of PPPEC, CHF, MVR- follows with cardiology did not go for scheduled echo last week as recommended. Hx of TOP x 8, 16 wk PPROM, PTD for fetal distress, prior c/s. delivery   with erbs palsy, 2017 delivery  abdominal distention= intestinal perforation s/p colostomy.  reports sick x 3 days cough congestion, feeling better had fever chills 2 days ago tested negative for covid- son at home sick with fever cough. Scheduled for repeat c/s .   RAUL mely pasts at 6pm  GBS unknown  meds:PNV ASA Mag B6 choline  all: seafood hives  PMH: CHF MVR migrianes RA asthma   PSH: c/s x 1 TOP x 8, tonsilectomy   gyn hx:: denies  ob hx: SAB x 1, PPROM at 16 weeks  TOP x 8   2014 7#15 erbs palsy ppPEC CHF MVR  primary c/s 2017 at 36 weeks for oligo and fetal distress,  abd distention - intestinal perforation s/p ostomy   45 yo AMA obese //10/2 @ 37.6 weeks reports ROM at 345pm clear fluid still leaking fluid. reports contractions q 7-10 minutes 10/10 pain scale +GFM. AP course complicated by AMA obesity GDMA2 did not start insulin not checking blood sugar since thursday, hx of PPPEC, CHF, MVR- follows with cardiology did not go for scheduled echo last week as recommended. Hx of TOP x 8, 16 wk PPROM, PTD for fetal distress, prior c/s. delivery   with erbs palsy,  delivery  abdominal distention= intestinal perforation s/p colostomy.  reports sick x 3 days cough congestion, feeling better had fever chills 2 days ago tested negative for covid- son at home sick with fever cough. Scheduled for repeat c/s .   RAUL mely pasts at 6pm  GBS unknown  meds: PNV ASA Mag B6 choline  all: seafood hives  PMH: CHF MVR migraines RA asthma (never hospitalized or intubated, not taking inhaler- had not taken in over a year)  PSH: c/s x 1 TOP x 8, tonsillectomy   gyn hx:: denies  ob hx: SAB x 1, PPROM at 16 weeks  TOP x 8   2014 7#15 erbs palsy ppPEC CHF MVR  primary c/s 2017 at 36 weeks for oligo and fetal distress,  abd distention - intestinal perforation s/p ostomy

## 2025-01-23 NOTE — OB PROVIDER H&P - NSHPPHYSICALEXAM_GEN_ALL_CORE
abd soft gravid obese NT  CV RRR  LS clear bilaterally NAD  TAS:   vertex  anterior placenta  +FM  +  SSE: + Nitrazine + ferning + pooling clear fluid   SVE: 2/70/-3  FHT: moderate variability + accelerations + early decelerations   toco: q 5 minutes 10/10 pain   Vital Signs Last 24 Hrs  T(C): 37 (23 Jan 2025 21:46), Max: 37 (23 Jan 2025 21:46)  T(F): 98.6 (23 Jan 2025 21:46), Max: 98.6 (23 Jan 2025 21:46)  HR: 91 (23 Jan 2025 21:46) (91 - 91)  BP: 135/85 (23 Jan 2025 21:46) (135/85 - 135/85)  BP(mean): --  RR: 18 (23 Jan 2025 21:46) (16 - 18)  SpO2: --    Parameters below as of 23 Jan 2025 21:46  Patient On (Oxygen Delivery Method): room air

## 2025-01-23 NOTE — OB RN PATIENT PROFILE - NS_PRENATALLABSOURCEGBS1PN_OBGYN_ALL_OB
Keep your appointment on Monday with your OBGYN.  Return to the ED for any worsening of symptoms or any other concerns.  I have given you a copy of all of your results for your doctor.   unknown

## 2025-01-23 NOTE — OB PROVIDER H&P - PROBLEM SELECTOR PLAN 1
admit to Dr Baker for PROM/labor at 37.6 weeks for unscheduled repeat c/section  NPO  Admit labs  POCT on admission and q 4 hrs, GDMA2  RVP panel for illness symptoms and sick contacts  PRBC on hold 2/2 hemorrhage risk  spinal anesthesia   IV x 2

## 2025-01-23 NOTE — OB PROVIDER H&P - ASSESSMENT
Dr Baker at bedside to confirm repeat unscheduled section, as patient initially discussed TOLAC. Pt desires repeat c/s.  Huddle called Nursing Residents Attending and Anesthesia notified for huddle   2140 Huddle complete OR opened, to go when OR ready when ready pt contractions q 5 minutes 10/10 on pain scale, 3rd baby, PROM    admit to Dr Baker for PROM/labor at 37.6 weeks for unscheduled repeat c/section  NPO (RAUL 6pm)  Admit labs  POCT on admission and q 4 hrs, GDMA2  RVP panel for illness symptoms and sick contacts  PRBC on hold 2/2 hemorrhage risk  spinal anesthesia   IV x 2     Risks, benefits, alternatives, and possible complications have been discussed in detail with the patient in her native language. Pre-admission, admission, and post admission procedures and expectations were discussed in detail. All questions answered, all appropriate hospital consents were signed. Anticipate normal vaginal delivery.   Informed consent was obtained. The following was discussed:   - Obstetrical management including internal fetal/contraction monitoring   - For  section  Dr Baker at bedside to confirm repeat unscheduled section, as patient initially discussed TOLAC. Pt desires repeat c/s.  Huddle called Nursing Residents Attending and Anesthesia notified for huddle   pt with hx of CHF and MVR- follows with cardiology last echo from 2024- anesthesia reviewed   2140 Huddle complete OR opened, to go when OR ready when ready pt contractions q 5 minutes 10/10 on pain scale, 3rd baby, PROM    admit to Dr Baker for PROM/labor at 37.6 weeks for unscheduled repeat c/section  NPO (RAUL 6pm)  Admit labs  POCT on admission and q 4 hrs, GDMA2  RVP panel for illness symptoms and sick contacts  PRBC on hold 2/2 hemorrhage risk  spinal anesthesia   IV x 2     Risks, benefits, alternatives, and possible complications have been discussed in detail with the patient in her native language. Pre-admission, admission, and post admission procedures and expectations were discussed in detail. All questions answered, all appropriate hospital consents were signed. Anticipate normal vaginal delivery.   Informed consent was obtained. The following was discussed:   - Obstetrical management including internal fetal/contraction monitoring   - For  section

## 2025-01-23 NOTE — OB PROVIDER H&P - NSLOWDOSEASPIRINGESTATIONALAGERANGE_OBGYN_ALL_OB
Hydroxyzine Pregnancy And Lactation Text: This medication is not safe during pregnancy and should not be taken. It is also excreted in breast milk and breast feeding isn't recommended. Unknown/Not sure

## 2025-01-23 NOTE — OB RN TRIAGE NOTE - NSICDXPASTMEDICALHX_GEN_ALL_CORE_FT
PAST MEDICAL HISTORY:  Acute congestive heart failure, unspecified congestive heart failure type during pregnancy 2014    Asthma mild, intermittent    Encounter for elective termination of pregnancy     Gestational diabetes mellitus (GDM), antepartum, gestational diabetes method of control unspecified 2016    Lung nodule     Migraine     Mitral valve regurgitation noted during pregnancy, reports resolved, states no further issues    Non-rheumatic mitral regurgitation

## 2025-01-23 NOTE — OB RN TRIAGE NOTE - NS_OBGYNHISTORY_OBGYN_ALL_OB_FT
Mis x2 w/ D&C  TOP x8     2014 7#15-Erbs palsy, Patient PP PEC, CHF and mitral valve regurgitation  Primary c/s @ 36wks 2017 oligo-fetal intestine rupture

## 2025-01-23 NOTE — OB PROVIDER H&P - NSRISKFACTORSDETA_OBGYN_ALL_OB
Gestational Diabetes/Previous  <37 weeks/Other serious chronic Disease/Other Poor Pregnancy Outcome/Referral for High Risk Care/Cardiac Disease: Functional Defect

## 2025-01-24 ENCOUNTER — APPOINTMENT (OUTPATIENT)
Dept: ANTEPARTUM | Facility: CLINIC | Age: 45
End: 2025-01-24

## 2025-01-24 ENCOUNTER — RESULT REVIEW (OUTPATIENT)
Age: 45
End: 2025-01-24

## 2025-01-24 ENCOUNTER — TRANSCRIPTION ENCOUNTER (OUTPATIENT)
Age: 45
End: 2025-01-24

## 2025-01-24 LAB
ALBUMIN SERPL ELPH-MCNC: 2.7 G/DL — LOW (ref 3.3–5)
ALP SERPL-CCNC: 119 U/L — SIGNIFICANT CHANGE UP (ref 40–120)
ALT FLD-CCNC: 17 U/L — SIGNIFICANT CHANGE UP (ref 4–33)
ANION GAP SERPL CALC-SCNC: 10 MMOL/L — SIGNIFICANT CHANGE UP (ref 7–14)
ANION GAP SERPL CALC-SCNC: 11 MMOL/L — SIGNIFICANT CHANGE UP (ref 7–14)
ANION GAP SERPL CALC-SCNC: 12 MMOL/L — SIGNIFICANT CHANGE UP (ref 7–14)
AST SERPL-CCNC: 22 U/L — SIGNIFICANT CHANGE UP (ref 4–32)
BILIRUB SERPL-MCNC: 0.2 MG/DL — SIGNIFICANT CHANGE UP (ref 0.2–1.2)
BUN SERPL-MCNC: 6 MG/DL — LOW (ref 7–23)
CALCIUM SERPL-MCNC: 7.7 MG/DL — LOW (ref 8.4–10.5)
CALCIUM SERPL-MCNC: 7.8 MG/DL — LOW (ref 8.4–10.5)
CALCIUM SERPL-MCNC: 8.1 MG/DL — LOW (ref 8.4–10.5)
CHLORIDE SERPL-SCNC: 100 MMOL/L — SIGNIFICANT CHANGE UP (ref 98–107)
CHLORIDE SERPL-SCNC: 104 MMOL/L — SIGNIFICANT CHANGE UP (ref 98–107)
CHLORIDE SERPL-SCNC: 107 MMOL/L — SIGNIFICANT CHANGE UP (ref 98–107)
CO2 SERPL-SCNC: 19 MMOL/L — LOW (ref 22–31)
CO2 SERPL-SCNC: 19 MMOL/L — LOW (ref 22–31)
CO2 SERPL-SCNC: 21 MMOL/L — LOW (ref 22–31)
CREAT SERPL-MCNC: 0.48 MG/DL — LOW (ref 0.5–1.3)
CREAT SERPL-MCNC: 0.49 MG/DL — LOW (ref 0.5–1.3)
CREAT SERPL-MCNC: 0.63 MG/DL — SIGNIFICANT CHANGE UP (ref 0.5–1.3)
EGFR: 113 ML/MIN/1.73M2 — SIGNIFICANT CHANGE UP
EGFR: 120 ML/MIN/1.73M2 — SIGNIFICANT CHANGE UP
EGFR: 120 ML/MIN/1.73M2 — SIGNIFICANT CHANGE UP
GLUCOSE SERPL-MCNC: 110 MG/DL — HIGH (ref 70–99)
GLUCOSE SERPL-MCNC: 128 MG/DL — HIGH (ref 70–99)
GLUCOSE SERPL-MCNC: 82 MG/DL — SIGNIFICANT CHANGE UP (ref 70–99)
HCT VFR BLD CALC: 30.9 % — LOW (ref 34.5–45)
HCT VFR BLD CALC: 31.8 % — LOW (ref 34.5–45)
HCT VFR BLD CALC: 33.5 % — LOW (ref 34.5–45)
HGB BLD-MCNC: 10.5 G/DL — LOW (ref 11.5–15.5)
HGB BLD-MCNC: 11.1 G/DL — LOW (ref 11.5–15.5)
HGB BLD-MCNC: 9.9 G/DL — LOW (ref 11.5–15.5)
MAGNESIUM SERPL-MCNC: 1.3 MG/DL — LOW (ref 1.6–2.6)
MCHC RBC-ENTMCNC: 27.5 PG — SIGNIFICANT CHANGE UP (ref 27–34)
MCHC RBC-ENTMCNC: 27.9 PG — SIGNIFICANT CHANGE UP (ref 27–34)
MCHC RBC-ENTMCNC: 28 PG — SIGNIFICANT CHANGE UP (ref 27–34)
MCHC RBC-ENTMCNC: 32 G/DL — SIGNIFICANT CHANGE UP (ref 32–36)
MCHC RBC-ENTMCNC: 33 G/DL — SIGNIFICANT CHANGE UP (ref 32–36)
MCHC RBC-ENTMCNC: 33.1 G/DL — SIGNIFICANT CHANGE UP (ref 32–36)
MCV RBC AUTO: 84.2 FL — SIGNIFICANT CHANGE UP (ref 80–100)
MCV RBC AUTO: 84.8 FL — SIGNIFICANT CHANGE UP (ref 80–100)
MCV RBC AUTO: 85.8 FL — SIGNIFICANT CHANGE UP (ref 80–100)
NRBC # BLD AUTO: 0 K/UL — SIGNIFICANT CHANGE UP (ref 0–0)
NRBC # BLD: 0 /100 WBCS — SIGNIFICANT CHANGE UP (ref 0–0)
NRBC # FLD: 0 K/UL — SIGNIFICANT CHANGE UP (ref 0–0)
NRBC BLD-RTO: 0 /100 WBCS — SIGNIFICANT CHANGE UP (ref 0–0)
NT-PROBNP SERPL-SCNC: <36 PG/ML — SIGNIFICANT CHANGE UP
PHOSPHATE SERPL-MCNC: 3.7 MG/DL — SIGNIFICANT CHANGE UP (ref 2.5–4.5)
PLATELET # BLD AUTO: 213 K/UL — SIGNIFICANT CHANGE UP (ref 150–400)
PLATELET # BLD AUTO: 214 K/UL — SIGNIFICANT CHANGE UP (ref 150–400)
PLATELET # BLD AUTO: 224 K/UL — SIGNIFICANT CHANGE UP (ref 150–400)
POTASSIUM SERPL-MCNC: 3.9 MMOL/L — SIGNIFICANT CHANGE UP (ref 3.5–5.3)
POTASSIUM SERPL-MCNC: 4.5 MMOL/L — SIGNIFICANT CHANGE UP (ref 3.5–5.3)
POTASSIUM SERPL-MCNC: 4.8 MMOL/L — SIGNIFICANT CHANGE UP (ref 3.5–5.3)
POTASSIUM SERPL-SCNC: 3.9 MMOL/L — SIGNIFICANT CHANGE UP (ref 3.5–5.3)
POTASSIUM SERPL-SCNC: 4.5 MMOL/L — SIGNIFICANT CHANGE UP (ref 3.5–5.3)
POTASSIUM SERPL-SCNC: 4.8 MMOL/L — SIGNIFICANT CHANGE UP (ref 3.5–5.3)
PROT SERPL-MCNC: 5.1 G/DL — LOW (ref 6–8.3)
RBC # BLD: 3.6 M/UL — LOW (ref 3.8–5.2)
RBC # BLD: 3.75 M/UL — LOW (ref 3.8–5.2)
RBC # BLD: 3.98 M/UL — SIGNIFICANT CHANGE UP (ref 3.8–5.2)
RBC # FLD: 13.7 % — SIGNIFICANT CHANGE UP (ref 10.3–14.5)
RBC # FLD: 13.7 % — SIGNIFICANT CHANGE UP (ref 10.3–14.5)
RBC # FLD: 14 % — SIGNIFICANT CHANGE UP (ref 10.3–14.5)
SODIUM SERPL-SCNC: 132 MMOL/L — LOW (ref 135–145)
SODIUM SERPL-SCNC: 135 MMOL/L — SIGNIFICANT CHANGE UP (ref 135–145)
SODIUM SERPL-SCNC: 136 MMOL/L — SIGNIFICANT CHANGE UP (ref 135–145)
T PALLIDUM AB TITR SER: NEGATIVE — SIGNIFICANT CHANGE UP
WBC # BLD: 9.56 K/UL — SIGNIFICANT CHANGE UP (ref 3.8–10.5)
WBC # BLD: 9.74 K/UL — SIGNIFICANT CHANGE UP (ref 3.8–10.5)
WBC # BLD: 9.78 K/UL — SIGNIFICANT CHANGE UP (ref 3.8–10.5)
WBC # FLD AUTO: 9.56 K/UL — SIGNIFICANT CHANGE UP (ref 3.8–10.5)
WBC # FLD AUTO: 9.74 K/UL — SIGNIFICANT CHANGE UP (ref 3.8–10.5)
WBC # FLD AUTO: 9.78 K/UL — SIGNIFICANT CHANGE UP (ref 3.8–10.5)

## 2025-01-24 PROCEDURE — 74177 CT ABD & PELVIS W/CONTRAST: CPT | Mod: 26

## 2025-01-24 PROCEDURE — 99232 SBSQ HOSP IP/OBS MODERATE 35: CPT

## 2025-01-24 PROCEDURE — 93306 TTE W/DOPPLER COMPLETE: CPT | Mod: 26

## 2025-01-24 PROCEDURE — 71045 X-RAY EXAM CHEST 1 VIEW: CPT | Mod: 26

## 2025-01-24 RX ORDER — MAGNESIUM HYDROXIDE 400 MG/5ML
30 SUSPENSION, ORAL (FINAL DOSE FORM) ORAL
Refills: 0 | Status: DISCONTINUED | OUTPATIENT
Start: 2025-01-24 | End: 2025-01-26

## 2025-01-24 RX ORDER — OXYCODONE HYDROCHLORIDE 30 MG/1
5 TABLET ORAL
Refills: 0 | Status: DISCONTINUED | OUTPATIENT
Start: 2025-01-24 | End: 2025-01-26

## 2025-01-24 RX ORDER — OXYTOCIN/0.9 % SODIUM CHLORIDE 10/500ML
42 PLASTIC BAG, INJECTION (ML) INTRAVENOUS
Qty: 30 | Refills: 0 | Status: DISCONTINUED | OUTPATIENT
Start: 2025-01-24 | End: 2025-01-26

## 2025-01-24 RX ORDER — METOCLOPRAMIDE 10 MG/1
10 TABLET ORAL ONCE
Refills: 0 | Status: COMPLETED | OUTPATIENT
Start: 2025-01-24 | End: 2025-01-24

## 2025-01-24 RX ORDER — CLOSTRIDIUM TETANI TOXOID ANTIGEN (FORMALDEHYDE INACTIVATED), CORYNEBACTERIUM DIPHTHERIAE TOXOID ANTIGEN (FORMALDEHYDE INACTIVATED), BORDETELLA PERTUSSIS TOXOID ANTIGEN (GLUTARALDEHYDE INACTIVATED), BORDETELLA PERTUSSIS FILAMENTOUS HEMAGGLUTININ ANTIGEN (FORMALDEHYDE INACTIVATED), BORDETELLA PERTUSSIS PERTACTIN ANTIGEN, AND BORDETELLA PERTUSSIS FIMBRIAE 2/3 ANTIGEN 5; 2; 2.5; 5; 3; 5 [LF]/.5ML; [LF]/.5ML; UG/.5ML; UG/.5ML; UG/.5ML; UG/.5ML
0.5 INJECTION, SUSPENSION INTRAMUSCULAR ONCE
Refills: 0 | Status: DISCONTINUED | OUTPATIENT
Start: 2025-01-24 | End: 2025-01-26

## 2025-01-24 RX ORDER — ACETAMINOPHEN 160 MG/5ML
1000 SUSPENSION ORAL ONCE
Refills: 0 | Status: COMPLETED | OUTPATIENT
Start: 2025-01-24 | End: 2025-01-24

## 2025-01-24 RX ORDER — DIPHENHYDRAMINE HCL 25 MG
25 CAPSULE ORAL EVERY 6 HOURS
Refills: 0 | Status: DISCONTINUED | OUTPATIENT
Start: 2025-01-24 | End: 2025-01-26

## 2025-01-24 RX ORDER — SODIUM CHLORIDE 9 G/ML
1000 INJECTION, SOLUTION INTRAVENOUS
Refills: 0 | Status: DISCONTINUED | OUTPATIENT
Start: 2025-01-24 | End: 2025-01-24

## 2025-01-24 RX ORDER — OXYCODONE HYDROCHLORIDE 30 MG/1
5 TABLET ORAL ONCE
Refills: 0 | Status: DISCONTINUED | OUTPATIENT
Start: 2025-01-24 | End: 2025-01-26

## 2025-01-24 RX ORDER — IBUPROFEN 600 MG/1
600 TABLET, FILM COATED ORAL EVERY 6 HOURS
Refills: 0 | Status: COMPLETED | OUTPATIENT
Start: 2025-01-24 | End: 2025-12-23

## 2025-01-24 RX ORDER — HEPARIN SODIUM,PORCINE 10000/ML
5000 VIAL (ML) INJECTION EVERY 12 HOURS
Refills: 0 | Status: DISCONTINUED | OUTPATIENT
Start: 2025-01-24 | End: 2025-01-26

## 2025-01-24 RX ORDER — KETOROLAC TROMETHAMINE 10 MG
30 TABLET ORAL EVERY 6 HOURS
Refills: 0 | Status: COMPLETED | OUTPATIENT
Start: 2025-01-24 | End: 2025-01-25

## 2025-01-24 RX ORDER — ACETAMINOPHEN 160 MG/5ML
975 SUSPENSION ORAL
Refills: 0 | Status: DISCONTINUED | OUTPATIENT
Start: 2025-01-24 | End: 2025-01-26

## 2025-01-24 RX ADMIN — Medication 5000 UNIT(S): at 17:18

## 2025-01-24 RX ADMIN — ONDANSETRON 4 MILLIGRAM(S): 4 TABLET, ORALLY DISINTEGRATING ORAL at 20:32

## 2025-01-24 RX ADMIN — ACETAMINOPHEN 975 MILLIGRAM(S): 160 SUSPENSION ORAL at 21:20

## 2025-01-24 RX ADMIN — METOCLOPRAMIDE 10 MILLIGRAM(S): 10 TABLET ORAL at 11:40

## 2025-01-24 RX ADMIN — Medication 5000 UNIT(S): at 06:00

## 2025-01-24 RX ADMIN — Medication 20 MILLIGRAM(S): at 10:06

## 2025-01-24 RX ADMIN — ACETAMINOPHEN 400 MILLIGRAM(S): 160 SUSPENSION ORAL at 13:32

## 2025-01-24 RX ADMIN — Medication 30 MILLIGRAM(S): at 18:00

## 2025-01-24 RX ADMIN — ACETAMINOPHEN 975 MILLIGRAM(S): 160 SUSPENSION ORAL at 20:32

## 2025-01-24 RX ADMIN — ONDANSETRON 4 MILLIGRAM(S): 4 TABLET, ORALLY DISINTEGRATING ORAL at 03:28

## 2025-01-24 RX ADMIN — ACETAMINOPHEN 1000 MILLIGRAM(S): 160 SUSPENSION ORAL at 14:31

## 2025-01-24 RX ADMIN — Medication 30 MILLIGRAM(S): at 17:18

## 2025-01-24 NOTE — PROVIDER CONTACT NOTE (OTHER) - ACTION/TREATMENT ORDERED:
BEN notified, assessed pt at bedside. MD Romero notified. No new orders at this time.
As per MD Cook, patient to receive a 500ml bolus of LR.

## 2025-01-24 NOTE — CHART NOTE - NSCHARTNOTEFT_GEN_A_CORE
Patient discussed with MD Guardado ([facility]), MD Kumar (safety), and MD Wright (Anesthesia) during morning safety rounds. Patient has had low UOP and received 2L LR boluses since arrival to the PACU at 3:30 am, per VIRGINIA Noble. Per Dr. Wright, patient appears edematous with elevated JVD and is reporting chest pressure. Patient immediately seen at bedside. Her vitals are stable and normal and she is saturating >96% on room air. She is reporting chest pressure but no SOB or pain. Given history of peripartum cardiomyopathy, high suspicion for reccurence. Cardiology Dr. Hernandez called via Teams message for urgent evaluation at the request of Dr. Bhardwaj and Dr. Kumar. TTE, BNP/CBC/CMP, CXR ordered. Aide, [place], also reporting patient's abdomen is distended. AM labs showed appropriate drop in H/H for EBL. However, given low UOP decision made to perform FAST scan. FAST scan performed with MD Kline at bedside, negative. Endometrial stripe thin. Braydon wnl. Attending Dr. Romero en route to hospital for expeditious evaluation of patient.    Latonia Sullivan PGY3

## 2025-01-24 NOTE — DISCHARGE NOTE OB - MEDICATION SUMMARY - MEDICATIONS TO TAKE
I will START or STAY ON the medications listed below when I get home from the hospital:    ibuprofen 600 mg oral tablet  -- 1 tab(s) by mouth every 6 hours  -- Indication: For pain     acetaminophen 325 mg oral tablet  -- 3 tab(s) by mouth every 8 hours as needed for pain  -- Indication: For pain

## 2025-01-24 NOTE — CHART NOTE - NSCHARTNOTEFT_GEN_A_CORE
Associate Chief of L & D    Concerns on saffety rounds that this patient was showing sign of postpartum pericarditis /cardiomyopathy based on distented JVD, SOB, and marked decreased urine output and marked edema  and chest heaviness.    called the attending and made her aware of the urgency and she stated she wanted to assess her her self and she is 5 mins away explained that we need cardiology now and evaluation as well.  This patient has been concerning since delivery last PM.  this was escalated    Anilaeduardo Chu M.D., M.B.A., M.S.

## 2025-01-24 NOTE — OB RN INTRAOPERATIVE NOTE - NS_POSTSURGSKIN_OBGYN_ALL_OB
Huddled with PCP: 30 day Cardiac Event Monitor order has been placed.     Called [facility]: rec'd scheduling number to set up time to administer [phone]    Called patient to update: He will call to schedule     Ana LUNDBERG RN         Intact

## 2025-01-24 NOTE — CONSULT NOTE ADULT - SUBJECTIVE AND OBJECTIVE BOX
43 yo AMA obese /1/10/ @ 37.6 weeks s/p csection last night. (scheduled for ) Patient with hx of MR, with very mildly thickened leaflets after prior delivery years ago had flash pulmonary edema due to MR. She since has had no    S/24 Events: feels drowsy, tired, mild chest tightness  O:  T(C): 37 (25 @ 21:46), Max: 37 (25 @ 21:46)  HR: 76 (25 @ 16:00) (57 - 91)  BP: 112/79 (25 @ 16:00) (106/77 - 135/85)  RR: 12 (25 @ 16:00) (11 - 25)  SpO2: 97% (25 @ 16:00) (95% - 100%)  Wt(kg): --  Daily Height in cm: 154.94 (2025 21:46)    Daily Baby A: Weight (gm) Delivery: 2980 (2025 01:15)    Tele: nSR    O/E:  Gen: NAD  HEENT: EOMI  CV: RRR, normal S1 + S2, no m/r/g  Lungs: decreased breath sounds bilateral bases  Abd: distended  Ext: trace pretibial edema    Labs:                        10.5   9.78  )-----------( 213      ( 2025 09:15 )             31.8         136  |  107  |  6[L]  ----------------------------<  110[H]  4.5   |  19[L]  |  0.48[L]    Ca    7.8[L]      2025 09:15    TPro  5.1[L]  /  Alb  2.7[L]  /  TBili  0.2  /  DBili  x   /  AST  22  /  ALT  17  /  AlkPhos  119                Meds:  MEDICATIONS  (STANDING):  acetaminophen     Tablet .. 975 milliGRAM(s) Oral <User Schedule>  diphtheria/tetanus/pertussis (acellular) Vaccine (Adacel) 0.5 milliLiter(s) IntraMuscular once  heparin   Injectable 5000 Unit(s) SubCutaneous every 12 hours  ibuprofen  Tablet. 600 milliGRAM(s) Oral every 6 hours  influenza   Vaccine 0.5 milliLiter(s) IntraMuscular once  ketorolac   Injectable 30 milliGRAM(s) IV Push every 6 hours  metoclopramide Injectable 10 milliGRAM(s) IV Push once  morphine PF Spinal 0.15 milliGRAM(s) IntraThecal. once  oxytocin Infusion 42 milliUNIT(s)/Min (42 mL/Hr) IV Continuous <Continuous>

## 2025-01-24 NOTE — CHART NOTE - NSCHARTNOTEFT_GEN_A_CORE
Patient discussed during morning safety rounds.  As per report patient has been oligouric overnight.  Patient also has significant history of peripartum cardiomyopathy with normal echo in November.  Patient with Hr 60s and by staff noted to have peripheral edema and shortness of breath.  Concern of cardiac etiology of symptoms, did receive 2 L overnight for olioguria.  Cardiology consulted stat and CBC/BMP/BNP to be sent.   Patient also flu positive that could be contributing to patient's status. Suspect needs higher level of care will await cardiology for disposition- staying in PACU secondary to current concerns for evaluation/work up to be completed.    Cornelia Guardado MD  MFM Attending

## 2025-01-24 NOTE — OB RN DELIVERY SUMMARY - NS_SEPSISRSKCALC_OBGYN_ALL_OB_FT
EOS calculated successfully. EOS Risk Factor: 0.5/1000 live births ([facility] national incidence); GA=37w6d; Temp=98.6; ROM=7.933; GBS='Unknown'; Antibiotics='No antibiotics or any antibiotics < 2 hrs prior to birth'

## 2025-01-24 NOTE — DISCHARGE NOTE OB - MEDICATION SUMMARY - MEDICATIONS TO STOP TAKING
I will STOP taking the medications listed below when I get home from the hospital:    aspirin 81 mg oral capsule  -- 2 cap(s) orally    Aspirin    Magnesium

## 2025-01-24 NOTE — PROGRESS NOTE ADULT - SUBJECTIVE AND OBJECTIVE BOX
OB Progress Note:  Delivery, POD#1 - See multiple chart notes for course of eval and plan     S: 43yo POD#1 s/p LTCS complicated by low UOP, concern for pulmonary edema/pleural effusion. Pain well controlled. Prabhakar in place now draining clear urine. Denies heavy vaginal bleeding, CP/SOB/lightheadedness/dizziness.     O:   Vital Signs Last 24 Hrs  T(C): 37 (2025 21:46), Max: 37 (2025 21:46)  T(F): 98.6 (2025 21:46), Max: 98.6 (2025 21:46)  HR: 80 (2025 11:00) (57 - 91)  BP: 115/89 (2025 11:00) (106/77 - 135/85)  BP(mean): 98 (2025 11:00) (77 - 104)  RR: 16 (2025 11:00) (11 - 25)  SpO2: 96% (2025 11:00) (95% - 100%)    Parameters below as of 2025 21:46  Patient On (Oxygen Delivery Method): room air        Labs:  Blood type: A Positive  Rubella IgG: RPR:                           10.5[L]   9.78 >-----------< 213    (  @ 09:15 )             31.8[L]                        11.1[L]   9.56 >-----------< 224    (  @ 06:35 )             33.5[L]                        12.4   5.14 >-----------< 278    (  @ 21:29 )             37.1    25 @ 09:15      136  |  107  |  6[L]  ----------------------------<  110[H]  4.5   |  19[L]  |  0.48[L]    25 @ 06:35      135  |  104  |  6[L]  ----------------------------<  128[H]  4.8   |  19[L]  |  0.49[L]        Ca    7.8[L]      2025 09:15  Ca    8.1[L]      2025 06:35    TPro  5.1[L]  /  Alb  2.7[L]  /  TBili  0.2  /  DBili  x   /  AST  22  /  ALT  17  /  AlkPhos  119  25 @ 09:15          PE:  General: NAD  CV: RRR  Resp: diminished breath sounds in lung bases  Abdomen: Softly distended, appropriately tender, Fundus firm, incision c/d/i.  : Nl lochia  Extremities: No erythema, no pitting edema

## 2025-01-24 NOTE — PROGRESS NOTE ADULT - ASSESSMENT
A/P: 45yo POD#1 s/p LTCS. Concern in PACU for low UOP in setting of pulmonary fluid overload. s/p Lasix x1 with improvement in UOP and     #Mitral valve regurgitation  -concern for pulmonary fluid overload in setting of post op fluid shifts  -Cardiology following, TTE with mild-mod MV regurgitation   -s/p Lasix 20mg IVP x1, goal output 1L over 4-5h   -Telemetry needed throughout PP course      #PNC   - Negative FAST scan at time of low UOP work up, pending CTAP but very low suspicion for intra-abdominal bleeding given stable labs, normal vitals, and other etiology identified for low UOP  - Advance diet when able   - Increase ambulation when able   - Continue toradol, tylenol, oxycodone PRN for pain control.      Jeanna Romero MD

## 2025-01-24 NOTE — PROVIDER CONTACT NOTE (OTHER) - ASSESSMENT
Pt 7 hours post op, continues to present with low urine output from overnight, UOP 25cc. Adequate UOP based on weight is 42cc/ hr. Patients abdomen distended, unable to complete routine fundal check. Abdomen soft & nontender, patient complains of pain when assessing fundus. Denies SOB, chest pain, general pain. Patient Flu A+, complains of fatigue and nausea without vomiting, but unable to tolerate PO fluids. Pt appears to be resting comfortably & sleeping intermittently.  Pressure dressing on lower transverse abdomen clean & dry. Fluids in OR 1700cc, pt s/p 2L LR IV bolus post op. CBC sent at 6am and unchanged from admission labs. VSS. Pt 7 hours post op, continues to present with low urine output from overnight, UOP 25cc. Adequate UOP based on weight is 42cc/ hr. Patients abdomen distended, unable to complete routine fundal check. Abdomen soft & nontender, patient complains of pain when assessing fundus. Denies SOB, chest pain, palpitations, dizziness. Patient Flu A+, complains of fatigue and nausea without vomiting, but unable to tolerate PO fluids. Pt appears to be resting comfortably & sleeping intermittently.  Pressure dressing on lower transverse abdomen clean & dry. Fluids in OR 1700cc, pt s/p 2L LR IV bolus post op. CBC sent at 6am and unchanged from admission labs. VSS.

## 2025-01-24 NOTE — OB RN DELIVERY SUMMARY - NSSELHIDDEN_OBGYN_ALL_OB_FT
[NS_DeliveryAttending1_OBGYN_ALL_OB_FT:[email or web address]],[NS_DeliveryAssist1_OBGYN_ALL_OB_FT:[id number]],[NS_DeliveryRN_OBGYN_ALL_OB_FT:[id number]]

## 2025-01-24 NOTE — PROVIDER CONTACT NOTE (OTHER) - BACKGROUND
Patient is a 44 year old s/p repeat . After the first hour, patient's urine output was 15ml.
GDMA2 no medications   Mis x2  TOP x8    2014 PP PEC, CHF-  with bells palsy   C/S 2017 @ 36 wks, oligo, fetal intestine rupture  hx Migraines, asthma, lung nodules

## 2025-01-24 NOTE — OB RN INTRAOPERATIVE NOTE - NSSELHIDDEN_OBGYN_ALL_OB_FT
[NS_DeliveryAttending1_OBGYN_ALL_OB_FT:[email or web address]],[NS_DeliveryAssist1_OBGYN_ALL_OB_FT:[id number]],[NS_DeliveryRN_OBGYN_ALL_OB_FT:[id number]]

## 2025-01-24 NOTE — OB PROVIDER DELIVERY SUMMARY - NSPROCPERFORMEDA_OBGYN_ALL_OB
[place] Statement Selected Low Segment Transverse C/S

## 2025-01-24 NOTE — CHART NOTE - NSCHARTNOTEFT_GEN_A_CORE
Patient with low UOP for several hours since surgery:  15->10->2->15->18    Patient evaluated at bedside. She denies any HA, lightheadedness, chest pain/palpitations, SOB, or other complaints. She also denies any significant pain. She does endorse large episode of vomiting after trying to orally hydrate.   She states that over the last several days, she has had significant decrease intake of fluids as she was feeling ill. She tested positive for Flu A upon admission.      ICU Vital Signs Last 24 Hrs  T(C): 37 (23 Jan 2025 21:46), Max: 37 (23 Jan 2025 21:46)  T(F): 98.6 (23 Jan 2025 21:46), Max: 98.6 (23 Jan 2025 21:46)  HR: 63 (24 Jan 2025 06:50) (57 - 91)  BP: 123/90 (24 Jan 2025 06:00) (106/77 - 135/85)  BP(mean): 101 (24 Jan 2025 06:00) (77 - 101)  ABP: --  ABP(mean): --  RR: 12 (24 Jan 2025 06:50) (11 - 25)  SpO2: 96% (24 Jan 2025 06:50) (95% - 100%)    O2 Parameters below as of 23 Jan 2025 21:46  Patient On (Oxygen Delivery Method): room air      Patient is s/p 1L fluid bolus around 3-4a. Stable vitals - no tachycardia, normotensive, and no hypoxemia with good SpO2.    Plan:  - stat CBC/BMP  - 1 additional fluid bolus given large episode of emesis  - remain in PACU until labs are back      D/w Dr. Olivia Cook, PGY3 Patient with low UOP for several hours since surgery:  15->10->2->15->18    Patient evaluated at bedside. She denies any HA, lightheadedness, chest pain/palpitations, SOB, or other complaints. She also denies any significant pain. She does endorse large episode of vomiting after trying to orally hydrate.   She states that over the last several days, she has had significant decrease intake of fluids as she was feeling ill. She tested positive for Flu A upon admission.      ICU Vital Signs Last 24 Hrs  T(C): 37 (23 Jan 2025 21:46), Max: 37 (23 Jan 2025 21:46)  T(F): 98.6 (23 Jan 2025 21:46), Max: 98.6 (23 Jan 2025 21:46)  HR: 63 (24 Jan 2025 06:50) (57 - 91)  BP: 123/90 (24 Jan 2025 06:00) (106/77 - 135/85)  BP(mean): 101 (24 Jan 2025 06:00) (77 - 101)  ABP: --  ABP(mean): --  RR: 12 (24 Jan 2025 06:50) (11 - 25)  SpO2: 96% (24 Jan 2025 06:50) (95% - 100%)    O2 Parameters below as of 23 Jan 2025 21:46  Patient On (Oxygen Delivery Method): room air      Patient is s/p 1L fluid bolus around 3-4a. Stable vitals - no tachycardia, normotensive, and no hypoxemia with good SpO2.  Her history if significant for peripartum heart failure with mitral valve regurgitation after her 2014 delivery. There is minimal concern for heart failure or fluid overload at this time.     Plan:  - stat CBC/BMP  - 1 additional fluid bolus given large episode of emesis  - remain in PACU until labs are back  - cards consult in postpartum period      D/w Dr. Olivia Cook, PGY3

## 2025-01-24 NOTE — OB PROVIDER DELIVERY SUMMARY - NSSELHIDDEN_OBGYN_ALL_OB_FT
[NS_DeliveryAttending1_OBGYN_ALL_OB_FT:[email or web address]],[NS_DeliveryAssist1_OBGYN_ALL_OB_FT:[id number]]

## 2025-01-24 NOTE — CHART NOTE - NSCHARTNOTEFT_GEN_A_CORE
Patient seen at evaluated at bedside. She denies any lightheadedness, chest pain, shortness of breath, dizziness, or significant abdominal pain. States she is tolerating PO intake of fluids and has not had recent vomiting.       ICU Vital Signs Last 24 Hrs  T(C): 36.6 (2025 23:00), Max: 36.8 (2025 12:30)  T(F): 97.9 (2025 23:00), Max: 98.2 (2025 12:30)  HR: 83 (2025 23:00) (57 - 92)  BP: 119/80 (2025 23:00) (105/77 - 134/87)  BP(mean): 93 (2025 23:00) (65 - 104)  ABP: --  ABP(mean): --  RR: 18 (2025 23:00) (11 - 25)  SpO2: 95% (2025 23:00) (94% - 100%)    I&O's Detail    2025 07:01  -  2025 07:00  --------------------------------------------------------  IN:    Lactated Ringers: 2416 mL    Other (mL): 1700 mL    Oxytocin: 294 mL  Total IN: 4410 mL  OUT:    Blood Loss (mL): 765 mL    Indwelling Catheter - Urethral (mL): 103 mL    Other (mL): 500 mL  Total OUT: 1368 mL    Total NET: 3042 mL    2025 07:01  -  2025 23:32  --------------------------------------------------------  IN:    Lactated Ringers: 250 mL    Oral Fluid: 350 mL  Total IN: 600 mL    OUT:    Indwelling Catheter - Urethral (mL): 1399 mL  Total OUT: 1399 mL    Total NET: -799 mL    CT Prelim read:  < from: CT Abdomen and Pelvis w/ IV Cont (25 @ 20:57) >    Enlarged, heterogeneous uterus in the post gravid state. Lower uterine   segment defect and adjacent scattered foci of air consistent with    section. At the site of the uterine defect between the uterus   and the bladder is a fluid collection with layering hyperdense material   measuring 6.4 x 3.3 x 2.8 cm (2-115), suggestive of bladder flap   hematoma. Postsurgical changes including small scattered foci of   pneumoperitoneum, and foci of air tracking within the lower ventral   abdominal wall soft tissues. The stomach is distended with fluid and air.   Normal caliber bowel. Symmetric sclerotic changes of the bilateral   sacroiliac joints.    < end of copied text >      44[facility], whose postop course was complicated by low UOP. History significant for prior peripartum cardiomyopathy with mitral valve regurgitation, with normal TTE from 2024. Patient's cardiac work-up thus far neg - including TTE and BNP, thus low suspicion for peripartum cardiomyopathy at this time. She is s/p CT A/P with IV contrast that is concerning for possible bladder flap hematoma. A bladder flap was made prior to hysterotomy. She is currently hemodynamically stable with improved UOP. Her H/H is overall stable (10.5/31.8 -> 9.9/30.9).     - repeat CBC at 230a  - strict I/O documentation via Prabhakar catheter  - if H/H drops, will obtain CT with IV contrast to rule out bleeding  - can be monitored in postpartum bed, can be discharged from PACU    D/w Dr. Cherelle Cook, PGY3

## 2025-01-24 NOTE — PROVIDER CONTACT NOTE (OTHER) - SITUATION
/1/10/3, s/p rpt c/s at 37.6wks GA. QBL in OR 634cc + 131cc in clots total 765cc. Patient presenting with low urine output, unable to complete routine fundal check due to distended abdomen

## 2025-01-24 NOTE — OB PROVIDER DELIVERY SUMMARY - NSPROVIDERDELIVERYNOTE_OBGYN_ALL_OB_FT
repeat LTCS after patient came with contractions, c/w early labor  viable female infant, vertex presentation, weight 6#9 (2980g), Apgars 8/9, cord gasses sent  Grossly normal fallopian tubes, uterus, and ovaries    QBL: 634  IVF: 1700  UOP: 500    Dictation: Zeny Cook, PGY-3 repeat LTCS after patient came with contractions and PROM, c/w early labor  viable female infant, vertex presentation, weight 6#9 (2980g), Apgars 8/9, cord gasses sent  Grossly normal fallopian tubes, uterus, and ovaries    QBL: 634  IVF: 1700  UOP: 500    Dictation: #[id number]  Tomas Cook, PGY-3

## 2025-01-24 NOTE — DISCHARGE NOTE OB - CARE PLAN
1 Principal Discharge DX:	 delivery delivered  Assessment and plan of treatment:	no heavy lifting or exercise x 6 weeks  follow up in 2 weeks

## 2025-01-24 NOTE — DISCHARGE NOTE OB - NS MD DC FALL RISK RISK
For information on Fall & Injury Prevention, visit: [email or web address] OR  [email or web address] OR  https://www.cdc.gov/steadi/patient.html

## 2025-01-24 NOTE — DISCHARGE NOTE OB - FINANCIAL ASSISTANCE
[facility] provides services at a reduced cost to those who are determined to be eligible through [facility]’s financial assistance program. Information regarding [facility]’s financial assistance program can be found by going to [email or web address] or by calling [phone].

## 2025-01-24 NOTE — PROVIDER CONTACT NOTE (OTHER) - SITUATION
Patient is a 44 year old s/p repeat . After the first hour, patient's urine output was 15ml. Patient expected to have urine output of at least 40ml to be considered adequate.

## 2025-01-24 NOTE — CONSULT NOTE ADULT - ASSESSMENT
---Echo at bedside with normal filling pressure, mild MR, normal biventricular function  ---Decreased breath sounds bilateral bases, Given small dose of IVP Lasix, responded well, no additional diuretic needed   ---Avoid further IVF  ---Probnp normal

## 2025-01-24 NOTE — DISCHARGE NOTE OB - PATIENT PORTAL LINK FT
You can access the FollowMyHealth Patient Portal offered by [facility] by registering at the following website: [email or web address]. By joining [facility]’s FollowMyHealth portal, you will also be able to view your health information using other applications (apps) compatible with our system.

## 2025-01-24 NOTE — CHART NOTE - NSCHARTNOTEFT_GEN_A_CORE
Patient re-evaluated. UOP >350cc since Lasix 20mg IVP. Putting out clear urine.   Patient reports feeling better, she is well appearing and comfortable sitting up in bed.     VS  T(C): 37 (01-23-25 @ 21:46)  HR: 80 (01-24-25 @ 11:00)  BP: 115/89 (01-24-25 @ 11:00)  RR: 16 (01-24-25 @ 11:00)  SpO2: 96% (01-24-25 @ 11:00)               10.5   9.78  )-----------( 213      ( 01-24 @ 09:15 )             31.8     Pro-Brain Natriuretic Peptide: <36: Acute congestive heart failure is unlikely if NT-proBNP is < 300 pg/mL.     Clinical status improving with diuresis. Cont monitoring.   Pending XRay and CTAP, radiology called to expedite.     Antione DEVI

## 2025-01-24 NOTE — CHART NOTE - NSCHARTNOTEFT_GEN_A_CORE
(Note written at 9:44 immediately following eval per initial time stamp, signed later due to computer error)     Received call from BEN Sung at 805am that patient was having low UOP throughout the night. At time of notification patient was 2L IVF bolus and postpartum pitocin recently completed. UOP 25cc for that hour. Reported normal vital signs and patient without additional complaints. Given improvement in UOP and recent hydration, recommended additional hour of monitoring.   Chart reviewed from home, confirmed normal vital signs and recent normal H/H. UOP noted to be between 15-20cc for previous few hours.   Patient's history notable for mitral valve regurgitation with episode of postpartum cardiomyopathy following previous delivery. Patient additionally found to be FluA+ on admission, reports several days of flu symptoms.     While en route to hospital, notified that UOP had decreased to 5cc. Patient evaluated by BEN Sung at that time with no change in clinical status. Requested labs to be drawn and verbalized that I would be arriving soon to evaluate the patient ASAP.     Received phone call from MD Kumar that plan was for TTE, XRay, and labs. Again verbalized that I would be present <10min and would evaluate patient. Vitals remained stable throughout - HR 50-80 and -120/70-80.     On my evaluation, patient reports feeling mild chest tightness but no shortness of breath. Lungs with diminished breath sounds in bilateral bases. No pedal edema. Abdomen distended, tympanitic, appropriately tender for immediate post op state. Vitals wnl. Concentrated urine in tubing drained into velazquez bag, 10cc total.   Cardiologist present at bedside and echo performed. Observations of echo reported to be mild-moderate mitral valve regurgitation. Cardiologist recommending Lasix 20mg IVP with goal 1L urine output over next 5h.     Additionally, screening FAST scan performed by MD Guardado negative, will follow up with CTAP.     Following eval, UOP for that hour improved to 37cc, approx adequate for patient's weight of 81kg (weight measured prior to delivery of 3kg infant).    Will f/u results of above, continue close observation and communication with cardiology team as needed, plan for telemetry monitoring to continue throughout postpartum course.     Appreciate cardiology recommendations.     Antione DEVI

## 2025-01-25 LAB
BASOPHILS # BLD AUTO: 0.02 K/UL — SIGNIFICANT CHANGE UP (ref 0–0.2)
BASOPHILS NFR BLD AUTO: 0.2 % — SIGNIFICANT CHANGE UP (ref 0–2)
EOSINOPHIL # BLD AUTO: 0.04 K/UL — SIGNIFICANT CHANGE UP (ref 0–0.5)
EOSINOPHIL NFR BLD AUTO: 0.4 % — SIGNIFICANT CHANGE UP (ref 0–6)
HCT VFR BLD CALC: 28.3 % — LOW (ref 34.5–45)
HCT VFR BLD CALC: 28.9 % — LOW (ref 34.5–45)
HGB BLD-MCNC: 9.3 G/DL — LOW (ref 11.5–15.5)
HGB BLD-MCNC: 9.3 G/DL — LOW (ref 11.5–15.5)
IANC: 6.88 K/UL — SIGNIFICANT CHANGE UP (ref 1.8–7.4)
IMM GRANULOCYTES NFR BLD AUTO: 0.4 % — SIGNIFICANT CHANGE UP (ref 0–0.9)
LYMPHOCYTES # BLD AUTO: 2.51 K/UL — SIGNIFICANT CHANGE UP (ref 1–3.3)
LYMPHOCYTES # BLD AUTO: 25 % — SIGNIFICANT CHANGE UP (ref 13–44)
MCHC RBC-ENTMCNC: 27.3 PG — SIGNIFICANT CHANGE UP (ref 27–34)
MCHC RBC-ENTMCNC: 27.7 PG — SIGNIFICANT CHANGE UP (ref 27–34)
MCHC RBC-ENTMCNC: 32.2 G/DL — SIGNIFICANT CHANGE UP (ref 32–36)
MCHC RBC-ENTMCNC: 32.9 G/DL — SIGNIFICANT CHANGE UP (ref 32–36)
MCV RBC AUTO: 84.2 FL — SIGNIFICANT CHANGE UP (ref 80–100)
MCV RBC AUTO: 84.8 FL — SIGNIFICANT CHANGE UP (ref 80–100)
MONOCYTES # BLD AUTO: 0.55 K/UL — SIGNIFICANT CHANGE UP (ref 0–0.9)
MONOCYTES NFR BLD AUTO: 5.5 % — SIGNIFICANT CHANGE UP (ref 2–14)
NEUTROPHILS # BLD AUTO: 6.88 K/UL — SIGNIFICANT CHANGE UP (ref 1.8–7.4)
NEUTROPHILS NFR BLD AUTO: 68.5 % — SIGNIFICANT CHANGE UP (ref 43–77)
NRBC # BLD AUTO: 0 K/UL — SIGNIFICANT CHANGE UP (ref 0–0)
NRBC # BLD AUTO: 0 K/UL — SIGNIFICANT CHANGE UP (ref 0–0)
NRBC # BLD: 0 /100 WBCS — SIGNIFICANT CHANGE UP (ref 0–0)
NRBC # BLD: 0 /100 WBCS — SIGNIFICANT CHANGE UP (ref 0–0)
NRBC # FLD: 0 K/UL — SIGNIFICANT CHANGE UP (ref 0–0)
NRBC # FLD: 0 K/UL — SIGNIFICANT CHANGE UP (ref 0–0)
NRBC BLD-RTO: 0 /100 WBCS — SIGNIFICANT CHANGE UP (ref 0–0)
NRBC BLD-RTO: 0 /100 WBCS — SIGNIFICANT CHANGE UP (ref 0–0)
PLATELET # BLD AUTO: 193 K/UL — SIGNIFICANT CHANGE UP (ref 150–400)
PLATELET # BLD AUTO: 207 K/UL — SIGNIFICANT CHANGE UP (ref 150–400)
RBC # BLD: 3.36 M/UL — LOW (ref 3.8–5.2)
RBC # BLD: 3.41 M/UL — LOW (ref 3.8–5.2)
RBC # FLD: 14 % — SIGNIFICANT CHANGE UP (ref 10.3–14.5)
RBC # FLD: 14 % — SIGNIFICANT CHANGE UP (ref 10.3–14.5)
WBC # BLD: 10.04 K/UL — SIGNIFICANT CHANGE UP (ref 3.8–10.5)
WBC # BLD: 9.43 K/UL — SIGNIFICANT CHANGE UP (ref 3.8–10.5)
WBC # FLD AUTO: 10.04 K/UL — SIGNIFICANT CHANGE UP (ref 3.8–10.5)
WBC # FLD AUTO: 9.43 K/UL — SIGNIFICANT CHANGE UP (ref 3.8–10.5)

## 2025-01-25 PROCEDURE — 74174 CTA ABD&PLVS W/CONTRAST: CPT | Mod: 26

## 2025-01-25 RX ORDER — IBUPROFEN 600 MG/1
600 TABLET, FILM COATED ORAL EVERY 6 HOURS
Refills: 0 | Status: DISCONTINUED | OUTPATIENT
Start: 2025-01-25 | End: 2025-01-26

## 2025-01-25 RX ORDER — FAMOTIDINE 10 MG/ML
20 INJECTION INTRAVENOUS DAILY
Refills: 0 | Status: DISCONTINUED | OUTPATIENT
Start: 2025-01-25 | End: 2025-01-26

## 2025-01-25 RX ADMIN — Medication 100 MILLIGRAM(S): at 06:20

## 2025-01-25 RX ADMIN — Medication 5000 UNIT(S): at 17:26

## 2025-01-25 RX ADMIN — ACETAMINOPHEN 975 MILLIGRAM(S): 160 SUSPENSION ORAL at 23:14

## 2025-01-25 RX ADMIN — IBUPROFEN 600 MILLIGRAM(S): 600 TABLET, FILM COATED ORAL at 20:40

## 2025-01-25 RX ADMIN — Medication 100 MILLIGRAM(S): at 17:42

## 2025-01-25 RX ADMIN — ACETAMINOPHEN 975 MILLIGRAM(S): 160 SUSPENSION ORAL at 17:27

## 2025-01-25 RX ADMIN — Medication 30 MILLIGRAM(S): at 02:33

## 2025-01-25 RX ADMIN — ACETAMINOPHEN 975 MILLIGRAM(S): 160 SUSPENSION ORAL at 06:23

## 2025-01-25 RX ADMIN — ACETAMINOPHEN 975 MILLIGRAM(S): 160 SUSPENSION ORAL at 12:10

## 2025-01-25 RX ADMIN — Medication 5000 UNIT(S): at 05:55

## 2025-01-25 RX ADMIN — IBUPROFEN 600 MILLIGRAM(S): 600 TABLET, FILM COATED ORAL at 15:49

## 2025-01-25 RX ADMIN — ACETAMINOPHEN 975 MILLIGRAM(S): 160 SUSPENSION ORAL at 05:53

## 2025-01-25 RX ADMIN — IBUPROFEN 600 MILLIGRAM(S): 600 TABLET, FILM COATED ORAL at 14:01

## 2025-01-25 RX ADMIN — IBUPROFEN 600 MILLIGRAM(S): 600 TABLET, FILM COATED ORAL at 19:53

## 2025-01-25 RX ADMIN — ACETAMINOPHEN 975 MILLIGRAM(S): 160 SUSPENSION ORAL at 11:17

## 2025-01-25 RX ADMIN — IBUPROFEN 600 MILLIGRAM(S): 600 TABLET, FILM COATED ORAL at 09:03

## 2025-01-25 RX ADMIN — IBUPROFEN 600 MILLIGRAM(S): 600 TABLET, FILM COATED ORAL at 08:21

## 2025-01-25 RX ADMIN — ACETAMINOPHEN 975 MILLIGRAM(S): 160 SUSPENSION ORAL at 18:41

## 2025-01-25 RX ADMIN — FAMOTIDINE 20 MILLIGRAM(S): 10 INJECTION INTRAVENOUS at 06:20

## 2025-01-25 RX ADMIN — ACETAMINOPHEN 975 MILLIGRAM(S): 160 SUSPENSION ORAL at 23:55

## 2025-01-25 RX ADMIN — Medication 30 MILLIGRAM(S): at 02:03

## 2025-01-25 NOTE — PROGRESS NOTE ADULT - ASSESSMENT
A/P: 45yo POD#2 s/p rLTCS c/b post-op low UOP and possible bladder flap hematoma on CTAP (1/25). . Patient with prior peripartum cardiomyopathy and mitral valve regurgitation - cardiac work-up negative thus far, low suspicion for peripartum cardiomyopathy currently. H/H downtrending 12.4/37.1>>9.3/28.3, therefore CT angiogram A/P protocoled to rule out active bleed. Patient states _______. Physical exam VSS, AF.    #Possible bladder flap hematoma  - CTAP (1/24): 6.4 x 3.3 x 2.8 cm bladder flap hematoma  - F/u final read CTAP  - F/u CT angiogram abdomen/pelvis (protocoled)  - F/u AM CBC (1/25)    #Low UOP, improving  - S/p 2L bolus in PACU  - S/p Lasix 20mg IV  - Velazquez in place  - UOP adequate overnight  - Continue strict I&Os via velazquez catheter    #History of peripartum cardiomyopathy  - Patient with low UOP, JVD on POD#0-1  - Negative cardiac workup thus far, low suspicion for peripartum cardiomyopathy  - Pro-BNP (1/24): wnl  - CX (1/24): wnl  - TTE (1/24): Tricuspid annular plane systolic excursion (TAPSE) 3.1 , mild/mod mitral regurg  - Cardio OB recs: Lasix x1, avoid additional diuretic, avoid further IVF, echo findings stable    #Routine postoperative care  - Continue regular diet.  - Increase ambulation.  - Continue motrin, tylenol, oxycodone PRN for pain control.     Lou Sheriff PGY1 A/P: 43yo POD#2 s/p rLTCS c/b post-op low UOP and possible bladder flap hematoma on CTAP (1/25). . Patient is also FluA+. Patient with prior peripartum cardiomyopathy and mitral valve regurgitation - cardiac work-up negative thus far, low suspicion for peripartum cardiomyopathy currently. H/H downtrending 12.4/37.1>>9.3/28.3, therefore CT angiogram A/P protocoled to rule out active bleed. Patient states _______. Physical exam VSS, AF.    #Possible bladder flap hematoma  - CTAP (1/24): 6.4 x 3.3 x 2.8 cm bladder flap hematoma  - F/u final read CTAP  - F/u CT angiogram abdomen/pelvis (protocoled)  - F/u AM CBC (1/25)    #Low UOP, improving  - S/p 2L bolus in PACU  - S/p Lasix 20mg IV  - Velazquez in place  - UOP adequate overnight  - Continue strict I&Os via velazquez catheter    #History of peripartum cardiomyopathy  - Patient with low UOP, JVD on POD#0-1  - Negative cardiac workup thus far, low suspicion for peripartum cardiomyopathy  - Pro-BNP (1/24): wnl  - CX (1/24): wnl  - TTE (1/24): Tricuspid annular plane systolic excursion (TAPSE) 3.1 , mild/mod mitral regurg  - Cardio OB recs: Lasix x1, avoid additional diuretic, avoid further IVF, echo findings stable    #Routine postoperative care  - Continue regular diet.  - Increase ambulation.  - Continue motrin, tylenol, oxycodone PRN for pain control.     Lou Sheriff PGY1 A/P: 43yo POD#2 s/p rLTCS c/b post-op low UOP and possible bladder flap hematoma on CTAP (1/25). . Patient is also FluA+. Patient with prior peripartum cardiomyopathy and mitral valve regurgitation - cardiac work-up negative thus far, low suspicion for peripartum cardiomyopathy currently. H/H downtrending 12.4/37.1>>9.3/28.3, therefore CT angiogram A/P protocoled to rule out active bleed. Patient denies lightheadedness/dizziness, reports pain is moderate. Physical exam appropriate tenderness. VSS, AF.    #Possible bladder flap hematoma  - CTAP (1/24): 6.4 x 3.3 x 2.8 cm bladder flap hematoma  - F/u final read CTAP  - F/u CT angiogram abdomen/pelvis (protocoled)  - F/u AM CBC (1/25)    #Low UOP, improving  - S/p 2L bolus in PACU  - S/p Lasix 20mg IV  - Velazquez in place  - UOP adequate overnight  - Continue strict I&Os via velazquez catheter    #History of peripartum cardiomyopathy  - Patient with low UOP, JVD on POD#0-1  - Negative cardiac workup thus far, low suspicion for peripartum cardiomyopathy  - Pro-BNP (1/24): wnl  - CX (1/24): wnl  - TTE (1/24): Tricuspid annular plane systolic excursion (TAPSE) 3.1 , mild/mod mitral regurg  - Cardio OB recs: Lasix x1, avoid additional diuretic, avoid further IVF, echo findings stable    #Routine postoperative care  - Continue regular diet.  - Increase ambulation.  - Continue motrin, tylenol, oxycodone PRN for pain control.   - Robitussin for cough  - Pepcid for heartburn    Lou Sheriff PGY1

## 2025-01-25 NOTE — PROGRESS NOTE ADULT - ATTENDING COMMENTS
Pt seen and examined and agree with above  T(C): 36.5 (01-25-25 @ 09:50), Max: 36.8 (01-24-25 @ 12:30)  HR: 80 (01-25-25 @ 09:50) (68 - 92)  BP: 128/72 (01-25-25 @ 09:50) (105/77 - 128/72)  RR: 17 (01-25-25 @ 09:50) (12 - 24)  SpO2: 97% (01-25-25 @ 09:50) (94% - 100%)                            9.3    10.04 )-----------( 207      ( 25 Jan 2025 05:32 )             28.9   01-24    132[L]  |  100  |  6[L]  ----------------------------<  82  3.9   |  21[L]  |  0.63    Ca    7.7[L]      24 Jan 2025 22:38  Phos  3.7     01-24  Mg     1.30     01-24    TPro  5.1[L]  /  Alb  2.7[L]  /  TBili  0.2  /  DBili  x   /  AST  22  /  ALT  17  /  AlkPhos  119  01-24  LIVER FUNCTIONS - ( 24 Jan 2025 09:15 )  Alb: 2.7 g/dL / Pro: 5.1 g/dL / ALK PHOS: 119 U/L / ALT: 17 U/L / AST: 22 U/L / GGT: x           continue current management  f/u CT angiogram  GLENN Harley MD

## 2025-01-25 NOTE — PROGRESS NOTE ADULT - SUBJECTIVE AND OBJECTIVE BOX
OB Progress Note: LTCS, POD#2    S: 43yo POD#2 s/p rLTCS. Pain is well controlled. She is tolerating a regular diet and passing flatus. She is voiding spontaneously, and ambulating without difficulty. Denies CP/SOB. Denies lightheadedness/dizziness. Denies N/V.    O:  Vitals:  Vital Signs Last 24 Hrs  T(C): 36.8 (25 Jan 2025 00:15), Max: 36.8 (24 Jan 2025 12:30)  T(F): 98.2 (25 Jan 2025 00:15), Max: 98.2 (24 Jan 2025 12:30)  HR: 84 (25 Jan 2025 00:15) (61 - 92)  BP: 121/79 (25 Jan 2025 00:15) (105/77 - 134/87)  BP(mean): 93 (24 Jan 2025 23:00) (65 - 104)  RR: 16 (25 Jan 2025 00:15) (11 - 25)  SpO2: 100% (25 Jan 2025 00:15) (94% - 100%)        MEDICATIONS  (STANDING):  acetaminophen     Tablet .. 975 milliGRAM(s) Oral <User Schedule>  diphtheria/tetanus/pertussis (acellular) Vaccine (Adacel) 0.5 milliLiter(s) IntraMuscular once  heparin   Injectable 5000 Unit(s) SubCutaneous every 12 hours  ibuprofen  Tablet. 600 milliGRAM(s) Oral every 6 hours  influenza   Vaccine 0.5 milliLiter(s) IntraMuscular once  morphine PF Spinal 0.15 milliGRAM(s) IntraThecal. once  oxytocin Infusion 42 milliUNIT(s)/Min (42 mL/Hr) IV Continuous <Continuous>      MEDICATIONS  (PRN):  dexAMETHasone  Injectable 4 milliGRAM(s) IV Push every 6 hours PRN Nausea  diphenhydrAMINE 25 milliGRAM(s) Oral every 6 hours PRN Pruritus  lanolin Ointment 1 Application(s) Topical every 6 hours PRN Sore Nipples  magnesium hydroxide Suspension 30 milliLiter(s) Oral two times a day PRN Constipation  nalbuphine Injectable 2.5 milliGRAM(s) IV Push every 6 hours PRN Pruritus  naloxone Injectable 0.1 milliGRAM(s) IV Push every 3 minutes PRN For ANY of the following changes in patient status:  A. Breaths Per Minute LESS THAN 10, B. Oxygen saturation LESS THAN 90%, C. Sedation score of 6 for Stop After: 4 Times  oxyCODONE    IR 5 milliGRAM(s) Oral every 3 hours PRN Moderate to Severe Pain (4-10)  oxyCODONE    IR 5 milliGRAM(s) Oral once PRN Moderate to Severe Pain (4-10)  simethicone 80 milliGRAM(s) Chew every 4 hours PRN Gas      Labs:  Blood type: A Positive  Rubella IgG: RPR: Negative                          9.3[L]   9.43 >-----------< 193    ( 01-25 @ 01:40 )             28.3[L]                        9.9[L]   9.74 >-----------< 214    ( 01-24 @ 22:36 )             30.9[L]                        10.5[L]   9.78 >-----------< 213    ( 01-24 @ 09:15 )             31.8[L]                        11.1[L]   9.56 >-----------< 224    ( 01-24 @ 06:35 )             33.5[L]                        12.4   5.14 >-----------< 278    ( 01-23 @ 21:29 )             37.1    01-24-25 @ 22:38      132[L]  |  100  |  6[L]  ----------------------------<  82  3.9   |  21[L]  |  0.63    01-24-25 @ 09:15      136  |  107  |  6[L]  ----------------------------<  110[H]  4.5   |  19[L]  |  0.48[L]    01-24-25 @ 06:35      135  |  104  |  6[L]  ----------------------------<  128[H]  4.8   |  19[L]  |  0.49[L]        Ca    7.7[L]      24 Jan 2025 22:38  Ca    7.8[L]      24 Jan 2025 09:15  Ca    8.1[L]      24 Jan 2025 06:35  Phos  3.7     01-24  Mg     1.30[L]     01-24    TPro  5.1[L]  /  Alb  2.7[L]  /  TBili  0.2  /  DBili  x   /  AST  22  /  ALT  17  /  AlkPhos  119  01-24-25 @ 09:15    I&O's Detail    23 Jan 2025 07:01  -  24 Jan 2025 07:00  --------------------------------------------------------  IN:    Lactated Ringers: 2416 mL    Other (mL): 1700 mL    Oxytocin: 294 mL  Total IN: 4410 mL    OUT:    Blood Loss (mL): 765 mL    Indwelling Catheter - Urethral (mL): 103 mL    Other (mL): 500 mL  Total OUT: 1368 mL    Total NET: 3042 mL      24 Jan 2025 07:01  -  25 Jan 2025 04:53  --------------------------------------------------------  IN:    Lactated Ringers: 250 mL    Oral Fluid: 350 mL  Total IN: 600 mL    OUT:    Indwelling Catheter - Urethral (mL): 1899 mL  Total OUT: 1899 mL    Total NET: -1299 mL        PE:  General: NAD  Abdomen: Soft, appropriately tender, incision c/d/i.  Extremities: No erythema, no pitting edema OB Progress Note: LTCS, POD#2    S: 43yo POD#2 s/p rLTCS. Pain is moderately controlled. Patient has the flu, and c/o cough and congestion. Patient also reports heartburn. She is tolerating a regular diet and passing flatus. She is voiding spontaneously, and ambulating without difficulty. Denies CP/SOB. Denies lightheadedness/dizziness. Denies N/V. Denies fever/chills.    O:  Vitals:  Vital Signs Last 24 Hrs  T(C): 36.8 (25 Jan 2025 00:15), Max: 36.8 (24 Jan 2025 12:30)  T(F): 98.2 (25 Jan 2025 00:15), Max: 98.2 (24 Jan 2025 12:30)  HR: 84 (25 Jan 2025 00:15) (61 - 92)  BP: 121/79 (25 Jan 2025 00:15) (105/77 - 134/87)  BP(mean): 93 (24 Jan 2025 23:00) (65 - 104)  RR: 16 (25 Jan 2025 00:15) (11 - 25)  SpO2: 100% (25 Jan 2025 00:15) (94% - 100%)        MEDICATIONS  (STANDING):  acetaminophen     Tablet .. 975 milliGRAM(s) Oral <User Schedule>  diphtheria/tetanus/pertussis (acellular) Vaccine (Adacel) 0.5 milliLiter(s) IntraMuscular once  heparin   Injectable 5000 Unit(s) SubCutaneous every 12 hours  ibuprofen  Tablet. 600 milliGRAM(s) Oral every 6 hours  influenza   Vaccine 0.5 milliLiter(s) IntraMuscular once  morphine PF Spinal 0.15 milliGRAM(s) IntraThecal. once  oxytocin Infusion 42 milliUNIT(s)/Min (42 mL/Hr) IV Continuous <Continuous>      MEDICATIONS  (PRN):  dexAMETHasone  Injectable 4 milliGRAM(s) IV Push every 6 hours PRN Nausea  diphenhydrAMINE 25 milliGRAM(s) Oral every 6 hours PRN Pruritus  lanolin Ointment 1 Application(s) Topical every 6 hours PRN Sore Nipples  magnesium hydroxide Suspension 30 milliLiter(s) Oral two times a day PRN Constipation  nalbuphine Injectable 2.5 milliGRAM(s) IV Push every 6 hours PRN Pruritus  naloxone Injectable 0.1 milliGRAM(s) IV Push every 3 minutes PRN For ANY of the following changes in patient status:  A. Breaths Per Minute LESS THAN 10, B. Oxygen saturation LESS THAN 90%, C. Sedation score of 6 for Stop After: 4 Times  oxyCODONE    IR 5 milliGRAM(s) Oral every 3 hours PRN Moderate to Severe Pain (4-10)  oxyCODONE    IR 5 milliGRAM(s) Oral once PRN Moderate to Severe Pain (4-10)  simethicone 80 milliGRAM(s) Chew every 4 hours PRN Gas      Labs:  Blood type: A Positive  Rubella IgG: RPR: Negative                          9.3[L]   9.43 >-----------< 193    ( 01-25 @ 01:40 )             28.3[L]                        9.9[L]   9.74 >-----------< 214    ( 01-24 @ 22:36 )             30.9[L]                        10.5[L]   9.78 >-----------< 213    ( 01-24 @ 09:15 )             31.8[L]                        11.1[L]   9.56 >-----------< 224    ( 01-24 @ 06:35 )             33.5[L]                        12.4   5.14 >-----------< 278    ( 01-23 @ 21:29 )             37.1    01-24-25 @ 22:38      132[L]  |  100  |  6[L]  ----------------------------<  82  3.9   |  21[L]  |  0.63    01-24-25 @ 09:15      136  |  107  |  6[L]  ----------------------------<  110[H]  4.5   |  19[L]  |  0.48[L]    01-24-25 @ 06:35      135  |  104  |  6[L]  ----------------------------<  128[H]  4.8   |  19[L]  |  0.49[L]        Ca    7.7[L]      24 Jan 2025 22:38  Ca    7.8[L]      24 Jan 2025 09:15  Ca    8.1[L]      24 Jan 2025 06:35  Phos  3.7     01-24  Mg     1.30[L]     01-24    TPro  5.1[L]  /  Alb  2.7[L]  /  TBili  0.2  /  DBili  x   /  AST  22  /  ALT  17  /  AlkPhos  119  01-24-25 @ 09:15    I&O's Detail    23 Jan 2025 07:01  -  24 Jan 2025 07:00  --------------------------------------------------------  IN:    Lactated Ringers: 2416 mL    Other (mL): 1700 mL    Oxytocin: 294 mL  Total IN: 4410 mL    OUT:    Blood Loss (mL): 765 mL    Indwelling Catheter - Urethral (mL): 103 mL    Other (mL): 500 mL  Total OUT: 1368 mL    Total NET: 3042 mL      24 Jan 2025 07:01  -  25 Jan 2025 04:53  --------------------------------------------------------  IN:    Lactated Ringers: 250 mL    Oral Fluid: 350 mL  Total IN: 600 mL    OUT:    Indwelling Catheter - Urethral (mL): 1899 mL  Total OUT: 1899 mL    Total NET: -1299 mL        PE:  General: NAD  Abdomen: Soft, appropriately tender, incision c/d/i.  Extremities: No erythema, no pitting edema, no calf tenderness

## 2025-01-26 VITALS
RESPIRATION RATE: 17 BRPM | HEART RATE: 79 BPM | SYSTOLIC BLOOD PRESSURE: 131 MMHG | DIASTOLIC BLOOD PRESSURE: 83 MMHG | TEMPERATURE: 98 F | OXYGEN SATURATION: 98 %

## 2025-01-26 LAB
HCT VFR BLD CALC: 27.3 % — LOW (ref 34.5–45)
HGB BLD-MCNC: 9 G/DL — LOW (ref 11.5–15.5)
MCHC RBC-ENTMCNC: 27.4 PG — SIGNIFICANT CHANGE UP (ref 27–34)
MCHC RBC-ENTMCNC: 33 G/DL — SIGNIFICANT CHANGE UP (ref 32–36)
MCV RBC AUTO: 83.2 FL — SIGNIFICANT CHANGE UP (ref 80–100)
NRBC # BLD AUTO: 0 K/UL — SIGNIFICANT CHANGE UP (ref 0–0)
NRBC # BLD: 0 /100 WBCS — SIGNIFICANT CHANGE UP (ref 0–0)
NRBC # FLD: 0 K/UL — SIGNIFICANT CHANGE UP (ref 0–0)
NRBC BLD-RTO: 0 /100 WBCS — SIGNIFICANT CHANGE UP (ref 0–0)
PLATELET # BLD AUTO: 210 K/UL — SIGNIFICANT CHANGE UP (ref 150–400)
RBC # BLD: 3.28 M/UL — LOW (ref 3.8–5.2)
RBC # FLD: 13.9 % — SIGNIFICANT CHANGE UP (ref 10.3–14.5)
WBC # BLD: 8.26 K/UL — SIGNIFICANT CHANGE UP (ref 3.8–10.5)
WBC # FLD AUTO: 8.26 K/UL — SIGNIFICANT CHANGE UP (ref 3.8–10.5)

## 2025-01-26 RX ORDER — IBUPROFEN 600 MG/1
1 TABLET, FILM COATED ORAL
Qty: 0 | Refills: 0 | DISCHARGE
Start: 2025-01-26

## 2025-01-26 RX ORDER — ASPIRIN 325 MG
2 TABLET ORAL
Refills: 0 | DISCHARGE

## 2025-01-26 RX ORDER — ACETAMINOPHEN 160 MG/5ML
3 SUSPENSION ORAL
Qty: 0 | Refills: 0 | DISCHARGE
Start: 2025-01-26

## 2025-01-26 RX ADMIN — IBUPROFEN 600 MILLIGRAM(S): 600 TABLET, FILM COATED ORAL at 06:50

## 2025-01-26 RX ADMIN — IBUPROFEN 600 MILLIGRAM(S): 600 TABLET, FILM COATED ORAL at 12:53

## 2025-01-26 RX ADMIN — ACETAMINOPHEN 975 MILLIGRAM(S): 160 SUSPENSION ORAL at 12:53

## 2025-01-26 RX ADMIN — Medication 5000 UNIT(S): at 06:10

## 2025-01-26 RX ADMIN — Medication 100 MILLIGRAM(S): at 17:03

## 2025-01-26 RX ADMIN — IBUPROFEN 600 MILLIGRAM(S): 600 TABLET, FILM COATED ORAL at 12:00

## 2025-01-26 RX ADMIN — ACETAMINOPHEN 975 MILLIGRAM(S): 160 SUSPENSION ORAL at 12:00

## 2025-01-26 RX ADMIN — FAMOTIDINE 20 MILLIGRAM(S): 10 INJECTION INTRAVENOUS at 12:01

## 2025-01-26 RX ADMIN — ACETAMINOPHEN 975 MILLIGRAM(S): 160 SUSPENSION ORAL at 06:50

## 2025-01-26 RX ADMIN — IBUPROFEN 600 MILLIGRAM(S): 600 TABLET, FILM COATED ORAL at 06:08

## 2025-01-26 RX ADMIN — Medication 100 MILLIGRAM(S): at 08:51

## 2025-01-26 RX ADMIN — ACETAMINOPHEN 975 MILLIGRAM(S): 160 SUSPENSION ORAL at 06:09

## 2025-01-26 NOTE — PROGRESS NOTE ADULT - SUBJECTIVE AND OBJECTIVE BOX
POST ANESTHESIA EVALUATION      INTERVAL HPI/OVERNIGHT EVENTS:  44y Female s/p c section under spinal anesthesia with duramorph for post op analgesia on 1/24/25    Vital Signs Last 24 Hrs  T(C): 36.3 (26 Jan 2025 06:21), Max: 37.1 (26 Jan 2025 01:57)  T(F): 97.4 (26 Jan 2025 06:21), Max: 98.7 (26 Jan 2025 01:57)  HR: 77 (26 Jan 2025 06:21) (76 - 83)  BP: 124/85 (26 Jan 2025 06:21) (124/85 - 145/92)  BP(mean): --  RR: 18 (26 Jan 2025 06:21) (16 - 18)  SpO2: 97% (26 Jan 2025 06:21) (97% - 100%)    Parameters below as of 26 Jan 2025 06:21  Patient On (Oxygen Delivery Method): room air            Patients pain is well controlled     No respiratory events overnight    No pruritis at this time    Patient seen and doing well     No headache      No residual numbness or weakness, sensory and motor function intact    Site examined    No anesthetic complications or complaints noted or reported          .

## 2025-01-26 NOTE — PROGRESS NOTE ADULT - ASSESSMENT
A/P: 43yo POD#3 s/p rLTCS c/b post-op low UOP and possible bladder flap hematoma on CTAP (1/25). . Patient is also FluA+. Patient with prior peripartum cardiomyopathy and mitral valve regurgitation - cardiac work-up negative thus far, low suspicion for peripartum cardiomyopathy currently. H/H downtrending 12.4/37.1>>9.3/28.3, therefore CT angiogram A/P protocoled to rule out active bleed. Patient denies lightheadedness/dizziness, reports pain is moderate. Physical exam appropriate tenderness. VSS, AF.    #Possible bladder flap hematoma  - CTAP (1/24): 6.4 x 3.3 x 2.8 cm bladder flap hematoma  - F/u final read CT angiogram abdomen/pelvis    #Low UOP, resolved  - S/p 2L bolus in PACU  - S/p Lasix 20mg IV  - UOP adequate overnight      #History of peripartum cardiomyopathy  - Patient with low UOP, JVD on POD#0-1  - Negative cardiac workup thus far, low suspicion for peripartum cardiomyopathy  - Pro-BNP (1/24): wnl  - CX (1/24): wnl  - TTE (1/24): Tricuspid annular plane systolic excursion (TAPSE) 3.1 , mild/mod mitral regurg  - Cardio OB recs: Lasix x1, avoid additional diuretic, avoid further IVF, echo findings stable    #Routine postoperative care  - Continue regular diet.  - Increase ambulation.  - Continue motrin, tylenol, oxycodone PRN for pain control.   - Robitussin for cough  - Pepcid for heartburn    RIVER Gilmore PGY1   A/P: 45yo POD#3 s/p rLTCS c/b post-op low UOP and possible bladder flap hematoma on CTAP (1/25). . Patient is also FluA+. Patient with prior peripartum cardiomyopathy and mitral valve regurgitation - cardiac work-up negative thus far, low suspicion for peripartum cardiomyopathy currently. H/H downtrending 12.4/37.1>>9.3/28.3, therefore CT angiogram A/P protocoled to rule out active bleed. Patient denies lightheadedness/dizziness, reports pain is moderate. Physical exam appropriate tenderness. VSS, AF.    #Possible bladder flap hematoma  - CTAP (1/24): 6.4 x 3.3 x 2.8 cm bladder flap hematoma  - CT angio AP prelim read shows stable hematoma, no evidence of expansion  - F/u final read CT angiogram abdomen/pelvis  - Prabhakar to be removed pending final read    #Low UOP, resolved  - S/p 2L bolus in PACU  - S/p Lasix 20mg IV  - UOP adequate overnight  - Prabhakar still in place pending CT read    #History of peripartum cardiomyopathy  - Patient with low UOP, JVD on POD#0-1  - Negative cardiac workup thus far, low suspicion for peripartum cardiomyopathy  - Pro-BNP (1/24): wnl  - CX (1/24): wnl  - TTE (1/24): Tricuspid annular plane systolic excursion (TAPSE) 3.1 , mild/mod mitral regurg  - Cardio OB recs: Lasix x1, avoid additional diuretic, avoid further IVF, echo findings stable    #Routine postoperative care  - Continue regular diet.  - Increase ambulation.  - Continue motrin, tylenol, oxycodone PRN for pain control.   - Robitussin for cough  - Pepcid for heartburn    RIVER Gilmore PGY1

## 2025-01-26 NOTE — PROGRESS NOTE ADULT - ATTENDING COMMENTS
pt seen and examined this am. abdomen soft, appropriately tender. pt denies worsening pain or worsening abdominal distention. CT angio suboptimal but demonstrated stable hematoma. H/H also stable thus low concern for active bleed. Can continue expectant mgt. Pt for DC today

## 2025-01-27 ENCOUNTER — APPOINTMENT (OUTPATIENT)
Dept: OBGYN | Facility: CLINIC | Age: 45
End: 2025-01-27

## 2025-01-28 ENCOUNTER — APPOINTMENT (OUTPATIENT)
Dept: MATERNAL FETAL MEDICINE | Facility: CLINIC | Age: 45
End: 2025-01-28

## 2025-01-29 ENCOUNTER — APPOINTMENT (OUTPATIENT)
Dept: ANTEPARTUM | Facility: CLINIC | Age: 45
End: 2025-01-29

## 2025-01-29 ENCOUNTER — NON-APPOINTMENT (OUTPATIENT)
Age: 45
End: 2025-01-29

## 2025-01-29 ENCOUNTER — INPATIENT (INPATIENT)
Facility: HOSPITAL | Age: 45
LOS: 6 days | Discharge: HOME | End: 2025-02-05
Attending: OBSTETRICS & GYNECOLOGY | Admitting: OBSTETRICS & GYNECOLOGY
Payer: MEDICAID

## 2025-01-29 VITALS
HEIGHT: 61 IN | TEMPERATURE: 99 F | RESPIRATION RATE: 20 BRPM | SYSTOLIC BLOOD PRESSURE: 142 MMHG | DIASTOLIC BLOOD PRESSURE: 85 MMHG | HEART RATE: 69 BPM | OXYGEN SATURATION: 100 %

## 2025-01-29 DIAGNOSIS — Z98.891 HISTORY OF UTERINE SCAR FROM PREVIOUS SURGERY: Chronic | ICD-10-CM

## 2025-01-29 DIAGNOSIS — I34.0 NONRHEUMATIC MITRAL (VALVE) INSUFFICIENCY: ICD-10-CM

## 2025-01-29 DIAGNOSIS — Z90.89 ACQUIRED ABSENCE OF OTHER ORGANS: Chronic | ICD-10-CM

## 2025-01-29 DIAGNOSIS — O03.9 COMPLETE OR UNSPECIFIED SPONTANEOUS ABORTION WITHOUT COMPLICATION: Chronic | ICD-10-CM

## 2025-01-29 PROBLEM — R91.1 SOLITARY PULMONARY NODULE: Chronic | Status: ACTIVE | Noted: 2025-01-23

## 2025-01-29 LAB
ADD ON TEST-SPECIMEN IN LAB: SIGNIFICANT CHANGE UP
ALBUMIN SERPL ELPH-MCNC: 3.2 G/DL — LOW (ref 3.3–5)
ALP SERPL-CCNC: 102 U/L — SIGNIFICANT CHANGE UP (ref 40–120)
ALT FLD-CCNC: 93 U/L — HIGH (ref 4–33)
ANION GAP SERPL CALC-SCNC: 13 MMOL/L — SIGNIFICANT CHANGE UP (ref 7–14)
APPEARANCE UR: CLEAR — SIGNIFICANT CHANGE UP
APTT BLD: 29.4 SEC — SIGNIFICANT CHANGE UP (ref 24.5–35.6)
AST SERPL-CCNC: 47 U/L — HIGH (ref 4–32)
BACTERIA # UR AUTO: NEGATIVE /HPF — SIGNIFICANT CHANGE UP
BASOPHILS # BLD AUTO: 0.01 K/UL — SIGNIFICANT CHANGE UP (ref 0–0.2)
BASOPHILS NFR BLD AUTO: 0.1 % — SIGNIFICANT CHANGE UP (ref 0–2)
BILIRUB SERPL-MCNC: 0.2 MG/DL — SIGNIFICANT CHANGE UP (ref 0.2–1.2)
BILIRUB UR-MCNC: NEGATIVE — SIGNIFICANT CHANGE UP
BUN SERPL-MCNC: 7 MG/DL — SIGNIFICANT CHANGE UP (ref 7–23)
CALCIUM SERPL-MCNC: 9 MG/DL — SIGNIFICANT CHANGE UP (ref 8.4–10.5)
CAST: 0 /LPF — SIGNIFICANT CHANGE UP (ref 0–4)
CHLORIDE SERPL-SCNC: 107 MMOL/L — SIGNIFICANT CHANGE UP (ref 98–107)
CO2 SERPL-SCNC: 21 MMOL/L — LOW (ref 22–31)
COLOR SPEC: ABNORMAL
CREAT SERPL-MCNC: 0.48 MG/DL — LOW (ref 0.5–1.3)
DIFF PNL FLD: ABNORMAL
EGFR: 120 ML/MIN/1.73M2 — SIGNIFICANT CHANGE UP
EOSINOPHIL # BLD AUTO: 0.15 K/UL — SIGNIFICANT CHANGE UP (ref 0–0.5)
EOSINOPHIL NFR BLD AUTO: 1.9 % — SIGNIFICANT CHANGE UP (ref 0–6)
FLUAV AG NPH QL: SIGNIFICANT CHANGE UP
FLUBV AG NPH QL: SIGNIFICANT CHANGE UP
GAS PNL BLDV: SIGNIFICANT CHANGE UP
GLUCOSE SERPL-MCNC: 80 MG/DL — SIGNIFICANT CHANGE UP (ref 70–99)
GLUCOSE UR QL: NEGATIVE MG/DL — SIGNIFICANT CHANGE UP
HCT VFR BLD CALC: 29.7 % — LOW (ref 34.5–45)
HGB BLD-MCNC: 9.5 G/DL — LOW (ref 11.5–15.5)
IANC: 4.83 K/UL — SIGNIFICANT CHANGE UP (ref 1.8–7.4)
IMM GRANULOCYTES NFR BLD AUTO: 0.8 % — SIGNIFICANT CHANGE UP (ref 0–0.9)
INR BLD: 0.93 RATIO — SIGNIFICANT CHANGE UP (ref 0.85–1.16)
KETONES UR-MCNC: NEGATIVE MG/DL — SIGNIFICANT CHANGE UP
LEUKOCYTE ESTERASE UR-ACNC: ABNORMAL
LIDOCAIN IGE QN: 34 U/L — SIGNIFICANT CHANGE UP (ref 7–60)
LYMPHOCYTES # BLD AUTO: 2.17 K/UL — SIGNIFICANT CHANGE UP (ref 1–3.3)
LYMPHOCYTES # BLD AUTO: 27.5 % — SIGNIFICANT CHANGE UP (ref 13–44)
MAGNESIUM SERPL-MCNC: 1.9 MG/DL — SIGNIFICANT CHANGE UP (ref 1.6–2.6)
MCHC RBC-ENTMCNC: 27.6 PG — SIGNIFICANT CHANGE UP (ref 27–34)
MCHC RBC-ENTMCNC: 32 G/DL — SIGNIFICANT CHANGE UP (ref 32–36)
MCV RBC AUTO: 86.3 FL — SIGNIFICANT CHANGE UP (ref 80–100)
MONOCYTES # BLD AUTO: 0.67 K/UL — SIGNIFICANT CHANGE UP (ref 0–0.9)
MONOCYTES NFR BLD AUTO: 8.5 % — SIGNIFICANT CHANGE UP (ref 2–14)
NEUTROPHILS # BLD AUTO: 4.83 K/UL — SIGNIFICANT CHANGE UP (ref 1.8–7.4)
NEUTROPHILS NFR BLD AUTO: 61.2 % — SIGNIFICANT CHANGE UP (ref 43–77)
NITRITE UR-MCNC: NEGATIVE — SIGNIFICANT CHANGE UP
NRBC # BLD AUTO: 0 K/UL — SIGNIFICANT CHANGE UP (ref 0–0)
NRBC # BLD: 0 /100 WBCS — SIGNIFICANT CHANGE UP (ref 0–0)
NRBC # FLD: 0 K/UL — SIGNIFICANT CHANGE UP (ref 0–0)
NRBC BLD-RTO: 0 /100 WBCS — SIGNIFICANT CHANGE UP (ref 0–0)
NT-PROBNP SERPL-SCNC: 242 PG/ML — SIGNIFICANT CHANGE UP
PH UR: 7 — SIGNIFICANT CHANGE UP (ref 5–8)
PLATELET # BLD AUTO: 321 K/UL — SIGNIFICANT CHANGE UP (ref 150–400)
POTASSIUM SERPL-MCNC: 4.1 MMOL/L — SIGNIFICANT CHANGE UP (ref 3.5–5.3)
POTASSIUM SERPL-SCNC: 4.1 MMOL/L — SIGNIFICANT CHANGE UP (ref 3.5–5.3)
PROT SERPL-MCNC: 6.1 G/DL — SIGNIFICANT CHANGE UP (ref 6–8.3)
PROT UR-MCNC: 30 MG/DL
PROTHROM AB SERPL-ACNC: 10.8 SEC — SIGNIFICANT CHANGE UP (ref 9.9–13.4)
RBC # BLD: 3.44 M/UL — LOW (ref 3.8–5.2)
RBC # FLD: 14.2 % — SIGNIFICANT CHANGE UP (ref 10.3–14.5)
RBC CASTS # UR COMP ASSIST: 404 /HPF — HIGH (ref 0–4)
RSV RNA NPH QL NAA+NON-PROBE: SIGNIFICANT CHANGE UP
SARS-COV-2 RNA SPEC QL NAA+PROBE: SIGNIFICANT CHANGE UP
SODIUM SERPL-SCNC: 141 MMOL/L — SIGNIFICANT CHANGE UP (ref 135–145)
SP GR SPEC: 1.02 — SIGNIFICANT CHANGE UP (ref 1–1.03)
SQUAMOUS # UR AUTO: 2 /HPF — SIGNIFICANT CHANGE UP (ref 0–5)
TROPONIN T, HIGH SENSITIVITY RESULT: 9 NG/L — SIGNIFICANT CHANGE UP
UROBILINOGEN FLD QL: 0.2 MG/DL — SIGNIFICANT CHANGE UP (ref 0.2–1)
WBC # BLD: 7.89 K/UL — SIGNIFICANT CHANGE UP (ref 3.8–10.5)
WBC # FLD AUTO: 7.89 K/UL — SIGNIFICANT CHANGE UP (ref 3.8–10.5)
WBC UR QL: 20 /HPF — HIGH (ref 0–5)

## 2025-01-29 PROCEDURE — 99253 IP/OBS CNSLTJ NEW/EST LOW 45: CPT

## 2025-01-29 PROCEDURE — 71275 CT ANGIOGRAPHY CHEST: CPT | Mod: 26

## 2025-01-29 PROCEDURE — 71046 X-RAY EXAM CHEST 2 VIEWS: CPT | Mod: 26

## 2025-01-29 PROCEDURE — 93970 EXTREMITY STUDY: CPT | Mod: 26

## 2025-01-29 PROCEDURE — 99221 1ST HOSP IP/OBS SF/LOW 40: CPT | Mod: GC

## 2025-01-29 PROCEDURE — 74177 CT ABD & PELVIS W/CONTRAST: CPT | Mod: 26

## 2025-01-29 PROCEDURE — 99285 EMERGENCY DEPT VISIT HI MDM: CPT

## 2025-01-29 RX ORDER — LEVETIRACETAM 750 MG/1
500 TABLET, FILM COATED ORAL
Refills: 0 | Status: DISCONTINUED | OUTPATIENT
Start: 2025-01-29 | End: 2025-01-30

## 2025-01-29 RX ORDER — ACETAMINOPHEN 160 MG/5ML
650 SUSPENSION ORAL ONCE
Refills: 0 | Status: COMPLETED | OUTPATIENT
Start: 2025-01-29 | End: 2025-01-29

## 2025-01-29 RX ADMIN — ACETAMINOPHEN 650 MILLIGRAM(S): 160 SUSPENSION ORAL at 19:31

## 2025-01-29 RX ADMIN — Medication 40 MILLIGRAM(S): at 19:31

## 2025-01-29 NOTE — ED PROVIDER NOTE - ATTENDING APP SHARED VISIT CONTRIBUTION OF CARE
Findings:  Background:  continues, awake and drowsy    Voltage:  normal    Organization:  well organized    Posterior Dominant Rhythm:  8-9 , symmetrical    Variability:  	yes    Reactivity:  Yes    Sleep:  None    Focal abnormalities:   none    Interictal Activity:  none    Location:    Focal Slowing:  None    Generalized Slowing:  None  Events:  1)	No electrographic seizures or significant clinical events.  Provocations:  1)	Hyperventilation:  was not performed    2)	Photic stimulation: was not performed    Impression:  normal      Clinical Correlation: a normal EEG does not exclude the possibility of seizure D/O .      see note written by me

## 2025-01-29 NOTE — ED ADULT TRIAGE NOTE - HISTORY OF COVID-19 VACCINATION
Vaccine status unknown Acitretin Counseling:  I discussed with the patient the risks of acitretin including but not limited to hair loss, dry lips/skin/eyes, liver damage, hyperlipidemia, depression/suicidal ideation, photosensitivity.  Serious rare side effects can include but are not limited to pancreatitis, pseudotumor cerebri, bony changes, clot formation/stroke/heart attack.  Patient understands that alcohol is contraindicated since it can result in liver toxicity and significantly prolong the elimination of the drug by many years.

## 2025-01-29 NOTE — ED ADULT NURSE NOTE - OBJECTIVE STATEMENT
45 y/o F arrives to E.D. rm 7 for cough, MENDIETA, bilat LE edema, RLQ abd pain.  on 25, tested + for flu same day. PHx: CHF, Pre-eclampsia, mitral valve regurgitation, lung nodule, asthma. Pt is a&ox4, ambulatory, RR even & unlabored, endorses SOB when laying flat, denies chest pain, neg N/V/D. L 20g IV placed to AC. SB/NSR on tele. Call bell in reach. Frequent monitoring in place.

## 2025-01-29 NOTE — ED ADULT TRIAGE NOTE - BIRTH SEX
Female Quality 110: Preventive Care And Screening: Influenza Immunization: Influenza Immunization Administered during Influenza season Detail Level: Detailed

## 2025-01-29 NOTE — ED ADULT NURSE NOTE - NSFALLUNIVINTERV_ED_ALL_ED
Bed/Stretcher in lowest position, wheels locked, appropriate side rails in place/Call bell, personal items and telephone in reach/Instruct patient to call for assistance before getting out of bed/chair/stretcher/Non-slip footwear applied when patient is off stretcher/[place] to call system/Physically safe environment - no spills, clutter or unnecessary equipment/Purposeful proactive rounding/Room/bathroom lighting operational, light cord in reach

## 2025-01-29 NOTE — ED ADULT NURSE REASSESSMENT NOTE - GENERAL PATIENT STATE
axo4, resp even unlabored at rest. Pt st" I am short of breath walking around and I cant lay flat to sleep "/comfortable appearance/smiling/interactive

## 2025-01-29 NOTE — ED PROVIDER NOTE - CLINICAL SUMMARY MEDICAL DECISION MAKING FREE TEXT BOX
Patient seen and  huddle by OB/GYN team, Attending Buzz, and myself.  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  Patient will likely need diuresis but would like to wait for labs prior to initiation.  Patient will likely also need blood pressure control, but per OB/GYN team, will maintain every 15 minute blood pressures and if pressure goes over 160 systolic, will renotify OB/GYN team and give labetalol.  Will get CTA with IV contrast to eval for PE with run-through to the abdomen to evaluate for acute surgical pathology in the abdomen (ex: appendicitis) versus complications from  such as hematoma/abscess. Patient states vaginal bleeding improved, no foul smelling lochia, no fever. Heart failure team automatically notified by patient's outpatient cardiologist and was at bedside, recommending echocardiogram.

## 2025-01-29 NOTE — ED PROVIDER NOTE - OBJECTIVE STATEMENT
44-year-old female past medical history of postpartum cardiomyopathy and congestive heart failure, preeclampsia mitral valve regurgitation presents with 1 week of progressively worsening lower extremity edema and shortness of breath since  on .  Orthopnea at night.  No fevers.  Patient also with infraumbilical and right lower quadrant abdominal pain. Patient also recently diagnosed with the flu while inpatient 44-year-old female past medical history of postpartum cardiomyopathy and congestive heart failure, preeclampsia mitral valve regurgitation presents with 1 week of progressively worsening lower extremity edema and shortness of breath since  on .  Orthopnea at night.  No fevers.  Patient also with infraumbilical and right lower quadrant abdominal pain. Patient also recently diagnosed with the flu while inpatient last week

## 2025-01-29 NOTE — ED ADULT TRIAGE NOTE - MEANS OF ARRIVAL
well developed, well nourished , in no acute distress , ambulating without difficulty , normal communication ability , well developed, well nourished , in no acute distress , ambulating without difficulty , normal communication ability ambulatory

## 2025-01-29 NOTE — ED ADULT NURSE REASSESSMENT NOTE - NS ED NURSE REASSESS COMMENT FT1
Report received from VIRGINIA Raymond. Pt A&Ox3 sitting up in stretcher resting comfortably. Respirations even and unlabored on room air. No acute distress noted. Pt remains on continuous cardiac monitor, sinus bradycardia noted. Pt endorses improvement of SOB with medication. Denies headache, dizziness, chest pain and palpitations at this time. VS as noted in flow sheet. Swab sent per MD orders. Plan of care ongoing, comfort measures provided and safety measures maintained. Awaiting bed assignment.

## 2025-01-29 NOTE — CONSULT NOTE ADULT - PROBLEM SELECTOR RECOMMENDATION 9
Patient with LE edema  Admit to tele  Most recent echo demonstrating normal filling pressures with normal BIV function and moderate to possible severe MR  Please obtain CBC, CMP, Mg, Phos, VBG lactate, proBNP  Obtain CXR for assessment of acute cardiopulmonary pathology  Obtain formal TTE to further assess mitral valve and LV/RV function  F/U lab results-> as d/w Dr Madrid, hold diuresis for now until labs result  Patient with elevated SBPs in ED, consider starting hydralazine 10 mg q8h (Hold for SBP<95)  Advise repeat RVP Patient with LE edema and moderately hypervolemic  Says she is not yet breastfeeding but intends to  Admit to tele  Most recent echo demonstrating normal filling pressures with normal BIV function and moderate to possible severe MR  Please obtain CBC, CMP, Mg, Phos, VBG lactate, proBNP  Obtain CXR for assessment of acute cardiopulmonary pathology  Obtain formal TTE to further assess mitral valve and LV/RV function  As d/w Dr Madrid, please give patient Lasix 40 mg IVP x1 and then start Lasix 40 mg IV BID (Hold for SBP<90)  Goal net negative 1-2 L over 24h  Patient with elevated SBPs in ED, consider starting hydralazine 10 mg q8h (Hold for SBP<95)  Advise repeat RVP Patient with LE edema and moderately hypervolemic  Says she is not yet breastfeeding but intends to  Maintain telemetry for monitoring of arrhythmia; admit  Most recent echo demonstrating normal filling pressures with normal BIV function and moderate to possible severe MR  Please obtain CBC, CMP, Mg, Phos, VBG lactate, proBNP  Obtain CXR for assessment of acute cardiopulmonary pathology  Obtain formal TTE to further assess mitral valve and LV/RV function  As d/w Dr Madrid, please give patient Lasix 40 mg IVP x1 and then start Lasix 40 mg IV BID (Hold for SBP<90)  Goal net negative 1-2 L over 24h  Patient with elevated SBPs in ED, consider starting hydralazine 10 mg q8h (Hold for SBP<95)  Advise repeat RVP

## 2025-01-29 NOTE — ED ADULT NURSE REASSESSMENT NOTE - SYMPTOMS
Pt s/p  on  pt st" I have been short of breath, swelling in both legs and my Bp running high since delivery. hx of Preeclampsia,/shortness of breath

## 2025-01-29 NOTE — CONSULT NOTE ADULT - ASSESSMENT
45yo ,1,10,3 s/p rLTCS on  presenting to ED today with increasing shortness of breath. States that she had been having worsening SOB since delivery and patient with notable history of post partum CHF s/p 2014 delivery with flash pulmonary edema. Endorses exertional  SOB and orthopnea sleeping with 3-4 pillows.  Here in ED blood pressure in the 150s systolics. Does have a history of preeclampsia with prior pregnancy in 2014.  Endorses a HA but she believes that it is due to pain and skipping dose of Tylenol today. No vision changes but is endorsing mild epigastric pain. Patient having RLQ pain. Clinical course during admission H/H downtrending 12.4/37.1>>9.3/28.3, therefore CT angiogram A/P preformed to r/o active abdominal bleed x2 which demonstrated stable bladder flap hematoma. Given that patient has history of CHF in the setting of her 2014 vaginal delivery. She saw cardio-obstetrics on (). Bedside TTE () with normal filling pressure, mild MR, normal biventricular function. Patient does deny chest pain but is worried that current sxs can be a manifestation of CHF.     Plan  -HELLP Labs CBC, CMP, LDH  -Amylase and Lipase for epigastric pain.   -Would recommend  CT angio of chest to r/o PE and CT abdomin and pelvis to w/u RLQ pain.   -Acquire LE doppler to r/o DVT. Pt with no BL calf pain.   -Would consult Cardiology to be on board given pt history of CHF and worsening SOB.     [place] PGY3

## 2025-01-29 NOTE — CONSULT NOTE ADULT - SUBJECTIVE AND OBJECTIVE BOX
Patient is a 44 year-old female with PMHx /1/10/2 POD#6 from C section, postpartum preeclampsia, moderate-severe MR (follows with HF attending Dr. Augustine Ledbetter outpatient), postpartum cardiomyopathy (EF 76% with moderate MR) who presents to [facility] ED with chief complaint of orthopnea, cough, and worsening B/L LE edema. On interview, patient states she was diagnosed with Flu  when admitted for her C section and her delivery with complicated with low urine output of which she says she received IV Lasix and noted improvement in both urine output and symptoms. She endorses her LE edema is different from her prior pregnancies and is experiencing orthopnea at night with ongoing cough for past one week. She also states she has been experiencing infraumbilical and RLQ abdominal pain. Denies CP, fevers, myalgias, N/V/D/C, lightheadedness/dizziness. Only reports taking pain medications PRN post delivery otherwise denies taking cardiac medications.  EKG normal sinus rhythm with sinus arrhythmia. Pending pertinent labs and formal TTE.    PAST MEDICAL & SURGICAL HISTORY:  Encounter for elective termination of pregnancy      Non-rheumatic mitral regurgitation      Acute congestive heart failure, unspecified congestive heart failure type  during pregnancy       Gestational diabetes mellitus (GDM), antepartum, gestational diabetes method of control unspecified        Asthma  mild, intermittent      Migraine      Mitral valve regurgitation  noted during pregnancy, reports resolved, states no further issues      Lung nodule            S/P tonsillectomy  childhood      S/P  section  May 2017      S/P           FAMILY HISTORY:      Home Medications:  acetaminophen 325 mg oral tablet: 3 tab(s) orally every 8 hours as needed for pain (2025 13:52)  ibuprofen 600 mg oral tablet: 1 tab(s) orally every 6 hours (2025 13:52)      Medications:      Review of systems:  10 point review of systems completed and are negative unless noted in HPI    Vitals:  T(C): 37 (25 @ 13:08), Max: 37 (25 @ 13:08)  HR: 69 (25 @ 13:08) (69 - 69)  BP: 142/85 (25 @ 13:08) (142/85 - 142/85)  BP(mean): --  RR: 20 (25 @ 13:08) (20 - 20)  SpO2: 100% (25 @ 13:08) (100% - 100%)    Daily Height in cm: 154.94 (2025 13:08)    Daily         I&O's Summary      Physical Exam:  Appearance: NAD, coughing on exam  HEENT: PERRL  Neck: Supple  Cardiovascular: Normal S1 S2, No murmurs/rubs/gallops  Respiratory: +Expiratory wheezing, coughing on deep inspiration, diminished bibasilar breath sounds. No crackles, rhonchi.  Gastrointestinal: Soft, Non-tender	  Skin: No cyanosis	  Neurologic: Non-focal  Extremities: 1-2+ pitting edema from knees to ankles B/L. 2+ pitting pedal edema. Warm and well perfused.  Psychiatry: A & O x 3, Mood & affect appropriate        Labs:                    EKG/TELEMETRY: NSR with sinus arrhythmia, no ectopy    Echocardiogram:  TTE W or WO Ultrasound Enhancing Agent (25 @ 09:30)  CONCLUSIONS:      1. Technically difficult image quality.   2. The left ventricular cavity is normal in size. Left ventricular wall thickness is normal. Left ventricular endocardium is not well visualized; however, the left ventricular systolic function appears grossly normal. There is normal left ventricular diastolic function.   3. Normal right ventricular cavity size and normal right ventricular systolic function. Tricuspid annular plane systolic excursion (TAPSE) is 3.1 cm (normal >=1.7 cm).   4. Structurally normal mitral valve with normal leaflet excursion. There is mild to moderate mitral regurgitation.    ________________________________________________________________________________________  FINDINGS:     Left Ventricle:  The left ventricular cavity is normal in size. Left ventricular wall thickness is normal. Left ventricular endocardium is not well visualized; however, the left ventricular systolic function appears grossly normal. There is normal left ventricular diastolic function.     Right Ventricle:  The right ventricular cavity is normal in size and right ventricular systolic function is normal. Tricuspid annular plane systolic excursion (TAPSE) is 3.1 cm (normal >=1.7 cm).     Right Atrium:  The right atrium is normal in size with an indexed volume of 24.09 ml/m² and an indexed area of 9.13 cm²/m².     Aortic Valve:  The aortic valve was not well visualized. The aortic valve anatomy cannot be determined.     Mitral Valve:  Structurally normal mitral valve with normal leaflet excursion. There is mild to moderate mitral regurgitation.     Tricuspid Valve:  Structurally normal tricuspid valvewith normal leaflet excursion. There is trace tricuspid regurgitation.     Pulmonic Valve:  Structurally normal pulmonic valve with normal leaflet excursion. There is trace pulmonic regurgitation.     Aorta:  The aortic root at the sinuses of Valsalva is normal in size, measuring 2.70 cm (indexed 1.51 cm/m²).     Pericardium:  No pericardial effusion seen.

## 2025-01-29 NOTE — CONSULT NOTE ADULT - SUBJECTIVE AND OBJECTIVE BOX
HPI  43yo ,1,10,3 s/p rLTCS on  presenting to ED today with increasing shortness of breath. States that she had been having worsening SOB since delivery and patient with notable history of post partum CHF s/p 2014 delivery with flash pulmonary edema. Endorses exertional  SOB and orthopnea sleeping with 3-4 pillows. Patient does deny chest pain and O2 saturation at time of evaluation was 100%. She tested flu positive during her admission last week. Denies fevers but does state that her cough has not improved and it causes her some moderate abdominal discomfort.   In terms of preeclampsia symptoms, patient does endorse a HA but she believes that it is due to pain and skipping dose of Tylenol today. No vision changes but is endorsing mild epigastric pain. She is taking her blood pressures at home and states that they have been creeping towards the high 140s systolic. Here in ED blood pressure in the 150s systolics. Does have a history of preeclampsia with prior pregnancy in .   Patient having RLQ pain. Clinical course during admission H/H downtrending 12.4/37.1>>9.3/28.3, therefore CT angiogram A/P preformed to r/o active abdominal bleed. CTAP (): 6.4 x 3.3 x 2.8 cm bladder flap hematoma. Ct repeated  demonstrating stable hematoma. States pain is worse with coughing, urination and palpation.   Given that patient has history of CHF in the setting of her  vaginal delivery. She saw cardio-obstetrics on (). Bedside TTE () with normal filling pressure, mild MR, normal biventricular function. Plan per cardio-ob was for IV lasix x1 in the setting of normal pro-bnp. Patient does deny chest pain but is worried that current sxs can be a manifestation of CHF.       Intrapartum course:   -Patient admitted on  with ruptured membranes and  preformed uncomplicated .   -Low HCT trend 12.4/37.1>>9.3/28.3, therefore CT angiogram A/P preformed to r/o active abdominal bleed. CTAP (): 6.4 x 3.3 x 2.8 cm bladder flap hematoma. Ct repeated  demonstrating stable hematoma.  - History of peripartum cardiomyopathy: TE (): Tricuspid annular plane systolic excursion (TAPSE) 3.1 , mild/mod mitral regurg. Cardio OB recs: Lasix x1, avoid additional diuretic, avoid further IVF, echo findings stable    GYN hx:: denies  OBhx: SAB x 1, PPROM at 16 weeks, TOP x 8,  2014 7#15 erbs palsy ppPEC CHF MVR, primary c/s 2017 at 36 weeks for oligo and fetal distress,  abd distention - intestinal perforation s/p ostomy  PMH: CHF MVR migraines RA asthma (never hospitalized or intubated, not taking inhaler- had not taken in over a year)  PSH: c/s x 1 TOP x 8, tonsillectomy   Med: PNV ASA Mag B6 choline  All: seafood hives      Vital Signs Last 24 Hrs  T(C): 37 (2025 13:08), Max: 37 (2025 13:08)  T(F): 98.6 (2025 13:08), Max: 98.6 (2025 13:08)  HR: 69 (2025 13:08) (69 - 69)  BP: 142/85 (2025 13:08) (142/85 - 142/85)  BP(mean): --  RR: 20 (2025 13:08) (20 - 20)  SpO2: 100% (2025 13:08) (100% - 100%)    Parameters below as of 2025 13:08  Patient On (Oxygen Delivery Method): room air    PE  Gen: NAD, pt with mild increase WOB  Lungs: BL crackles and wheezing worse in LL fields   Abdomen: Pfannenstiel incision c/d/i. Moderate periumbilical edema.  LE: 3+ edema, no BL calf pain

## 2025-01-29 NOTE — ED ADULT TRIAGE NOTE - CHIEF COMPLAINT QUOTE
C- section 1/23 (pt of MD Peña) c/o cough, SOB, b/l LE edema, lower pelvic pain- no resp distress- no pmhx  per Randa in L&D, pt to be seen in main ED

## 2025-01-29 NOTE — ED PROVIDER NOTE - PROGRESS NOTE DETAILS
Attending Buzz, JUAN Samuels- per heart failure team recommending 40mg lasix IV in ED. JUAN Samuels- eric OB recommending to admit to their service and pt to go to L&D.

## 2025-01-29 NOTE — ED ADULT NURSE REASSESSMENT NOTE - COMFORT CARE
Lasix and tylenol given . reassessment to follow. Handoff to Primary RN Segundo/assisted to bathroom

## 2025-01-29 NOTE — ED ADULT NURSE REASSESSMENT NOTE - NS ED NURSE REASSESS COMMENT FT1
PT is resting in stretcher, easily arousable to verbal stimuli. no apparent distress noted.  pt sent to US per JUAN Connelly consent to send pt denies complaints at this time. pt plan of care on going.

## 2025-01-29 NOTE — CONSULT NOTE ADULT - ASSESSMENT
44 year-old female with PMHx /1/10/2 POD#6 from C section, postpartum preeclampsia, moderate-severe MR (follows with HF attending Dr. 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 On interview, patient states she was diagnosed with Flu  when admitted for her C section and her delivery with complicated with low urine output of which she says she received IV Lasix and noted improvement in both urine output and symptoms. She endorses her LE edema is different from her prior pregnancies and is experiencing orthopnea at night with ongoing cough for past one week. She also states she has been experiencing infraumbilical and RLQ abdominal pain. Denies CP, fevers, myalgias, N/V/D/C, lightheadedness/dizziness. Only reports taking pain medications PRN post delivery otherwise denies taking cardiac medications.  EKG normal sinus rhythm with sinus arrhythmia. Pending pertinent labs and formal TTE. Patient with SBP 140s-160s. Clinical presentation possibly in setting of suspected viral illness versus fluid shift post C section with moderate MR causing retrograde flow driving c/f possible pulmonary edema.  44 year-old female with PMHx /1/10/2 POD#6 from C section, postpartum preeclampsia, moderate-severe MR (follows with HF attending Dr. 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 On interview, patient states she was diagnosed with Flu  when admitted for her C section and her delivery with complicated with low urine output of which she says she received IV Lasix and noted improvement in both urine output and symptoms. She endorses her LE edema is different from her prior pregnancies and is experiencing orthopnea at night with ongoing cough for past one week. She also states she has been experiencing infraumbilical and RLQ abdominal pain. Denies CP, fevers, myalgias, N/V/D/C, lightheadedness/dizziness. Only reports taking pain medications PRN post delivery otherwise denies taking cardiac medications.  EKG normal sinus rhythm with sinus arrhythmia. Pending pertinent labs and formal TTE. Patient with SBP 140s-160s. Clinical presentation possibly in setting of suspected viral illness versus fluid shift post C section with moderate MR causing retrograde flow driving c/f possible pulmonary edema, patient is hypervolemic on exam.   Addendum @[id number]-> Pertinent labs H/H 9.5/29.7, Na 141, K 4.1, SCr 0.48, Alk Phos 102, transaminitis to AST 47 ALT 93, trop 9, Mg 1.9, vbg lactate 1.2

## 2025-01-29 NOTE — CONSULT NOTE ADULT - NS ATTEND AMEND GEN_ALL_CORE FT
Ms. Cobb is a 44 year-old /1/10/2 F POD#6 from C section, postpartum preeclampsia, prior moderate-severe MR, EF 76% who presents to [facility] ED with chief complaint of orthopnea, cough, and worsening B/L LE edema. Was diagnosed with Flu  when admitted for her C section and her delivery was complicated with low urine output. Received IV Lasix and noted improvement in both urine output and symptoms. Since discharge,  endorses worsening LE edema and is experiencing orthopnea at night (3-4 pillows). Plans on breastfeeding. Had been having abdominal pain (noted to have bladder flap hematoma) and had been taking tylenol and ibuprofen. Denies CP, fevers, myalgias, N/V/D/C, lightheadedness/dizziness. On exam, NAD, JVP approx 12-14 cm w/ HJR, RRR, no m/r/g appreciated, b/l wheeze, distended abdomen, b/l LE edema 1+. Labs reviewed - Hb 9.5 (stable), AST/ALT 47/93, . Prior TTE reviewed - nl EF, mild-mod MR, nl RV size/function. Currently hypervolemic and borderline hypertensive.  - give lasix 40 mg IV now and then q12  - repeat TTE  - counseled against NSAIDs  - standing weights

## 2025-01-29 NOTE — ED PROVIDER NOTE - PHYSICAL EXAMINATION
PHYSICAL EXAM:   General: well-appearing, appears stated age, not in extremis   HEENT: NC/AT,  airway patent  Cardiovascular: regular rate and rhythm, + S1/S2, no murmurs, rubs, gallops appreciated  Respiratory: Bibasilar scant wheezing and crackles  Abdominal: soft, rlq and infraumbilical tenderness near fundus of uterus,  scar clean dry intact, nondistended, no rebound, guarding or rigidity  Extremities:2+LE edema b/l.   Neuro: Alert and oriented x3. Moving all extremities. Ambulatory.  Psychiatric: appropriate mood and affect.   -Dedra Garcia MD Attending Physician

## 2025-01-30 ENCOUNTER — APPOINTMENT (OUTPATIENT)
Dept: CV DIAGNOSITCS | Facility: HOSPITAL | Age: 45
End: 2025-01-30

## 2025-01-30 ENCOUNTER — RESULT REVIEW (OUTPATIENT)
Age: 45
End: 2025-01-30

## 2025-01-30 DIAGNOSIS — I10 ESSENTIAL (PRIMARY) HYPERTENSION: ICD-10-CM

## 2025-01-30 DIAGNOSIS — I50.9 HEART FAILURE, UNSPECIFIED: ICD-10-CM

## 2025-01-30 LAB
ANION GAP SERPL CALC-SCNC: 13 MMOL/L — SIGNIFICANT CHANGE UP (ref 7–14)
BASOPHILS # BLD AUTO: 0.01 K/UL — SIGNIFICANT CHANGE UP (ref 0–0.2)
BASOPHILS NFR BLD AUTO: 0.2 % — SIGNIFICANT CHANGE UP (ref 0–2)
BLD GP AB SCN SERPL QL: NEGATIVE — SIGNIFICANT CHANGE UP
BUN SERPL-MCNC: 8 MG/DL — SIGNIFICANT CHANGE UP (ref 7–23)
CALCIUM SERPL-MCNC: 8.5 MG/DL — SIGNIFICANT CHANGE UP (ref 8.4–10.5)
CHLORIDE SERPL-SCNC: 102 MMOL/L — SIGNIFICANT CHANGE UP (ref 98–107)
CO2 SERPL-SCNC: 23 MMOL/L — SIGNIFICANT CHANGE UP (ref 22–31)
CREAT SERPL-MCNC: 0.49 MG/DL — LOW (ref 0.5–1.3)
EGFR: 120 ML/MIN/1.73M2 — SIGNIFICANT CHANGE UP
EOSINOPHIL # BLD AUTO: 0.12 K/UL — SIGNIFICANT CHANGE UP (ref 0–0.5)
EOSINOPHIL NFR BLD AUTO: 1.9 % — SIGNIFICANT CHANGE UP (ref 0–6)
GLUCOSE SERPL-MCNC: 80 MG/DL — SIGNIFICANT CHANGE UP (ref 70–99)
HCT VFR BLD CALC: 28.7 % — LOW (ref 34.5–45)
HGB BLD-MCNC: 9.2 G/DL — LOW (ref 11.5–15.5)
IANC: 3.57 K/UL — SIGNIFICANT CHANGE UP (ref 1.8–7.4)
IMM GRANULOCYTES NFR BLD AUTO: 0.5 % — SIGNIFICANT CHANGE UP (ref 0–0.9)
LYMPHOCYTES # BLD AUTO: 2.02 K/UL — SIGNIFICANT CHANGE UP (ref 1–3.3)
LYMPHOCYTES # BLD AUTO: 31.8 % — SIGNIFICANT CHANGE UP (ref 13–44)
MAGNESIUM SERPL-MCNC: 1.7 MG/DL — SIGNIFICANT CHANGE UP (ref 1.6–2.6)
MCHC RBC-ENTMCNC: 27.5 PG — SIGNIFICANT CHANGE UP (ref 27–34)
MCHC RBC-ENTMCNC: 32.1 G/DL — SIGNIFICANT CHANGE UP (ref 32–36)
MCV RBC AUTO: 85.7 FL — SIGNIFICANT CHANGE UP (ref 80–100)
MONOCYTES # BLD AUTO: 0.6 K/UL — SIGNIFICANT CHANGE UP (ref 0–0.9)
MONOCYTES NFR BLD AUTO: 9.4 % — SIGNIFICANT CHANGE UP (ref 2–14)
NEUTROPHILS # BLD AUTO: 3.57 K/UL — SIGNIFICANT CHANGE UP (ref 1.8–7.4)
NEUTROPHILS NFR BLD AUTO: 56.2 % — SIGNIFICANT CHANGE UP (ref 43–77)
NRBC # BLD AUTO: 0 K/UL — SIGNIFICANT CHANGE UP (ref 0–0)
NRBC # BLD: 0 /100 WBCS — SIGNIFICANT CHANGE UP (ref 0–0)
NRBC # FLD: 0 K/UL — SIGNIFICANT CHANGE UP (ref 0–0)
NRBC BLD-RTO: 0 /100 WBCS — SIGNIFICANT CHANGE UP (ref 0–0)
PHOSPHATE SERPL-MCNC: 5.1 MG/DL — HIGH (ref 2.5–4.5)
PLATELET # BLD AUTO: 320 K/UL — SIGNIFICANT CHANGE UP (ref 150–400)
POTASSIUM SERPL-MCNC: 3.6 MMOL/L — SIGNIFICANT CHANGE UP (ref 3.5–5.3)
POTASSIUM SERPL-SCNC: 3.6 MMOL/L — SIGNIFICANT CHANGE UP (ref 3.5–5.3)
RBC # BLD: 3.35 M/UL — LOW (ref 3.8–5.2)
RBC # FLD: 14.2 % — SIGNIFICANT CHANGE UP (ref 10.3–14.5)
RH IG SCN BLD-IMP: POSITIVE — SIGNIFICANT CHANGE UP
SODIUM SERPL-SCNC: 138 MMOL/L — SIGNIFICANT CHANGE UP (ref 135–145)
WBC # BLD: 6.35 K/UL — SIGNIFICANT CHANGE UP (ref 3.8–10.5)
WBC # FLD AUTO: 6.35 K/UL — SIGNIFICANT CHANGE UP (ref 3.8–10.5)

## 2025-01-30 PROCEDURE — 99233 SBSQ HOSP IP/OBS HIGH 50: CPT

## 2025-01-30 PROCEDURE — 99231 SBSQ HOSP IP/OBS SF/LOW 25: CPT | Mod: GC

## 2025-01-30 PROCEDURE — 70450 CT HEAD/BRAIN W/O DYE: CPT | Mod: 26,59

## 2025-01-30 PROCEDURE — 70496 CT ANGIOGRAPHY HEAD: CPT | Mod: 26

## 2025-01-30 RX ORDER — HYDRALAZINE HCL 100 MG
10 TABLET ORAL EVERY 8 HOURS
Refills: 0 | Status: DISCONTINUED | OUTPATIENT
Start: 2025-01-30 | End: 2025-01-30

## 2025-01-30 RX ORDER — METOCLOPRAMIDE 10 MG/1
10 TABLET ORAL ONCE
Refills: 0 | Status: COMPLETED | OUTPATIENT
Start: 2025-01-30 | End: 2025-01-30

## 2025-01-30 RX ORDER — OXYCODONE HYDROCHLORIDE 30 MG/1
5 TABLET ORAL EVERY 6 HOURS
Refills: 0 | Status: DISCONTINUED | OUTPATIENT
Start: 2025-01-30 | End: 2025-02-05

## 2025-01-30 RX ORDER — DIPHENHYDRAMINE HCL 25 MG
25 CAPSULE ORAL ONCE
Refills: 0 | Status: COMPLETED | OUTPATIENT
Start: 2025-01-30 | End: 2025-01-30

## 2025-01-30 RX ORDER — HYDRALAZINE HCL 100 MG
25 TABLET ORAL EVERY 8 HOURS
Refills: 0 | Status: DISCONTINUED | OUTPATIENT
Start: 2025-01-30 | End: 2025-02-03

## 2025-01-30 RX ORDER — HEPARIN SODIUM,PORCINE 10000/ML
5000 VIAL (ML) INJECTION EVERY 12 HOURS
Refills: 0 | Status: DISCONTINUED | OUTPATIENT
Start: 2025-01-30 | End: 2025-02-05

## 2025-01-30 RX ORDER — ACETAMINOPHEN 160 MG/5ML
975 SUSPENSION ORAL EVERY 6 HOURS
Refills: 0 | Status: DISCONTINUED | OUTPATIENT
Start: 2025-01-30 | End: 2025-02-05

## 2025-01-30 RX ADMIN — Medication 40 MILLIGRAM(S): at 18:44

## 2025-01-30 RX ADMIN — Medication 10 MILLIGRAM(S): at 08:37

## 2025-01-30 RX ADMIN — LEVETIRACETAM 500 MILLIGRAM(S): 750 TABLET, FILM COATED ORAL at 17:55

## 2025-01-30 RX ADMIN — Medication 25 MILLIGRAM(S): at 14:40

## 2025-01-30 RX ADMIN — Medication 25 MILLIGRAM(S): at 17:23

## 2025-01-30 RX ADMIN — Medication 25 MILLIGRAM(S): at 22:18

## 2025-01-30 RX ADMIN — ACETAMINOPHEN 975 MILLIGRAM(S): 160 SUSPENSION ORAL at 03:57

## 2025-01-30 RX ADMIN — Medication 5000 UNIT(S): at 11:26

## 2025-01-30 RX ADMIN — Medication 40 MILLIGRAM(S): at 07:39

## 2025-01-30 RX ADMIN — METOCLOPRAMIDE 10 MILLIGRAM(S): 10 TABLET ORAL at 12:44

## 2025-01-30 RX ADMIN — Medication 25 MILLIGRAM(S): at 12:44

## 2025-01-30 RX ADMIN — ACETAMINOPHEN 975 MILLIGRAM(S): 160 SUSPENSION ORAL at 12:19

## 2025-01-30 RX ADMIN — ACETAMINOPHEN 975 MILLIGRAM(S): 160 SUSPENSION ORAL at 17:55

## 2025-01-30 RX ADMIN — LEVETIRACETAM 500 MILLIGRAM(S): 750 TABLET, FILM COATED ORAL at 06:28

## 2025-01-30 RX ADMIN — ACETAMINOPHEN 975 MILLIGRAM(S): 160 SUSPENSION ORAL at 23:49

## 2025-01-30 RX ADMIN — Medication 5000 UNIT(S): at 22:18

## 2025-01-30 NOTE — PROGRESS NOTE ADULT - SUBJECTIVE AND OBJECTIVE BOX
Interval History:  feels improved but remains SOB  BP elevated     Medications:  acetaminophen     Tablet .. 975 milliGRAM(s) Oral every 6 hours  furosemide   Injectable 40 milliGRAM(s) IV Push every 12 hours  heparin   Injectable 5000 Unit(s) SubCutaneous every 12 hours  hydrALAZINE 25 milliGRAM(s) Oral every 8 hours  levETIRAcetam 500 milliGRAM(s) Oral two times a day  oxyCODONE    IR 5 milliGRAM(s) Oral every 6 hours PRN      Vitals:  T(C): 36.4 (01-30-25 @ 08:32), Max: 37.3 (01-29-25 @ 20:42)  HR: 50 (01-30-25 @ 08:32) (44 - 69)  BP: 138/96 (01-30-25 @ 08:32) (133/89 - 159/88)  BP(mean): 100 (01-30-25 @ 00:25) (100 - 100)  RR: 18 (01-30-25 @ 08:32) (15 - 20)  SpO2: 100% (01-30-25 @ 08:32) (99% - 100%)    Daily Height in cm: 154.94 (29 Jan 2025 13:08)    Daily         I&O's Summary      Physical Exam:  Appearance: No Acute Distress  HEENT: PERRL  Neck: JVD approx 10 cm (improved)  Cardiovascular: Normal S1 S2, No murmurs/rubs/gallops  Respiratory: Clear to auscultation bilaterally  Gastrointestinal: Soft, Non-tender	  Skin: No cyanosis	  Neurologic: Non-focal  Extremities: b/l 1+ LE edema (improved)  Psychiatry: A & O x 3, Mood & affect appropriate    Labs:                        9.2    6.35  )-----------( 320      ( 30 Jan 2025 06:10 )             28.7     01-30    138  |  102  |  8   ----------------------------<  80  3.6   |  23  |  0.49[L]    Ca    8.5      30 Jan 2025 06:10  Phos  5.1     01-30  Mg     1.70     01-30    TPro  6.1  /  Alb  3.2[L]  /  TBili  0.2  /  DBili  x   /  AST  47[H]  /  ALT  93[H]  /  AlkPhos  102  01-29    PT/INR - ( 29 Jan 2025 16:09 )   PT: 10.8 sec;   INR: 0.93 ratio         PTT - ( 29 Jan 2025 16:09 )  PTT:29.4 sec

## 2025-01-30 NOTE — H&P ADULT - HISTORY OF PRESENT ILLNESS
43yo ,1,10,3 s/p rLTCS on  presenting to ED today with increasing shortness of breath. States that she had been having worsening SOB since delivery and patient with notable history of post partum CHF s/p 2014 delivery with flash pulmonary edema. Endorses exertional  SOB and orthopnea sleeping with 3-4 pillows. Patient does deny chest pain and O2 saturation at time of evaluation was 100%. She tested flu positive during her admission last week. Denies fevers but does state that her cough has not improved and it causes her some moderate abdominal discomfort.     In terms of preeclampsia symptoms, patient does endorse a HA but she believes that it is due to pain and skipping dose of Tylenol today. No vision changes but is endorsing mild epigastric pain. She is taking her blood pressures at home and states that they have been creeping towards the high 140s systolic. Here in ED blood pressure in the 150s systolics. Does have a history of preeclampsia with prior pregnancy in .     Patient having RLQ pain. Clinical course during admission H/H downtrending 12.4/37.1>>9.3/28.3, therefore CT angiogram A/P preformed to r/o active abdominal bleed. CTAP (): 6.4 x 3.3 x 2.8 cm bladder flap hematoma. Ct repeated  demonstrating stable hematoma. States pain is worse with coughing, urination and palpation.   Given that patient has history of CHF in the setting of her  vaginal delivery. She saw cardio-obstetrics on (). Bedside TTE () with normal filling pressure, mild MR, normal biventricular function. Plan per cardio-ob was for IV lasix x1 in the setting of normal pro-bnp. Patient does deny chest pain but is worried that current sxs can be a manifestation of CHF.     Intrapartum course:   -Patient admitted on  with ruptured membranes and  preformed uncomplicated .   -Low HCT trend 12.4/37.1>>9.3/28.3, therefore CT angiogram A/P preformed to r/o active abdominal bleed. CTAP (): 6.4 x 3.3 x 2.8 cm bladder flap hematoma. Ct repeated  demonstrating stable hematoma.  - History of peripartum cardiomyopathy: TE (): Tricuspid annular plane systolic excursion (TAPSE) 3.1 , mild/mod mitral regurg. Cardio OB recs: Lasix x1, avoid additional diuretic, avoid further IVF, echo findings stable    GYN hx:: denies  OBhx: SAB x 1, PPROM at 16 weeks, TOP x 8,  2014 7#15 erbs palsy ppPEC CHF MVR, primary c/s 2017 at 36 weeks for oligo and fetal distress,  abd distention - intestinal perforation s/p ostomy  PMH: CHF MVR migraines RA asthma (never hospitalized or intubated, not taking inhaler- had not taken in over a year)  PSH: c/s x 1 TOP x 8, tonsillectomy   Med: PNV ASA Mag B6 choline  All: seafood hives

## 2025-01-30 NOTE — ED ADULT NURSE REASSESSMENT NOTE - NS ED NURSE REASSESS COMMENT FT1
BREAK RN: pt. remains A&Ox4, awake and resting. pt. endorsing a HA at this time a/w mild visual disturbances, states this has been intermittent since arriving to the ED and being admitted. no acute distress noted. respirations even and unlabored. NSR on cardiac monitor. VS as noted. due medications given as per orders. BREAK RN: pt. remains A&Ox4, awake and resting. pt. endorsing a HA at this time a/w mild visual disturbances, states this has been intermittent since arriving to the ED and being admitted. MD Purcell made aware, orders to be placed. no acute distress noted. respirations even and unlabored. NSR on cardiac monitor. VS as noted. due medications given as per orders.

## 2025-01-30 NOTE — H&P ADULT - ASSESSMENT
43yo ,1,10,3 s/p rLTCS on  presenting to ED today with increasing shortness of breath. States that she had been having worsening SOB since delivery and patient with notable history of post partum CHF s/p 2014 delivery with flash pulmonary edema. Endorses exertional  SOB and orthopnea sleeping with 3-4 pillows.  Here in ED blood pressure in the 150s systolics. Does have a history of preeclampsia with prior pregnancy in .  Endorses a HA but she believes that it is due to pain and skipping dose of Tylenol today. No vision changes but is endorsing mild epigastric pain. Patient having RLQ pain. Clinical course during admission H/H downtrending 12.4/37.1>>9.3/28.3, therefore CT angiogram A/P preformed to r/o active abdominal bleed x2 which demonstrated stable bladder flap hematoma. Given that patient has history of CHF in the setting of her  vaginal delivery. She saw cardio-obstetrics on (). Bedside TTE () with normal filling pressure, mild MR, normal biventricular function. Patient does deny chest pain but is worried that current sxs can be a manifestation of CHF. Patient admitted to OBGYN for closer observation and further workup of SOB.    #SOB, history of peripartum cardiomyopathy  #Presumed sPEC (mild range blood pressures + SOB)  - Cards: Tele,  CBC, CMP, Mg, Phos, VBG lactate, proBNP, CXR, TTE, Lasix 40 mg IVP x1 and then start Lasix 40 mg IV BID (Hold for SBP<90). Goal net negative 1-2 L over 24h. Avoid NSAIDs.  - CT-Angio(): No pulmonary embolism. Reflux of contrast in the IVC/hepatic veins on pulmonary arterial phase   imaging, a finding which can be seen in the setting of right-sided heart dysfunction but may be technically related. Trace bilateral pleural effusions with associated atelectasis.  - CXR(): clear lungs  - Continue Lasix 40mg IV q12hr  - Hydralazine 10mg q8hr per Cardiology recs  - Obtain repeat TTE  - Avoid NSAIDs  - Tele monitoring    #Maternal well-being  - Regular diet  - HSQ, Venodynes for DVT ppx   45yo ,1,10,3 s/p rLTCS on  presenting to ED today with increasing shortness of breath. States that she had been having worsening SOB since delivery and patient with notable history of post partum CHF s/p 2014 delivery with flash pulmonary edema. Endorses exertional  SOB and orthopnea sleeping with 3-4 pillows.  Here in ED blood pressure in the 150s systolics. Does have a history of preeclampsia with prior pregnancy in .  Endorses a HA but she believes that it is due to pain and skipping dose of Tylenol today. No vision changes but is endorsing mild epigastric pain. Patient having RLQ pain. Clinical course during admission H/H downtrending 12.4/37.1>>9.3/28.3, therefore CT angiogram A/P preformed to r/o active abdominal bleed x2 which demonstrated stable bladder flap hematoma. Given that patient has history of CHF in the setting of her  vaginal delivery. She saw cardio-obstetrics on (). Bedside TTE () with normal filling pressure, mild MR, normal biventricular function. Patient does deny chest pain but is worried that current sxs can be a manifestation of CHF. Patient admitted to OBGYN for closer observation and further workup of SOB.    #SOB, history of peripartum cardiomyopathy  #Presumed sPEC (mild range blood pressures + SOB)  - Cards: Tele,  CBC, CMP, Mg, Phos, VBG lactate, proBNP, CXR, TTE, Lasix 40 mg IVP x1 and then start Lasix 40 mg IV BID (Hold for SBP<90). Goal net negative 1-2 L over 24h. Avoid NSAIDs.  - CT-Angio(): No pulmonary embolism. Reflux of contrast in the IVC/hepatic veins on pulmonary arterial phase   imaging, a finding which can be seen in the setting of right-sided heart dysfunction but may be technically related. Trace bilateral pleural effusions with associated atelectasis.  - CXR(): clear lungs  - Continue Lasix 40mg IV q12hr  - Hydralazine 10mg q8hr per Cardiology recs  - Keppra 500mg BID for seizure prophylaxis  - Obtain repeat TTE  - Avoid NSAIDs  - Tele monitoring    #Maternal well-being  - Regular diet  - HSQ, Venodynes for DVT ppx

## 2025-01-30 NOTE — PROGRESS NOTE ADULT - ASSESSMENT
45yo ,1,10,3 s/p rLTCS on  presenting to ED today with increasing shortness of breath. States that she had been having worsening SOB since delivery and patient with notable history of post partum CHF s/p 2014 delivery with flash pulmonary edema. Endorses exertional  SOB and orthopnea sleeping with 3-4 pillows.  Here in ED blood pressure in the 150s systolics. Does have a history of preeclampsia with prior pregnancy in .  Endorses a HA but she believes that it is due to pain and skipping dose of Tylenol today. No vision changes but is endorsing mild epigastric pain. Patient having RLQ pain. Clinical course during admission H/H downtrending 12.4/37.1>>9.3/28.3, therefore CT angiogram A/P preformed to r/o active abdominal bleed x2 which demonstrated stable bladder flap hematoma. Given that patient has history of CHF in the setting of her  vaginal delivery. She saw cardio-obstetrics on (). Bedside TTE () with normal filling pressure, mild MR, normal biventricular function. Patient does deny chest pain but is worried that current sxs can be a manifestation of CHF. Patient admitted to OBGYN for closer observation and further workup of SOB.    #SOB, history of peripartum cardiomyopathy  #Presumed sPEC (mild range blood pressures + SOB)  - Cards: Tele,  CBC, CMP, Mg, Phos, VBG lactate, proBNP, CXR, TTE, Lasix 40 mg IVP x1 and then start Lasix 40 mg IV BID (Hold for SBP<90). Goal net negative 1-2 L over 24h. Avoid NSAIDs.  - CT-Angio(): No pulmonary embolism. Reflux of contrast in the IVC/hepatic veins on pulmonary arterial phase   imaging, a finding which can be seen in the setting of right-sided heart dysfunction but may be technically related. Trace bilateral pleural effusions with associated atelectasis.  - LE dopplers neg for DVT  - CXR(): clear lungs  - Continue Lasix 40mg IV q12hr  - Hydralazine 10mg q8hr per Cardiology recs  - Keppra 500mg BID for seizure prophylaxis  - Obtain repeat TTE  - Avoid NSAIDs  - Tele monitoring  - Strict I&O    #Maternal well-being  - Regular diet  - HSQ, Venodynes for DVT ppx    Noemi Gilliam PGY3

## 2025-01-30 NOTE — PATIENT PROFILE ADULT - FALL HARM RISK - UNIVERSAL INTERVENTIONS
Bed in lowest position, wheels locked, appropriate side rails in place/Call bell, personal items and telephone in reach/Instruct patient to call for assistance before getting out of bed or chair/Non-slip footwear when patient is out of bed/[place] to call system/Physically safe environment - no spills, clutter or unnecessary equipment/Purposeful Proactive Rounding/Room/bathroom lighting operational, light cord in reach

## 2025-01-30 NOTE — PROGRESS NOTE ADULT - SUBJECTIVE AND OBJECTIVE BOX
OBGYN progress note    Patient seen and examined at bedside in ED. Overnight patient with return of mild left sided chest tightness/pain. Also with postoperative abdominal pain that improved with tylenol. Denies lightheadedness, dizziness, palpitations, severe SOB, fevers, chills.    Vital Signs Last 24 Hours  T(C): 36.9 (01-30-25 @ 07:38), Max: 37.3 (01-29-25 @ 20:42)  HR: 44 (01-30-25 @ 07:38) (44 - 69)  BP: 155/76 (01-30-25 @ 07:38) (133/89 - 159/88)  RR: 18 (01-30-25 @ 07:38) (15 - 20)  SpO2: 99% (01-30-25 @ 07:38) (99% - 100%)      Physical Exam:  General: NAD  CV: extremities well perfused  Lungs: normal respiratory effort on room air  Abdomen: Soft, appropriately tender  Ext: No pain or swelling in lower extremities bilaterally     Labs:                        9.2    6.35  )-----------( 320      ( 30 Jan 2025 06:10 )             28.7   baso 0.2    eos 1.9    imm gran 0.5    lymph 31.8   mono 9.4    poly 56.2                         9.5    7.89  )-----------( 321      ( 29 Jan 2025 16:09 )             29.7   baso 0.1    eos 1.9    imm gran 0.8    lymph 27.5   mono 8.5    poly 61.2       MEDICATIONS  (STANDING):  acetaminophen     Tablet .. 975 milliGRAM(s) Oral every 6 hours  furosemide   Injectable 40 milliGRAM(s) IV Push every 12 hours  heparin   Injectable 5000 Unit(s) SubCutaneous every 12 hours  hydrALAZINE 10 milliGRAM(s) Oral every 8 hours  levETIRAcetam 500 milliGRAM(s) Oral two times a day    MEDICATIONS  (PRN):  oxyCODONE    IR 5 milliGRAM(s) Oral every 6 hours PRN Severe Pain (7 - 10)

## 2025-01-30 NOTE — ED ADULT NURSE REASSESSMENT NOTE - NS ED NURSE REASSESS COMMENT FT1
Pt is A+O4. Speech is clear. Spontaneous respirations are even and unlabored on room air. Lung sounds clear to auscultation, cough noted when taking deep inspirations. Endorses intermittent left sided chest pain. Denies SOB, n/v, dizziness. Sinus charles noted on cardiac monitor. VS as noted. Medicated as prescribed. OB at bedside. Pt is A+O4. Speech is clear. Spontaneous respirations are even and unlabored on room air. Lung sounds clear to auscultation, cough noted when taking deep inspirations. Abdomen is soft, distended, tender to touch in RLQ. Endorses intermittent left sided chest pain. Denies SOB, n/v, dizziness. Sinus charles noted on cardiac monitor. VS as noted. Medicated as prescribed. OB at bedside.

## 2025-01-30 NOTE — H&P ADULT - NSHPPHYSICALEXAM_GEN_ALL_CORE
Vital Signs Last 24 Hrs  T(C): 37 (29 Jan 2025 13:08), Max: 37 (29 Jan 2025 13:08)  T(F): 98.6 (29 Jan 2025 13:08), Max: 98.6 (29 Jan 2025 13:08)  HR: 69 (29 Jan 2025 13:08) (69 - 69)  BP: 142/85 (29 Jan 2025 13:08) (142/85 - 142/85)  RR: 20 (29 Jan 2025 13:08) (20 - 20)  SpO2: 100% (29 Jan 2025 13:08) (100% - 100%)    Parameters below as of 29 Jan 2025 13:08  Patient On (Oxygen Delivery Method): room air    PE  Gen: NAD, pt with mild increase WOB  Lungs: BL crackles and wheezing worse in LL fields   Abdomen: Pfannenstiel incision c/d/i. Moderate periumbilical edema.  LE: 3+ edema, no BL calf pain

## 2025-01-30 NOTE — PROGRESS NOTE ADULT - PROBLEM SELECTOR PLAN 1
repeat TTE reviewed - appears moderate MR  c/w lasix 40 mg IV q12 for today then 40 mg PO daily tomorrow  add kenya 25 mg daily  unclear mechanism of MR but may benefit from a KADEN to further evaluation; does not appear to be degenerative

## 2025-01-30 NOTE — ED ADULT NURSE REASSESSMENT NOTE - NS ED NURSE REASSESS COMMENT FT1
Upon reassessment pt A&Ox3 sitting up in stretcher, respirations even and unlabored on room air. No acute distress noted. Denies headache, dizziness, SOB and palpitations at this time. Pt endorsing same mild L sided chest tightness. VS as noted in flow sheet. Pt medicated per EMAR. Plan of care ongoing, comfort measures provided and safety measures maintained. Awaiting bed assignment. Him/He

## 2025-01-30 NOTE — PROGRESS NOTE ADULT - ASSESSMENT
Ms. Cobb is a 44 year-old /1/10/2 F recent C section (), postpartum preeclampsia, prior moderate-severe MR, nl EF who presents to [facility] ED with chief complaint of orthopnea, cough, and worsening B/L LE edema. Was diagnosed with Flu  when admitted for her C section and her delivery was complicated with low urine output. Received IV Lasix and noted improvement in both urine output and symptoms. Since discharge, endorses worsening LE edema and is experiencing orthopnea at night (3-4 pillows). Plans on breastfeeding. Had been having abdominal pain (noted to have bladder flap hematoma) and had been taking tylenol and ibuprofen. CT scan stable hematoma. Improving with diuresis. Labs reviewed - Hb 9.5 (stable), AST/ALT 47/93, . Prior TTE reviewed - nl EF, mild-mod MR, nl RV size/function. Currently hypervolemic and borderline hypertensive.

## 2025-01-30 NOTE — ED ADULT NURSE REASSESSMENT NOTE - NS ED NURSE REASSESS COMMENT FT1
Upon reassessment pt A&Ox3 sitting up in stretcher resting comfortably. Respirations even and unlabored on room air. No acute distress noted. Pt remains on continuous cardiac monitor. VS as noted in flow sheet. Pt endorsing abdominal pain around  incision at this time. MD Gilliam made aware. Pt endorsing L sided chest pain described as tightness and a "squeeze" making pt feel SOB. EKG performed and sent to MD Gilliam via teams. Denies headache, dizziness, palpitations at this time. Awaiting reply. Plan of care ongoing, comfort measures provided and safety measures maintained. Awaiting further orders. Upon reassessment pt A&Ox3 sitting up in stretcher resting comfortably. Respirations even and unlabored on room air. No acute distress noted. Pt remains on continuous cardiac monitor. VS as noted in flow sheet. Pt endorsing abdominal pain around  incision at this time. MD Gilliam made aware. Pt endorsing L sided chest pain described as tightness and a "squeeze" making pt feel SOB. EKG performed and sent to MD Gilliam via teams. MD Gilliam aware of EKG at this time. Denies headache, dizziness, palpitations at this time.  Plan of care ongoing, comfort measures provided and safety measures maintained. Awaiting further orders.

## 2025-01-30 NOTE — ED ADULT NURSE REASSESSMENT NOTE - NS ED NURSE REASSESS COMMENT FT1
Upon reassessment pt A&Ox3 sitting up in stretcher resting comfortably. Respirations even and unlabored on room air. No acute distress noted. Pt remains on continuous cardiac monitor, sinus bradycardia. Denies headache, dizziness, chest pain and SOB at this time. Plan of care ongoing, comfort measures provided and safety measures maintained. Awaiting bed assignment.

## 2025-01-31 LAB
ALBUMIN SERPL ELPH-MCNC: 3.3 G/DL — SIGNIFICANT CHANGE UP (ref 3.3–5)
ALP SERPL-CCNC: 98 U/L — SIGNIFICANT CHANGE UP (ref 40–120)
ALT FLD-CCNC: 79 U/L — HIGH (ref 4–33)
ANION GAP SERPL CALC-SCNC: 14 MMOL/L — SIGNIFICANT CHANGE UP (ref 7–14)
AST SERPL-CCNC: 24 U/L — SIGNIFICANT CHANGE UP (ref 4–32)
BASOPHILS # BLD AUTO: 0.01 K/UL — SIGNIFICANT CHANGE UP (ref 0–0.2)
BASOPHILS NFR BLD AUTO: 0.1 % — SIGNIFICANT CHANGE UP (ref 0–2)
BILIRUB SERPL-MCNC: 0.2 MG/DL — SIGNIFICANT CHANGE UP (ref 0.2–1.2)
BUN SERPL-MCNC: 10 MG/DL — SIGNIFICANT CHANGE UP (ref 7–23)
CALCIUM SERPL-MCNC: 8.7 MG/DL — SIGNIFICANT CHANGE UP (ref 8.4–10.5)
CHLORIDE SERPL-SCNC: 104 MMOL/L — SIGNIFICANT CHANGE UP (ref 98–107)
CO2 SERPL-SCNC: 24 MMOL/L — SIGNIFICANT CHANGE UP (ref 22–31)
CREAT SERPL-MCNC: 0.56 MG/DL — SIGNIFICANT CHANGE UP (ref 0.5–1.3)
EGFR: 116 ML/MIN/1.73M2 — SIGNIFICANT CHANGE UP
EOSINOPHIL # BLD AUTO: 0.1 K/UL — SIGNIFICANT CHANGE UP (ref 0–0.5)
EOSINOPHIL NFR BLD AUTO: 1.3 % — SIGNIFICANT CHANGE UP (ref 0–6)
GLUCOSE SERPL-MCNC: 129 MG/DL — HIGH (ref 70–99)
HCT VFR BLD CALC: 30.8 % — LOW (ref 34.5–45)
HGB BLD-MCNC: 10.2 G/DL — LOW (ref 11.5–15.5)
IANC: 4.76 K/UL — SIGNIFICANT CHANGE UP (ref 1.8–7.4)
IMM GRANULOCYTES NFR BLD AUTO: 0.6 % — SIGNIFICANT CHANGE UP (ref 0–0.9)
LDH SERPL L TO P-CCNC: 265 U/L — HIGH (ref 135–225)
LYMPHOCYTES # BLD AUTO: 2.17 K/UL — SIGNIFICANT CHANGE UP (ref 1–3.3)
LYMPHOCYTES # BLD AUTO: 28 % — SIGNIFICANT CHANGE UP (ref 13–44)
MCHC RBC-ENTMCNC: 27.8 PG — SIGNIFICANT CHANGE UP (ref 27–34)
MCHC RBC-ENTMCNC: 33.1 G/DL — SIGNIFICANT CHANGE UP (ref 32–36)
MCV RBC AUTO: 83.9 FL — SIGNIFICANT CHANGE UP (ref 80–100)
MONOCYTES # BLD AUTO: 0.65 K/UL — SIGNIFICANT CHANGE UP (ref 0–0.9)
MONOCYTES NFR BLD AUTO: 8.4 % — SIGNIFICANT CHANGE UP (ref 2–14)
NEUTROPHILS # BLD AUTO: 4.76 K/UL — SIGNIFICANT CHANGE UP (ref 1.8–7.4)
NEUTROPHILS NFR BLD AUTO: 61.6 % — SIGNIFICANT CHANGE UP (ref 43–77)
NRBC # BLD AUTO: 0 K/UL — SIGNIFICANT CHANGE UP (ref 0–0)
NRBC # BLD: 0 /100 WBCS — SIGNIFICANT CHANGE UP (ref 0–0)
NRBC # FLD: 0 K/UL — SIGNIFICANT CHANGE UP (ref 0–0)
NRBC BLD-RTO: 0 /100 WBCS — SIGNIFICANT CHANGE UP (ref 0–0)
PLATELET # BLD AUTO: 427 K/UL — HIGH (ref 150–400)
POTASSIUM SERPL-MCNC: 3.6 MMOL/L — SIGNIFICANT CHANGE UP (ref 3.5–5.3)
POTASSIUM SERPL-SCNC: 3.6 MMOL/L — SIGNIFICANT CHANGE UP (ref 3.5–5.3)
PROT SERPL-MCNC: 6.2 G/DL — SIGNIFICANT CHANGE UP (ref 6–8.3)
RBC # BLD: 3.67 M/UL — LOW (ref 3.8–5.2)
RBC # FLD: 14.2 % — SIGNIFICANT CHANGE UP (ref 10.3–14.5)
SODIUM SERPL-SCNC: 142 MMOL/L — SIGNIFICANT CHANGE UP (ref 135–145)
URATE SERPL-MCNC: 4.7 MG/DL — SIGNIFICANT CHANGE UP (ref 2.5–7)
WBC # BLD: 7.74 K/UL — SIGNIFICANT CHANGE UP (ref 3.8–10.5)
WBC # FLD AUTO: 7.74 K/UL — SIGNIFICANT CHANGE UP (ref 3.8–10.5)

## 2025-01-31 PROCEDURE — 99233 SBSQ HOSP IP/OBS HIGH 50: CPT

## 2025-01-31 PROCEDURE — 99231 SBSQ HOSP IP/OBS SF/LOW 25: CPT

## 2025-01-31 PROCEDURE — 99254 IP/OBS CNSLTJ NEW/EST MOD 60: CPT

## 2025-01-31 RX ADMIN — Medication 25 MILLIGRAM(S): at 06:06

## 2025-01-31 RX ADMIN — Medication 25 MILLIGRAM(S): at 19:15

## 2025-01-31 RX ADMIN — Medication 60 MILLIGRAM(S): at 13:56

## 2025-01-31 RX ADMIN — ACETAMINOPHEN 975 MILLIGRAM(S): 160 SUSPENSION ORAL at 06:06

## 2025-01-31 RX ADMIN — ACETAMINOPHEN 975 MILLIGRAM(S): 160 SUSPENSION ORAL at 00:49

## 2025-01-31 RX ADMIN — Medication 25 MILLIGRAM(S): at 13:59

## 2025-01-31 RX ADMIN — ACETAMINOPHEN 975 MILLIGRAM(S): 160 SUSPENSION ORAL at 20:26

## 2025-01-31 RX ADMIN — ACETAMINOPHEN 975 MILLIGRAM(S): 160 SUSPENSION ORAL at 06:38

## 2025-01-31 RX ADMIN — ACETAMINOPHEN 975 MILLIGRAM(S): 160 SUSPENSION ORAL at 19:15

## 2025-01-31 RX ADMIN — Medication 25 MILLIGRAM(S): at 21:14

## 2025-01-31 RX ADMIN — ACETAMINOPHEN 975 MILLIGRAM(S): 160 SUSPENSION ORAL at 12:30

## 2025-01-31 RX ADMIN — ACETAMINOPHEN 975 MILLIGRAM(S): 160 SUSPENSION ORAL at 11:26

## 2025-01-31 RX ADMIN — Medication 5000 UNIT(S): at 21:14

## 2025-01-31 NOTE — CONSULT NOTE ADULT - SUBJECTIVE AND OBJECTIVE BOX
Neurology - Consult Note    -  Spectra: [phone] ([facility]), [phone] ([facility])  -    HPI: Patient NEDRA WELSH is a 44y (1980) wo/man with a PMHx significant for ***      Review of Systems:  INCOMPLETE   CONSTITUTIONAL: No fevers or chills  EYES AND ENT: No visual changes or no throat pain   NECK: No pain or stiffness  RESPIRATORY: No hemoptysis or shortness of breath  CARDIOVASCULAR: No chest pain or palpitations  GASTROINTESTINAL: No melena or hematochezia  GENITOURINARY: No dysuria or hematuria  NEUROLOGICAL: +As stated in HPI above  SKIN: No itching, burning, rashes, or lesions   All other review of systems is negative unless indicated above.    Allergies:  No Known Drug Allergies  calamari and fish eggs (Unknown)      PMHx/PSHx/Family Hx: As above, otherwise see below   No pertinent past medical history    Asthma    Encounter for elective termination of pregnancy    Non-rheumatic mitral regurgitation    Preeclampsia in postpartum period    Acute congestive heart failure, unspecified congestive heart failure type    Missed     Gestational diabetes mellitus (GDM), antepartum, gestational diabetes method of control unspecified    Asthma    Migraine    Mitral valve regurgitation    Lung nodule        Social Hx:  No current use of tobacco, alcohol, or illicit drugs  Lives with ***    Medications:  MEDICATIONS  (STANDING):  acetaminophen     Tablet .. 975 milliGRAM(s) Oral every 6 hours  furosemide   Injectable 60 milliGRAM(s) IV Push every 12 hours  heparin   Injectable 5000 Unit(s) SubCutaneous every 12 hours  hydrALAZINE 25 milliGRAM(s) Oral every 8 hours  influenza   Vaccine 0.5 milliLiter(s) IntraMuscular once  spironolactone 25 milliGRAM(s) Oral daily    MEDICATIONS  (PRN):  oxyCODONE    IR 5 milliGRAM(s) Oral every 6 hours PRN Severe Pain (7 - 10)      Vitals:  T(C): 36.8 (25 @ 14:11), Max: 37.2 (25 @ 02:05)  HR: 77 (25 @ 14:11) (56 - 80)  BP: 137/79 (25 @ 14:11) (121/79 - 137/79)  RR: 17 (25 @ 14:11) (16 - 18)  SpO2: 98% (25 @ 14:11) (98% - 99%)    Physical Examination: INCOMPLETE  General - NAD, pleasant, cooperative   Cardiovascular - Peripheral pulses palpable, no edema  Neurologic Exam:  Mental status - Awake, Alert, Oriented to person, place, and time. Speech fluent, repetition and naming intact. Follows simple and complex commands. Attention/concentration, recent and remote memory (including registration and recall), and fund of knowledge intact    Cranial nerves:  CN II: Visual fields are full to confrontation. Fundoscopic exam is normal with sharp discs. Pupils are 4 mm and briskly reactive to light. Visual acuity is 20/20 bilaterally.  CN III, IV, VI: EOMI, no nystagmus, no ptosis  CN V: Facial sensation is intact to pinprick in all 3 divisions bilaterally.  CN VII: Face is symmetric with normal eye closure and smile.  CN VII: Hearing is normal to rubbing fingers  CN IX, X: Palate elevates symmetrically. Phonation is normal.  CN XI: Head turning and shoulder shrug are intact  CN XII: Tongue is midline with normal movements and no atrophy.    Motor - Normal bulk and tone throughout. No pronator drift of out-stretched arms.  Strength testing            Deltoid(C5)  Biceps(C6)    Triceps(C7)     Wrist Extension    Wrist Flexion (C8)     Interossei  (T1)       R            5                 5                        5                     5                              5                                      5                         5  L             5                 5                        5                     5                              5                                      5                          5              Hip Flexion(L2/3)    Hip Extension (L4/5)   Knee Flexion (L4/5/S1)    Knee Extension (L3/4)   Dorsiflexion (L4/5)   Plantar Flexion (S1)  R              5                                    5                                     5                                                     5                                              5                          5  L              5                                     5                                     5                                                     5                                              5                          5    Sensation - Light touch/temperature OR pain/vibration intact in fingers and toes     DTR's -             Biceps      Triceps     Brachioradialis      Patellar    Ankle    Toes/plantar response  R             2+             2+                  2+                       2+            2+                 Down  L              2+             2+                 2+                        2+           2+                 Down    Coordination - Rapid alternating movements and fine finger movements are intact. There is no dysmetria on finger-to-nose and heel-knee-shin. There are no abnormal or extraneous movements. Romberg?    Gait and station - Posture is normal. Gait is steady with normal steps, base, arm swing, and turning. Heel and toe walking are normal. Tandem gait is normal       Labs:                        10.2   7.74  )-----------( 427      ( 2025 13:19 )             30.8         142  |  104  |  10  ----------------------------<  129[H]  3.6   |  24  |  0.56    Ca    8.7      2025 13:19  Phos  5.1       Mg     1.70         TPro  6.2  /  Alb  3.3  /  TBili  0.2  /  DBili  x   /  AST  24  /  ALT  79[H]  /  AlkPhos  98      CAPILLARY BLOOD GLUCOSE        LIVER FUNCTIONS - ( 2025 13:19 )  Alb: 3.3 g/dL / Pro: 6.2 g/dL / ALK PHOS: 98 U/L / ALT: 79 U/L / AST: 24 U/L / GGT: x             PT/INR - ( 2025 16:09 )   PT: 10.8 sec;   INR: 0.93 ratio         PTT - ( 2025 16:09 )  PTT:29.4 sec  CSF:                  Radiology:  CT Head No Cont:  (2025 19:31)     Neurology - Consult Note    -  Spectra: [phone] ([facility]), [phone] ([facility])  -    HPI: Patient NEDRA WELSH is a 44y (1980) female  PPD #8 from  with a PMH of migraines, sciatica, and peripartum cardiomyopathy presenting with 2 days of R-sided frontoparietal headache and visual changes. Pt states she first noticed the HA when released from the hospital on  after  but it was well-controlled on daily tylenol and motrin. On  she developed leg swelling and SOB which she believed was related to CHF, at which point she stopped taking analgesic medication. Headache returned gradually until it reached 10/10 severity; initially it was behind her right eye and forehead but since then it has spread further back in her head and to the back of her neck. She describes the sensation as a "pressure". Headache is not changed by positional changes; she does have phonophobia but denies photophobia. She has had one 10-minute episode of visual disturbance which she describes as bilateral blurriness similar to looking into a kaleidoscope. She has had similar visual episodes 2-3x throughout her pregnancy. These symptoms are different from migraines she has had in the past in which she has seen floaters. She also endorses new-onset loss of balance over the past two days; acknowledges light-headedness but no vertigo.     She denies any motor weakness or sensory changes. Patient has had 2 previous involuntary abortions, both of which occurred in the first trimester. Neurologic ROS positive for bilateral tingling of hands and feet since just prior to the pregnancy; patient states she saw a neurologist who told her it was bilateral carpal tunnel and tarsal tunnel syndrome.       Review of Systems:      EYES AND ENT: visual changes as described in HPI  NEUROLOGICAL: +As stated in HPI above  All other review of systems is negative unless indicated above.    Allergies:  No Known Drug Allergies  calamari and fish eggs (Unknown)      PMHx/PSHx/Family Hx: As above, otherwise see below   No pertinent past medical history    Asthma    Encounter for elective termination of pregnancy    Non-rheumatic mitral regurgitation    Preeclampsia in postpartum period    Acute congestive heart failure, unspecified congestive heart failure type    Missed     Gestational diabetes mellitus (GDM), antepartum, gestational diabetes method of control unspecified    Asthma    Migraine    Mitral valve regurgitation    Lung nodule        Social Hx:  No current use of tobacco, alcohol, or illicit drugs      Medications:  MEDICATIONS  (STANDING):  acetaminophen     Tablet .. 975 milliGRAM(s) Oral every 6 hours  furosemide   Injectable 60 milliGRAM(s) IV Push every 12 hours  heparin   Injectable 5000 Unit(s) SubCutaneous every 12 hours  hydrALAZINE 25 milliGRAM(s) Oral every 8 hours  influenza   Vaccine 0.5 milliLiter(s) IntraMuscular once  spironolactone 25 milliGRAM(s) Oral daily    MEDICATIONS  (PRN):  oxyCODONE    IR 5 milliGRAM(s) Oral every 6 hours PRN Severe Pain (7 - 10)      Vitals:  T(C): 36.8 (25 @ 14:11), Max: 37.2 (25 @ 02:05)  HR: 77 (25 @ 14:11) (56 - 80)  BP: 137/79 (25 @ 14:11) (121/79 - 137/79)  RR: 17 (25 @ 14:11) (16 - 18)  SpO2: 98% (25 @ 14:11) (98% - 99%)    Physical Examination: INCOMPLETE  General - NAD, pleasant, cooperative     Neurologic Exam:  Mental status - Awake, Alert, Oriented to person, place, and time. Speech fluent; naming intact. Follows simple and complex commands.     Cranial nerves:  CN II: Visual fields are full to confrontation. Patient recently had pupils dilated by ophthalmology team; pupillary constriction not appreciated. Visual acuity is 20/20 bilaterally.  CN III, IV, VI: EOMI, no nystagmus, no ptosis  CN V: Facial sensation is intact to pinprick in all 3 divisions bilaterally.  CN VII: Face is symmetric with normal eye closure and smile.  CN VII: Hearing is normal to rubbing fingers  CN IX, X: Palate elevates symmetrically. Phonation is normal.  CN XI: Head turning and shoulder shrug are intact  CN XII: Tongue is midline with normal movements and no atrophy.    Motor - Normal bulk and tone throughout. No pronator drift of out-stretched arms.  Strength testing            Deltoid(C5)  Biceps(C6)    Triceps(C7)     Wrist Extension    Wrist Flexion (C8)     Interossei  (T1)       R            5                 5                        5                     5                              5                   5                    5  L             5                 5                        5                     5                              5                   5                    5              Hip Flexion(L2/3)    Hip Extension (L4/5)   Knee Flexion (L4/5/S1)    Knee Extension (L3/4)   Dorsiflexion (L4/5)   Plantar Flexion (S1)  R              4+                                    5                                    5                                                     5                                              5                          5  L              5                                     5                                     5                                                     5                                              5                          5    Sensation - Light touch and pain intact in fingers and toes     DTR's -             Biceps      Triceps     Brachioradialis      Patellar    Ankle    Toes/plantar response  R             2+             2+                  2+              2+            2+                Down  L              2+             2+                 2+               2+           2+                 Down    Coordination - There is no dysmetria on finger-to-nose and heel-knee-shin. There are no abnormal or extraneous movements.     Gait and station - Posture is normal. Gait is steady with normal steps, base, arm swing, and turning. Heel and toe walking are normal. Tandem gait is normal       Labs:                        10.2   7.74  )-----------( 427      ( 2025 13:19 )             30.8         142  |  104  |  10  ----------------------------<  129[H]  3.6   |  24  |  0.56    Ca    8.7      2025 13:19  Phos  5.1       Mg     1.70         TPro  6.2  /  Alb  3.3  /  TBili  0.2  /  DBili  x   /  AST  24  /  ALT  79[H]  /  AlkPhos  98      CAPILLARY BLOOD GLUCOSE        LIVER FUNCTIONS - ( 2025 13:19 )  Alb: 3.3 g/dL / Pro: 6.2 g/dL / ALK PHOS: 98 U/L / ALT: 79 U/L / AST: 24 U/L / GGT: x             PT/INR - ( 2025 16:09 )   PT: 10.8 sec;   INR: 0.93 ratio         PTT - ( 2025 16:09 )  PTT:29.4 sec  CSF:                  Radiology:  CT Head No Cont:  (2025 19:31)     Neurology - Consult Note    -  Spectra: [phone] ([facility]), [phone] ([facility])  -    HPI: Patient NEDRA WELSH is a 44y (1980) female  PPD #8 from  with a PMH of migraines, sciatica, and peripartum cardiomyopathy who presented 26 w/ 1 week of progressively worsening lower extremity edema and shortness of breath since  on .  Orthopnea at night.  No fevers.  Patient also with infraumbilical and right lower quadrant abdominal pain. Patient also recently diagnosed with the flu while inpatient last.     Neurology consulted for headache over similar timeline. Also noted gait imbalance since diphenhydramine use since on discharge 25.     She has several days of R-sided frontoparietal headache and visual changes. Pt states she first noticed the HA when released from the hospital on  after  but it was well-controlled on daily tylenol and motrin. On  she developed leg swelling and SOB which she believed was related to CHF, at which point she stopped taking analgesic medication. Headache returned gradually until it reached 10/10 severity; initially it was behind her right eye and forehead but since then it has spread further back in her head and to the back of her neck. She describes the sensation as a "pressure". Headache is not changed by positional changes; she does have phonophobia but denies photophobia. She has had one 10-minute episode of visual disturbance which she describes as bilateral blurriness similar to looking into a kaleidoscope worse in peripheral vision. She has had similar visual episodes 2-3x throughout her pregnancy. These symptoms are different from migraines she has had in the past in which she has seen floaters. She also endorses new-onset loss of balance over the past two days; acknowledges light-headedness but no vertigo.     She denies any motor weakness or sensory changes. Patient has had 2 previous involuntary abortions, both of which occurred in the first trimester. Neurologic ROS positive for bilateral tingling of hands and feet since just prior to the pregnancy; patient states she saw a neurologist who told her it was bilateral carpal tunnel and tarsal tunnel syndrome.     Review of Systems:    EYES AND ENT: visual changes as described in HPI  NEUROLOGICAL: +As stated in HPI above  All other review of systems is negative unless indicated above.    Allergies:  No Known Drug Allergies  calamari and fish eggs (Unknown)    PMHx/PSHx/Family Hx: As above, otherwise see below   Asthma  Encounter for elective termination of pregnancy  Non-rheumatic mitral regurgitation  Preeclampsia in postpartum period  Acute congestive heart failure, unspecified congestive heart failure type  Missed   Gestational diabetes mellitus (GDM), antepartum, gestational diabetes method of control unspecified  Asthma  Migraine  Mitral valve regurgitation  Lung nodule    Social Hx:  No current use of tobacco, alcohol, or illicit drug    Medications:  Home Medications:  acetaminophen 325 mg oral tablet: 3 tab(s) orally every 8 hours as needed for pain (2025 13:52)  ibuprofen 600 mg oral tablet: 1 tab(s) orally every 6 hours (2025 13:52)    MEDICATIONS  (STANDING):  acetaminophen     Tablet .. 975 milliGRAM(s) Oral every 6 hours  furosemide   Injectable 60 milliGRAM(s) IV Push every 12 hours  heparin   Injectable 5000 Unit(s) SubCutaneous every 12 hours  hydrALAZINE 25 milliGRAM(s) Oral every 8 hours  influenza   Vaccine 0.5 milliLiter(s) IntraMuscular once  spironolactone 25 milliGRAM(s) Oral daily    MEDICATIONS  (PRN):  oxyCODONE    IR 5 milliGRAM(s) Oral every 6 hours PRN Severe Pain (7 - 10)      Vitals:  T(C): 36.8 (25 @ 14:11), Max: 37.2 (25 @ 02:05)  HR: 77 (25 @ 14:11) (56 - 80)  BP: 137/79 (25 @ 14:11) (121/79 - 137/79)  RR: 17 (25 @ 14:11) (16 - 18)  SpO2: 98% (25 @ 14:11) (98% - 99%)    Physical Examination:   General - NAD, pleasant, cooperative     Neurologic Exam:  Mental status - Awake, Alert, Oriented to person, place, and time. Speech fluent; naming intact. Follows simple and complex commands.     Cranial nerves:  CN II: Visual fields are full to confrontation. Patient recently had pupils dilated by ophthalmology team; pupillary constriction not appreciated. Visual acuity is 20/20 bilaterally w/ dilation.  CN III, IV, VI: EOMI, no nystagmus, no ptosis  CN V: Facial sensation is intact to pinprick in all 3 divisions bilaterally.  CN VII: Face is symmetric with normal eye closure and smile.  CN VII: Hearing is normal to rubbing fingers  CN IX, X: Palate elevates symmetrically. Phonation is normal.  CN XI: Head turning and shoulder shrug are intact  CN XII: Tongue is midline with normal movements and no atrophy.    Motor - Normal bulk and tone throughout. No pronator drift of out-stretched arms.  Strength testing            Deltoid(C5)  Biceps(C6)    Triceps(C7)     Wrist Extension    Wrist Flexion (C8)     Interossei  (T1)       R            5                 5                        5                     5                              5                   5                    5  L             5                 5                        5                     5                              5                   5                    5              Hip Flexion(L2/3)    Hip Extension (L4/5)   Knee Flexion (L4/5/S1)    Knee Extension (L3/4)   Dorsiflexion (L4/5)   Plantar Flexion (S1)  R              5                                   5                                    5                                                     5                                              5                          5  L              5                                     5                                     5                                                     5                                              5                          5    Sensation - Light touch and pain intact in fingers and toes     DTR's -             Biceps      Triceps     Brachioradialis      Patellar    Ankle    Toes/plantar response  R             2+             2+                  2+              2+            2+                Down  L              2+             2+                 2+               2+           2+                 Down    Coordination - There is no dysmetria on finger-to-nose and heel-knee-shin. There are no abnormal or extraneous movements.     Gait and station - Posture is normal. Gait is tenative but able to tandem walk without issue. Often holds onto guard rail. ROmberg negative but with some swaying       Labs:                        10.2   7.74  )-----------( 427      ( 2025 13:19 )             30.8         142  |  104  |  10  ----------------------------<  129[H]  3.6   |  24  |  0.56    Ca    8.7      2025 13:19  Phos  5.1       Mg     1.70         TPro  6.2  /  Alb  3.3  /  TBili  0.2  /  DBili  x   /  AST  24  /  ALT  79[H]  /  AlkPhos  98      CAPILLARY BLOOD GLUCOSE        LIVER FUNCTIONS - ( 2025 13:19 )  Alb: 3.3 g/dL / Pro: 6.2 g/dL / ALK PHOS: 98 U/L / ALT: 79 U/L / AST: 24 U/L / GGT: x             PT/INR - ( 2025 16:09 )   PT: 10.8 sec;   INR: 0.93 ratio         PTT - ( 2025 16:09 )  PTT:29.4 sec  CSF:                  Radiology:  CT Head No Cont:  (2025 19:31)

## 2025-01-31 NOTE — CONSULT NOTE ADULT - ATTENDING COMMENTS
Exam:   General: No acute distress, Awake, Alert.     Fundus: disc margins sharp with normal color discs.     Mental status   Awake, alert, and gives detailed history.     Cranial Nerves   II: VFF to CF  III, IV, VI: Pupils 3-->2, B.  EOMI.     V: Facial sensation is normal B/L.     VII: Facial strength is normal B/L.   VIII: Gross hearing is intact.     IX, X: Palate is midline and elevates symmetrically.   No dysarthria.   XI: Trapezius normal strength.     XII: Tongue midline without atrophy or fasciculations.     Motor exam    Muscle tone - no evidence of rigidity or resistance in all 4 extremities.    No atrophy or fasciculations   Muscle Strength: arms and legs, proximal and distal flexors and extensors are normal   No UE pronator drift.  RSM - symmetric.     Reflexes   All present, normal, and symmetrical.     Plantars right: mute.     Plantars left: mute.        Coordination   Finger to nose: Normal.    Heel to shin: Normal.       Gait   Tentative but intact - including heels, toes, and tandem gait.        < from: CT Venogram Brain w/ IV Cont (01.30.25 @ 19:31) >    HEAD CT: No acute intracranial hemorrhage or mass effect.    BRAIN CTV: No evidence of venous thrombosis.    < end of copied text > Exam:   General: No acute distress, Awake, Alert.     Fundus: disc margins sharp with normal color discs.     Mental status   Awake, alert, and gives detailed history.     Cranial Nerves   II: VFF to CF  III, IV, VI: Pupils 3-->2, B.  EOMI.     VII: Facial strength is normal B/L.   VIII: Gross hearing is intact.     IX, X: Palate is midline and elevates symmetrically.   No dysarthria.   XI: Trapezius normal strength.     XII: Tongue midline without atrophy or fasciculations.     Motor exam    Muscle tone - no evidence of rigidity or resistance in all 4 extremities.    No atrophy or fasciculations   Muscle Strength: arms and legs, proximal and distal flexors and extensors are normal   No UE pronator drift.  RSM - symmetric.     Reflexes   All present, normal, and symmetrical.     Plantars right: mute.     Plantars left: mute.        Coordination   Finger to nose: Normal.    Heel to shin: Normal.       Gait   Tentative but intact - including heels, toes, and tandem gait.        < from: CT Venogram Brain w/ IV Cont (25 @ 19:31) >    HEAD CT: No acute intracranial hemorrhage or mass effect.    BRAIN CTV: No evidence of venous thrombosis.    < end of copied text >    A/P  Ms. Cobb is a 45 yo woman post partum one week s/p  with migraine with aura.  Since headache is different from usual migraine, CT head and CTVenogram were done - normal CT head, no CVST on CTV head. Normal neurological exam except tentative gait which began after being given IV diphenhydramine with severe lethargy and gait imbalance initially which has improved.   I agree with work up and management as above.   D/W patient.   Thank you.

## 2025-01-31 NOTE — CONSULT NOTE ADULT - ASSESSMENT
45yo ,1,10,3 s/p rLTCS on  presenting to ED today with increasing shortness of breath, reports intermittent visual disturbances which occur when pt has headaches, not present outside of headaches.    # Visual disturbances  - 44F 8 days post partum , being treated for preeclampsia, reports intermittent headaches throughout pregnancy and now post partum a/w bilateral spots in vision and, at times, "kaleidoscope" vision, which resolves within a few minutes and not experienced outside of headaches   - VA 20/20 OD/OS, IOP wnl, no rAPD OU, EOMs full OU, CVF full OU, color plates full OD/OS  - Anterior exam and DFE unremarkable OU - no optic disc edema  - No ophthalmic cause identified for visual disturbances which only occur in the setting of headaches. Unlikely due to ophthalmic cause  - No ophthalmic intervention  - Rest of work up/management per primary team/neurology     Seen and discussed with Dr. Herndon, ophthalmology attending.     Outpatient Follow-up: Patient should follow-up with his/her ophthalmologist or with [facility] Department of Ophthalmology within 1 week of after discharge at:    [address]  [place] [address]  [phone]

## 2025-01-31 NOTE — PROGRESS NOTE ADULT - SUBJECTIVE AND OBJECTIVE BOX
Josse Kamara NP  CCU Service  Cardiology   Spect: [id number] ([facility])     PATIENT: NEDRA WELSH, MRN: [id number]    CHIEF COMPLAINT: Patient is a 44y old  Female who presents with a chief complaint of SOB, postpartum, h/o peripartum cardiomyopathy (30 Jan 2025 12:11)      INTERVAL HISTORY/OVERNIGHT EVENTS: Still SOB with chest pain and couging. Denies abdominal pain. Has been ambulating with assistance. Oriented to person, place, and time. Breathing comfortably on room air.    REVIEW OF SYSTEMS:    Constitutional:     [ ] negative [ ] fevers [ ] chills [ ] weight loss [ ] weight gain  HEENT:                  [ ] negative [ ] dry eyes [ ] eye irritation [ ] postnasal drip [ ] nasal congestion  CV:                         [ ] negative  [ ] chest pain [ ] orthopnea [ ] palpitations [ ] murmur  Resp:                     [ ] negative [ ] cough [ ] shortness of breath [ ] dyspnea [ ] wheezing [ ] sputum [ ] hemoptysis  GI:                          [ ] negative [ ] nausea [ ] vomiting [ ] diarrhea [ ] constipation [ ] abd pain [ ] dysphagia   :                        [ ] negative [ ] dysuria [ ] nocturia [ ] hematuria [ ] increased urinary frequency  Musculoskeletal: [ ] negative [ ] back pain [ ] myalgias [ ] arthralgias [ ] fracture  Skin:                       [ ] negative [ ] rash [ ] itch  Neurological:        [ ] negative [ ] headache [ ] dizziness [ ] syncope [ ] weakness [ ] numbness  Psychiatric:           [ ] negative [ ] anxiety [ ] depression  Endocrine:            [ ] negative [ ] diabetes [ ] thyroid problem  Heme/Lymph:      [ ] negative [x ] anemia [ ] bleeding problem  Allergic/Immune: [ ] negative [ ] itchy eyes [ ] nasal discharge [ ] hives [ ] angioedema    [x ] All other systems negative  [ ] Unable to assess ROS because ________.    MEDICATIONS:  MEDICATIONS  (STANDING):  acetaminophen     Tablet .. 975 milliGRAM(s) Oral every 6 hours  furosemide    Tablet 40 milliGRAM(s) Oral daily  heparin   Injectable 5000 Unit(s) SubCutaneous every 12 hours  hydrALAZINE 25 milliGRAM(s) Oral every 8 hours  influenza   Vaccine 0.5 milliLiter(s) IntraMuscular once  spironolactone 25 milliGRAM(s) Oral daily    MEDICATIONS  (PRN):  oxyCODONE    IR 5 milliGRAM(s) Oral every 6 hours PRN Severe Pain (7 - 10)      ALLERGIES: Allergies    No Known Drug Allergies  calamari and fish eggs (Unknown)    Intolerances        OBJECTIVE:  ICU Vital Signs Last 24 Hrs  T(C): 36.9 (31 Jan 2025 09:44), Max: 37.2 (31 Jan 2025 02:05)  T(F): 98.5 (31 Jan 2025 09:44), Max: 98.9 (31 Jan 2025 02:05)  HR: 80 (31 Jan 2025 09:44) (53 - 86)  BP: 134/75 (31 Jan 2025 09:44) (121/79 - 139/87)  RR: 16 (31 Jan 2025 09:44) (16 - 18)  SpO2: 98% (31 Jan 2025 09:44) (98% - 100%)    O2 Parameters below as of 31 Jan 2025 09:44  Patient On (Oxygen Delivery Method): room air    CAPILLARY BLOOD GLUCOSE        I&O's Summary    30 Jan 2025 07:01  -  31 Jan 2025 07:00  --------------------------------------------------------  IN: 1100 mL / OUT: 1600 mL / NET: -500 mL      Daily     PHYSICAL EXAMINATION:  General: Comfortable, no acute distress, cooperative with exam.  HEENT: Moist mucous membranes.  Respiratory: Crackles at the bases, normal respiratory effort, + coughing.  CV: RRR, S1S2, no murmurs, rubs or gallops. Elevated JVD. Distal pulses intact.  Abdominal: Soft, nontender, nondistended, no rebound or guarding, normal bowel sounds.  Neurology: AOx3, no focal neuro defects, MI x 4.  Extremities: + pitting edema, + Peripheral pulses. Warm   Cath site:   Tubes:    LABS:                          9.2    6.35  )-----------( 320      ( 30 Jan 2025 06:10 )             28.7     01-30    138  |  102  |  8   ----------------------------<  80  3.6   |  23  |  0.49[L]    Ca    8.5      30 Jan 2025 06:10  Phos  5.1     01-30  Mg     1.70     01-30    TPro  6.1  /  Alb  3.2[L]  /  TBili  0.2  /  DBili  x   /  AST  47[H]  /  ALT  93[H]  /  AlkPhos  102  01-29    LIVER FUNCTIONS - ( 29 Jan 2025 16:09 )  Alb: 3.2 g/dL / Pro: 6.1 g/dL / ALK PHOS: 102 U/L / ALT: 93 U/L / AST: 47 U/L / GGT: x           PT/INR - ( 29 Jan 2025 16:09 )   PT: 10.8 sec;   INR: 0.93 ratio         PTT - ( 29 Jan 2025 16:09 )  PTT:29.4 sec        Urinalysis Basic - ( 30 Jan 2025 06:10 )    Color: x / Appearance: x / SG: x / pH: x  Gluc: 80 mg/dL / Ketone: x  / Bili: x / Urobili: x   Blood: x / Protein: x / Nitrite: x   Leuk Esterase: x / RBC: x / WBC x   Sq Epi: x / Non Sq Epi: x / Bacteria: x        TELEMETRY: NSR    EKG:     IMAGING:   TTE W or WO Ultrasound Enhancing Agent (01.30.25 @ 09:48) >   1. Left ventricular systolic function is normal with an ejection fraction of 66 % by Munoz's method of disks. There are no regional wall motion abnormalities seen.   2. Left ventricular global longitudinal strain is -24.8 % which is normal (< -18%). Images were acquired on a [email or web address] ultrasound system and processed on the ultrasound machine with a heart rate of 60 bpm and a blood pressure of 138/96 mmHg.   3. Normal left ventricular diastolic function, with normal left ventricular filling pressure.   4. Normal right ventricular cavity size and normal right ventricular systolic function.   5. Left atrium is mildly dilated.   6. Structurally normal mitral valve with normal leaflet excursion. Mildly thickened mitral valve leaflets. There is no mitral valve stenosis. Moderate mitral regurgitation. By stroke volume method, mitral regurgitation volume 55.3mL/beat, mitral regurgitation fraction 43.8%, and MR EROA 0.27cm2, consistent with moderate mitral regurgitation.   7. Estimated pulmonary artery systolic pressure is 34 mmHg.   8. No pericardial effusion seen.   9. Compared to the transthoracic echocardiogram performed on 1/24/2025, the mitral regurgitation is better viewed on this study, in the moderate range.

## 2025-01-31 NOTE — CONSULT NOTE ADULT - ASSESSMENT
Assessment: 43yo F  PMH migraines, peripartum cardiomyopathy, sciatica presenting with 2 days of 10/10 frontoparietal headache, episodes of bilateral vision blurring, lightheadedness, and balance changes. Neurologic exam reveals no deficits and CT venogram showing no thrombosis or intracranial hemorrhage.    Though VST and APLS must be considered in post-partum period given hypercoagulability and previous pregnancy losses, favor migraine headache given onset at time of termination of analgesic medications, unilateral nature, and patient's person history.    Impression: unilateral frontoparietal headache and visual changes consistent with migraine headache.     Plan:  -    Assessment: 45yo F  PMH migraines, peripartum cardiomyopathy, sciatica presenting with 2 days of gradual onset 10/10 right frontoparietal headache, episodes of bilateral vision blurring (seeing through a kelidoscope), lightheadedness, and balance changes. Neurologic exam reveals tentative gait and CTH/CT venogram showing no thrombosis or intracranial hemorrhage.      Impression:  -Multifactorial headache: migrainous (unilateral frontoparietal) w/ aura (seeing through kelidoscope) . iso medication withdrawal (rebound), medical issues (hypervolemic per cards), new vasoactive medications (hydralazine), active diuresis  -Tentative gait since diphenhydramine use is potentially iatrogenic but atyipcal    Plan:  -Noting cards recommendation to avoid nsaids on diuretics  -Noting adverse reaction to diphenydramine (over sedated, then tentative gait)  -Continue acteaminophen and ondansetron, seems to be providing relief  -Appreciate OB and cards recommendations, suspect will have improvement with improved volume status  -Consideration for imaging pending clinical course regarding her gait   -May consider 2nd line migraine abortives pending clinical course. Would hold off on triptans for now but would consider later if needed and if no cardiac objections.   -If headache persists after discharge can follow up with established neurologist or Dr. Manuel or Dr. Salinas at [address] [place] [address] 1-2 weeks after discharge. Please instruct the patient to call [phone] to schedule this appointment.    Assessment: 45yo F  PMH migraines, peripartum cardiomyopathy, sciatica presenting with 2 days of gradual onset 10/10 right frontoparietal headache, episodes of bilateral vision blurring (seeing through a kelidoscope), lightheadedness, and balance changes. Neurologic exam reveals tentative gait and CTH/CT venogram showing no thrombosis or intracranial hemorrhage.      Impression:  -Multifactorial headache: migrainous (unilateral frontoparietal) w/ aura (seeing through kelidoscope) . iso medication withdrawal (rebound), medical issues (hypervolemic per cards), new vasoactive medications (hydralazine), active diuresis  -Tentative gait since diphenhydramine use is potentially iatrogenic but atyipcal    Plan:  -Noting cards recommendation to avoid nsaids on diuretics  -Noting adverse reaction to diphenydramine (over sedated, then tentative gait)  -Continue acteaminophen and ondansetron, seems to be providing relief  -Appreciate OB and cards recommendations, suspect will have improvement with improved volume status  -Consideration for imaging pending clinical course regarding her gait   -May consider 2nd line migraine abortives pending clinical course. Would hold off on triptans for now but would consider later if needed and if no cardiac objections.   -If headache persists after discharge can follow up with established neurologist or Dr. Manuel or Dr. Salinas at [address] [place] [address] 1-2 weeks after discharge. Please instruct the patient to call [phone] to schedule this appointment.   -ELIZA Rodarte

## 2025-01-31 NOTE — PROGRESS NOTE ADULT - NS ATTEND AMEND GEN_ALL_CORE FT
Feels improved but remains orthopneic. Also has raspy cough. Urinating well. On exam, JVP approx 8-10 with HJR, RRR, no m/r/g audible, b/l wheeze and mild crackles, nontender abdomen, b/l 1+ edema. Labs reviewed- Hb 10.2, K 3.6, BUN/Cr 10/0.56.  - increase lasix to 60 mg IV q12 then 40 mg daily tomorrow if improves  - c/w hydral 25 mg q8h  - c/w kenya 25 mg daily   - standing weights

## 2025-01-31 NOTE — PROGRESS NOTE ADULT - PROBLEM SELECTOR PLAN 1
- Remain volume overloaded on exam   - TTE:  Left ventricular systolic function is normal with an ejection fraction of 66 %  with moderate MR  - Change  Lasix 40 mg IV q12   - Continue kenya 25 mg daily  - Unclear mechanism of MR but may benefit from a KADEN to further evaluation; does not appear to be degenerative  - Continue hydral 25 mg q8h  - Monitor I&Os   - Daily standing weights - Remain volume overloaded on exam   - TTE:  Left ventricular systolic function is normal with an ejection fraction of 66 %  with moderate MR  - Change  Lasix 60 mg IV q12 then Lasix 40 mg PO this Saturday if improves   - Continue kenya 25 mg daily  - Unclear mechanism of MR but may benefit from a KADEN to further evaluation; does not appear to be degenerative  - Continue hydral 25 mg q8h  - Monitor I&Os   - Daily standing weights

## 2025-01-31 NOTE — PROGRESS NOTE ADULT - ASSESSMENT
43yo ,1,10,3 s/p rLTCS on  presenting to ED today with increasing shortness of breath. States that she had been having worsening SOB since delivery and patient with notable history of post partum CHF s/p 2014 delivery with flash pulmonary edema. Endorses exertional  SOB and orthopnea sleeping with 3-4 pillows.  Here in ED blood pressure in the 150s systolics. Does have a history of preeclampsia with prior pregnancy in .  Endorses a HA but she believes that it is due to pain and skipping dose of Tylenol today. No vision changes but is endorsing mild epigastric pain. Patient having RLQ pain. Clinical course during admission H/H downtrending 12.4/37.1>>9.3/28.3, therefore CT angiogram A/P preformed to r/o active abdominal bleed x2 which demonstrated stable bladder flap hematoma. Given that patient has history of CHF in the setting of her  vaginal delivery. She saw cardio-obstetrics on (). Bedside TTE () with normal filling pressure, mild MR, normal biventricular function. Patient does deny chest pain but is worried that current sxs can be a manifestation of CHF. Patient admitted to OBGYN for closer observation and further workup of SOB.    #SOB, history of peripartum cardiomyopathy  #Presumed sPEC (mild range blood pressures + SOB)  - Cards: Tele,  CBC, CMP, Mg, Phos, VBG lactate, proBNP, CXR, TTE, Lasix 40 mg IVP x1 and then start Lasix 40 mg IV BID (Hold for SBP<90). Goal net negative 1-2 L over 24h. Avoid NSAIDs.  - CT-Angio(): No pulmonary embolism. Reflux of contrast in the IVC/hepatic veins on pulmonary arterial phase   imaging, a finding which can be seen in the setting of right-sided heart dysfunction but may be technically related. Trace bilateral pleural effusions with associated atelectasis.  - LE dopplers neg for DVT  - CXR(): clear lungs  - TTE(): EF 66%, Left atrium is mildly dilated. Moderate mitral regurgitation.     #Cards Recs (updated ()  - Change  Lasix 40 mg IV q12   - Continue kenya 25 mg daily  - Unclear mechanism of MR but may benefit from a KADEN to further evaluation; does not appear to be degenerative  - Continue hydral 25 mg q8h  - Monitor I&Os   - Daily standing weights.  - Avoid NSAIDs  - Tele monitoring  - Strict I&O    #Maternal well-being  - s/p Keppra 500mg BID for seizure prophylaxis  - Regular diet  - HSQ, Venodynes for DVT ppx  -LFT: -> will repeat HELLP labs today.     [facility] PGY3

## 2025-01-31 NOTE — CONSULT NOTE ADULT - ATTENDING COMMENTS
45yo ,1,10,3 s/p rLTCS on  presenting to ED today with increasing shortness of breath, reports intermittent visual disturbances which occur when pt has headaches, not present outside of headaches. Eye exam essentially normal. Visual disturbances likely due to ocular migraine. Monitor. Can follow-up as outpatient if symptoms persist or become more frequent. Agree with above. Please reconsult as needed.

## 2025-01-31 NOTE — PROGRESS NOTE ADULT - SUBJECTIVE AND OBJECTIVE BOX
S: 43yo POD#8 s/p LTCS. The patient feels well.  Pain is well controlled. Complains of on and off HA, pt has hx of migraines. Endorses some blurry vision that looks like a kaleidoscope States some residual SOB.     O:  Vitals:  Vital Signs Last 24 Hrs  T(C): 36.9 (31 Jan 2025 09:44), Max: 37.2 (31 Jan 2025 02:05)  T(F): 98.5 (31 Jan 2025 09:44), Max: 98.9 (31 Jan 2025 02:05)  HR: 80 (31 Jan 2025 09:44) (53 - 80)  BP: 134/75 (31 Jan 2025 09:44) (121/79 - 134/75)  BP(mean): --  RR: 16 (31 Jan 2025 09:44) (16 - 18)  SpO2: 98% (31 Jan 2025 09:44) (98% - 99%)    Parameters below as of 31 Jan 2025 09:44  Patient On (Oxygen Delivery Method): room air        MEDICATIONS  (STANDING):  acetaminophen     Tablet .. 975 milliGRAM(s) Oral every 6 hours  furosemide    Tablet 40 milliGRAM(s) Oral daily  heparin   Injectable 5000 Unit(s) SubCutaneous every 12 hours  hydrALAZINE 25 milliGRAM(s) Oral every 8 hours  influenza   Vaccine 0.5 milliLiter(s) IntraMuscular once  spironolactone 25 milliGRAM(s) Oral daily    MEDICATIONS  (PRN):  oxyCODONE    IR 5 milliGRAM(s) Oral every 6 hours PRN Severe Pain (7 - 10)      LABS:  Blood type: A Positive  Rubella IgG: RPR: Negative                          9.2[L]   6.35 >-----------< 320    ( 01-30 @ 06:10 )             28.7[L]                        9.5[L]   7.89 >-----------< 321    ( 01-29 @ 16:09 )             29.7[L]    01-30-25 @ 06:10      138  |  102  |  8   ----------------------------<  80  3.6   |  23  |  0.49[L]    01-29-25 @ 16:09      141  |  107  |  7   ----------------------------<  80  4.1   |  21[L]  |  0.48[L]        Ca    8.5      30 Jan 2025 06:10  Ca    9.0      29 Jan 2025 16:09  Phos  5.1[H]     01-30  Mg     1.70     01-30  Mg     1.90     01-29    TPro  6.1  /  Alb  3.2[L]  /  TBili  0.2  /  DBili  x   /  AST  47[H]  /  ALT  93[H]  /  AlkPhos  102  01-29-25 @ 16:09          Physical exam:  Gen: NAD  Abdomen: Soft, nontender, no distension , firm uterine fundus at umbilicus.  Incision: Clean, dry, and intact   Pelvic: Normal lochia noted  Ext: No calf tenderness

## 2025-01-31 NOTE — PROGRESS NOTE ADULT - ASSESSMENT
Ms. Cobb is a 44 year-old /1/10/2 F recent C section (), postpartum preeclampsia, prior moderate-severe MR, nl EF who presents to [facility] ED with chief complaint of orthopnea, cough, and worsening B/L LE edema. Was diagnosed with Flu  when admitted for her C section and her delivery was complicated with low urine output. Received IV Lasix and noted improvement in both urine output and symptoms. Since discharge, endorses worsening LE edema and is experiencing orthopnea at night (3-4 pillows). Plans on breastfeeding. Had been having abdominal pain (noted to have bladder flap hematoma) and had been taking tylenol and ibuprofen. CT scan stable hematoma. Improving with diuresis. Labs reviewed - Hb 9.5 (stable), AST/ALT 47/93, . Prior TTE reviewed - nl EF, mild-mod MR, nl RV size/function. Currently hypervolemic and borderline hypertensive.

## 2025-01-31 NOTE — CONSULT NOTE ADULT - SUBJECTIVE AND OBJECTIVE BOX
[facility] DEPARTMENT OF OPHTHALMOLOGY - INITIAL ADULT CONSULT  ----------------------------------------------------------------------------------------------------  Dayday Radford PGY-3  Available on teams  ----------------------------------------------------------------------------------------------------    HPI:  43yo ,1,10,3 s/p rLTCS on  presenting to ED today with increasing shortness of breath. States that she had been having worsening SOB since delivery and patient with notable history of post partum CHF s/p  delivery with flash pulmonary edema. Endorses exertional  SOB and orthopnea sleeping with 3-4 pillows. Patient does deny chest pain and O2 saturation at time of evaluation was 100%. She tested flu positive during her admission last week. Denies fevers but does state that her cough has not improved and it causes her some moderate abdominal discomfort.     In terms of preeclampsia symptoms, patient does endorse a HA but she believes that it is due to pain and skipping dose of Tylenol today. No vision changes but is endorsing mild epigastric pain. She is taking her blood pressures at home and states that they have been creeping towards the high 140s systolic. Here in ED blood pressure in the 150s systolics. Does have a history of preeclampsia with prior pregnancy in .     Patient having RLQ pain. Clinical course during admission H/H downtrending 12.4/37.1>>9.3/28.3, therefore CT angiogram A/P preformed to r/o active abdominal bleed. CTAP (): 6.4 x 3.3 x 2.8 cm bladder flap hematoma. Ct repeated  demonstrating stable hematoma. States pain is worse with coughing, urination and palpation.   Given that patient has history of CHF in the setting of her  vaginal delivery. She saw cardio-obstetrics on (). Bedside TTE () with normal filling pressure, mild MR, normal biventricular function. Plan per cardio-ob was for IV lasix x1 in the setting of normal pro-bnp. Patient does deny chest pain but is worried that current sxs can be a manifestation of CHF.     Intrapartum course:   -Patient admitted on  with ruptured membranes and  preformed uncomplicated .   -Low HCT trend 12.4/37.1>>9.3/28.3, therefore CT angiogram A/P preformed to r/o active abdominal bleed. CTAP (): 6.4 x 3.3 x 2.8 cm bladder flap hematoma. Ct repeated  demonstrating stable hematoma.  - History of peripartum cardiomyopathy: TE (): Tricuspid annular plane systolic excursion (TAPSE) 3.1 , mild/mod mitral regurg. Cardio OB recs: Lasix x1, avoid additional diuretic, avoid further IVF, echo findings stable    GYN hx:: denies  OBhx: SAB x 1, PPROM at 16 weeks, TOP x 8,  2014 7#15 erbs palsy ppPEC CHF MVR, primary c/s 2017 at 36 weeks for oligo and fetal distress,  abd distention - intestinal perforation s/p ostomy  PMH: CHF MVR migraines RA asthma (never hospitalized or intubated, not taking inhaler- had not taken in over a year)  PSH: c/s x 1 TOP x 8, tonsillectomy   Med: PNV ASA Mag B6 choline  All: seafood hives (2025 00:22)    Interval History: Ophthalmology consulted as pt reporting intermittent visual disturbance that occur when she has headaches. Reports hx of migraines, however has been reporting headaches of different quality more on the right side of her head intermittently throughout her pregnancy and now post partum as well. Reports bilateral spots in her vision along with occasional "kaleidoscope" in both eyes when headaches happen which lasts a few minutes and resolve. Does not have visual disturbances when there is no headache. At present, denies blurry vision, flashes, floaters or vision loss.     PAST MEDICAL & SURGICAL HISTORY:  Encounter for elective termination of pregnancy      Non-rheumatic mitral regurgitation      Acute congestive heart failure, unspecified congestive heart failure type  during pregnancy       Gestational diabetes mellitus (GDM), antepartum, gestational diabetes method of control unspecified        Asthma  mild, intermittent      Migraine      Mitral valve regurgitation  noted during pregnancy, reports resolved, states no further issues      Lung nodule            S/P tonsillectomy  childhood      S/P  section  May 2017      S/P         Past Ocular History: None  Ophthalmic Medications: None  FAMILY HISTORY:      MEDICATIONS  (STANDING):  acetaminophen     Tablet .. 975 milliGRAM(s) Oral every 6 hours  furosemide   Injectable 60 milliGRAM(s) IV Push every 12 hours  heparin   Injectable 5000 Unit(s) SubCutaneous every 12 hours  hydrALAZINE 25 milliGRAM(s) Oral every 8 hours  influenza   Vaccine 0.5 milliLiter(s) IntraMuscular once  spironolactone 25 milliGRAM(s) Oral daily    MEDICATIONS  (PRN):  oxyCODONE    IR 5 milliGRAM(s) Oral every 6 hours PRN Severe Pain (7 - 10)    Allergies & Intolerances:   No Known Drug Allergies  calamari and fish eggs (Unknown)    Review of Systems:  Constitutional: No fever, chills  Eyes: See HPI   Neuro: No tremors  Cardiovascular: No chest pain, palpitations  Respiratory: No SOB, no cough  GI: No nausea, vomiting, abdominal pain  : No dysuria  Skin: no rash  Psych: no depression  Endocrine: no polyuria, polydipsia  Heme/lymph: no swelling    VITALS: T(C): 36.8 (25 @ 14:11)  T(F): 98.3 (25 @ 14:11), Max: 98.9 (25 @ 02:05)  HR: 77 (25 @ 14:11) (56 - 80)  BP: 137/79 (25 @ 14:11) (121/79 - 137/79)  RR:  (16 - 18)  SpO2:  (98% - 99%)  Wt(kg): --  General: AAO x 3, appropriate mood and affect    Ophthalmology Exam:  Visual acuity (sc): 20/20 OD/OS  Pupils: PERRL OU, no APD  Ttono: 12 OD 12 OS   Extraocular movements (EOMs): Full OU, no pain, no diplopia  Confrontational Visual Field (CVF): Full OD/OS  Color Plates: /12 OD/OS    Pen Light Exam (PLE)  External: Flat OU  Lids/Lashes/Lacrimal Ducts: Flat OU    Sclera/Conjunctiva: W+Q OU  Cornea: Cl OU  Anterior Chamber: D+F OU    Iris: Flat OU  Lens: Cl OU    Fundus Exam: dilated with 1% tropicamide and 2.5% phenylephrine  Approval obtained from primary team for dilation  Patient aware that pupils can remained dilated for at least 4-6 hours  Exam performed with 20D lens    Vitreous: wnl OU  Disc, cup/disc: sharp and pink, 0.45 OU  Macula: wnl OU  Vessels: wnl OU  Periphery: wnl OU     [facility] DEPARTMENT OF OPHTHALMOLOGY - INITIAL ADULT CONSULT  ----------------------------------------------------------------------------------------------------  Dayady Radford PGY-3  Available on teams  ----------------------------------------------------------------------------------------------------    HPI:  43yo ,1,10,3 s/p rLTCS on  presenting to ED today with increasing shortness of breath. States that she had been having worsening SOB since delivery and patient with notable history of post partum CHF s/p  delivery with flash pulmonary edema. Endorses exertional  SOB and orthopnea sleeping with 3-4 pillows. Patient does deny chest pain and O2 saturation at time of evaluation was 100%. She tested flu positive during her admission last week. Denies fevers but does state that her cough has not improved and it causes her some moderate abdominal discomfort.     In terms of preeclampsia symptoms, patient does endorse a HA but she believes that it is due to pain and skipping dose of Tylenol today. No vision changes but is endorsing mild epigastric pain. She is taking her blood pressures at home and states that they have been creeping towards the high 140s systolic. Here in ED blood pressure in the 150s systolics. Does have a history of preeclampsia with prior pregnancy in .     Patient having RLQ pain. Clinical course during admission H/H downtrending 12.4/37.1>>9.3/28.3, therefore CT angiogram A/P preformed to r/o active abdominal bleed. CTAP (): 6.4 x 3.3 x 2.8 cm bladder flap hematoma. Ct repeated  demonstrating stable hematoma. States pain is worse with coughing, urination and palpation.   Given that patient has history of CHF in the setting of her  vaginal delivery. She saw cardio-obstetrics on (). Bedside TTE () with normal filling pressure, mild MR, normal biventricular function. Plan per cardio-ob was for IV lasix x1 in the setting of normal pro-bnp. Patient does deny chest pain but is worried that current sxs can be a manifestation of CHF.     Intrapartum course:   -Patient admitted on  with ruptured membranes and  preformed uncomplicated .   -Low HCT trend 12.4/37.1>>9.3/28.3, therefore CT angiogram A/P preformed to r/o active abdominal bleed. CTAP (): 6.4 x 3.3 x 2.8 cm bladder flap hematoma. Ct repeated  demonstrating stable hematoma.  - History of peripartum cardiomyopathy: TE (): Tricuspid annular plane systolic excursion (TAPSE) 3.1 , mild/mod mitral regurg. Cardio OB recs: Lasix x1, avoid additional diuretic, avoid further IVF, echo findings stable    GYN hx:: denies  OBhx: SAB x 1, PPROM at 16 weeks, TOP x 8,  2014 7#15 erbs palsy ppPEC CHF MVR, primary c/s 2017 at 36 weeks for oligo and fetal distress,  abd distention - intestinal perforation s/p ostomy  PMH: CHF MVR migraines RA asthma (never hospitalized or intubated, not taking inhaler- had not taken in over a year)  PSH: c/s x 1 TOP x 8, tonsillectomy   Med: PNV ASA Mag B6 choline  All: seafood hives (2025 00:22)    Interval History: Ophthalmology consulted as pt reporting intermittent visual disturbance that occur when she has headaches. Reports hx of migraines, however has been reporting headaches of different quality more on the right side of her head intermittently throughout her pregnancy and now post partum as well. Reports bilateral spots in her vision along with occasional "kaleidoscope" in both eyes when headaches happen which lasts a few minutes and resolve. Does not have visual disturbances when there is no headache. At present, denies blurry vision, flashes, floaters or vision loss.     PAST MEDICAL & SURGICAL HISTORY:  Encounter for elective termination of pregnancy      Non-rheumatic mitral regurgitation      Acute congestive heart failure, unspecified congestive heart failure type  during pregnancy       Gestational diabetes mellitus (GDM), antepartum, gestational diabetes method of control unspecified        Asthma  mild, intermittent    Migraine    Mitral valve regurgitation  noted during pregnancy, reports resolved, states no further issues    Lung nodule        S/P tonsillectomy  childhood    S/P  section  May 2017    S/P     Past Ocular History: None  Ophthalmic Medications: None  FAMILY HISTORY:      MEDICATIONS  (STANDING):  acetaminophen     Tablet .. 975 milliGRAM(s) Oral every 6 hours  furosemide   Injectable 60 milliGRAM(s) IV Push every 12 hours  heparin   Injectable 5000 Unit(s) SubCutaneous every 12 hours  hydrALAZINE 25 milliGRAM(s) Oral every 8 hours  influenza   Vaccine 0.5 milliLiter(s) IntraMuscular once  spironolactone 25 milliGRAM(s) Oral daily    MEDICATIONS  (PRN):  oxyCODONE    IR 5 milliGRAM(s) Oral every 6 hours PRN Severe Pain (7 - 10)    Allergies & Intolerances:   No Known Drug Allergies  calamari and fish eggs (Unknown)    Review of Systems:  Constitutional: No fever, chills  Eyes: See HPI   Neuro: No tremors  Cardiovascular: No chest pain, palpitations  Respiratory: No SOB, no cough  GI: No nausea, vomiting, abdominal pain  : No dysuria  Skin: no rash  Psych: no depression  Endocrine: no polyuria, polydipsia  Heme/lymph: no swelling    VITALS: T(C): 36.8 (25 @ 14:11)  T(F): 98.3 (25 @ 14:11), Max: 98.9 (25 @ 02:05)  HR: 77 (25 @ 14:11) (56 - 80)  BP: 137/79 (25 @ 14:11) (121/79 - 137/79)  RR:  (16 - 18)  SpO2:  (98% - 99%)  Wt(kg): --  General: AAO x 3, appropriate mood and affect    Ophthalmology Exam:  Visual acuity (sc): 20/20 OD/OS  Pupils: PERRL OU, no APD  Ttono: 12 OD 12 OS   Extraocular movements (EOMs): Full OU, no pain, no diplopia  Confrontational Visual Field (CVF): Full OD/OS  Color Plates: /12 OD/OS    Pen Light Exam (PLE)  External: Flat OU  Lids/Lashes/Lacrimal Ducts: Flat OU    Sclera/Conjunctiva: W+Q OU  Cornea: Cl OU  Anterior Chamber: D+F OU    Iris: Flat OU  Lens: Cl OU    Fundus Exam: dilated with 1% tropicamide and 2.5% phenylephrine  Approval obtained from primary team for dilation  Patient aware that pupils can remained dilated for at least 4-6 hours  Exam performed with 20D lens    Vitreous: wnl OU  Disc, cup/disc: sharp and pink, 0.45 OU  Macula: wnl OU  Vessels: wnl OU  Periphery: wnl OU

## 2025-02-01 LAB
ALBUMIN SERPL ELPH-MCNC: 3.4 G/DL — SIGNIFICANT CHANGE UP (ref 3.3–5)
ALP SERPL-CCNC: 97 U/L — SIGNIFICANT CHANGE UP (ref 40–120)
ALT FLD-CCNC: 60 U/L — HIGH (ref 4–33)
ANION GAP SERPL CALC-SCNC: 13 MMOL/L — SIGNIFICANT CHANGE UP (ref 7–14)
AST SERPL-CCNC: 20 U/L — SIGNIFICANT CHANGE UP (ref 4–32)
BILIRUB SERPL-MCNC: 0.2 MG/DL — SIGNIFICANT CHANGE UP (ref 0.2–1.2)
BUN SERPL-MCNC: 7 MG/DL — SIGNIFICANT CHANGE UP (ref 7–23)
CALCIUM SERPL-MCNC: 8.8 MG/DL — SIGNIFICANT CHANGE UP (ref 8.4–10.5)
CHLORIDE SERPL-SCNC: 101 MMOL/L — SIGNIFICANT CHANGE UP (ref 98–107)
CO2 SERPL-SCNC: 26 MMOL/L — SIGNIFICANT CHANGE UP (ref 22–31)
CREAT SERPL-MCNC: 0.56 MG/DL — SIGNIFICANT CHANGE UP (ref 0.5–1.3)
EGFR: 116 ML/MIN/1.73M2 — SIGNIFICANT CHANGE UP
GLUCOSE SERPL-MCNC: 80 MG/DL — SIGNIFICANT CHANGE UP (ref 70–99)
POTASSIUM SERPL-MCNC: 3.6 MMOL/L — SIGNIFICANT CHANGE UP (ref 3.5–5.3)
POTASSIUM SERPL-SCNC: 3.6 MMOL/L — SIGNIFICANT CHANGE UP (ref 3.5–5.3)
PROT SERPL-MCNC: 6.1 G/DL — SIGNIFICANT CHANGE UP (ref 6–8.3)
SODIUM SERPL-SCNC: 140 MMOL/L — SIGNIFICANT CHANGE UP (ref 135–145)

## 2025-02-01 PROCEDURE — 99232 SBSQ HOSP IP/OBS MODERATE 35: CPT

## 2025-02-01 PROCEDURE — 99233 SBSQ HOSP IP/OBS HIGH 50: CPT

## 2025-02-01 PROCEDURE — 99231 SBSQ HOSP IP/OBS SF/LOW 25: CPT | Mod: GC

## 2025-02-01 RX ORDER — FLUTICASONE PROPIONATE AND SALMETEROL 113; 14 UG/1; UG/1
1 POWDER, METERED RESPIRATORY (INHALATION)
Refills: 0 | Status: DISCONTINUED | OUTPATIENT
Start: 2025-02-01 | End: 2025-02-05

## 2025-02-01 RX ORDER — ALBUTEROL 90 MCG
2 AEROSOL REFILL (GRAM) INHALATION EVERY 6 HOURS
Refills: 0 | Status: DISCONTINUED | OUTPATIENT
Start: 2025-02-01 | End: 2025-02-05

## 2025-02-01 RX ADMIN — ACETAMINOPHEN 975 MILLIGRAM(S): 160 SUSPENSION ORAL at 09:09

## 2025-02-01 RX ADMIN — ACETAMINOPHEN 975 MILLIGRAM(S): 160 SUSPENSION ORAL at 01:03

## 2025-02-01 RX ADMIN — ACETAMINOPHEN 975 MILLIGRAM(S): 160 SUSPENSION ORAL at 12:49

## 2025-02-01 RX ADMIN — ACETAMINOPHEN 975 MILLIGRAM(S): 160 SUSPENSION ORAL at 19:32

## 2025-02-01 RX ADMIN — Medication 5000 UNIT(S): at 10:28

## 2025-02-01 RX ADMIN — Medication 25 MILLIGRAM(S): at 19:01

## 2025-02-01 RX ADMIN — ACETAMINOPHEN 975 MILLIGRAM(S): 160 SUSPENSION ORAL at 00:03

## 2025-02-01 RX ADMIN — Medication 5000 UNIT(S): at 22:03

## 2025-02-01 RX ADMIN — Medication 25 MILLIGRAM(S): at 06:18

## 2025-02-01 RX ADMIN — Medication 25 MILLIGRAM(S): at 22:03

## 2025-02-01 RX ADMIN — ACETAMINOPHEN 975 MILLIGRAM(S): 160 SUSPENSION ORAL at 18:25

## 2025-02-01 RX ADMIN — Medication 25 MILLIGRAM(S): at 15:06

## 2025-02-01 RX ADMIN — ACETAMINOPHEN 975 MILLIGRAM(S): 160 SUSPENSION ORAL at 23:27

## 2025-02-01 RX ADMIN — Medication 60 MILLIGRAM(S): at 12:45

## 2025-02-01 RX ADMIN — Medication 0.5 MILLILITER(S): at 17:07

## 2025-02-01 RX ADMIN — Medication 60 MILLIGRAM(S): at 23:23

## 2025-02-01 RX ADMIN — FLUTICASONE PROPIONATE AND SALMETEROL 1 DOSE(S): 113; 14 POWDER, METERED RESPIRATORY (INHALATION) at 22:03

## 2025-02-01 RX ADMIN — ACETAMINOPHEN 975 MILLIGRAM(S): 160 SUSPENSION ORAL at 13:43

## 2025-02-01 RX ADMIN — ACETAMINOPHEN 650 MILLIGRAM(S): 160 SUSPENSION ORAL at 09:09

## 2025-02-01 RX ADMIN — Medication 60 MILLIGRAM(S): at 00:04

## 2025-02-01 RX ADMIN — ACETAMINOPHEN 975 MILLIGRAM(S): 160 SUSPENSION ORAL at 06:50

## 2025-02-01 RX ADMIN — ACETAMINOPHEN 975 MILLIGRAM(S): 160 SUSPENSION ORAL at 06:18

## 2025-02-01 NOTE — PHYSICAL THERAPY INITIAL EVALUATION ADULT - ACTIVE RANGE OF MOTION EXAMINATION, REHAB EVAL
stephani. upper extremity Active ROM was WNL (within normal limits)/bilateral lower extremity Active ROM was WNL (within normal limits)

## 2025-02-01 NOTE — PROVIDER CONTACT NOTE (OTHER) - SITUATION
pt states she is coughing more, I witness more couphing, and the pt states she "can't seem to catch her breath"

## 2025-02-01 NOTE — PHYSICAL THERAPY INITIAL EVALUATION ADULT - PERTINENT HX OF CURRENT PROBLEM, REHAB EVAL
Patient is a 44 year old Female; ,1,10,3 s/p rLTCS on  presenting to ED today with increasing shortness of breath. States that she had been having worsening SOB since delivery and patient with notable history of post partum CHF s/p 2014 delivery with flash pulmonary edema. Endorses exertional  SOB and orthopnea sleeping with 3-4 pillows.  Here in ED blood pressure in the 150s systolics. Does have a history of preeclampsia with prior pregnancy in .  Endorses a HA but she believes that it is due to pain and skipping dose of Tylenol today. No vision changes but is endorsing mild epigastric pain. Patient having Right LQ pain. Clinical course during admission H/H downtrending 12.4/37.1>>9.3/28.3, therefore CT angiogram A/P preformed to r/o active abdominal bleed x2 which demonstrated stable bladder flap hematoma. Given that patient has history of CHF in the setting of her  vaginal delivery. She saw cardio-obstetrics on (). Bedside TTE () with normal filling pressure, mild MR, normal biventricular function. Patient does deny chest pain but is worried that current sxs can be a manifestation of CHF. Patient admitted to OBGYN for closer observation and further workup of SOB.

## 2025-02-01 NOTE — PROGRESS NOTE ADULT - SUBJECTIVE AND OBJECTIVE BOX
S: 43yo POD#8 s/p LTCS. The patient feels well. Pain is well controlled. Denies headache, vision changes, lightheadedness, CP, SOB, RUQ pain, epigastric pain, N/V.     O:  Vital Signs Last 24 Hrs  T(C): 36.8 (01 Feb 2025 01:57), Max: 37.6 (31 Jan 2025 17:30)  T(F): 98.2 (01 Feb 2025 01:57), Max: 99.6 (31 Jan 2025 17:30)  HR: 72 (01 Feb 2025 01:57) (61 - 80)  BP: 118/68 (01 Feb 2025 01:57) (118/68 - 142/83)  RR: 17 (01 Feb 2025 01:57) (16 - 17)  SpO2: 97% (01 Feb 2025 01:57) (97% - 99%)    Parameters below as of 01 Feb 2025 01:57  Patient On (Oxygen Delivery Method): room air    MEDICATIONS  (STANDING):  acetaminophen     Tablet .. 975 milliGRAM(s) Oral every 6 hours  furosemide    Tablet 40 milliGRAM(s) Oral daily  heparin   Injectable 5000 Unit(s) SubCutaneous every 12 hours  hydrALAZINE 25 milliGRAM(s) Oral every 8 hours  influenza   Vaccine 0.5 milliLiter(s) IntraMuscular once  spironolactone 25 milliGRAM(s) Oral daily    MEDICATIONS  (PRN):  oxyCODONE    IR 5 milliGRAM(s) Oral every 6 hours PRN Severe Pain (7 - 10)      LABS:  Blood type: A Positive  Rubella IgG: RPR: Negative                          9.2[L]   6.35 >-----------< 320    ( 01-30 @ 06:10 )             28.7[L]                        9.5[L]   7.89 >-----------< 321    ( 01-29 @ 16:09 )             29.7[L]    01-30-25 @ 06:10      138  |  102  |  8   ----------------------------<  80  3.6   |  23  |  0.49[L]    01-29-25 @ 16:09      141  |  107  |  7   ----------------------------<  80  4.1   |  21[L]  |  0.48[L]        Ca    8.5      30 Jan 2025 06:10  Ca    9.0      29 Jan 2025 16:09  Phos  5.1[H]     01-30  Mg     1.70     01-30  Mg     1.90     01-29    TPro  6.1  /  Alb  3.2[L]  /  TBili  0.2  /  DBili  x   /  AST  47[H]  /  ALT  93[H]  /  AlkPhos  102  01-29-25 @ 16:09      Physical exam:  Gen: NAD  Abdomen: Soft, nontender, no distension, firm uterine fundus at umbilicus.  Incision: Clean, dry, and intact   Pelvic: Normal lochia noted  Ext: No calf tenderness

## 2025-02-01 NOTE — PROVIDER CONTACT NOTE (OTHER) - ASSESSMENT
150/90, 90, 24, 100% room air. lung sounds not heard at bases, wheezing in the HODAN. clear on the RUL.

## 2025-02-01 NOTE — CHART NOTE - NSCHARTNOTEFT_GEN_A_CORE
Patient with increased cough and subjective feeling SOB with 100% O2 sat. Denies fever and chills with no PEC sxs. Patient ordered for advair and albuterol 2/2 hx of asthma (well controlled). Will reassess prn.   DTaveras PGY3

## 2025-02-01 NOTE — PROGRESS NOTE ADULT - SUBJECTIVE AND OBJECTIVE BOX
Patient is a 44y old  Female who presents with a chief complaint of SOB, postpartum, h/o peripartum cardiomyopathy (2025 04:38)    HPI:  43yo ,1,10,3 s/p rLTCS on  presenting to ED today with increasing shortness of breath. States that she had been having worsening SOB since delivery and patient with notable history of post partum CHF s/p 2014 delivery with flash pulmonary edema. Endorses exertional  SOB and orthopnea sleeping with 3-4 pillows. Patient does deny chest pain and O2 saturation at time of evaluation was 100%. She tested flu positive during her admission last week. Denies fevers but does state that her cough has not improved and it causes her some moderate abdominal discomfort.   In terms of preeclampsia symptoms, patient does endorse a HA but she believes that it is due to pain and skipping dose of Tylenol today. No vision changes but is endorsing mild epigastric pain. She is taking her blood pressures at home and states that they have been creeping towards the high 140s systolic. Here in ED blood pressure in the 150s systolics. Does have a history of preeclampsia with prior pregnancy in .     Intrapartum course:   -Patient admitted on  with ruptured membranes and  preformed uncomplicated .   -Low HCT trend 12.4/37.1>>9.3/28.3, therefore CT angiogram A/P preformed to r/o active abdominal bleed. CTAP (): 6.4 x 3.3 x 2.8 cm bladder flap hematoma. Ct repeated  demonstrating stable hematoma.  - History of peripartum cardiomyopathy: TE (): Tricuspid annular plane systolic excursion (TAPSE) 3.1 , mild/mod mitral regurg. Cardio OB recs: Lasix x1, avoid additional diuretic, avoid further IVF, echo findings stable      Interval history: Examined at bedside with neurology attending. Reports feeling better, headache has resolved and her walking is back to normal. Reports feeling better on the increased lasix dose.      Vital Signs Last 24 Hrs  T(C): 36.7 (2025 06:33), Max: 37.6 (2025 17:30)  T(F): 98.1 (2025 06:33), Max: 99.6 (2025 17:30)  HR: 77 (2025 06:33) (62 - 80)  BP: 133/81 (2025 06:33) (118/68 - 142/83)  BP(mean): --  RR: 17 (2025 06:33) (16 - 17)  SpO2: 97% (2025 06:33) (97% - 99%)    Parameters below as of 2025 06:33  Patient On (Oxygen Delivery Method): room air          Physical Exam:  Constitutional: comfortably sitting in bed  Neuro  * Mental Status:  GCS 15: Awake, alert, oriented to conversation.  * Cranial Nerves: Cnii-Cnxii grossly intact.   * Motor: Spontaneous antigravity movement   * Sensory: Sensation intact to light touch  * Reflexes: not assessed   Gait: normal base and stance, steady. normal heel and toe walking.    LABS:                        10.2   7.74  )-----------( 427      ( 2025 13:19 )             30.8         142  |  104  |  10  ----------------------------<  129[H]  3.6   |  24  |  0.56    Ca    8.7      2025 13:19    TPro  6.2  /  Alb  3.3  /  TBili  0.2  /  DBili  x   /  AST  24  /  ALT  79[H]  /  AlkPhos  98        Urinalysis Basic - ( 2025 13:19 )    Color: x / Appearance: x / SG: x / pH: x  Gluc: 129 mg/dL / Ketone: x  / Bili: x / Urobili: x   Blood: x / Protein: x / Nitrite: x   Leuk Esterase: x / RBC: x / WBC x   Sq Epi: x / Non Sq Epi: x / Bacteria: x        CULTURES:        I&O:       Medications:    RADIOLOGY & ADDITIONAL STUDIES:

## 2025-02-01 NOTE — PROVIDER CONTACT NOTE (OTHER) - BACKGROUND
pulomary edema, CHF, s/p s/c on the 23rd, and readmitted for pumonary edema, superimposed pre eclampsia. Pt receiving IV push Lasix, last dose at noon.

## 2025-02-01 NOTE — PROGRESS NOTE ADULT - SUBJECTIVE AND OBJECTIVE BOX
Feels better, sitting in chair. Occasional cough and edema improved.  	    MEDICATIONS:  furosemide   Injectable 60 milliGRAM(s) IV Push every 12 hours  heparin   Injectable 5000 Unit(s) SubCutaneous every 12 hours  hydrALAZINE 25 milliGRAM(s) Oral every 8 hours  spironolactone 25 milliGRAM(s) Oral daily  acetaminophen     Tablet .. 975 milliGRAM(s) Oral every 6 hours  oxyCODONE    IR 5 milliGRAM(s) Oral every 6 hours PRN  influenza   Vaccine 0.5 milliLiter(s) IntraMuscular once    REVIEW OF SYSTEMS:  All negative except noted above	    PHYSICAL EXAM:  T(C): 36.8 (02-01-25 @ 09:44), Max: 37.6 (01-31-25 @ 17:30)  HR: 72 (02-01-25 @ 09:44) (62 - 77)  BP: 134/87 (02-01-25 @ 09:44) (118/68 - 142/83)  RR: 16 (02-01-25 @ 09:44) (16 - 17)  SpO2: 98% (02-01-25 @ 09:44) (97% - 99%)    31 Jan 2025 07:01  -  01 Feb 2025 07:00  --------------------------------------------------------  IN: 500 mL / OUT: 2600 mL / NET: -2100 mL        Appearance: Normal	  HEENT:   Normal oral mucosa, PERRL, EOMI	  Lymphatic: No lymphadenopathy  Cardiovascular: Normal S1 S2, No JVD, No murmurs, +1 edema  Respiratory: Lungs clear to auscultation after cough	  Psychiatry: A & O x 3, Mood & affect appropriate  Gastrointestinal:  Soft, Non-tender, + BS	  Skin: No rashes, No ecchymoses, No cyanosis	  Neurologic: Non-focal  Vascular: Peripheral pulses palpable 2+ bilaterally    LABS:	 	    CBC Full  -  ( 31 Jan 2025 13:19 )  WBC Count : 7.74 K/uL  Hemoglobin : 10.2 g/dL  Hematocrit : 30.8 %  Platelet Count - Automated : 427 K/uL  Mean Cell Volume : 83.9 fL  Mean Cell Hemoglobin : 27.8 pg    02-01    140  |  101  |  7   ----------------------------<  80  3.6   |  26  |  0.56  	  ASSESSMENT/PLAN: 	    44 year old woman with recent C section, PPEC and moderate MR with preserved EF admitted with increased edema, cough and dyspnea.    Patient appears clinically improved although still with trace to +1 edema and cough at times  -Continue IV lasix today  -continue with hydral and kenya  -transition to Lasix PO tomorrow

## 2025-02-01 NOTE — PROGRESS NOTE ADULT - ASSESSMENT
45yo ,1,10,3 s/p rLTCS on  presenting to ED today with increasing shortness of breath. States that she had been having worsening SOB since delivery and patient with notable history of post partum CHF s/p 2014 delivery with flash pulmonary edema. Endorses exertional  SOB and orthopnea sleeping with 3-4 pillows.  Here in ED blood pressure in the 150s systolics. Does have a history of preeclampsia with prior pregnancy in .  Endorses a HA but she believes that it is due to pain and skipping dose of Tylenol today. No vision changes but is endorsing mild epigastric pain. Patient having RLQ pain. Clinical course during admission H/H downtrending 12.4/37.1>>9.3/28.3, therefore CT angiogram A/P preformed to r/o active abdominal bleed x2 which demonstrated stable bladder flap hematoma. Given that patient has history of CHF in the setting of her  vaginal delivery. She saw cardio-obstetrics on (). Bedside TTE () with normal filling pressure, mild MR, normal biventricular function. Patient does deny chest pain but is worried that current sxs can be a manifestation of CHF. Patient admitted to OBGYN for closer observation and further workup of SOB.    #SOB, history of peripartum cardiomyopathy  #Presumed sPEC (mild range blood pressures + SOB)  - Cards: Tele,  CBC, CMP, Mg, Phos, VBG lactate, proBNP, CXR, TTE, Lasix 40 mg IVP x1 and then start Lasix 40 mg IV BID (Hold for SBP<90). Goal net negative 1-2 L over 24h. Avoid NSAIDs.  - CT-Angio(): No pulmonary embolism. Reflux of contrast in the IVC/hepatic veins on pulmonary arterial phase   imaging, a finding which can be seen in the setting of right-sided heart dysfunction but may be technically related. Trace bilateral pleural effusions with associated atelectasis.  - LE dopplers neg for DVT  - CXR(): clear lungs  - TTE(): EF 66%, Left atrium is mildly dilated. Moderate mitral regurgitation.     #Visual changes/headache  - S/p normal ophthalmologic exam   - S/p Neuro consult, HA likely multifactorial, may be migrainous in nature. Avoid triptans.    #Cards Recs (updated ()  - Change Lasix 60 mg IV q12   - Continue kenya 25 mg daily  - Unclear mechanism of MR but may benefit from a KADEN to further evaluation; does not appear to be degenerative  - Continue hydral 25 mg q8h  - Monitor I&Os   - Daily standing weights.  - Avoid NSAIDs  - Tele monitoring  - Strict I&O    #Maternal well-being  - s/p Keppra 500mg BID for seizure prophylaxis  - Regular diet  - HSQ, Venodynes for DVT ppx  - LFT: -> will repeat HELLP labs today.     Noemi Gilliam PGY3

## 2025-02-01 NOTE — PROGRESS NOTE ADULT - ASSESSMENT
Assessment: 45yo F  PMH migraines, peripartum cardiomyopathy, sciatica presenting with 2 days of gradual onset 10/10 right frontoparietal headache, episodes of bilateral vision blurring (seeing through a kaleidoscope lightheadedness, and balance changes. Neurologic exam reveals tentative gait and CTH/CT venogram showing no thrombosis or intracranial hemorrhage.    Impression:  -Resolved multifactorial headache: migrainous (unilateral frontoparietal) w/ aura (seeing through kelidoscope) . iso medication withdrawal (rebound), medical issues (hypervolemic per cards), new vasoactive medications (hydralazine), active diuresis  - Resolved Tentative gait in temporal relation to diphenhydramine use likely 2/2 prolonged medication side effect    Plan:  -Continue acteaminophen and ondansetron, seems to be providing relief  -No role of further neurological imaging  -Noting cards recommendation to avoid nsaids on diuretics  -Noting adverse reaction to diphenydramine (over sedated, then tentative gait)  -May consider 2nd line migraine abortives pending clinical course. Would hold off on triptans for now but would consider later if needed and if no cardiac objections.   -No further inpatient neurological work up  -F/U with neurology as outpatient Dr. Reeves or someone in her group [phone] at discharge    Neurology will sign off. Please call [phone] with any questions. Thank you1    Seen and discussed with Dr. naik, neurology attending

## 2025-02-02 LAB
ALBUMIN SERPL ELPH-MCNC: 3.8 G/DL — SIGNIFICANT CHANGE UP (ref 3.3–5)
ALP SERPL-CCNC: 98 U/L — SIGNIFICANT CHANGE UP (ref 40–120)
ALT FLD-CCNC: 51 U/L — HIGH (ref 4–33)
ANION GAP SERPL CALC-SCNC: 14 MMOL/L — SIGNIFICANT CHANGE UP (ref 7–14)
AST SERPL-CCNC: 16 U/L — SIGNIFICANT CHANGE UP (ref 4–32)
BASOPHILS # BLD AUTO: 0.02 K/UL — SIGNIFICANT CHANGE UP (ref 0–0.2)
BASOPHILS NFR BLD AUTO: 0.2 % — SIGNIFICANT CHANGE UP (ref 0–2)
BILIRUB SERPL-MCNC: 0.2 MG/DL — SIGNIFICANT CHANGE UP (ref 0.2–1.2)
BUN SERPL-MCNC: 8 MG/DL — SIGNIFICANT CHANGE UP (ref 7–23)
CALCIUM SERPL-MCNC: 9.1 MG/DL — SIGNIFICANT CHANGE UP (ref 8.4–10.5)
CHLORIDE SERPL-SCNC: 101 MMOL/L — SIGNIFICANT CHANGE UP (ref 98–107)
CO2 SERPL-SCNC: 25 MMOL/L — SIGNIFICANT CHANGE UP (ref 22–31)
CREAT SERPL-MCNC: 0.56 MG/DL — SIGNIFICANT CHANGE UP (ref 0.5–1.3)
EGFR: 116 ML/MIN/1.73M2 — SIGNIFICANT CHANGE UP
EOSINOPHIL # BLD AUTO: 0.19 K/UL — SIGNIFICANT CHANGE UP (ref 0–0.5)
EOSINOPHIL NFR BLD AUTO: 2.3 % — SIGNIFICANT CHANGE UP (ref 0–6)
GLUCOSE SERPL-MCNC: 97 MG/DL — SIGNIFICANT CHANGE UP (ref 70–99)
HCT VFR BLD CALC: 35.1 % — SIGNIFICANT CHANGE UP (ref 34.5–45)
HGB BLD-MCNC: 11.2 G/DL — LOW (ref 11.5–15.5)
IANC: 5.33 K/UL — SIGNIFICANT CHANGE UP (ref 1.8–7.4)
IMM GRANULOCYTES NFR BLD AUTO: 0.4 % — SIGNIFICANT CHANGE UP (ref 0–0.9)
LDH SERPL L TO P-CCNC: 281 U/L — HIGH (ref 135–225)
LYMPHOCYTES # BLD AUTO: 2.08 K/UL — SIGNIFICANT CHANGE UP (ref 1–3.3)
LYMPHOCYTES # BLD AUTO: 24.8 % — SIGNIFICANT CHANGE UP (ref 13–44)
MCHC RBC-ENTMCNC: 26.8 PG — LOW (ref 27–34)
MCHC RBC-ENTMCNC: 31.9 G/DL — LOW (ref 32–36)
MCV RBC AUTO: 84 FL — SIGNIFICANT CHANGE UP (ref 80–100)
MONOCYTES # BLD AUTO: 0.74 K/UL — SIGNIFICANT CHANGE UP (ref 0–0.9)
MONOCYTES NFR BLD AUTO: 8.8 % — SIGNIFICANT CHANGE UP (ref 2–14)
NEUTROPHILS # BLD AUTO: 5.33 K/UL — SIGNIFICANT CHANGE UP (ref 1.8–7.4)
NEUTROPHILS NFR BLD AUTO: 63.5 % — SIGNIFICANT CHANGE UP (ref 43–77)
NRBC # BLD AUTO: 0 K/UL — SIGNIFICANT CHANGE UP (ref 0–0)
NRBC # BLD: 0 /100 WBCS — SIGNIFICANT CHANGE UP (ref 0–0)
NRBC # FLD: 0 K/UL — SIGNIFICANT CHANGE UP (ref 0–0)
NRBC BLD-RTO: 0 /100 WBCS — SIGNIFICANT CHANGE UP (ref 0–0)
PLATELET # BLD AUTO: 559 K/UL — HIGH (ref 150–400)
POTASSIUM SERPL-MCNC: 3.5 MMOL/L — SIGNIFICANT CHANGE UP (ref 3.5–5.3)
POTASSIUM SERPL-SCNC: 3.5 MMOL/L — SIGNIFICANT CHANGE UP (ref 3.5–5.3)
PROT SERPL-MCNC: 7.1 G/DL — SIGNIFICANT CHANGE UP (ref 6–8.3)
RBC # BLD: 4.18 M/UL — SIGNIFICANT CHANGE UP (ref 3.8–5.2)
RBC # FLD: 14.3 % — SIGNIFICANT CHANGE UP (ref 10.3–14.5)
SODIUM SERPL-SCNC: 140 MMOL/L — SIGNIFICANT CHANGE UP (ref 135–145)
URATE SERPL-MCNC: 4.6 MG/DL — SIGNIFICANT CHANGE UP (ref 2.5–7)
WBC # BLD: 8.39 K/UL — SIGNIFICANT CHANGE UP (ref 3.8–10.5)
WBC # FLD AUTO: 8.39 K/UL — SIGNIFICANT CHANGE UP (ref 3.8–10.5)

## 2025-02-02 PROCEDURE — 99232 SBSQ HOSP IP/OBS MODERATE 35: CPT

## 2025-02-02 PROCEDURE — 99231 SBSQ HOSP IP/OBS SF/LOW 25: CPT | Mod: GC

## 2025-02-02 RX ADMIN — Medication 25 MILLIGRAM(S): at 06:10

## 2025-02-02 RX ADMIN — Medication 80 MILLIGRAM(S): at 23:14

## 2025-02-02 RX ADMIN — Medication 60 MILLIGRAM(S): at 11:09

## 2025-02-02 RX ADMIN — Medication 25 MILLIGRAM(S): at 22:20

## 2025-02-02 RX ADMIN — Medication 25 MILLIGRAM(S): at 18:45

## 2025-02-02 RX ADMIN — Medication 5000 UNIT(S): at 11:09

## 2025-02-02 RX ADMIN — ACETAMINOPHEN 975 MILLIGRAM(S): 160 SUSPENSION ORAL at 06:10

## 2025-02-02 RX ADMIN — Medication 25 MILLIGRAM(S): at 14:57

## 2025-02-02 RX ADMIN — Medication 5000 UNIT(S): at 22:20

## 2025-02-02 RX ADMIN — ACETAMINOPHEN 975 MILLIGRAM(S): 160 SUSPENSION ORAL at 11:09

## 2025-02-02 RX ADMIN — ACETAMINOPHEN 975 MILLIGRAM(S): 160 SUSPENSION ORAL at 19:55

## 2025-02-02 RX ADMIN — ACETAMINOPHEN 975 MILLIGRAM(S): 160 SUSPENSION ORAL at 06:40

## 2025-02-02 RX ADMIN — ACETAMINOPHEN 975 MILLIGRAM(S): 160 SUSPENSION ORAL at 23:09

## 2025-02-02 RX ADMIN — ACETAMINOPHEN 975 MILLIGRAM(S): 160 SUSPENSION ORAL at 18:38

## 2025-02-02 RX ADMIN — ACETAMINOPHEN 975 MILLIGRAM(S): 160 SUSPENSION ORAL at 00:27

## 2025-02-02 RX ADMIN — ACETAMINOPHEN 975 MILLIGRAM(S): 160 SUSPENSION ORAL at 11:31

## 2025-02-02 RX ADMIN — Medication 20 MILLIGRAM(S): at 12:20

## 2025-02-02 NOTE — PROGRESS NOTE ADULT - ASSESSMENT
45yo ,1,10,3 s/p rLTCS on  presenting to ED today with increasing shortness of breath. States that she had been having worsening SOB since delivery and patient with notable history of post partum CHF s/p 2014 delivery with flash pulmonary edema. Endorses exertional  SOB and orthopnea sleeping with 3-4 pillows.  Here in ED blood pressure in the 150s systolics. Does have a history of preeclampsia with prior pregnancy in .  Endorses a HA but she believes that it is due to pain and skipping dose of Tylenol today. No vision changes but is endorsing mild epigastric pain. Patient having RLQ pain. Clinical course during admission H/H downtrending 12.4/37.1>>9.3/28.3, therefore CT angiogram A/P preformed to r/o active abdominal bleed x2 which demonstrated stable bladder flap hematoma. Given that patient has history of CHF in the setting of her  vaginal delivery. She saw cardio-obstetrics on (). Bedside TTE () with normal filling pressure, mild MR, normal biventricular function. Patient does deny chest pain but is worried that current sxs can be a manifestation of CHF. Patient admitted to OBGYN for closer observation and further workup of SOB.    #SOB, history of peripartum cardiomyopathy  #Presumed sPEC (mild range blood pressures + SOB)  - Cards: Tele,  CBC, CMP, Mg, Phos, VBG lactate, proBNP, CXR, TTE, Lasix 40 mg IVP x1 and then start Lasix 40 mg IV BID (Hold for SBP<90). Goal net negative 1-2 L over 24h. Avoid NSAIDs.  - CT-Angio(): Cards: Lasix 60 mg IV q12 then d/c  on PO, Continue kenya 25 mg daily, Unclear mechanism of MR but may benefit from a KADEN to further evaluation; does not appear to be degenerative, Continue hydral 25 mg q8h, Monitor I&Os, Daily standing weights.  - LE dopplers neg for DVT  - CXR(): clear lungs  - TTE(): EF 66%, Left atrium is mildly dilated. Moderate mitral regurgitation.   -Advair and Albuterol ordered (-)  -f/u AM HELLP labs    #Visual changes/headache  - S/p normal ophthalmologic exam   - S/p Neuro consult, HA likely multifactorial, may be migrainous in nature. Avoid triptans.    #Cards Recs (updated ()  - Change Lasix 60 mg IV q12, will likely change to PO today.   - Continue kenya 25 mg daily  - Unclear mechanism of MR but may benefit from a KADEN to further evaluation; does not appear to be degenerative  - Continue hydral 25 mg q8h  - Monitor I&Os   - Daily standing weights.  - Avoid NSAIDs  - Strict I&O    #Maternal well-being  - s/p Keppra 500mg BID for seizure prophylaxis  - Regular diet  - HSQ, Venodynes for DVT ppx  - LFT: ->-> will repeat HELLP labs today.     DTaveras PGY3   43yo ,1,10,3 s/p rLTCS on  presenting to ED today with increasing shortness of breath. States that she had been having worsening SOB since delivery and patient with notable history of post partum CHF s/p 2014 delivery with flash pulmonary edema. Endorses exertional  SOB and orthopnea sleeping with 3-4 pillows.  Here in ED blood pressure in the 150s systolics. Does have a history of preeclampsia with prior pregnancy in .  Endorses a HA but she believes that it is due to pain and skipping dose of Tylenol today. No vision changes but is endorsing mild epigastric pain. Patient having RLQ pain. Clinical course during admission H/H downtrending 12.4/37.1>>9.3/28.3, therefore CT angiogram A/P preformed to r/o active abdominal bleed x2 which demonstrated stable bladder flap hematoma. Given that patient has history of CHF in the setting of her  vaginal delivery. She saw cardio-obstetrics on (). Bedside TTE () with normal filling pressure, mild MR, normal biventricular function. Patient does deny chest pain but is worried that current sxs can be a manifestation of CHF. Patient admitted to OBGYN for closer observation and further workup of SOB.    #SOB, history of peripartum cardiomyopathy  #Presumed sPEC (mild range blood pressures + SOB)  - Cards: Tele,  CBC, CMP, Mg, Phos, VBG lactate, proBNP, CXR, TTE, Lasix 40 mg IVP x1 and then start Lasix 40 mg IV BID (Hold for SBP<90). Goal net negative 1-2 L over 24h. Avoid NSAIDs.  - CT-Angio(): Cards: Lasix 60 mg IV q12 then d/c  on PO, Continue kenya 25 mg daily, Unclear mechanism of MR but may benefit from a KADEN to further evaluation; does not appear to be degenerative, Continue hydral 25 mg q8h, Monitor I&Os, Daily standing weights.  - LE dopplers neg for DVT  - CXR(): clear lungs  - TTE(): EF 66%, Left atrium is mildly dilated. Moderate mitral regurgitation.   -Advair and Albuterol ordered (-)  -f/u AM HELLP labs  -Advair and Albuterol added () for cough related chest pain.     #Visual changes/headache  - S/p normal ophthalmologic exam   - S/p Neuro consult, HA likely multifactorial, may be migrainous in nature. Avoid triptans.    #Cards Recs (updated ()  - Change Lasix 60 mg IV q12, will likely change to PO today.   - Continue kenya 25 mg daily  - Unclear mechanism of MR but may benefit from a KADEN to further evaluation; does not appear to be degenerative  - Continue hydral 25 mg q8h  - Monitor I&Os   - Daily standing weights.  - Avoid NSAIDs  - Strict I&O    #Maternal well-being  - s/p Keppra 500mg BID for seizure prophylaxis  - Regular diet  - HSQ, Venodynes for DVT ppx  - LFT: ->-> will repeat HELLP labs today.     DTaveras PGY3

## 2025-02-02 NOTE — PROGRESS NOTE ADULT - SUBJECTIVE AND OBJECTIVE BOX
Patient seen and examined at bedside.    Overnight Events: Worsening SOB, chest tightness, and cough; feels like she is accumulating fluid again, JVD and Edema on exam, will inc lasix to 80 IV BID    Telemetry: NSR    Review of Systems: Negative except as per HPI     Current Meds:  acetaminophen     Tablet .. 975 milliGRAM(s) Oral every 6 hours  albuterol    90 MICROgram(s) HFA Inhaler 2 Puff(s) Inhalation every 6 hours PRN  fluticasone propionate/ salmeterol 100-50 MICROgram(s) Diskus 1 Dose(s) Inhalation two times a day  furosemide   Injectable 60 milliGRAM(s) IV Push every 12 hours  heparin   Injectable 5000 Unit(s) SubCutaneous every 12 hours  hydrALAZINE 25 milliGRAM(s) Oral every 8 hours  oxyCODONE    IR 5 milliGRAM(s) Oral every 6 hours PRN  spironolactone 25 milliGRAM(s) Oral daily      Vitals:  T(F): 98.1 (02-02), Max: 98.4 (02-01)  HR: 66 (02-02) (65 - 90)  BP: 140/82 (02-02) (132/78 - 152/82)  RR: 17 (02-02)  SpO2: 97% (02-02)  I&O's Summary    01 Feb 2025 07:01  -  02 Feb 2025 07:00  --------------------------------------------------------  IN: 350 mL / OUT: 3100 mL / NET: -2750 mL    Physical Exam:  Appearance: No acute distress; well appearing  Eyes: PERRL, EOMI, pink conjunctiva  HEENT: Normal oral mucosa  Cardiovascular: RRR, S1, S2, no murmurs, rubs, or gallops; no edema; mod JVD  Respiratory: Clear to auscultation bilaterally  Gastrointestinal: soft, non-tender, non-distended with normal bowel sounds  Musculoskeletal: 1-2+ BLE edema  Neurologic: Non-focal  Lymphatic: No lymphadenopathy  Psychiatry: AAOx3, mood & affect appropriate  Skin: No rashes, ecchymoses, or cyanosis                          10.2   7.74  )-----------( 427      ( 31 Jan 2025 13:19 )             30.8     02-01    140  |  101  |  7   ----------------------------<  80  3.6   |  26  |  0.56    Ca    8.8      01 Feb 2025 06:00    TPro  6.1  /  Alb  3.4  /  TBili  0.2  /  DBili  x   /  AST  20  /  ALT  60[H]  /  AlkPhos  97  02-01    CARDIAC MARKERS ( 29 Jan 2025 16:09 )  9 ng/L / x     / x     / x     / x     / x        A/P  44 year old woman with recent C section, PPEC and moderate MR with preserved EF admitted with increased edema, cough and dyspnea.    #MR  - Pt with mod MR and recurrent SOB  - Currently vol up on lasix 60 IV BID --> Please increase to 80 IV BID   > Goal neg 1-2L per 24 hrs  - continue with hydral and kenya  - Would keep in hospital until euvolemic     Jason Sweet PGY6  Cardiology Fellow    [facility]

## 2025-02-02 NOTE — PROGRESS NOTE ADULT - SUBJECTIVE AND OBJECTIVE BOX
S: 45yo POD#10 HD: 5 s/p LTCS. The patient feels well.  Pain is well controlled. She is tolerating a regular diet and passing flatus. She is voiding spontaneously, and ambulating without difficulty. Denies CP/SOB. Denies lightheadedness/dizziness. Denies N/V. Denies PEC sxs, and cough improving.     O:  Vitals:  Vital Signs Last 24 Hrs  T(C): 36.6 (02 Feb 2025 01:46), Max: 36.9 (01 Feb 2025 14:11)  T(F): 97.9 (02 Feb 2025 01:46), Max: 98.4 (01 Feb 2025 14:11)  HR: 81 (02 Feb 2025 01:46) (65 - 90)  BP: 132/78 (02 Feb 2025 01:46) (132/78 - 152/82)  BP(mean): --  RR: 17 (02 Feb 2025 01:46) (16 - 24)  SpO2: 100% (02 Feb 2025 01:46) (97% - 100%)    Parameters below as of 02 Feb 2025 01:46  Patient On (Oxygen Delivery Method): room air        MEDICATIONS  (STANDING):  acetaminophen     Tablet .. 975 milliGRAM(s) Oral every 6 hours  fluticasone propionate/ salmeterol 100-50 MICROgram(s) Diskus 1 Dose(s) Inhalation two times a day  furosemide   Injectable 60 milliGRAM(s) IV Push every 12 hours  heparin   Injectable 5000 Unit(s) SubCutaneous every 12 hours  hydrALAZINE 25 milliGRAM(s) Oral every 8 hours  spironolactone 25 milliGRAM(s) Oral daily    MEDICATIONS  (PRN):  albuterol    90 MICROgram(s) HFA Inhaler 2 Puff(s) Inhalation every 6 hours PRN Bronchospasm  oxyCODONE    IR 5 milliGRAM(s) Oral every 6 hours PRN Severe Pain (7 - 10)      LABS:  Blood type: A Positive  Rubella IgG: RPR: Negative                          10.2[L]   7.74 >-----------< 427[H]    ( 01-31 @ 13:19 )             30.8[L]                        9.2[L]   6.35 >-----------< 320    ( 01-30 @ 06:10 )             28.7[L]    02-01-25 @ 06:00      140  |  101  |  7   ----------------------------<  80  3.6   |  26  |  0.56    01-31-25 @ 13:19      142  |  104  |  10  ----------------------------<  129[H]  3.6   |  24  |  0.56    01-30-25 @ 06:10      138  |  102  |  8   ----------------------------<  80  3.6   |  23  |  0.49[L]        Ca    8.8      01 Feb 2025 06:00  Ca    8.7      31 Jan 2025 13:19  Ca    8.5      30 Jan 2025 06:10  Phos  5.1[H]     01-30  Mg     1.70     01-30    TPro  6.1  /  Alb  3.4  /  TBili  0.2  /  DBili  x   /  AST  20  /  ALT  60[H]  /  AlkPhos  97  02-01-25 @ 06:00  TPro  6.2  /  Alb  3.3  /  TBili  0.2  /  DBili  x   /  AST  24  /  ALT  79[H]  /  AlkPhos  98  01-31-25 @ 13:19          Physical exam:  Gen: NAD  Abdomen: Soft, nontender, no distension , firm uterine fundus at umbilicus.  Incision: Clean, dry, and intact   Pelvic: Normal lochia noted  Ext: No calf tenderness

## 2025-02-03 LAB
BLD GP AB SCN SERPL QL: NEGATIVE — SIGNIFICANT CHANGE UP
NT-PROBNP SERPL-SCNC: <36 PG/ML — SIGNIFICANT CHANGE UP
RH IG SCN BLD-IMP: POSITIVE — SIGNIFICANT CHANGE UP

## 2025-02-03 PROCEDURE — 99231 SBSQ HOSP IP/OBS SF/LOW 25: CPT | Mod: GC

## 2025-02-03 PROCEDURE — 99233 SBSQ HOSP IP/OBS HIGH 50: CPT

## 2025-02-03 RX ORDER — HYDRALAZINE HCL 100 MG
25 TABLET ORAL ONCE
Refills: 0 | Status: COMPLETED | OUTPATIENT
Start: 2025-02-03 | End: 2025-02-03

## 2025-02-03 RX ORDER — HYDRALAZINE HCL 100 MG
50 TABLET ORAL EVERY 8 HOURS
Refills: 0 | Status: DISCONTINUED | OUTPATIENT
Start: 2025-02-03 | End: 2025-02-04

## 2025-02-03 RX ORDER — POTASSIUM CHLORIDE 750 MG/1
20 TABLET, EXTENDED RELEASE ORAL ONCE
Refills: 0 | Status: COMPLETED | OUTPATIENT
Start: 2025-02-03 | End: 2025-02-03

## 2025-02-03 RX ORDER — AMLODIPINE BESYLATE 5 MG
5 TABLET ORAL DAILY
Refills: 0 | Status: DISCONTINUED | OUTPATIENT
Start: 2025-02-03 | End: 2025-02-04

## 2025-02-03 RX ADMIN — Medication 50 MILLIGRAM(S): at 22:41

## 2025-02-03 RX ADMIN — Medication 5000 UNIT(S): at 11:00

## 2025-02-03 RX ADMIN — ACETAMINOPHEN 975 MILLIGRAM(S): 160 SUSPENSION ORAL at 15:26

## 2025-02-03 RX ADMIN — Medication 25 MILLIGRAM(S): at 06:24

## 2025-02-03 RX ADMIN — ACETAMINOPHEN 975 MILLIGRAM(S): 160 SUSPENSION ORAL at 06:24

## 2025-02-03 RX ADMIN — Medication 5000 UNIT(S): at 22:41

## 2025-02-03 RX ADMIN — POTASSIUM CHLORIDE 20 MILLIEQUIVALENT(S): 750 TABLET, EXTENDED RELEASE ORAL at 15:33

## 2025-02-03 RX ADMIN — Medication 80 MILLIGRAM(S): at 23:02

## 2025-02-03 RX ADMIN — Medication 5 MILLIGRAM(S): at 15:55

## 2025-02-03 RX ADMIN — ACETAMINOPHEN 975 MILLIGRAM(S): 160 SUSPENSION ORAL at 14:56

## 2025-02-03 RX ADMIN — Medication 80 MILLIGRAM(S): at 11:00

## 2025-02-03 RX ADMIN — ACETAMINOPHEN 975 MILLIGRAM(S): 160 SUSPENSION ORAL at 23:02

## 2025-02-03 RX ADMIN — ACETAMINOPHEN 975 MILLIGRAM(S): 160 SUSPENSION ORAL at 00:06

## 2025-02-03 RX ADMIN — Medication 25 MILLIGRAM(S): at 14:56

## 2025-02-03 RX ADMIN — Medication 25 MILLIGRAM(S): at 15:33

## 2025-02-03 RX ADMIN — ACETAMINOPHEN 975 MILLIGRAM(S): 160 SUSPENSION ORAL at 07:01

## 2025-02-03 NOTE — PROGRESS NOTE ADULT - ASSESSMENT
Ms. Cobb is a 44 year-old /1/10/2 F recent C section (), postpartum preeclampsia, prior moderate-severe MR, nl EF who presents to [facility] ED with chief complaint of orthopnea, cough, and worsening B/L LE edema. Was diagnosed with Flu  when admitted for her C section and her delivery was complicated with low urine output. Received IV Lasix and noted improvement in both urine output and symptoms. Since discharge, endorses worsening LE edema and is experiencing orthopnea at night (3-4 pillows). Plans on breastfeeding. Had been having abdominal pain (noted to have bladder flap hematoma) and had been taking Tylenol and ibuprofen. CT scan stable hematoma. Improving with diuresis. Labs reviewed - Hb 9.5 (stable), AST/ALT 47/93, . Prior TTE reviewed - nl EF, mild-mod MR, nl RV size/function. Overall, she was admitted with hypervolemia and hypertension. Currently she remains hypertensive, but has diuresed very well and is euvolemic.

## 2025-02-03 NOTE — PROGRESS NOTE ADULT - PROBLEM SELECTOR PLAN 2
- Uncontrolled -160  - Increase hydralazine to 50 mg q8h  - Add Norvasc 5 mg po daily  - Counseled against NSAIDs

## 2025-02-03 NOTE — PROGRESS NOTE ADULT - PROBLEM SELECTOR PLAN 1
- JVP 6 cm. Euvolemic. Currently does not have MENDIETA, orthopnea, PND.  - TTE:  Left ventricular systolic function is normal with an ejection fraction of 66 %  with moderate MR  - As d/w Dr. Ledbetter, d/c IV Lasix. Start Lasix 60 mg po daily. Hold for SBP <100.   - Discontinue kenya 25 mg daily.   - Increase hydralazine to 50 mg q8h  - Monitor I&Os   - Daily standing weights - JVP 6 cm. Euvolemic. Currently does not have MENDIETA, orthopnea, PND.  - TTE:  Left ventricular systolic function is normal with an ejection fraction of 66 %  with moderate MR  - As d/w Dr. Ledbetter, d/c IV Lasix. Start Lasix 60 mg po daily. Hold for SBP <100.   - Discontinue kenya 25 mg daily.   - Increase hydralazine to 50 mg q8h  - Monitor I&Os   - Daily standing weights  -Please get repeat TTE to evaluate MR, now that she is euvolemic, as d/w Dr. Ledbetter. - JVP 6 cm. Euvolemic. Currently does not have MENDIETA, orthopnea, PND.  - TTE:  Left ventricular systolic function is normal with an ejection fraction of 66 %  with moderate MR  - As d/w Dr. Ledbetter, d/c IV Lasix. Start Lasix 60 mg po daily. Hold for SBP <100.   - Discontinue kenya 25 mg daily.   - Increase hydralazine to 50 mg q8h  - Monitor I&Os   - Daily standing weights  - Keep K 4.0-5.0 and mag >2.0. Supplement K today.   -Please get repeat TTE to evaluate MR, now that she is euvolemic, as d/w Dr. Ledbetter. - JVP 6 cm. Euvolemic. Currently does not have MENDIETA, orthopnea, PND.  - TTE:  Left ventricular systolic function is normal with an ejection fraction of 66 %  with moderate MR  - As d/w Dr. Ledbetter, continue IV Lasix this evening, and then d/c. Start Lasix 60 mg po daily. Hold for SBP <100.   - Discontinue kenya 25 mg daily.   - Increase hydralazine to 50 mg q8h  - Monitor I&Os   - Daily standing weights  - Keep K 4.0-5.0 and mag >2.0. Supplement K today.   -Please get repeat TTE to evaluate MR, now that she is euvolemic, as d/w Dr. Ledbetter. - JVP 6 cm. Euvolemic. Currently does not have MENDIETA, orthopnea, PND.  - TTE:  Left ventricular systolic function is normal with an ejection fraction of 66 %  with moderate MR  - As d/w Dr. Ledbetter, continue IV Lasix this evening, and then d/c. Start Lasix 60 mg po daily tomorrow. Hold for SBP <100.   - Discontinue kenya 25 mg daily.   - Increase hydralazine to 50 mg q8h  - Monitor I&Os   - Daily standing weights  - Keep K 4.0-5.0 and mag >2.0. Supplement K today.   -Please get repeat TTE to evaluate MR, now that she is euvolemic, as d/w Dr. Ledbetter.

## 2025-02-03 NOTE — PROGRESS NOTE ADULT - TIME BILLING
- Review of chart and consultation notes  - Exam and discussion with patient  - Review of laboratory and imaging   - Establishing a care plan for patient  - Communication with other providers

## 2025-02-03 NOTE — PROGRESS NOTE ADULT - NS ATTEND AMEND GEN_ALL_CORE FT
BP elevated at times.  Pt continues to experience chest tightness when Lasix wears off.  Appears euvolemic.  Change Lasix to 60 mg po qd tomorrow.  Check NTproBNP at next blood draw.  Repeat echo. Assess MR and IVC.  Increase hydralazine to 50 mg po tid.  Start amlodipine 5 mg po qd.

## 2025-02-03 NOTE — PROGRESS NOTE ADULT - SUBJECTIVE AND OBJECTIVE BOX
Medications:  acetaminophen     Tablet .. 975 milliGRAM(s) Oral every 6 hours  albuterol    90 MICROgram(s) HFA Inhaler 2 Puff(s) Inhalation every 6 hours PRN  fluticasone propionate/ salmeterol 100-50 MICROgram(s) Diskus 1 Dose(s) Inhalation two times a day  furosemide   Injectable 80 milliGRAM(s) IV Push every 12 hours  heparin   Injectable 5000 Unit(s) SubCutaneous every 12 hours  hydrALAZINE 25 milliGRAM(s) Oral every 8 hours  oxyCODONE    IR 5 milliGRAM(s) Oral every 6 hours PRN  spironolactone 25 milliGRAM(s) Oral daily      Vitals:  Vital Signs Last 24 Hrs  T(C): 36.8 (2025 06:08), Max: 37.1 (2025 21:33)  T(F): 98.2 (2025 06:08), Max: 98.8 (2025 21:33)  HR: 74 (2025 06:08) (70 - 88)  BP: 140/83 (2025 06:08) (135/84 - 151/93)  BP(mean): --  RR: 17 (2025 06:08) (16 - 18)  SpO2: 99% (2025 06:08) (98% - 99%)    Parameters below as of 2025 21:33  Patient On (Oxygen Delivery Method): room air        Daily     Daily Weight in k.4 (2025 08:59)    I&O's Detail    2025 07:01  -  2025 07:00  --------------------------------------------------------  IN:    Oral Fluid: 2475 mL  Total IN: 2475 mL    OUT:    Voided (mL): 6570 mL  Total OUT: 6570 mL    Total NET: -4095 mL          Physical Exam:     General: No distress. Comfortable.  HEENT: EOM intact.  Neck: Neck supple. JVP not elevated. No masses  Chest: Clear to auscultation bilaterally  CV: Normal S1 and S2. No murmurs, rub, or gallops. Radial pulses normal.  Abdomen: Soft, non-distended, non-tender  Skin: No rashes or skin breakdown  Neurology: Alert and oriented times three. Sensation intact  Psych: Affect normal    Labs:                        11.2   8.39  )-----------( 559      ( 2025 13:00 )             35.1         140  |  101  |  8   ----------------------------<  97  3.5   |  25  |  0.56    Ca    9.1      2025 13:00    TPro  7.1  /  Alb  3.8  /  TBili  0.2  /  DBili  x   /  AST  16  /  ALT  51[H]  /  AlkPhos  98             Patient seen and examined. She states when her BP is high she gets a sensation of chest tightness and she starts coughing and finds it difficult to breath.   States after getting Lasix and urinating, she feels much better.   Currently no SOB at rest, CP, palpitations.       Medications:  acetaminophen     Tablet .. 975 milliGRAM(s) Oral every 6 hours  albuterol    90 MICROgram(s) HFA Inhaler 2 Puff(s) Inhalation every 6 hours PRN  fluticasone propionate/ salmeterol 100-50 MICROgram(s) Diskus 1 Dose(s) Inhalation two times a day  furosemide   Injectable 80 milliGRAM(s) IV Push every 12 hours  heparin   Injectable 5000 Unit(s) SubCutaneous every 12 hours  hydrALAZINE 25 milliGRAM(s) Oral every 8 hours  oxyCODONE    IR 5 milliGRAM(s) Oral every 6 hours PRN  spironolactone 25 milliGRAM(s) Oral daily      Vitals:  Vital Signs Last 24 Hrs  T(C): 36.8 (2025 06:08), Max: 37.1 (2025 21:33)  T(F): 98.2 (2025 06:08), Max: 98.8 (2025 21:33)  HR: 74 (2025 06:08) (70 - 88)  BP: 140/83 (2025 06:08) (135/84 - 151/93)  BP(mean): --  RR: 17 (2025 06:08) (16 - 18)  SpO2: 99% (2025 06:08) (98% - 99%)    Parameters below as of 2025 21:33  Patient On (Oxygen Delivery Method): room air        Daily     Daily Weight in k.4 (2025 08:59)    I&O's Detail    2025 07:01  -  2025 07:00  --------------------------------------------------------  IN:    Oral Fluid: 2475 mL  Total IN: 2475 mL    OUT:    Voided (mL): 6570 mL  Total OUT: 6570 mL    Total NET: -4095 mL          Physical Exam:     General: No distress. Comfortable.  HEENT: EOM intact.  Neck: Neck supple. JVP not elevated 6cm. No masses  Chest: Clear to auscultation bilaterally  CV: Normal S1 and S2. No murmurs, rub, or gallops. Radial pulses normal. Normal LE edema and is warm b/l.   Abdomen: Soft, non-distended, non-tender  Skin: No rashes or skin breakdown  Neurology: Alert and oriented times three. Sensation intact  Psych: Affect normal    Labs:                        11.2   8.39  )-----------( 559      ( 2025 13:00 )             35.1     02-    140  |  101  |  8   ----------------------------<  97  3.5   |  25  |  0.56    Ca    9.1      2025 13:00    TPro  7.1  /  Alb  3.8  /  TBili  0.2  /  DBili  x   /  AST  16  /  ALT  51[H]  /  AlkPhos  98  02-           Patient seen and examined. She notes when her BP is high she gets a sensation of chest tightness and she starts coughing and finds it difficult to breathe.   States after getting Lasix and urinating, she feels much better.   Currently no SOB at rest, CP, palpitations.       Medications:  acetaminophen     Tablet .. 975 milliGRAM(s) Oral every 6 hours  albuterol    90 MICROgram(s) HFA Inhaler 2 Puff(s) Inhalation every 6 hours PRN  fluticasone propionate/ salmeterol 100-50 MICROgram(s) Diskus 1 Dose(s) Inhalation two times a day  furosemide   Injectable 80 milliGRAM(s) IV Push every 12 hours  heparin   Injectable 5000 Unit(s) SubCutaneous every 12 hours  hydrALAZINE 25 milliGRAM(s) Oral every 8 hours  oxyCODONE    IR 5 milliGRAM(s) Oral every 6 hours PRN  spironolactone 25 milliGRAM(s) Oral daily      Vitals:  Vital Signs Last 24 Hrs  T(C): 36.8 (2025 06:08), Max: 37.1 (2025 21:33)  T(F): 98.2 (2025 06:08), Max: 98.8 (2025 21:33)  HR: 74 (2025 06:08) (70 - 88)  BP: 140/83 (2025 06:08) (135/84 - 151/93)  BP(mean): --  RR: 17 (2025 06:08) (16 - 18)  SpO2: 99% (2025 06:08) (98% - 99%)    Parameters below as of 2025 21:33  Patient On (Oxygen Delivery Method): room air        Daily     Daily Weight in k.4 (2025 08:59)    I&O's Detail    2025 07:01  -  2025 07:00  --------------------------------------------------------  IN:    Oral Fluid: 2475 mL  Total IN: 2475 mL    OUT:    Voided (mL): 6570 mL  Total OUT: 6570 mL    Total NET: -4095 mL          Physical Exam:     General: No distress. Comfortable.  HEENT: EOM intact.  Neck: Neck supple. JVP not elevated 6cm. No masses  Chest: Clear to auscultation bilaterally  CV: Normal S1 and S2. No murmurs, rub, or gallops. Radial pulses normal. Normal LE edema and is warm b/l.   Abdomen: Soft, non-distended, non-tender  Skin: No rashes or skin breakdown  Neurology: Alert and oriented times three. Sensation intact  Psych: Affect normal    Labs:                        11.2   8.39  )-----------( 559      ( 2025 13:00 )             35.1     02-    140  |  101  |  8   ----------------------------<  97  3.5   |  25  |  0.56    Ca    9.1      2025 13:00    TPro  7.1  /  Alb  3.8  /  TBili  0.2  /  DBili  x   /  AST  16  /  ALT  51[H]  /  AlkPhos  98  02-

## 2025-02-03 NOTE — PROGRESS NOTE ADULT - SUBJECTIVE AND OBJECTIVE BOX
S: 43yo POD#11 s/p LTCS. The patient feels well.  Pain is well controlled. She is tolerating a regular diet and passing flatus. She is voiding spontaneously, and ambulating without difficulty. Denies CP/SOB. Denies lightheadedness/dizziness. Denies N/V. Denies PEC sxs, chest tightness improving.     O:  Vitals:  Vital Signs Last 24 Hrs  T(C): 36.8 (03 Feb 2025 06:08), Max: 37.1 (02 Feb 2025 21:33)  T(F): 98.2 (03 Feb 2025 06:08), Max: 98.8 (02 Feb 2025 21:33)  HR: 74 (03 Feb 2025 06:08) (66 - 88)  BP: 140/83 (03 Feb 2025 06:08) (135/84 - 151/93)  BP(mean): --  RR: 17 (03 Feb 2025 06:08) (16 - 18)  SpO2: 99% (03 Feb 2025 06:08) (97% - 99%)    Parameters below as of 02 Feb 2025 21:33  Patient On (Oxygen Delivery Method): room air        MEDICATIONS  (STANDING):  acetaminophen     Tablet .. 975 milliGRAM(s) Oral every 6 hours  fluticasone propionate/ salmeterol 100-50 MICROgram(s) Diskus 1 Dose(s) Inhalation two times a day  furosemide   Injectable 80 milliGRAM(s) IV Push every 12 hours  heparin   Injectable 5000 Unit(s) SubCutaneous every 12 hours  hydrALAZINE 25 milliGRAM(s) Oral every 8 hours  spironolactone 25 milliGRAM(s) Oral daily    MEDICATIONS  (PRN):  albuterol    90 MICROgram(s) HFA Inhaler 2 Puff(s) Inhalation every 6 hours PRN Bronchospasm  oxyCODONE    IR 5 milliGRAM(s) Oral every 6 hours PRN Severe Pain (7 - 10)      LABS:  Blood type: A Positive  Rubella IgG: RPR: Negative                          11.2[L]   8.39 >-----------< 559[H]    ( 02-02 @ 13:00 )             35.1                        10.2[L]   7.74 >-----------< 427[H]    ( 01-31 @ 13:19 )             30.8[L]    02-02-25 @ 13:00      140  |  101  |  8   ----------------------------<  97  3.5   |  25  |  0.56    02-01-25 @ 06:00      140  |  101  |  7   ----------------------------<  80  3.6   |  26  |  0.56    01-31-25 @ 13:19      142  |  104  |  10  ----------------------------<  129[H]  3.6   |  24  |  0.56        Ca    9.1      02 Feb 2025 13:00  Ca    8.8      01 Feb 2025 06:00  Ca    8.7      31 Jan 2025 13:19    TPro  7.1  /  Alb  3.8  /  TBili  0.2  /  DBili  x   /  AST  16  /  ALT  51[H]  /  AlkPhos  98  02-02-25 @ 13:00  TPro  6.1  /  Alb  3.4  /  TBili  0.2  /  DBili  x   /  AST  20  /  ALT  60[H]  /  AlkPhos  97  02-01-25 @ 06:00  TPro  6.2  /  Alb  3.3  /  TBili  0.2  /  DBili  x   /  AST  24  /  ALT  79[H]  /  AlkPhos  98  01-31-25 @ 13:19          Physical exam:  Gen: NAD  Abdomen: Soft, nontender, no distension , firm uterine fundus at umbilicus.  Incision: Clean, dry, and intact   Pelvic: Normal lochia noted  Ext: No calf tenderness    A/P: 43yo POD#3 s/p LTCS.  Patient is stable and is doing well post-operatively.  - Continue motrin, tylenol, oxycodone PRN for pain control.  - Increase ambulation  - Continue regular diet  - Discharge planning    Farhan Purcell PGY1         S: 43yo POD#11 s/p LTCS. The patient feels well.  Pain is well controlled. She is tolerating a regular diet and passing flatus. She is voiding spontaneously, and ambulating without difficulty. Denies lightheadedness/dizziness. Denies N/V. Feels overall much improved since admission, but feels like a couple hours before the next Lasix dose, her SOB/chest tightness/cough return and then resolve with next dose.    O:  Vitals:  Vital Signs Last 24 Hrs  T(C): 36.8 (03 Feb 2025 06:08), Max: 37.1 (02 Feb 2025 21:33)  T(F): 98.2 (03 Feb 2025 06:08), Max: 98.8 (02 Feb 2025 21:33)  HR: 74 (03 Feb 2025 06:08) (66 - 88)  BP: 140/83 (03 Feb 2025 06:08) (135/84 - 151/93)  BP(mean): --  RR: 17 (03 Feb 2025 06:08) (16 - 18)  SpO2: 99% (03 Feb 2025 06:08) (97% - 99%)    Parameters below as of 02 Feb 2025 21:33  Patient On (Oxygen Delivery Method): room air        MEDICATIONS  (STANDING):  acetaminophen     Tablet .. 975 milliGRAM(s) Oral every 6 hours  fluticasone propionate/ salmeterol 100-50 MICROgram(s) Diskus 1 Dose(s) Inhalation two times a day  furosemide   Injectable 80 milliGRAM(s) IV Push every 12 hours  heparin   Injectable 5000 Unit(s) SubCutaneous every 12 hours  hydrALAZINE 25 milliGRAM(s) Oral every 8 hours  spironolactone 25 milliGRAM(s) Oral daily    MEDICATIONS  (PRN):  albuterol    90 MICROgram(s) HFA Inhaler 2 Puff(s) Inhalation every 6 hours PRN Bronchospasm  oxyCODONE    IR 5 milliGRAM(s) Oral every 6 hours PRN Severe Pain (7 - 10)      LABS:  Blood type: A Positive  Rubella IgG: RPR: Negative                          11.2[L]   8.39 >-----------< 559[H]    ( 02-02 @ 13:00 )             35.1                        10.2[L]   7.74 >-----------< 427[H]    ( 01-31 @ 13:19 )             30.8[L]    02-02-25 @ 13:00      140  |  101  |  8   ----------------------------<  97  3.5   |  25  |  0.56    02-01-25 @ 06:00      140  |  101  |  7   ----------------------------<  80  3.6   |  26  |  0.56    01-31-25 @ 13:19      142  |  104  |  10  ----------------------------<  129[H]  3.6   |  24  |  0.56        Ca    9.1      02 Feb 2025 13:00  Ca    8.8      01 Feb 2025 06:00  Ca    8.7      31 Jan 2025 13:19    TPro  7.1  /  Alb  3.8  /  TBili  0.2  /  DBili  x   /  AST  16  /  ALT  51[H]  /  AlkPhos  98  02-02-25 @ 13:00  TPro  6.1  /  Alb  3.4  /  TBili  0.2  /  DBili  x   /  AST  20  /  ALT  60[H]  /  AlkPhos  97  02-01-25 @ 06:00  TPro  6.2  /  Alb  3.3  /  TBili  0.2  /  DBili  x   /  AST  24  /  ALT  79[H]  /  AlkPhos  98  01-31-25 @ 13:19          Physical exam:  Gen: NAD  Abdomen: Soft, nontender, no distension , firm uterine fundus at umbilicus.  Incision: Clean, dry, and intact   Pelvic: Normal lochia noted  Ext: No calf tenderness

## 2025-02-03 NOTE — PROGRESS NOTE ADULT - ASSESSMENT
45yo ,1,10,3 s/p rLTCS on  presenting to ED today with increasing shortness of breath. States that she had been having worsening SOB since delivery and patient with notable history of post partum CHF s/p 2014 delivery with flash pulmonary edema. Endorses exertional  SOB and orthopnea sleeping with 3-4 pillows.  Here in ED blood pressure in the 150s systolics. Does have a history of preeclampsia with prior pregnancy in .  Endorses a HA but she believes that it is due to pain and skipping dose of Tylenol today. No vision changes but is endorsing mild epigastric pain. Patient having RLQ pain. Clinical course during admission H/H downtrending 12.4/37.1>>9.3/28.3, therefore CT angiogram A/P preformed to r/o active abdominal bleed x2 which demonstrated stable bladder flap hematoma. Given that patient has history of CHF in the setting of her  vaginal delivery. She saw cardio-obstetrics on (). Bedside TTE () with normal filling pressure, mild MR, normal biventricular function. Patient does deny chest pain but is worried that current sxs can be a manifestation of CHF. Patient admitted to OBGYN for closer observation and further workup of SOB.    #SOB, history of peripartum cardiomyopathy  #Presumed sPEC (mild range blood pressures + SOB)  - Cards: Tele,  CBC, CMP, Mg, Phos, VBG lactate, proBNP, CXR, TTE, Lasix 40 mg IVP x1 and then start Lasix 40 mg IV BID (Hold for SBP<90). Goal net negative 1-2 L over 24h. Avoid NSAIDs.  - CT-Angio(): Cards: Lasix 80 mg IV q12 []will follow up plan today, Continue kenya 25 mg daily, Unclear mechanism of MR but may benefit from a KADEN to further evaluation; does not appear to be degenerative, Continue hydral 25 mg q8h, Monitor I&Os, Daily standing weights.  - LE dopplers neg for DVT  - CXR(): clear lungs  - TTE(): EF 66%, Left atrium is mildly dilated. Moderate mitral regurgitation.   -Advair and Albuterol ordered (-)  -f/u AM HELLP labs  -Advair and Albuterol added () for cough related chest pain.     #Visual changes/headache  - S/p normal ophthalmologic exam   - S/p Neuro consult, HA likely multifactorial, may be migrainous in nature. Avoid triptans.    #Cards Recs (updated ()  - Change Lasix 80 mg IV q12, will likely change to PO today.   - Continue kenya 25 mg daily  - Unclear mechanism of MR but may benefit from a KADEN to further evaluation; does not appear to be degenerative  - Continue hydral 25 mg q8h  - Monitor I&Os   - Daily standing weights.  - Avoid NSAIDs  - Strict I&O    #Maternal well-being  - s/p Keppra 500mg BID for seizure prophylaxis  - Regular diet  - HSQ, Venodynes for DVT ppx  - LFT: ->->>> will repeat HELLP labs today.     [facility] PGY3

## 2025-02-04 ENCOUNTER — RESULT REVIEW (OUTPATIENT)
Age: 45
End: 2025-02-04

## 2025-02-04 LAB
ALBUMIN SERPL ELPH-MCNC: 3.6 G/DL — SIGNIFICANT CHANGE UP (ref 3.3–5)
ALP SERPL-CCNC: 88 U/L — SIGNIFICANT CHANGE UP (ref 40–120)
ALT FLD-CCNC: 32 U/L — SIGNIFICANT CHANGE UP (ref 4–33)
ANION GAP SERPL CALC-SCNC: 16 MMOL/L — HIGH (ref 7–14)
AST SERPL-CCNC: 13 U/L — SIGNIFICANT CHANGE UP (ref 4–32)
BASOPHILS # BLD AUTO: 0.06 K/UL — SIGNIFICANT CHANGE UP (ref 0–0.2)
BASOPHILS NFR BLD AUTO: 0.7 % — SIGNIFICANT CHANGE UP (ref 0–2)
BILIRUB SERPL-MCNC: <0.2 MG/DL — SIGNIFICANT CHANGE UP (ref 0.2–1.2)
BUN SERPL-MCNC: 12 MG/DL — SIGNIFICANT CHANGE UP (ref 7–23)
CALCIUM SERPL-MCNC: 8.8 MG/DL — SIGNIFICANT CHANGE UP (ref 8.4–10.5)
CHLORIDE SERPL-SCNC: 100 MMOL/L — SIGNIFICANT CHANGE UP (ref 98–107)
CO2 SERPL-SCNC: 22 MMOL/L — SIGNIFICANT CHANGE UP (ref 22–31)
CREAT SERPL-MCNC: 0.52 MG/DL — SIGNIFICANT CHANGE UP (ref 0.5–1.3)
EGFR: 118 ML/MIN/1.73M2 — SIGNIFICANT CHANGE UP
EOSINOPHIL # BLD AUTO: 0.31 K/UL — SIGNIFICANT CHANGE UP (ref 0–0.5)
EOSINOPHIL NFR BLD AUTO: 3.6 % — SIGNIFICANT CHANGE UP (ref 0–6)
GLUCOSE SERPL-MCNC: 86 MG/DL — SIGNIFICANT CHANGE UP (ref 70–99)
HCT VFR BLD CALC: 37.4 % — SIGNIFICANT CHANGE UP (ref 34.5–45)
HGB BLD-MCNC: 11.7 G/DL — SIGNIFICANT CHANGE UP (ref 11.5–15.5)
IANC: 4.44 K/UL — SIGNIFICANT CHANGE UP (ref 1.8–7.4)
IMM GRANULOCYTES NFR BLD AUTO: 0.6 % — SIGNIFICANT CHANGE UP (ref 0–0.9)
LDH SERPL L TO P-CCNC: 313 U/L — HIGH (ref 135–225)
LYMPHOCYTES # BLD AUTO: 2.97 K/UL — SIGNIFICANT CHANGE UP (ref 1–3.3)
LYMPHOCYTES # BLD AUTO: 34.9 % — SIGNIFICANT CHANGE UP (ref 13–44)
MCHC RBC-ENTMCNC: 26.8 PG — LOW (ref 27–34)
MCHC RBC-ENTMCNC: 31.3 G/DL — LOW (ref 32–36)
MCV RBC AUTO: 85.6 FL — SIGNIFICANT CHANGE UP (ref 80–100)
MONOCYTES # BLD AUTO: 0.68 K/UL — SIGNIFICANT CHANGE UP (ref 0–0.9)
MONOCYTES NFR BLD AUTO: 8 % — SIGNIFICANT CHANGE UP (ref 2–14)
NEUTROPHILS # BLD AUTO: 4.44 K/UL — SIGNIFICANT CHANGE UP (ref 1.8–7.4)
NEUTROPHILS NFR BLD AUTO: 52.2 % — SIGNIFICANT CHANGE UP (ref 43–77)
NRBC # BLD AUTO: 0 K/UL — SIGNIFICANT CHANGE UP (ref 0–0)
NRBC # BLD: 0 /100 WBCS — SIGNIFICANT CHANGE UP (ref 0–0)
NRBC # FLD: 0 K/UL — SIGNIFICANT CHANGE UP (ref 0–0)
NRBC BLD-RTO: 0 /100 WBCS — SIGNIFICANT CHANGE UP (ref 0–0)
PLATELET # BLD AUTO: 592 K/UL — HIGH (ref 150–400)
POTASSIUM SERPL-MCNC: 3.6 MMOL/L — SIGNIFICANT CHANGE UP (ref 3.5–5.3)
POTASSIUM SERPL-SCNC: 3.6 MMOL/L — SIGNIFICANT CHANGE UP (ref 3.5–5.3)
PROT SERPL-MCNC: 7.1 G/DL — SIGNIFICANT CHANGE UP (ref 6–8.3)
RBC # BLD: 4.37 M/UL — SIGNIFICANT CHANGE UP (ref 3.8–5.2)
RBC # FLD: 14.4 % — SIGNIFICANT CHANGE UP (ref 10.3–14.5)
SODIUM SERPL-SCNC: 138 MMOL/L — SIGNIFICANT CHANGE UP (ref 135–145)
URATE SERPL-MCNC: 5 MG/DL — SIGNIFICANT CHANGE UP (ref 2.5–7)
WBC # BLD: 8.51 K/UL — SIGNIFICANT CHANGE UP (ref 3.8–10.5)
WBC # FLD AUTO: 8.51 K/UL — SIGNIFICANT CHANGE UP (ref 3.8–10.5)

## 2025-02-04 PROCEDURE — 93325 DOPPLER ECHO COLOR FLOW MAPG: CPT | Mod: 26,GC

## 2025-02-04 PROCEDURE — 99232 SBSQ HOSP IP/OBS MODERATE 35: CPT

## 2025-02-04 PROCEDURE — 99231 SBSQ HOSP IP/OBS SF/LOW 25: CPT | Mod: GC

## 2025-02-04 PROCEDURE — 93308 TTE F-UP OR LMTD: CPT | Mod: 26,GC

## 2025-02-04 RX ORDER — POTASSIUM CHLORIDE 750 MG/1
20 TABLET, EXTENDED RELEASE ORAL ONCE
Refills: 0 | Status: COMPLETED | OUTPATIENT
Start: 2025-02-04 | End: 2025-02-04

## 2025-02-04 RX ORDER — HYDRALAZINE HCL 100 MG
100 TABLET ORAL EVERY 12 HOURS
Refills: 0 | Status: DISCONTINUED | OUTPATIENT
Start: 2025-02-05 | End: 2025-02-05

## 2025-02-04 RX ADMIN — ACETAMINOPHEN 975 MILLIGRAM(S): 160 SUSPENSION ORAL at 06:28

## 2025-02-04 RX ADMIN — ACETAMINOPHEN 975 MILLIGRAM(S): 160 SUSPENSION ORAL at 00:26

## 2025-02-04 RX ADMIN — Medication 5000 UNIT(S): at 10:13

## 2025-02-04 RX ADMIN — POTASSIUM CHLORIDE 20 MILLIEQUIVALENT(S): 750 TABLET, EXTENDED RELEASE ORAL at 15:32

## 2025-02-04 RX ADMIN — Medication 50 MILLIGRAM(S): at 14:20

## 2025-02-04 RX ADMIN — POTASSIUM CHLORIDE 20 MILLIEQUIVALENT(S): 750 TABLET, EXTENDED RELEASE ORAL at 23:13

## 2025-02-04 RX ADMIN — ACETAMINOPHEN 975 MILLIGRAM(S): 160 SUSPENSION ORAL at 22:13

## 2025-02-04 RX ADMIN — Medication 5 MILLIGRAM(S): at 10:13

## 2025-02-04 RX ADMIN — Medication 60 MILLIGRAM(S): at 13:11

## 2025-02-04 RX ADMIN — ACETAMINOPHEN 975 MILLIGRAM(S): 160 SUSPENSION ORAL at 14:19

## 2025-02-04 RX ADMIN — Medication 5000 UNIT(S): at 22:13

## 2025-02-04 RX ADMIN — ACETAMINOPHEN 975 MILLIGRAM(S): 160 SUSPENSION ORAL at 22:40

## 2025-02-04 RX ADMIN — ACETAMINOPHEN 975 MILLIGRAM(S): 160 SUSPENSION ORAL at 15:00

## 2025-02-04 RX ADMIN — ACETAMINOPHEN 975 MILLIGRAM(S): 160 SUSPENSION ORAL at 06:08

## 2025-02-04 RX ADMIN — Medication 50 MILLIGRAM(S): at 06:08

## 2025-02-04 NOTE — PROGRESS NOTE ADULT - ATTENDING COMMENTS
44 year old woman with recent C section, PPEC and moderate MR with preserved EF admitted with increased edema, cough and dyspnea.    Patient appears clinically improved although still with trace to +1 edema and cough at times  -Continue IV lasix today- increase to 80 IV BID  -continue with hydral and kenya
Pt seen and agree with above  appreciate cards recommendations  TTE today  d/c planning  L MD Cherri
45yo ,1,10,3 s/p rLTCS on  w/ hx of PP cardiomyopathy admitted with symptomatic fluid overload and SPEC (mild range BPs, elevated LFTs) s/p Keppra. LFTs downtrending, BPs well controlled w/o antihypertensives. Cardiology following. Fluid status much improved since admission. F/u cards recs today. Will need to transition to PO lasix prior to discharge. Meeting other PP/postop milestones.    J Goldberg MD
No headache.  Gait back to normal.    Exam:   Gait   Normal including heels, toes, and tandem gait.      A/P  Ms. Cobb is a 45 yo woman post partum one week s/p  with migraine with aura.  Since headache is different from usual migraine, CT head and CTVenogram were done - normal CT head, no CVST on CTV head.   Normal neurological exam now - lethargy and gait difficulty began after being given IV diphenhydramine which has now resolved.    I agree with work up and management as above.   D/W patient.   Thank you.
Patient seen and evaluated at bedside. Agree with above. Admitted with sPEC for treatment with keppra due to concern for pulmonary edema. Cards following in setting of MVR w/ pulmonary edema. Recommending Lasix for continued diuresis, BP control.     d/w Cardiology attending at bedside Dr. Mercy Talbot MD
Pt seen and agree with above  T(C): 36.7 (02-02-25 @ 09:34), Max: 36.9 (02-01-25 @ 14:11)  HR: 66 (02-02-25 @ 09:34) (65 - 90)  BP: 140/82 (02-02-25 @ 09:34) (132/78 - 152/82)  RR: 17 (02-02-25 @ 09:34) (16 - 24)  SpO2: 97% (02-02-25 @ 09:34) (97% - 100%)                            10.2   7.74  )-----------( 427      ( 31 Jan 2025 13:19 )             30.8   02-01    140  |  101  |  7   ----------------------------<  80  3.6   |  26  |  0.56    Ca    8.8      01 Feb 2025 06:00    TPro  6.1  /  Alb  3.4  /  TBili  0.2  /  DBili  x   /  AST  20  /  ALT  60[H]  /  AlkPhos  97  02-01  LIVER FUNCTIONS - ( 01 Feb 2025 06:00 )  Alb: 3.4 g/dL / Pro: 6.1 g/dL / ALK PHOS: 97 U/L / ALT: 60 U/L / AST: 20 U/L / GGT: x           appreciate cardiology recommendations  will continue plan per felicita Harley MD

## 2025-02-04 NOTE — PROGRESS NOTE ADULT - ASSESSMENT
45yo ,1,10,3 s/p rLTCS on  presented to ED  with increasing shortness of breath. States that she had been having worsening SOB since delivery and patient with notable history of post partum CHF s/p 2014 delivery with flash pulmonary edema. Endorses exertional  SOB and orthopnea sleeping with 3-4 pillows.  Here in ED blood pressure in the 150s systolics. Does have a history of preeclampsia with prior pregnancy in .  Endorses a HA but she believes that it is due to pain and skipping dose of Tylenol today. No vision changes but is endorsing mild epigastric pain. Patient having RLQ pain. Clinical course during admission H/H downtrending 12.4/37.1>>9.3/28.3, therefore CT angiogram A/P preformed to r/o active abdominal bleed x2 which demonstrated stable bladder flap hematoma. Given that patient has history of CHF in the setting of her  vaginal delivery. She saw cardio-obstetrics on (). Bedside TTE () with normal filling pressure, mild MR, normal biventricular function. Patient does deny chest pain but is worried that current sxs can be a manifestation of CHF. Patient admitted to OBGYN for closer observation and further workup of SOB.    #SOB, history of peripartum cardiomyopathy  #Presumed sPEC (mild range blood pressures + SOB)  - Cards: PO Lasix 60QD(Hold for SBP <100), dc kenya 25 mg daily, Increase hydralazine to 50 mg q8h K 4.0-5.0 and mag >2.0. get repeat TTE to evaluate MR.  Add Norvasc 5 mg po daily Monitor I&Os, Daily standing weights. No NSAIDs. - CT-Angio(): Cards: Lasix 80 mg IV q12 []will follow up plan today, s.  - LE dopplers neg for DVT  - CXR(): clear lungs  - TTE(): EF 66%, Left atrium is mildly dilated. Moderate mitral regurgitation. []Cards for repeat TTE today.   -Advair and Albuterol ordered (-)  -f/u AM HELLP labs and Pro BNP   -Advair and Albuterol added () for cough related chest pain.     #Visual changes/headache  - S/p normal ophthalmologic exam   - S/p Neuro consult, HA likely multifactorial, may be migrainous in nature. Avoid triptans.    #Cards Recs (updated ()  - Change Lasix 80 mg IV q12, will likely change to PO today.   - Continue kenya 25 mg daily  - Unclear mechanism of MR but may benefit from a KADEN to further evaluation; does not appear to be degenerative  - Continue hydral 25 mg q8h  - Monitor I&Os   - Daily standing weights.  - Avoid NSAIDs  - Strict I&O    #Maternal well-being  - s/p Keppra 500mg BID for seizure prophylaxis  - Regular diet  - HSQ, Venodynes for DVT ppx  - LFT: ->->>> will repeat HELLP labs today.     [facility] PGY3

## 2025-02-04 NOTE — PROGRESS NOTE ADULT - PROBLEM SELECTOR PLAN 1
- JVP 6 cm. Euvolemic. Currently does not have MENDIETA, orthopnea, PND. Feeling a lot better.  - Give Lasix 60 mg po daily now, and continue. Hold for SBP <100.   - Discontinued kenya 25 mg daily.   - Continue hydralazine to 50 mg q8h  - Monitor I&Os   - Daily standing weights  - Keep K 4.0-5.0 and mag >2.0. Supplement K today.   -Got repeat TTE to evaluate MR, IVC. F/u results.  -d/c planning for tomorrow morning. - JVP 6 cm. Euvolemic. Currently does not have MENDIETA, orthopnea, PND. Feeling a lot better.  - Changed Lasix to 20 mg po daily, first dose tomorrow. Hold for SBP <100.   - Discontinued kenya 25 mg daily.   - Changed hydralazine to 100 mg po BID, fist dose tomorrow. Hold for SBP <100  - Monitor I&Os   - Daily standing weights  - Keep K 4.0-5.0 and mag >2.0. Supplement K today.   -Got repeat TTE to evaluate MR, IVC. F/u results.  -d/c planning for tomorrow morning.

## 2025-02-04 NOTE — PROGRESS NOTE ADULT - SUBJECTIVE AND OBJECTIVE BOX
S: 43yo POD#12 s/p LTCS. The patient feels well.  Pain is well controlled. She is tolerating a regular diet and passing flatus. She is voiding spontaneously, and ambulating without difficulty. Denies CP/SOB. Denies lightheadedness/dizziness. Denies N/V. Denies PEC sxs patient states SOB has markedly improved.     O:  Vitals:  Vital Signs Last 24 Hrs  T(C): 36.7 (04 Feb 2025 06:17), Max: 37 (03 Feb 2025 14:00)  T(F): 98.1 (04 Feb 2025 06:17), Max: 98.6 (03 Feb 2025 14:00)  HR: 78 (04 Feb 2025 06:17) (78 - 115)  BP: 119/76 (04 Feb 2025 06:17) (118/80 - 149/63)  BP(mean): --  RR: 18 (04 Feb 2025 06:17) (17 - 20)  SpO2: 99% (04 Feb 2025 06:17) (96% - 99%)        MEDICATIONS  (STANDING):  acetaminophen     Tablet .. 975 milliGRAM(s) Oral every 6 hours  amLODIPine   Tablet 5 milliGRAM(s) Oral daily  fluticasone propionate/ salmeterol 100-50 MICROgram(s) Diskus 1 Dose(s) Inhalation two times a day  furosemide    Tablet 60 milliGRAM(s) Oral daily  heparin   Injectable 5000 Unit(s) SubCutaneous every 12 hours  hydrALAZINE 50 milliGRAM(s) Oral every 8 hours    MEDICATIONS  (PRN):  albuterol    90 MICROgram(s) HFA Inhaler 2 Puff(s) Inhalation every 6 hours PRN Bronchospasm  oxyCODONE    IR 5 milliGRAM(s) Oral every 6 hours PRN Severe Pain (7 - 10)      LABS:  Blood type: A Positive  Rubella IgG: RPR: Negative                          11.7   8.51 >-----------< 592[H]    ( 02-04 @ 05:11 )             37.4                        11.2[L]   8.39 >-----------< 559[H]    ( 02-02 @ 13:00 )             35.1    02-02-25 @ 13:00      140  |  101  |  8   ----------------------------<  97  3.5   |  25  |  0.56        Ca    9.1      02 Feb 2025 13:00    TPro  7.1  /  Alb  3.8  /  TBili  0.2  /  DBili  x   /  AST  16  /  ALT  51[H]  /  AlkPhos  98  02-02-25 @ 13:00          Physical exam:  Gen: NAD  Abdomen: Soft, nontender, no distension , firm uterine fundus at umbilicus.  Incision: Clean, dry, and intact   Pelvic: Normal lochia noted  Ext: No calf tenderness

## 2025-02-04 NOTE — PROGRESS NOTE ADULT - ASSESSMENT
Ms. Cobb is a 44 year-old /1/10/2 F recent C section (), postpartum preeclampsia, prior moderate-severe MR, nl EF who presents to [facility] ED with chief complaint of orthopnea, cough, and worsening B/L LE edema. Was diagnosed with Flu  when admitted for her C section and her delivery was complicated with low urine output. Received IV Lasix and noted improvement in both urine output and symptoms. Since discharge, endorses worsening LE edema and is experiencing orthopnea at night (3-4 pillows). Plans on breastfeeding. Had been having abdominal pain (noted to have bladder flap hematoma) and had been taking Tylenol and ibuprofen. CT scan stable hematoma. Improving with diuresis. Labs reviewed - Hb 9.5 (stable), AST/ALT 47/93, . Prior TTE reviewed - nl EF, mild-mod MR, nl RV size/function. Overall, she was admitted with hypervolemia and hypertension. Currently she remains hypertensive, but has diuresed very well and is euvolemic.

## 2025-02-04 NOTE — PROGRESS NOTE ADULT - NS ATTEND AMEND GEN_ALL_CORE FT
TTE, NTproBNP results noted. Pt appears to be euvolemic.  BP better controlled.  Decrease Lasix to 20 mg po qd on d/c. TTE, NTproBNP results noted. Pt appears to be euvolemic.  BP better controlled.  Decrease Lasix to 20 mg po qd on d/c.    PA addendum:  As d/w Dr. Ledbetter, d/cd Norvasc. -Changed hydralazine to 100 mg po BID, fist dose tomorrow. Hold for SBP <100

## 2025-02-04 NOTE — PROGRESS NOTE ADULT - SUBJECTIVE AND OBJECTIVE BOX
Medications:  acetaminophen     Tablet .. 975 milliGRAM(s) Oral every 6 hours  albuterol    90 MICROgram(s) HFA Inhaler 2 Puff(s) Inhalation every 6 hours PRN  amLODIPine   Tablet 5 milliGRAM(s) Oral daily  fluticasone propionate/ salmeterol 100-50 MICROgram(s) Diskus 1 Dose(s) Inhalation two times a day  furosemide    Tablet 60 milliGRAM(s) Oral daily  heparin   Injectable 5000 Unit(s) SubCutaneous every 12 hours  hydrALAZINE 50 milliGRAM(s) Oral every 8 hours  oxyCODONE    IR 5 milliGRAM(s) Oral every 6 hours PRN      Vitals:  Vital Signs Last 24 Hrs  T(C): 36.7 (04 Feb 2025 06:17), Max: 37 (03 Feb 2025 14:00)  T(F): 98.1 (04 Feb 2025 06:17), Max: 98.6 (03 Feb 2025 14:00)  HR: 78 (04 Feb 2025 06:17) (78 - 115)  BP: 119/76 (04 Feb 2025 06:17) (118/80 - 149/63)  BP(mean): --  RR: 18 (04 Feb 2025 06:17) (17 - 20)  SpO2: 99% (04 Feb 2025 06:17) (96% - 99%)        Daily     Daily     I&O's Detail    03 Feb 2025 07:01  -  04 Feb 2025 07:00  --------------------------------------------------------  IN:    Oral Fluid: 5293 mL  Total IN: 5293 mL    OUT:    Voided (mL): 4839 mL  Total OUT: 4839 mL    Total NET: 454 mL          Physical Exam:     General: No distress. Comfortable.  HEENT: EOM intact.  Neck: Neck supple. JVP not elevated. No masses  Chest: Clear to auscultation bilaterally  CV: Normal S1 and S2. No murmurs, rub, or gallops. Radial pulses normal.  Abdomen: Soft, non-distended, non-tender  Skin: No rashes or skin breakdown  Neurology: Alert and oriented times three. Sensation intact  Psych: Affect normal    Labs:                        11.7   8.51  )-----------( 592      ( 04 Feb 2025 05:11 )             37.4     02-02    140  |  101  |  8   ----------------------------<  97  3.5   |  25  |  0.56    Ca    9.1      02 Feb 2025 13:00    TPro  7.1  /  Alb  3.8  /  TBili  0.2  /  DBili  x   /  AST  16  /  ALT  51[H]  /  AlkPhos  98  02-02           Patient seen and examined. She is feeling a lot better today. No SOB at rest, MENDIETA, CP, palpitations, dizziness.   No more PND episodes.       Medications:  acetaminophen     Tablet .. 975 milliGRAM(s) Oral every 6 hours  albuterol    90 MICROgram(s) HFA Inhaler 2 Puff(s) Inhalation every 6 hours PRN  amLODIPine   Tablet 5 milliGRAM(s) Oral daily  fluticasone propionate/ salmeterol 100-50 MICROgram(s) Diskus 1 Dose(s) Inhalation two times a day  furosemide    Tablet 60 milliGRAM(s) Oral daily  heparin   Injectable 5000 Unit(s) SubCutaneous every 12 hours  hydrALAZINE 50 milliGRAM(s) Oral every 8 hours  oxyCODONE    IR 5 milliGRAM(s) Oral every 6 hours PRN      Vitals:  Vital Signs Last 24 Hrs  T(C): 36.7 (04 Feb 2025 06:17), Max: 37 (03 Feb 2025 14:00)  T(F): 98.1 (04 Feb 2025 06:17), Max: 98.6 (03 Feb 2025 14:00)  HR: 78 (04 Feb 2025 06:17) (78 - 115)  BP: 119/76 (04 Feb 2025 06:17) (118/80 - 149/63)  BP(mean): --  RR: 18 (04 Feb 2025 06:17) (17 - 20)  SpO2: 99% (04 Feb 2025 06:17) (96% - 99%)        Daily     Daily     I&O's Detail    03 Feb 2025 07:01  -  04 Feb 2025 07:00  --------------------------------------------------------  IN:    Oral Fluid: 5293 mL  Total IN: 5293 mL    OUT:    Voided (mL): 4839 mL  Total OUT: 4839 mL    Total NET: 454 mL          Physical Exam:     General: No distress. Comfortable.  HEENT: EOM intact.  Neck: Neck supple. JVP not elevated. No masses  Chest: Clear to auscultation bilaterally  CV: Normal S1 and S2. No murmurs, rub, or gallops. Radial pulses normal. No LE edema and is warm b/l.   Abdomen: Soft, non-distended, non-tender  Skin: No rashes or skin breakdown  Neurology: Alert and oriented times three. Sensation intact  Psych: Affect normal    Labs:                        11.7   8.51  )-----------( 592      ( 04 Feb 2025 05:11 )             37.4     02-02    140  |  101  |  8   ----------------------------<  97  3.5   |  25  |  0.56    Ca    9.1      02 Feb 2025 13:00    TPro  7.1  /  Alb  3.8  /  TBili  0.2  /  DBili  x   /  AST  16  /  ALT  51[H]  /  AlkPhos  98  02-02

## 2025-02-04 NOTE — PROGRESS NOTE ADULT - PROBLEM SELECTOR PLAN 2
- Now BP controlled.  - Continue hydralazine to 50 mg q8h  - Continue Norvasc 5 mg po daily  - Counseled against NSAIDs - Now BP controlled.  - Changed hydralazine to 100 mg po BID, fist dose tomorrow. Hold for SBP <100  - Counseled against NSAIDs

## 2025-02-05 ENCOUNTER — APPOINTMENT (OUTPATIENT)
Dept: OBGYN | Facility: HOSPITAL | Age: 45
End: 2025-02-05

## 2025-02-05 ENCOUNTER — TRANSCRIPTION ENCOUNTER (OUTPATIENT)
Age: 45
End: 2025-02-05

## 2025-02-05 VITALS
TEMPERATURE: 99 F | OXYGEN SATURATION: 99 % | RESPIRATION RATE: 19 BRPM | DIASTOLIC BLOOD PRESSURE: 75 MMHG | SYSTOLIC BLOOD PRESSURE: 128 MMHG | HEART RATE: 100 BPM

## 2025-02-05 LAB
ALBUMIN SERPL ELPH-MCNC: 3.7 G/DL — SIGNIFICANT CHANGE UP (ref 3.3–5)
ALP SERPL-CCNC: 86 U/L — SIGNIFICANT CHANGE UP (ref 40–120)
ALT FLD-CCNC: 29 U/L — SIGNIFICANT CHANGE UP (ref 4–33)
ANION GAP SERPL CALC-SCNC: 16 MMOL/L — HIGH (ref 7–14)
AST SERPL-CCNC: 14 U/L — SIGNIFICANT CHANGE UP (ref 4–32)
BASOPHILS # BLD AUTO: 0.05 K/UL — SIGNIFICANT CHANGE UP (ref 0–0.2)
BASOPHILS NFR BLD AUTO: 0.6 % — SIGNIFICANT CHANGE UP (ref 0–2)
BILIRUB SERPL-MCNC: <0.2 MG/DL — SIGNIFICANT CHANGE UP (ref 0.2–1.2)
BUN SERPL-MCNC: 9 MG/DL — SIGNIFICANT CHANGE UP (ref 7–23)
CALCIUM SERPL-MCNC: 9.3 MG/DL — SIGNIFICANT CHANGE UP (ref 8.4–10.5)
CHLORIDE SERPL-SCNC: 102 MMOL/L — SIGNIFICANT CHANGE UP (ref 98–107)
CO2 SERPL-SCNC: 21 MMOL/L — LOW (ref 22–31)
CREAT SERPL-MCNC: 0.49 MG/DL — LOW (ref 0.5–1.3)
EGFR: 120 ML/MIN/1.73M2 — SIGNIFICANT CHANGE UP
EOSINOPHIL # BLD AUTO: 0.24 K/UL — SIGNIFICANT CHANGE UP (ref 0–0.5)
EOSINOPHIL NFR BLD AUTO: 2.9 % — SIGNIFICANT CHANGE UP (ref 0–6)
GLUCOSE SERPL-MCNC: 86 MG/DL — SIGNIFICANT CHANGE UP (ref 70–99)
HCT VFR BLD CALC: 37.7 % — SIGNIFICANT CHANGE UP (ref 34.5–45)
HGB BLD-MCNC: 11.9 G/DL — SIGNIFICANT CHANGE UP (ref 11.5–15.5)
IANC: 4.31 K/UL — SIGNIFICANT CHANGE UP (ref 1.8–7.4)
IMM GRANULOCYTES NFR BLD AUTO: 0.4 % — SIGNIFICANT CHANGE UP (ref 0–0.9)
LDH SERPL L TO P-CCNC: 245 U/L — HIGH (ref 135–225)
LYMPHOCYTES # BLD AUTO: 2.92 K/UL — SIGNIFICANT CHANGE UP (ref 1–3.3)
LYMPHOCYTES # BLD AUTO: 35.8 % — SIGNIFICANT CHANGE UP (ref 13–44)
MCHC RBC-ENTMCNC: 27.4 PG — SIGNIFICANT CHANGE UP (ref 27–34)
MCHC RBC-ENTMCNC: 31.6 G/DL — LOW (ref 32–36)
MCV RBC AUTO: 86.9 FL — SIGNIFICANT CHANGE UP (ref 80–100)
MONOCYTES # BLD AUTO: 0.61 K/UL — SIGNIFICANT CHANGE UP (ref 0–0.9)
MONOCYTES NFR BLD AUTO: 7.5 % — SIGNIFICANT CHANGE UP (ref 2–14)
NEUTROPHILS # BLD AUTO: 4.31 K/UL — SIGNIFICANT CHANGE UP (ref 1.8–7.4)
NEUTROPHILS NFR BLD AUTO: 52.8 % — SIGNIFICANT CHANGE UP (ref 43–77)
NRBC # BLD AUTO: 0 K/UL — SIGNIFICANT CHANGE UP (ref 0–0)
NRBC # BLD: 0 /100 WBCS — SIGNIFICANT CHANGE UP (ref 0–0)
NRBC # FLD: 0 K/UL — SIGNIFICANT CHANGE UP (ref 0–0)
NRBC BLD-RTO: 0 /100 WBCS — SIGNIFICANT CHANGE UP (ref 0–0)
PLATELET # BLD AUTO: 622 K/UL — HIGH (ref 150–400)
POTASSIUM SERPL-MCNC: 3.9 MMOL/L — SIGNIFICANT CHANGE UP (ref 3.5–5.3)
POTASSIUM SERPL-SCNC: 3.9 MMOL/L — SIGNIFICANT CHANGE UP (ref 3.5–5.3)
PROT SERPL-MCNC: 7 G/DL — SIGNIFICANT CHANGE UP (ref 6–8.3)
RBC # BLD: 4.34 M/UL — SIGNIFICANT CHANGE UP (ref 3.8–5.2)
RBC # FLD: 14.3 % — SIGNIFICANT CHANGE UP (ref 10.3–14.5)
SODIUM SERPL-SCNC: 139 MMOL/L — SIGNIFICANT CHANGE UP (ref 135–145)
URATE SERPL-MCNC: 4.2 MG/DL — SIGNIFICANT CHANGE UP (ref 2.5–7)
WBC # BLD: 8.16 K/UL — SIGNIFICANT CHANGE UP (ref 3.8–10.5)
WBC # FLD AUTO: 8.16 K/UL — SIGNIFICANT CHANGE UP (ref 3.8–10.5)

## 2025-02-05 PROCEDURE — 99238 HOSP IP/OBS DSCHRG MGMT 30/<: CPT

## 2025-02-05 PROCEDURE — 99232 SBSQ HOSP IP/OBS MODERATE 35: CPT

## 2025-02-05 RX ORDER — HYDRALAZINE HCL 100 MG
1 TABLET ORAL
Qty: 60 | Refills: 0
Start: 2025-02-05 | End: 2025-03-06

## 2025-02-05 RX ADMIN — Medication 100 MILLIGRAM(S): at 14:14

## 2025-02-05 RX ADMIN — Medication 5000 UNIT(S): at 09:04

## 2025-02-05 RX ADMIN — ACETAMINOPHEN 975 MILLIGRAM(S): 160 SUSPENSION ORAL at 09:35

## 2025-02-05 RX ADMIN — ACETAMINOPHEN 975 MILLIGRAM(S): 160 SUSPENSION ORAL at 03:15

## 2025-02-05 RX ADMIN — ACETAMINOPHEN 975 MILLIGRAM(S): 160 SUSPENSION ORAL at 09:05

## 2025-02-05 RX ADMIN — FLUTICASONE PROPIONATE AND SALMETEROL 1 DOSE(S): 113; 14 POWDER, METERED RESPIRATORY (INHALATION) at 09:44

## 2025-02-05 RX ADMIN — ACETAMINOPHEN 975 MILLIGRAM(S): 160 SUSPENSION ORAL at 03:50

## 2025-02-05 RX ADMIN — Medication 20 MILLIGRAM(S): at 11:21

## 2025-02-05 RX ADMIN — Medication 100 MILLIGRAM(S): at 02:00

## 2025-02-05 NOTE — PROGRESS NOTE ADULT - PROBLEM SELECTOR PLAN 1
- JVP 6 cm. Euvolemic. Currently does not have MENDIETA, orthopnea, PND.   - Wt 161 lbs today, has lost 10 lbs on this admission.   - Repeat TTE on 2/4 shows minimal MR.   - Continue Lasix 20 mg po daily. Hold for SBP <100.   - Discontinue kenya 25 mg daily.   - Continue hydralazine 100 mg po BID.   - Monitor I&Os   - Daily standing weights  - Keep K 4.0-5.0 and mag >2.0.   -Ok to d/c from HF standpoint.  - Follow up with Dr. Augustine Ledbetter on 2/10 at 2:40am.

## 2025-02-05 NOTE — DISCHARGE NOTE PROVIDER - NSDCFUADDAPPT_GEN_ALL_CORE_FT
- Continue BP meds as prescribed (hold is BP is under 110/60)  -Take blood pressure with at home cuff prior to taking medications; if BP is >150/90 call MD  - Return to hospital with headaches, visual changes, abdominal pain, nausea, vomiting, chest pain or shortness of breath  - Follow up Cardiology team  - Follow up with OB in 2 days

## 2025-02-05 NOTE — DISCHARGE NOTE PROVIDER - NSDCFUSCHEDAPPT_GEN_ALL_CORE_FT
Augustine Ledbetter  [facility] [address]  Scheduled Appointment: 02/10/2025    [facility] [address]  Scheduled Appointment: 03/10/2025

## 2025-02-05 NOTE — DISCHARGE NOTE PROVIDER - HOSPITAL COURSE
43yo ,1,10,3 s/p rLTCS on  presented to ED  with increasing shortness of breath. States that she had been having worsening SOB since delivery and patient with notable history of post partum CHF s/p 2014 delivery with flash pulmonary edema. Endorses exertional SOB and orthopnea sleeping with 3-4 pillows. Here in ED blood pressure in the 150s systolics. Patient met criteria for sPEC based on mild blood pressures and shortness of breath. S/p Keppra x 24 hours for seizure ppx. Cardiology consulted and c/f fluid overload. Recommended repeat TTE to evaluate which showed EF 66% with moderate MR. Lasix IV started and transitioned to PO and short course of spironolactone and started on hydralazine and norvasc for blood pressure control.    Patient c/o LE swelling and dopplers were performed - WNL. CTA on admission WNL to r/o PE. CXR on : Clear lungs.    On , patient c/o vision changes. Opthalmology consulted and exam WNL. Neurology consulted and reports that headache is likely multifactorial but may be migrainous in nature. Avoid triptans.     On , patient cleared by cardiology and OB. Denies s/sx of sPEC. Rx for Hydralazine 100mg BID and Lasix 20mg QD sent to pharmacy.    Cardio-OB emailed for follow up. Patient to follow up in 2 days for repeat BP check.

## 2025-02-05 NOTE — PROGRESS NOTE ADULT - PROVIDER SPECIALTY LIST ADULT
Cardio-OB
Cardiology
Heart Failure
Heart Failure
OB
OB
Heart Failure
Heart Failure
OB
Neurology
OB
Heart Failure

## 2025-02-05 NOTE — PROGRESS NOTE ADULT - SUBJECTIVE AND OBJECTIVE BOX
Medications:  acetaminophen     Tablet .. 975 milliGRAM(s) Oral every 6 hours  albuterol    90 MICROgram(s) HFA Inhaler 2 Puff(s) Inhalation every 6 hours PRN  fluticasone propionate/ salmeterol 100-50 MICROgram(s) Diskus 1 Dose(s) Inhalation two times a day  furosemide    Tablet 20 milliGRAM(s) Oral daily  heparin   Injectable 5000 Unit(s) SubCutaneous every 12 hours  hydrALAZINE 100 milliGRAM(s) Oral every 12 hours  oxyCODONE    IR 5 milliGRAM(s) Oral every 6 hours PRN      Vitals:  Vital Signs Last 24 Hrs  T(C): 36.7 (05 Feb 2025 05:54), Max: 37.1 (04 Feb 2025 19:08)  T(F): 98 (05 Feb 2025 05:54), Max: 98.8 (04 Feb 2025 19:08)  HR: 83 (05 Feb 2025 05:54) (83 - 89)  BP: 119/73 (05 Feb 2025 05:54) (119/73 - 133/81)  BP(mean): --  RR: 18 (05 Feb 2025 05:54) (17 - 18)  SpO2: 97% (05 Feb 2025 05:54) (97% - 100%)    Parameters below as of 04 Feb 2025 21:51  Patient On (Oxygen Delivery Method): room air        Daily     Daily     I&O's Detail    04 Feb 2025 07:01  -  05 Feb 2025 07:00  --------------------------------------------------------  IN:    Oral Fluid: 5811 mL  Total IN: 5811 mL    OUT:    Voided (mL): 5630 mL  Total OUT: 5630 mL    Total NET: 181 mL      05 Feb 2025 07:01  -  05 Feb 2025 09:41  --------------------------------------------------------  IN:    Oral Fluid: 480 mL  Total IN: 480 mL    OUT:    Voided (mL): 250 mL  Total OUT: 250 mL    Total NET: 230 mL          Physical Exam:     General: No distress. Comfortable.  HEENT: EOM intact.  Neck: Neck supple. JVP not elevated. No masses  Chest: Clear to auscultation bilaterally  CV: Normal S1 and S2. No murmurs, rub, or gallops. Radial pulses normal.  Abdomen: Soft, non-distended, non-tender  Skin: No rashes or skin breakdown  Neurology: Alert and oriented times three. Sensation intact  Psych: Affect normal    Labs:                        11.9   8.16  )-----------( 622      ( 05 Feb 2025 05:10 )             37.7     02-05    139  |  102  |  9   ----------------------------<  86  3.9   |  21[L]  |  0.49[L]    Ca    9.3      05 Feb 2025 05:10    TPro  7.0  /  Alb  3.7  /  TBili  <0.2  /  DBili  x   /  AST  14  /  ALT  29  /  AlkPhos  86  02-05           Patient seen and examined. She states she feels great. No SOB at rest, MENDIETA, orthopnea.   BP controlled.   Wt 161 lbs, has lost 10 lbs on this admission.       Medications:  acetaminophen     Tablet .. 975 milliGRAM(s) Oral every 6 hours  albuterol    90 MICROgram(s) HFA Inhaler 2 Puff(s) Inhalation every 6 hours PRN  fluticasone propionate/ salmeterol 100-50 MICROgram(s) Diskus 1 Dose(s) Inhalation two times a day  furosemide    Tablet 20 milliGRAM(s) Oral daily  heparin   Injectable 5000 Unit(s) SubCutaneous every 12 hours  hydrALAZINE 100 milliGRAM(s) Oral every 12 hours  oxyCODONE    IR 5 milliGRAM(s) Oral every 6 hours PRN      Vitals:  Vital Signs Last 24 Hrs  T(C): 36.7 (05 Feb 2025 05:54), Max: 37.1 (04 Feb 2025 19:08)  T(F): 98 (05 Feb 2025 05:54), Max: 98.8 (04 Feb 2025 19:08)  HR: 83 (05 Feb 2025 05:54) (83 - 89)  BP: 119/73 (05 Feb 2025 05:54) (119/73 - 133/81)  BP(mean): --  RR: 18 (05 Feb 2025 05:54) (17 - 18)  SpO2: 97% (05 Feb 2025 05:54) (97% - 100%)    Parameters below as of 04 Feb 2025 21:51  Patient On (Oxygen Delivery Method): room air        Daily     Daily     I&O's Detail    04 Feb 2025 07:01  -  05 Feb 2025 07:00  --------------------------------------------------------  IN:    Oral Fluid: 5811 mL  Total IN: 5811 mL    OUT:    Voided (mL): 5630 mL  Total OUT: 5630 mL    Total NET: 181 mL      05 Feb 2025 07:01  -  05 Feb 2025 09:41  --------------------------------------------------------  IN:    Oral Fluid: 480 mL  Total IN: 480 mL    OUT:    Voided (mL): 250 mL  Total OUT: 250 mL    Total NET: 230 mL          Physical Exam:     General: No distress. Comfortable.  HEENT: EOM intact.  Neck: Neck supple. JVP not elevated 6cm. No masses  Chest: Clear to auscultation bilaterally  CV: Normal S1 and S2. No murmurs, rub, or gallops. Radial pulses normal.  No LE edema and is warm b/l.   Abdomen: Soft, non-distended, non-tender  Skin: No rashes or skin breakdown  Neurology: Alert and oriented times three. Sensation intact  Psych: Affect normal    Labs:                        11.9   8.16  )-----------( 622      ( 05 Feb 2025 05:10 )             37.7     02-05    139  |  102  |  9   ----------------------------<  86  3.9   |  21[L]  |  0.49[L]    Ca    9.3      05 Feb 2025 05:10    TPro  7.0  /  Alb  3.7  /  TBili  <0.2  /  DBili  x   /  AST  14  /  ALT  29  /  AlkPhos  86  02-05

## 2025-02-05 NOTE — PROGRESS NOTE ADULT - SUBJECTIVE AND OBJECTIVE BOX
S: 45yo POD#13 s/p LTCS. The patient feels well.  Pain is well controlled. She is tolerating a regular diet and passing flatus. She is voiding spontaneously, and ambulating without difficulty. Denies CP/SOB. Denies lightheadedness/dizziness. Denies N/V. Denies PEC sxs.     O:  Vitals:  Vital Signs Last 24 Hrs  T(C): 36.7 (05 Feb 2025 05:54), Max: 37.1 (04 Feb 2025 19:08)  T(F): 98 (05 Feb 2025 05:54), Max: 98.8 (04 Feb 2025 19:08)  HR: 83 (05 Feb 2025 05:54) (83 - 89)  BP: 119/73 (05 Feb 2025 05:54) (119/73 - 133/81)  BP(mean): --  RR: 18 (05 Feb 2025 05:54) (17 - 18)  SpO2: 97% (05 Feb 2025 05:54) (97% - 100%)    Parameters below as of 04 Feb 2025 21:51  Patient On (Oxygen Delivery Method): room air        MEDICATIONS  (STANDING):  acetaminophen     Tablet .. 975 milliGRAM(s) Oral every 6 hours  fluticasone propionate/ salmeterol 100-50 MICROgram(s) Diskus 1 Dose(s) Inhalation two times a day  furosemide    Tablet 20 milliGRAM(s) Oral daily  heparin   Injectable 5000 Unit(s) SubCutaneous every 12 hours  hydrALAZINE 100 milliGRAM(s) Oral every 12 hours    MEDICATIONS  (PRN):  albuterol    90 MICROgram(s) HFA Inhaler 2 Puff(s) Inhalation every 6 hours PRN Bronchospasm  oxyCODONE    IR 5 milliGRAM(s) Oral every 6 hours PRN Severe Pain (7 - 10)      LABS:  Blood type: A Positive  Rubella IgG: RPR: Negative                          11.9   8.16 >-----------< 622[H]    ( 02-05 @ 05:10 )             37.7                        11.7   8.51 >-----------< 592[H]    ( 02-04 @ 05:11 )             37.4                        11.2[L]   8.39 >-----------< 559[H]    ( 02-02 @ 13:00 )             35.1    02-05-25 @ 05:10      139  |  102  |  9   ----------------------------<  86  3.9   |  21[L]  |  0.49[L]    02-04-25 @ 05:11      138  |  100  |  12  ----------------------------<  86  3.6   |  22  |  0.52    02-02-25 @ 13:00      140  |  101  |  8   ----------------------------<  97  3.5   |  25  |  0.56        Ca    9.3      05 Feb 2025 05:10  Ca    8.8      04 Feb 2025 05:11  Ca    9.1      02 Feb 2025 13:00    TPro  7.0  /  Alb  3.7  /  TBili  <0.2  /  DBili  x   /  AST  14  /  ALT  29  /  AlkPhos  86  02-05-25 @ 05:10  TPro  7.1  /  Alb  3.6  /  TBili  <0.2  /  DBili  x   /  AST  13  /  ALT  32  /  AlkPhos  88  02-04-25 @ 05:11  TPro  7.1  /  Alb  3.8  /  TBili  0.2  /  DBili  x   /  AST  16  /  ALT  51[H]  /  AlkPhos  98  02-02-25 @ 13:00          Physical exam:  Gen: NAD  Abdomen: Soft, nontender, no distension , firm uterine fundus at umbilicus.  Incision: Clean, dry, and intact   Pelvic: Normal lochia noted  Ext: No calf tenderness    A/P: 45yo POD#3 s/p LTCS.  Patient is stable and is doing well post-operatively.  - Continue motrin, tylenol, oxycodone PRN for pain control.  - Increase ambulation  - Continue regular diet  - Discharge planning    Farhan Purcell PGY1

## 2025-02-05 NOTE — DISCHARGE NOTE NURSING/CASE MANAGEMENT/SOCIAL WORK - PATIENT PORTAL LINK FT
You can access the FollowMyHealth Patient Portal offered by [facility] by registering at the following website: [email or web address]. By joining [employer]’s FollowMyHealth portal, you will also be able to view your health information using other applications (apps) compatible with our system.

## 2025-02-05 NOTE — DISCHARGE NOTE PROVIDER - CARE PROVIDER_API CALL
Pedro Brizuela  Obstetrics and Gynecology  [address], [address]  [place] [address]  Phone: [phone]  Fax: [phone]  Follow Up Time:

## 2025-02-05 NOTE — DISCHARGE NOTE PROVIDER - NSDCMRMEDTOKEN_GEN_ALL_CORE_FT
acetaminophen 325 mg oral tablet: 3 tab(s) orally every 8 hours as needed for pain  furosemide 20 mg oral tablet: 1 tab(s) orally once a day MDD: 1  hydrALAZINE 100 mg oral tablet: 1 tab(s) orally every 12 hours Hold if BP &lt;110/60 MDD: 2 tabs  ibuprofen 600 mg oral tablet: 1 tab(s) orally every 6 hours   acetaminophen 325 mg oral tablet: 3 tab(s) orally every 8 hours as needed for pain  furosemide 20 mg oral tablet: 1 tab(s) orally once a day MDD: 1  hydrALAZINE 100 mg oral tablet: 1 tab(s) orally every 12 hours Hold if BP &lt;110/60 MDD: 2 tabs

## 2025-02-05 NOTE — PROGRESS NOTE ADULT - ASSESSMENT
Ms. Cobb is a 44 year-old /1/10/2 F recent C section (), postpartum preeclampsia, prior moderate-severe MR, nl EF who presents to [facility] ED with chief complaint of orthopnea, cough, and worsening B/L LE edema. Was diagnosed with Flu  when admitted for her C section and her delivery was complicated with low urine output. Received IV Lasix and noted improvement in both urine output and symptoms. Since discharge, endorses worsening LE edema and is experiencing orthopnea at night (3-4 pillows). Prior TTE reviewed - nl EF, mod MR, nl RV size/function. Overall, she was admitted with hypervolemia in setting of moderate MR and hypertension. She was diuresed and HTN better controlled, symptoms greatly improved. Currently she is euvolemic and normotensive.

## 2025-02-05 NOTE — PROGRESS NOTE ADULT - REASON FOR ADMISSION
SOB, postpartum, h/o peripartum cardiomyopathy

## 2025-02-05 NOTE — PROGRESS NOTE ADULT - ASSESSMENT
43yo ,1,10,3 s/p rLTCS on  presented to ED  with increasing shortness of breath. States that she had been having worsening SOB since delivery and patient with notable history of post partum CHF s/p 2014 delivery with flash pulmonary edema. Endorses exertional  SOB and orthopnea sleeping with 3-4 pillows.  Here in ED blood pressure in the 150s systolics. Does have a history of preeclampsia with prior pregnancy in .  Endorses a HA but she believes that it is due to pain and skipping dose of Tylenol today. No vision changes but is endorsing mild epigastric pain. Patient having RLQ pain. Clinical course during admission H/H downtrending 12.4/37.1>>9.3/28.3, therefore CT angiogram A/P preformed to r/o active abdominal bleed x2 which demonstrated stable bladder flap hematoma. Given that patient has history of CHF in the setting of her 2014 vaginal delivery. She saw cardio-obstetrics on (). Bedside TTE () with normal filling pressure, mild MR, normal biventricular function. Patient does deny chest pain but is worried that current sxs can be a manifestation of CHF. Patient admitted to OBGYN for closer observation and further workup of SOB.    #SOB, history of peripartum cardiomyopathy  #Presumed sPEC (mild range blood pressures + SOB)  - Cards: PO Lasix 20QD(Hold for SBP <100), dc kenya 25 mg daily, Increase hydralazine to 100mg BID K 4.0-5.0 and mag >2.0. repeat TTE preformed  to evaluate MR.  dcNorvasc 5 mg po daily Monitor I&Os, Daily standing weights. No NSAIDs. -   - LE dopplers neg for DVT/ CTA on admission neg for PE  - CXR(): clear lungs  - TTE(): EF 66%, Left atrium is mildly dilated. Moderate mitral regurgitation. s/p repeat TTE  demonstrating stable mild MR.   -Advair and Albuterol ordered (-)  -f/u AM HELLP labs  -Advair and Albuterol added () for cough related chest pain.     #Visual changes/headache  - S/p normal ophthalmologic exam   - S/p Neuro consult, HA likely multifactorial, may be migrainous in nature. Avoid triptans.    #Maternal well-being  - s/p Keppra 500mg BID for seizure prophylaxis  - Regular diet  - HSQ, Venodynes for DVT ppx  - LFT: ->->>>-> will repeat HELLP labs today.     DTaveras PGY3

## 2025-02-05 NOTE — DISCHARGE NOTE NURSING/CASE MANAGEMENT/SOCIAL WORK - FINANCIAL ASSISTANCE
[facility] provides services at a reduced cost to those who are determined to be eligible through [facility]’s financial assistance program. Information regarding [facility]’s financial assistance program can be found by going to [email or web address] or by calling [phone].

## 2025-02-05 NOTE — DISCHARGE NOTE NURSING/CASE MANAGEMENT/SOCIAL WORK - NSDCVIVACCINE_GEN_ALL_CORE_FT
Influenza, split virus, trivalent, preservative free; 01-Feb-2025 17:07; Zahraa Joseph ([place]); [employer]; [id number] (Exp. Date: 01-Jun-2025); IntraMuscular; Deltoid Left.; 0.5 milliLiter(s); VIS (VIS Published: 06-Aug-2021, VIS Presented: 01-Feb-2025);

## 2025-02-05 NOTE — DISCHARGE NOTE PROVIDER - NSDCDCMDCOMP_GEN_ALL_CORE
This document is complete and the patient is ready for discharge.
Spontaneous, unlabored and symmetrical

## 2025-02-10 ENCOUNTER — NON-APPOINTMENT (OUTPATIENT)
Age: 45
End: 2025-02-10

## 2025-02-10 ENCOUNTER — APPOINTMENT (OUTPATIENT)
Dept: HEART FAILURE | Facility: CLINIC | Age: 45
End: 2025-02-10
Payer: MEDICAID

## 2025-02-10 VITALS
SYSTOLIC BLOOD PRESSURE: 138 MMHG | HEIGHT: 61 IN | OXYGEN SATURATION: 95 % | WEIGHT: 180 LBS | BODY MASS INDEX: 33.99 KG/M2 | HEART RATE: 104 BPM | DIASTOLIC BLOOD PRESSURE: 80 MMHG

## 2025-02-10 DIAGNOSIS — I10 ESSENTIAL (PRIMARY) HYPERTENSION: ICD-10-CM

## 2025-02-10 DIAGNOSIS — I34.0 NONRHEUMATIC MITRAL (VALVE) INSUFFICIENCY: ICD-10-CM

## 2025-02-10 PROCEDURE — G2211 COMPLEX E/M VISIT ADD ON: CPT | Mod: NC

## 2025-02-10 PROCEDURE — 99215 OFFICE O/P EST HI 40 MIN: CPT

## 2025-02-10 PROCEDURE — 93000 ELECTROCARDIOGRAM COMPLETE: CPT

## 2025-02-10 RX ORDER — HYDRALAZINE HYDROCHLORIDE 100 MG/1
100 TABLET ORAL 3 TIMES DAILY
Qty: 90 | Refills: 4 | Status: ACTIVE | COMMUNITY
Start: 2025-02-06

## 2025-02-10 RX ORDER — FUROSEMIDE 40 MG/1
40 TABLET ORAL
Qty: 90 | Refills: 3 | Status: DISCONTINUED | COMMUNITY
Start: 2025-02-06 | End: 2025-02-10

## 2025-02-11 ENCOUNTER — APPOINTMENT (OUTPATIENT)
Dept: OBGYN | Facility: CLINIC | Age: 45
End: 2025-02-11
Payer: MEDICAID

## 2025-02-11 VITALS
BODY MASS INDEX: 29.64 KG/M2 | SYSTOLIC BLOOD PRESSURE: 126 MMHG | HEIGHT: 61 IN | DIASTOLIC BLOOD PRESSURE: 76 MMHG | WEIGHT: 157 LBS

## 2025-02-11 DIAGNOSIS — O24.414 GESTATIONAL DIABETES MELLITUS IN PREGNANCY, INSULIN CONTROLLED: ICD-10-CM

## 2025-02-11 DIAGNOSIS — O34.219 MATERNAL CARE FOR UNSPECIFIED TYPE SCAR FROM PREVIOUS CESAREAN DELIVERY: ICD-10-CM

## 2025-02-11 DIAGNOSIS — J81.1 CHRONIC PULMONARY EDEMA: ICD-10-CM

## 2025-02-11 DIAGNOSIS — O99.519 DISEASES OF THE RESPIRATORY SYSTEM COMPLICATING PREGNANCY, UNSPECIFIED TRIMESTER: ICD-10-CM

## 2025-02-11 DIAGNOSIS — Z92.0 PERSONAL HISTORY OF CONTRACEPTION: ICD-10-CM

## 2025-02-11 DIAGNOSIS — Z3A.30 30 WEEKS GESTATION OF PREGNANCY: ICD-10-CM

## 2025-02-11 DIAGNOSIS — J45.901 UNSPECIFIED ASTHMA WITH (ACUTE) EXACERBATION: ICD-10-CM

## 2025-02-11 DIAGNOSIS — R93.89 ABNORMAL FINDINGS ON DIAGNOSTIC IMAGING OF OTHER SPECIFIED BODY STRUCTURES: ICD-10-CM

## 2025-02-11 DIAGNOSIS — Z3A.10 10 WEEKS GESTATION OF PREGNANCY: ICD-10-CM

## 2025-02-11 DIAGNOSIS — J45.909 DISEASES OF THE RESPIRATORY SYSTEM COMPLICATING PREGNANCY, UNSPECIFIED TRIMESTER: ICD-10-CM

## 2025-02-11 DIAGNOSIS — Z3A.22 22 WEEKS GESTATION OF PREGNANCY: ICD-10-CM

## 2025-02-11 DIAGNOSIS — Z3A.36 36 WEEKS GESTATION OF PREGNANCY: ICD-10-CM

## 2025-02-11 DIAGNOSIS — N94.9 UNSPECIFIED CONDITION ASSOCIATED WITH FEMALE GENITAL ORGANS AND MENSTRUAL CYCLE: ICD-10-CM

## 2025-02-11 DIAGNOSIS — O36.80X0 PREGNANCY WITH INCONCLUSIVE FETAL VIABILITY, NOT APPLICABLE OR UNSPECIFIED: ICD-10-CM

## 2025-02-11 DIAGNOSIS — Z3A.17 17 WEEKS GESTATION OF PREGNANCY: ICD-10-CM

## 2025-02-11 DIAGNOSIS — Z32.01 ENCOUNTER FOR PREGNANCY TEST, RESULT POSITIVE: ICD-10-CM

## 2025-02-11 DIAGNOSIS — O21.9 VOMITING OF PREGNANCY, UNSPECIFIED: ICD-10-CM

## 2025-02-11 DIAGNOSIS — O09.529 SUPERVISION OF ELDERLY MULTIGRAVIDA, UNSPECIFIED TRIMESTER: ICD-10-CM

## 2025-02-11 DIAGNOSIS — Z3A.29 29 WEEKS GESTATION OF PREGNANCY: ICD-10-CM

## 2025-02-11 DIAGNOSIS — J45.909 UNSPECIFIED ASTHMA, UNCOMPLICATED: ICD-10-CM

## 2025-02-11 DIAGNOSIS — Z3A.35 35 WEEKS GESTATION OF PREGNANCY: ICD-10-CM

## 2025-02-11 DIAGNOSIS — Z3A.32 32 WEEKS GESTATION OF PREGNANCY: ICD-10-CM

## 2025-02-11 DIAGNOSIS — Z87.42 PERSONAL HISTORY OF OTHER DISEASES OF THE FEMALE GENITAL TRACT: ICD-10-CM

## 2025-02-11 DIAGNOSIS — R07.89 OTHER CHEST PAIN: ICD-10-CM

## 2025-02-11 RX ORDER — FLUTICASONE PROPIONATE AND SALMETEROL 500; 50 UG/1; UG/1
500-50 POWDER RESPIRATORY (INHALATION) TWICE DAILY
Qty: 1 | Refills: 0 | Status: ACTIVE | COMMUNITY
Start: 2025-02-11 | End: 1900-01-01

## 2025-02-21 RX ORDER — FUROSEMIDE 40 MG/1
40 TABLET ORAL DAILY
Qty: 90 | Refills: 1 | Status: ACTIVE | COMMUNITY
Start: 2025-02-11 | End: 1900-01-01

## 2025-02-24 ENCOUNTER — NON-APPOINTMENT (OUTPATIENT)
Age: 45
End: 2025-02-24

## 2025-02-24 ENCOUNTER — APPOINTMENT (OUTPATIENT)
Dept: HEART FAILURE | Facility: CLINIC | Age: 45
End: 2025-02-24
Payer: MEDICAID

## 2025-02-24 VITALS
HEIGHT: 61 IN | SYSTOLIC BLOOD PRESSURE: 131 MMHG | HEART RATE: 67 BPM | OXYGEN SATURATION: 98 % | DIASTOLIC BLOOD PRESSURE: 87 MMHG

## 2025-02-24 DIAGNOSIS — R07.89 OTHER CHEST PAIN: ICD-10-CM

## 2025-02-24 DIAGNOSIS — I10 ESSENTIAL (PRIMARY) HYPERTENSION: ICD-10-CM

## 2025-02-24 PROCEDURE — G2211 COMPLEX E/M VISIT ADD ON: CPT | Mod: NC

## 2025-02-24 PROCEDURE — 99214 OFFICE O/P EST MOD 30 MIN: CPT

## 2025-02-24 PROCEDURE — 93000 ELECTROCARDIOGRAM COMPLETE: CPT

## 2025-02-24 RX ORDER — METOPROLOL SUCCINATE 25 MG/1
25 TABLET, EXTENDED RELEASE ORAL
Qty: 30 | Refills: 5 | Status: ACTIVE | COMMUNITY
Start: 2025-02-24 | End: 1900-01-01

## 2025-03-10 ENCOUNTER — APPOINTMENT (OUTPATIENT)
Dept: HEART FAILURE | Facility: CLINIC | Age: 45
End: 2025-03-10

## 2025-03-17 ENCOUNTER — APPOINTMENT (OUTPATIENT)
Dept: OBGYN | Facility: CLINIC | Age: 45
End: 2025-03-17
Payer: MEDICAID

## 2025-03-17 VITALS
HEART RATE: 79 BPM | SYSTOLIC BLOOD PRESSURE: 121 MMHG | HEIGHT: 61 IN | BODY MASS INDEX: 29.27 KG/M2 | WEIGHT: 155 LBS | DIASTOLIC BLOOD PRESSURE: 76 MMHG

## 2025-03-17 DIAGNOSIS — U07.1 COVID-19: ICD-10-CM

## 2025-03-17 DIAGNOSIS — O24.419 GESTATIONAL DIABETES MELLITUS IN PREGNANCY, UNSPECIFIED CONTROL: ICD-10-CM

## 2025-03-17 PROCEDURE — 0503F POSTPARTUM CARE VISIT: CPT

## 2025-03-20 NOTE — PROGRESS NOTE ADULT - SUBJECTIVE AND OBJECTIVE BOX
**INCOMPLETE NOTE**    Patient seen and examined at bedside, no acute overnight events. No acute complaints, pain well controlled. Patient is ambulating and tolerating regular diet. Has passed flatus and voided spontaneously. Has not yet passed flatus. ***Prabhakar is still in place. Denies CP, SOB, N/V, HA, blurred vision, epigastric pain.    Vital Signs Last 24 Hours  T(C): 37.1 (01-26-25 @ 01:57), Max: 37.1 (01-26-25 @ 01:57)  HR: 77 (01-26-25 @ 01:57) (76 - 89)  BP: 145/92 (01-26-25 @ 01:57) (124/85 - 145/92)  RR: 18 (01-26-25 @ 01:57) (16 - 18)  SpO2: 97% (01-26-25 @ 01:57) (97% - 100%)    I&O's Summary    24 Jan 2025 07:01  -  25 Jan 2025 07:00  --------------------------------------------------------  IN: 600 mL / OUT: 2499 mL / NET: -1899 mL    25 Jan 2025 07:01  -  26 Jan 2025 03:55  --------------------------------------------------------  IN: 1230 mL / OUT: 5900 mL / NET: -4670 mL        Physical Exam:  General: NAD  Abdomen: Soft, non-tender, non-distended, fundus firm  Incision: Pfannenstiel incision CDI, subcuticular suture closure  Pelvic: Lochia wnl, external exam of perineum clean and dry without swelling      01-24-25 @ 07:01  -  01-25-25 @ 07:00  --------------------------------------------------------  IN: 600 mL / OUT: 2499 mL / NET: -1899 mL    01-25-25 @ 07:01  -  01-26-25 @ 03:55  --------------------------------------------------------  IN: 1230 mL / OUT: 5900 mL / NET: -4670 mL        Blood Type: A Positive  Antibody Screen: Negative  RPR: Negative               9.3    10.04 )-----------( 207      ( 01-25 @ 05:32 )             28.9                9.3    9.43  )-----------( 193      ( 01-25 @ 01:40 )             28.3                9.9    9.74  )-----------( 214      ( 01-24 @ 22:36 )             30.9         MEDICATIONS  (STANDING):  acetaminophen     Tablet .. 975 milliGRAM(s) Oral <User Schedule>  diphtheria/tetanus/pertussis (acellular) Vaccine (Adacel) 0.5 milliLiter(s) IntraMuscular once  famotidine    Tablet 20 milliGRAM(s) Oral daily  heparin   Injectable 5000 Unit(s) SubCutaneous every 12 hours  ibuprofen  Tablet. 600 milliGRAM(s) Oral every 6 hours  influenza   Vaccine 0.5 milliLiter(s) IntraMuscular once  morphine PF Spinal 0.15 milliGRAM(s) IntraThecal. once  oxytocin Infusion 42 milliUNIT(s)/Min (42 mL/Hr) IV Continuous <Continuous>    MEDICATIONS  (PRN):  dexAMETHasone  Injectable 4 milliGRAM(s) IV Push every 6 hours PRN Nausea  diphenhydrAMINE 25 milliGRAM(s) Oral every 6 hours PRN Pruritus  guaiFENesin Oral Liquid (Sugar-Free) 100 milliGRAM(s) Oral every 6 hours PRN Cough  lanolin Ointment 1 Application(s) Topical every 6 hours PRN Sore Nipples  magnesium hydroxide Suspension 30 milliLiter(s) Oral two times a day PRN Constipation  nalbuphine Injectable 2.5 milliGRAM(s) IV Push every 6 hours PRN Pruritus  naloxone Injectable 0.1 milliGRAM(s) IV Push every 3 minutes PRN For ANY of the following changes in patient status:  A. Breaths Per Minute LESS THAN 10, B. Oxygen saturation LESS THAN 90%, C. Sedation score of 6 for Stop After: 4 Times  oxyCODONE    IR 5 milliGRAM(s) Oral every 3 hours PRN Moderate to Severe Pain (4-10)  oxyCODONE    IR 5 milliGRAM(s) Oral once PRN Moderate to Severe Pain (4-10)  simethicone 80 milliGRAM(s) Chew every 4 hours PRN Gas   Patient seen and examined at bedside, no acute overnight events. No acute complaints, pain well controlled. Patient is ambulating and tolerating regular diet. Has passed flatus. Prabhakar is still in place. Denies CP, SOB, N/V, HA, blurred vision, epigastric pain.    Vital Signs Last 24 Hours  T(C): 37.1 (01-26-25 @ 01:57), Max: 37.1 (01-26-25 @ 01:57)  HR: 77 (01-26-25 @ 01:57) (76 - 89)  BP: 145/92 (01-26-25 @ 01:57) (124/85 - 145/92)  RR: 18 (01-26-25 @ 01:57) (16 - 18)  SpO2: 97% (01-26-25 @ 01:57) (97% - 100%)    I&O's Summary    24 Jan 2025 07:01  -  25 Jan 2025 07:00  --------------------------------------------------------  IN: 600 mL / OUT: 2499 mL / NET: -1899 mL    25 Jan 2025 07:01  -  26 Jan 2025 03:55  --------------------------------------------------------  IN: 1230 mL / OUT: 5900 mL / NET: -4670 mL        Physical Exam:  General: NAD  Abdomen: Soft, non-tender, non-distended, fundus firm  Incision: Pfannenstiel incision CDI, subcuticular suture closure  Pelvic: Lochia wnl, external exam of perineum clean and dry without swelling      01-24-25 @ 07:01  -  01-25-25 @ 07:00  --------------------------------------------------------  IN: 600 mL / OUT: 2499 mL / NET: -1899 mL    01-25-25 @ 07:01  -  01-26-25 @ 03:55  --------------------------------------------------------  IN: 1230 mL / OUT: 5900 mL / NET: -4670 mL        Blood Type: A Positive  Antibody Screen: Negative  RPR: Negative               9.3    10.04 )-----------( 207      ( 01-25 @ 05:32 )             28.9                9.3    9.43  )-----------( 193      ( 01-25 @ 01:40 )             28.3                9.9    9.74  )-----------( 214      ( 01-24 @ 22:36 )             30.9         MEDICATIONS  (STANDING):  acetaminophen     Tablet .. 975 milliGRAM(s) Oral <User Schedule>  diphtheria/tetanus/pertussis (acellular) Vaccine (Adacel) 0.5 milliLiter(s) IntraMuscular once  famotidine    Tablet 20 milliGRAM(s) Oral daily  heparin   Injectable 5000 Unit(s) SubCutaneous every 12 hours  ibuprofen  Tablet. 600 milliGRAM(s) Oral every 6 hours  influenza   Vaccine 0.5 milliLiter(s) IntraMuscular once  morphine PF Spinal 0.15 milliGRAM(s) IntraThecal. once  oxytocin Infusion 42 milliUNIT(s)/Min (42 mL/Hr) IV Continuous <Continuous>    MEDICATIONS  (PRN):  dexAMETHasone  Injectable 4 milliGRAM(s) IV Push every 6 hours PRN Nausea  diphenhydrAMINE 25 milliGRAM(s) Oral every 6 hours PRN Pruritus  guaiFENesin Oral Liquid (Sugar-Free) 100 milliGRAM(s) Oral every 6 hours PRN Cough  lanolin Ointment 1 Application(s) Topical every 6 hours PRN Sore Nipples  magnesium hydroxide Suspension 30 milliLiter(s) Oral two times a day PRN Constipation  nalbuphine Injectable 2.5 milliGRAM(s) IV Push every 6 hours PRN Pruritus  naloxone Injectable 0.1 milliGRAM(s) IV Push every 3 minutes PRN For ANY of the following changes in patient status:  A. Breaths Per Minute LESS THAN 10, B. Oxygen saturation LESS THAN 90%, C. Sedation score of 6 for Stop After: 4 Times  oxyCODONE    IR 5 milliGRAM(s) Oral every 3 hours PRN Moderate to Severe Pain (4-10)  oxyCODONE    IR 5 milliGRAM(s) Oral once PRN Moderate to Severe Pain (4-10)  simethicone 80 milliGRAM(s) Chew every 4 hours PRN Gas   PROVIDER:[TOKEN:[[id number]],FOLLOWUP:[1-3 days]]

## 2025-04-01 ENCOUNTER — NON-APPOINTMENT (OUTPATIENT)
Age: 45
End: 2025-04-01

## 2025-04-02 RX ORDER — NORETHINDRONE 0.35 MG/1
0.35 TABLET ORAL
Qty: 2 | Refills: 0 | Status: ACTIVE | COMMUNITY
Start: 2025-04-02 | End: 1900-01-01

## 2025-05-05 ENCOUNTER — LABORATORY RESULT (OUTPATIENT)
Age: 45
End: 2025-05-05

## 2025-05-05 ENCOUNTER — APPOINTMENT (OUTPATIENT)
Dept: OBGYN | Facility: CLINIC | Age: 45
End: 2025-05-05
Payer: MEDICAID

## 2025-05-05 VITALS
BODY MASS INDEX: 29.64 KG/M2 | SYSTOLIC BLOOD PRESSURE: 112 MMHG | DIASTOLIC BLOOD PRESSURE: 64 MMHG | WEIGHT: 157 LBS | HEART RATE: 74 BPM | HEIGHT: 61 IN

## 2025-05-05 DIAGNOSIS — R92.30 DENSE BREASTS, UNSPECIFIED: ICD-10-CM

## 2025-05-05 DIAGNOSIS — R10.2 PELVIC AND PERINEAL PAIN: ICD-10-CM

## 2025-05-05 DIAGNOSIS — Z01.419 ENCOUNTER FOR GYNECOLOGICAL EXAMINATION (GENERAL) (ROUTINE) W/OUT ABNORMAL FINDINGS: ICD-10-CM

## 2025-05-05 PROCEDURE — 99396 PREV VISIT EST AGE 40-64: CPT

## 2025-05-08 LAB — HPV HIGH+LOW RISK DNA PNL CVX: NOT DETECTED

## 2025-05-12 LAB — CYTOLOGY CVX/VAG DOC THIN PREP: NORMAL

## 2025-07-01 ENCOUNTER — APPOINTMENT (OUTPATIENT)
Dept: CV DIAGNOSITCS | Facility: HOSPITAL | Age: 45
End: 2025-07-01

## 2025-09-12 ENCOUNTER — APPOINTMENT (OUTPATIENT)
Dept: FAMILY MEDICINE | Facility: CLINIC | Age: 45
End: 2025-09-12
Payer: MEDICAID

## 2025-09-12 VITALS
HEART RATE: 86 BPM | BODY MASS INDEX: 30.02 KG/M2 | TEMPERATURE: 97.5 F | HEIGHT: 61 IN | OXYGEN SATURATION: 99 % | SYSTOLIC BLOOD PRESSURE: 138 MMHG | RESPIRATION RATE: 16 BRPM | DIASTOLIC BLOOD PRESSURE: 80 MMHG | WEIGHT: 159 LBS

## 2025-09-12 VITALS — DIASTOLIC BLOOD PRESSURE: 70 MMHG | SYSTOLIC BLOOD PRESSURE: 130 MMHG

## 2025-09-12 DIAGNOSIS — R10.819 ABDOMINAL TENDERNESS, UNSPECIFIED SITE: ICD-10-CM

## 2025-09-12 DIAGNOSIS — N92.0 EXCESSIVE AND FREQUENT MENSTRUATION WITH REGULAR CYCLE: ICD-10-CM

## 2025-09-12 DIAGNOSIS — I34.0 NONRHEUMATIC MITRAL (VALVE) INSUFFICIENCY: ICD-10-CM

## 2025-09-12 PROCEDURE — 99215 OFFICE O/P EST HI 40 MIN: CPT | Mod: 25

## 2025-09-12 PROCEDURE — 81003 URINALYSIS AUTO W/O SCOPE: CPT | Mod: QW

## 2025-09-12 PROCEDURE — 36415 COLL VENOUS BLD VENIPUNCTURE: CPT

## 2025-09-13 ENCOUNTER — EMERGENCY (EMERGENCY)
Facility: HOSPITAL | Age: 45
LOS: 1 days | End: 2025-09-13
Attending: STUDENT IN AN ORGANIZED HEALTH CARE EDUCATION/TRAINING PROGRAM | Admitting: STUDENT IN AN ORGANIZED HEALTH CARE EDUCATION/TRAINING PROGRAM
Payer: MEDICAID

## 2025-09-13 VITALS
TEMPERATURE: 98 F | HEART RATE: 75 BPM | DIASTOLIC BLOOD PRESSURE: 77 MMHG | RESPIRATION RATE: 15 BRPM | SYSTOLIC BLOOD PRESSURE: 134 MMHG | OXYGEN SATURATION: 98 %

## 2025-09-13 VITALS
SYSTOLIC BLOOD PRESSURE: 147 MMHG | DIASTOLIC BLOOD PRESSURE: 93 MMHG | RESPIRATION RATE: 16 BRPM | OXYGEN SATURATION: 98 % | WEIGHT: 160.94 LBS | TEMPERATURE: 98 F | HEIGHT: 61 IN | HEART RATE: 94 BPM

## 2025-09-13 DIAGNOSIS — O03.9 COMPLETE OR UNSPECIFIED SPONTANEOUS ABORTION WITHOUT COMPLICATION: Chronic | ICD-10-CM

## 2025-09-13 DIAGNOSIS — Z90.89 ACQUIRED ABSENCE OF OTHER ORGANS: Chronic | ICD-10-CM

## 2025-09-13 DIAGNOSIS — Z98.891 HISTORY OF UTERINE SCAR FROM PREVIOUS SURGERY: Chronic | ICD-10-CM

## 2025-09-13 LAB
ALBUMIN SERPL ELPH-MCNC: 4.6 G/DL — SIGNIFICANT CHANGE UP (ref 3.3–5)
ALP SERPL-CCNC: 56 U/L — SIGNIFICANT CHANGE UP (ref 40–120)
ALT FLD-CCNC: 14 U/L — SIGNIFICANT CHANGE UP (ref 4–33)
ANION GAP SERPL CALC-SCNC: 13 MMOL/L — SIGNIFICANT CHANGE UP (ref 7–14)
AST SERPL-CCNC: 19 U/L — SIGNIFICANT CHANGE UP (ref 4–32)
BASOPHILS # BLD AUTO: 0.04 K/UL — SIGNIFICANT CHANGE UP (ref 0–0.2)
BASOPHILS NFR BLD AUTO: 0.5 % — SIGNIFICANT CHANGE UP (ref 0–2)
BILIRUB SERPL-MCNC: 0.2 MG/DL — SIGNIFICANT CHANGE UP (ref 0.2–1.2)
BUN SERPL-MCNC: 10 MG/DL — SIGNIFICANT CHANGE UP (ref 7–23)
BUN SERPL-MCNC: 8 MG/DL — SIGNIFICANT CHANGE UP (ref 7–23)
CALCIUM SERPL-MCNC: 8.4 MG/DL — SIGNIFICANT CHANGE UP (ref 8.4–10.5)
CALCIUM SERPL-MCNC: SIGNIFICANT CHANGE UP MG/DL (ref 8.4–10.5)
CHLORIDE SERPL-SCNC: 102 MMOL/L — SIGNIFICANT CHANGE UP (ref 98–107)
CHLORIDE SERPL-SCNC: 103 MMOL/L — SIGNIFICANT CHANGE UP (ref 98–107)
CO2 SERPL-SCNC: 20 MMOL/L — LOW (ref 22–31)
CO2 SERPL-SCNC: 7 MMOL/L — CRITICAL LOW (ref 22–31)
CREAT SERPL-MCNC: 0.6 MG/DL — SIGNIFICANT CHANGE UP (ref 0.5–1.3)
CREAT SERPL-MCNC: 0.66 MG/DL — SIGNIFICANT CHANGE UP (ref 0.5–1.3)
EGFR: 112 ML/MIN/1.73M2 — SIGNIFICANT CHANGE UP
EGFR: 112 ML/MIN/1.73M2 — SIGNIFICANT CHANGE UP
EGFR: 114 ML/MIN/1.73M2 — SIGNIFICANT CHANGE UP
EGFR: 114 ML/MIN/1.73M2 — SIGNIFICANT CHANGE UP
EOSINOPHIL # BLD AUTO: 0.07 K/UL — SIGNIFICANT CHANGE UP (ref 0–0.5)
EOSINOPHIL NFR BLD AUTO: 0.9 % — SIGNIFICANT CHANGE UP (ref 0–6)
FLUAV AG NPH QL: SIGNIFICANT CHANGE UP
FLUBV AG NPH QL: SIGNIFICANT CHANGE UP
GLUCOSE SERPL-MCNC: 120 MG/DL — HIGH (ref 70–99)
GLUCOSE SERPL-MCNC: 52 MG/DL — CRITICAL LOW (ref 70–99)
HCG SERPL-ACNC: <1 MIU/ML — SIGNIFICANT CHANGE UP
HCT VFR BLD CALC: 42.2 % — SIGNIFICANT CHANGE UP (ref 34.5–45)
HGB BLD-MCNC: 13.8 G/DL — SIGNIFICANT CHANGE UP (ref 11.5–15.5)
IMM GRANULOCYTES # BLD AUTO: 0.02 K/UL — SIGNIFICANT CHANGE UP (ref 0–0.07)
IMM GRANULOCYTES NFR BLD AUTO: 0.2 % — SIGNIFICANT CHANGE UP (ref 0–0.9)
LYMPHOCYTES # BLD AUTO: 3.52 K/UL — HIGH (ref 1–3.3)
LYMPHOCYTES NFR BLD AUTO: 43.2 % — SIGNIFICANT CHANGE UP (ref 13–44)
MAGNESIUM SERPL-MCNC: 1.7 MG/DL — SIGNIFICANT CHANGE UP (ref 1.6–2.6)
MCHC RBC-ENTMCNC: 28.8 PG — SIGNIFICANT CHANGE UP (ref 27–34)
MCHC RBC-ENTMCNC: 32.7 G/DL — SIGNIFICANT CHANGE UP (ref 32–36)
MCV RBC AUTO: 87.9 FL — SIGNIFICANT CHANGE UP (ref 80–100)
MONOCYTES # BLD AUTO: 0.64 K/UL — SIGNIFICANT CHANGE UP (ref 0–0.9)
MONOCYTES NFR BLD AUTO: 7.9 % — SIGNIFICANT CHANGE UP (ref 2–14)
NEUTROPHILS # BLD AUTO: 3.85 K/UL — SIGNIFICANT CHANGE UP (ref 1.8–7.4)
NEUTROPHILS NFR BLD AUTO: 47.3 % — SIGNIFICANT CHANGE UP (ref 43–77)
NRBC # BLD AUTO: 0 K/UL — SIGNIFICANT CHANGE UP (ref 0–0)
NRBC # FLD: 0 K/UL — SIGNIFICANT CHANGE UP (ref 0–0)
NRBC BLD AUTO-RTO: 0 /100 WBCS — SIGNIFICANT CHANGE UP (ref 0–0)
NT-PROBNP SERPL-SCNC: <36 PG/ML — SIGNIFICANT CHANGE UP
PHOSPHATE SERPL-MCNC: 3.3 MG/DL — SIGNIFICANT CHANGE UP (ref 2.5–4.5)
PLATELET # BLD AUTO: 431 K/UL — HIGH (ref 150–400)
PMV BLD: 9.8 FL — SIGNIFICANT CHANGE UP (ref 7–13)
POTASSIUM SERPL-MCNC: 3.6 MMOL/L — SIGNIFICANT CHANGE UP (ref 3.5–5.3)
POTASSIUM SERPL-MCNC: 4.2 MMOL/L — SIGNIFICANT CHANGE UP (ref 3.5–5.3)
POTASSIUM SERPL-SCNC: 3.6 MMOL/L — SIGNIFICANT CHANGE UP (ref 3.5–5.3)
POTASSIUM SERPL-SCNC: 4.2 MMOL/L — SIGNIFICANT CHANGE UP (ref 3.5–5.3)
PROT SERPL-MCNC: 7.7 G/DL — SIGNIFICANT CHANGE UP (ref 6–8.3)
RBC # BLD: 4.8 M/UL — SIGNIFICANT CHANGE UP (ref 3.8–5.2)
RBC # FLD: 12.6 % — SIGNIFICANT CHANGE UP (ref 10.3–14.5)
RSV RNA NPH QL NAA+NON-PROBE: SIGNIFICANT CHANGE UP
SARS-COV-2 RNA SPEC QL NAA+PROBE: SIGNIFICANT CHANGE UP
SODIUM SERPL-SCNC: 136 MMOL/L — SIGNIFICANT CHANGE UP (ref 135–145)
SODIUM SERPL-SCNC: 139 MMOL/L — SIGNIFICANT CHANGE UP (ref 135–145)
SOURCE RESPIRATORY: SIGNIFICANT CHANGE UP
TROPONIN T, HIGH SENSITIVITY RESULT: <6 NG/L — SIGNIFICANT CHANGE UP
WBC # BLD: 8.14 K/UL — SIGNIFICANT CHANGE UP (ref 3.8–10.5)
WBC # FLD AUTO: 8.14 K/UL — SIGNIFICANT CHANGE UP (ref 3.8–10.5)

## 2025-09-13 PROCEDURE — 99285 EMERGENCY DEPT VISIT HI MDM: CPT

## 2025-09-13 PROCEDURE — 93010 ELECTROCARDIOGRAM REPORT: CPT

## 2025-09-13 PROCEDURE — 71046 X-RAY EXAM CHEST 2 VIEWS: CPT | Mod: 26

## 2025-09-13 PROCEDURE — 70496 CT ANGIOGRAPHY HEAD: CPT | Mod: 26

## 2025-09-13 RX ORDER — METOCLOPRAMIDE HCL 10 MG
10 TABLET ORAL ONCE
Refills: 0 | Status: COMPLETED | OUTPATIENT
Start: 2025-09-13 | End: 2025-09-13

## 2025-09-13 RX ORDER — ACETAMINOPHEN 500 MG/5ML
1000 LIQUID (ML) ORAL ONCE
Refills: 0 | Status: COMPLETED | OUTPATIENT
Start: 2025-09-13 | End: 2025-09-13

## 2025-09-13 RX ORDER — MAGNESIUM SULFATE 500 MG/ML
1 SYRINGE (ML) INJECTION ONCE
Refills: 0 | Status: COMPLETED | OUTPATIENT
Start: 2025-09-13 | End: 2025-09-13

## 2025-09-13 RX ADMIN — Medication 100 GRAM(S): at 22:43

## 2025-09-13 RX ADMIN — Medication 10 MILLIGRAM(S): at 17:46

## 2025-09-13 RX ADMIN — Medication 400 MILLIGRAM(S): at 17:46

## 2025-09-13 RX ADMIN — Medication 1000 MILLILITER(S): at 17:46

## 2025-09-17 ENCOUNTER — APPOINTMENT (OUTPATIENT)
Dept: FAMILY MEDICINE | Facility: CLINIC | Age: 45
End: 2025-09-17
Payer: MEDICAID

## 2025-09-17 VITALS
OXYGEN SATURATION: 98 % | DIASTOLIC BLOOD PRESSURE: 86 MMHG | RESPIRATION RATE: 16 BRPM | HEIGHT: 61 IN | SYSTOLIC BLOOD PRESSURE: 135 MMHG | HEART RATE: 83 BPM | WEIGHT: 162 LBS | TEMPERATURE: 97.6 F | BODY MASS INDEX: 30.58 KG/M2

## 2025-09-17 DIAGNOSIS — G89.29 LOW BACK PAIN, UNSPECIFIED: ICD-10-CM

## 2025-09-17 DIAGNOSIS — M79.7 FIBROMYALGIA: ICD-10-CM

## 2025-09-17 DIAGNOSIS — N94.6 DYSMENORRHEA, UNSPECIFIED: ICD-10-CM

## 2025-09-17 DIAGNOSIS — G43.109 MIGRAINE WITH AURA, NOT INTRACTABLE, W/OUT STATUS MIGRAINOSUS: ICD-10-CM

## 2025-09-17 DIAGNOSIS — R51.9 HEADACHE, UNSPECIFIED: ICD-10-CM

## 2025-09-17 DIAGNOSIS — M54.2 CERVICALGIA: ICD-10-CM

## 2025-09-17 DIAGNOSIS — G89.29 DORSALGIA, UNSPECIFIED: ICD-10-CM

## 2025-09-17 DIAGNOSIS — F41.9 ANXIETY DISORDER, UNSPECIFIED: ICD-10-CM

## 2025-09-17 DIAGNOSIS — M54.9 DORSALGIA, UNSPECIFIED: ICD-10-CM

## 2025-09-17 DIAGNOSIS — M25.50 PAIN IN UNSPECIFIED JOINT: ICD-10-CM

## 2025-09-17 DIAGNOSIS — F32.A ANXIETY DISORDER, UNSPECIFIED: ICD-10-CM

## 2025-09-17 DIAGNOSIS — J45.909 UNSPECIFIED ASTHMA, UNCOMPLICATED: ICD-10-CM

## 2025-09-17 DIAGNOSIS — M54.50 LOW BACK PAIN, UNSPECIFIED: ICD-10-CM

## 2025-09-17 DIAGNOSIS — R92.30 DENSE BREASTS, UNSPECIFIED: ICD-10-CM

## 2025-09-17 LAB
ALBUMIN SERPL ELPH-MCNC: 5 G/DL
ALP BLD-CCNC: 64 U/L
ALT SERPL-CCNC: 19 U/L
AMYLASE/CREAT SERPL: 124 U/L
ANION GAP SERPL CALC-SCNC: 16 MMOL/L
APPEARANCE: CLEAR
APTT BLD: 31.7 SEC
AST SERPL-CCNC: 20 U/L
BASOPHILS # BLD AUTO: 0.05 K/UL
BASOPHILS NFR BLD AUTO: 0.8 %
BILIRUB SERPL-MCNC: 0.3 MG/DL
BILIRUBIN URINE: NEGATIVE
BLOOD URINE: NEGATIVE
BUN SERPL-MCNC: 7 MG/DL
CALCIUM SERPL-MCNC: 10.1 MG/DL
CHLORIDE SERPL-SCNC: 102 MMOL/L
CO2 SERPL-SCNC: 20 MMOL/L
COLOR: YELLOW
CREAT SERPL-MCNC: 0.54 MG/DL
EGFRCR SERPLBLD CKD-EPI 2021: 117 ML/MIN/1.73M2
EOSINOPHIL # BLD AUTO: 0.11 K/UL
EOSINOPHIL NFR BLD AUTO: 1.7 %
FERRITIN SERPL-MCNC: 28 NG/ML
GLUCOSE QUALITATIVE U: NEGATIVE MG/DL
GLUCOSE SERPL-MCNC: 101 MG/DL
HCG SERPL-MCNC: <1 MIU/ML
HCT VFR BLD CALC: 43.9 %
HGB BLD-MCNC: 14.2 G/DL
IMM GRANULOCYTES NFR BLD AUTO: 0.2 %
INR PPP: 0.9 RATIO
IRON SATN MFR SERPL: 24 %
IRON SERPL-MCNC: 105 UG/DL
KETONES URINE: NEGATIVE MG/DL
LEUKOCYTE ESTERASE URINE: NEGATIVE
LPL SERPL-CCNC: 34 U/L
LYMPHOCYTES # BLD AUTO: 2.89 K/UL
LYMPHOCYTES NFR BLD AUTO: 45.3 %
MAGNESIUM SERPL-MCNC: 1.9 MG/DL
MAN DIFF?: NORMAL
MCHC RBC-ENTMCNC: 28.3 PG
MCHC RBC-ENTMCNC: 32.3 G/DL
MCV RBC AUTO: 87.6 FL
MONOCYTES # BLD AUTO: 0.44 K/UL
MONOCYTES NFR BLD AUTO: 6.9 %
NEUTROPHILS # BLD AUTO: 2.88 K/UL
NEUTROPHILS NFR BLD AUTO: 45.1 %
NITRITE URINE: NEGATIVE
PH URINE: 7
PLATELET # BLD AUTO: 418 K/UL
POTASSIUM SERPL-SCNC: 4 MMOL/L
PROT SERPL-MCNC: 8 G/DL
PROTEIN URINE: NEGATIVE MG/DL
PT BLD: 10.4 SEC
RBC # BLD: 5.01 M/UL
RBC # FLD: 13 %
SODIUM SERPL-SCNC: 138 MMOL/L
SPECIFIC GRAVITY URINE: 1.01
TIBC SERPL-MCNC: 444 UG/DL
TSH SERPL-ACNC: 0.76 UIU/ML
UIBC SERPL-MCNC: 339 UG/DL
UROBILINOGEN URINE: 0.2 MG/DL
WBC # FLD AUTO: 6.38 K/UL

## 2025-09-17 PROCEDURE — 99214 OFFICE O/P EST MOD 30 MIN: CPT | Mod: 25

## 2025-09-17 PROCEDURE — 36415 COLL VENOUS BLD VENIPUNCTURE: CPT

## 2025-09-17 PROCEDURE — 99396 PREV VISIT EST AGE 40-64: CPT

## 2025-09-17 RX ORDER — CHLORHEXIDINE GLUCONATE 4 %
325 (65 FE) LIQUID (ML) TOPICAL DAILY
Qty: 30 | Refills: 1 | Status: ACTIVE | COMMUNITY
Start: 2025-09-17 | End: 1900-01-01

## 2025-09-19 ENCOUNTER — APPOINTMENT (OUTPATIENT)
Dept: OBGYN | Facility: CLINIC | Age: 45
End: 2025-09-19
Payer: MEDICAID

## 2025-09-19 VITALS
HEART RATE: 87 BPM | DIASTOLIC BLOOD PRESSURE: 86 MMHG | SYSTOLIC BLOOD PRESSURE: 134 MMHG | HEIGHT: 61 IN | WEIGHT: 159 LBS | BODY MASS INDEX: 30.02 KG/M2

## 2025-09-19 DIAGNOSIS — R11.0 NAUSEA: ICD-10-CM

## 2025-09-19 DIAGNOSIS — N92.1 EXCESSIVE AND FREQUENT MENSTRUATION WITH IRREGULAR CYCLE: ICD-10-CM

## 2025-09-19 DIAGNOSIS — N93.9 ABNORMAL UTERINE AND VAGINAL BLEEDING, UNSPECIFIED: ICD-10-CM

## 2025-09-19 DIAGNOSIS — R14.0 ABDOMINAL DISTENSION (GASEOUS): ICD-10-CM

## 2025-09-19 LAB
ALBUMIN SERPL ELPH-MCNC: 5 G/DL
ALP BLD-CCNC: 63 U/L
ALT SERPL-CCNC: 18 U/L
ANA SCREEN BY IMMUNOASSAY: NEGATIVE
ANION GAP SERPL CALC-SCNC: 17 MMOL/L
AST SERPL-CCNC: 22 U/L
BASOPHILS # BLD AUTO: 0.04 K/UL
BASOPHILS NFR BLD AUTO: 0.6 %
BILIRUB SERPL-MCNC: 0.4 MG/DL
BUN SERPL-MCNC: 8 MG/DL
CALCIUM SERPL-MCNC: 9.9 MG/DL
CCP AB SER IA-ACNC: <8 U/ML
CCP AB SER QL: NEGATIVE
CENTROMERE B AB SER-ACNC: <0.2 AL
CHLORIDE SERPL-SCNC: 103 MMOL/L
CHOLEST SERPL-MCNC: 211 MG/DL
CHROMATIN AB SERPL-ACNC: <0.2 AL
CO2 SERPL-SCNC: 21 MMOL/L
CREAT SERPL-MCNC: 0.57 MG/DL
CRP SERPL-MCNC: 3 MG/L
DSDNA AB SER-ACNC: <1 IU/ML
EGFRCR SERPLBLD CKD-EPI 2021: 116 ML/MIN/1.73M2
ENA JO1 AB SER-ACNC: <0.2 AL
ENA RNP AB SER-ACNC: <0.2 AL
ENA SCL70 AB SER-ACNC: <0.2 AL
ENA SM AB SER-ACNC: <0.2 AL
ENA SS-A AB SER-ACNC: <0.2 AL
ENA SS-B AB SER-ACNC: <0.2 AL
EOSINOPHIL # BLD AUTO: 0.1 K/UL
EOSINOPHIL NFR BLD AUTO: 1.4 %
ERYTHROCYTE [SEDIMENTATION RATE] IN BLOOD BY WESTERGREN METHOD: 28 MM/HR
GLUCOSE SERPL-MCNC: 105 MG/DL
HCT VFR BLD CALC: 42.4 %
HDLC SERPL-MCNC: 53 MG/DL
HGB BLD-MCNC: 14.1 G/DL
IMM GRANULOCYTES NFR BLD AUTO: 0 %
LDLC SERPL-MCNC: 139 MG/DL
LYMPHOCYTES # BLD AUTO: 2.79 K/UL
LYMPHOCYTES NFR BLD AUTO: 39.7 %
MAN DIFF?: NORMAL
MCHC RBC-ENTMCNC: 28.6 PG
MCHC RBC-ENTMCNC: 33.3 G/DL
MCV RBC AUTO: 86 FL
MONOCYTES # BLD AUTO: 0.52 K/UL
MONOCYTES NFR BLD AUTO: 7.4 %
NEUTROPHILS # BLD AUTO: 3.57 K/UL
NEUTROPHILS NFR BLD AUTO: 50.9 %
NONHDLC SERPL-MCNC: 158 MG/DL
PLATELET # BLD AUTO: 448 K/UL
POTASSIUM SERPL-SCNC: 4.4 MMOL/L
PROT SERPL-MCNC: 7.9 G/DL
RBC # BLD: 4.93 M/UL
RBC # FLD: 12.7 %
RHEUMATOID FACT SERPL-ACNC: <10 IU/ML
RIBOSOMAL P AB SER-ACNC: <0.2 AL
SODIUM SERPL-SCNC: 141 MMOL/L
TRIGL SERPL-MCNC: 108 MG/DL
WBC # FLD AUTO: 7.02 K/UL

## 2025-09-19 PROCEDURE — 99213 OFFICE O/P EST LOW 20 MIN: CPT

## 2025-09-22 PROBLEM — I50.30 (HFPEF) HEART FAILURE WITH PRESERVED EJECTION FRACTION: Status: ACTIVE | Noted: 2025-09-22

## 2025-09-22 PROBLEM — N93.9 VAGINAL BLEEDING: Status: ACTIVE | Noted: 2025-09-22

## (undated) DEVICE — DRAPE LIGHT HANDLE COVER (GREEN)

## (undated) DEVICE — GOWN XL

## (undated) DEVICE — GLV 7 PROTEXIS (CREAM) MICRO

## (undated) DEVICE — POSITIONER STRAP ARMBOARD VELCRO TS-30

## (undated) DEVICE — VACUUM CURETTE BUSSE HOSP CURVED 8MM

## (undated) DEVICE — TISSUE TRAP BERKELEY SAFE TOUCH

## (undated) DEVICE — LABELS BLANK W PEN

## (undated) DEVICE — VACUUM CURETTE BERKLEY OLYMPUS CURVED 9MM

## (undated) DEVICE — PACK D&C

## (undated) DEVICE — DRSG TELFA 3 X 8

## (undated) DEVICE — GOWN LG

## (undated) DEVICE — WARMING BLANKET FULL ADULT

## (undated) DEVICE — DRSG PAD SANITARY OB

## (undated) DEVICE — VENODYNE/SCD SLEEVE CALF MEDIUM

## (undated) DEVICE — VACUUM CURETTE BUSSE HOSP CURVED 7MM

## (undated) DEVICE — TUBING GYRUS ACMI COLLECTION SET 6FT

## (undated) DEVICE — PREP BETADINE SPONGE STICKS

## (undated) DEVICE — BASIN SET DOUBLE

## (undated) DEVICE — SOL IRR POUR H2O 500ML

## (undated) DEVICE — PROTECTOR HEEL / ELBOW FLUFFY

## (undated) DEVICE — BOTTLE COLLECTION KIT CAP 3SM 2 LG

## (undated) DEVICE — VISITEC 4X4

## (undated) DEVICE — DRAPE TOWEL BLUE 17" X 24"